# Patient Record
Sex: MALE | Race: WHITE | NOT HISPANIC OR LATINO | URBAN - METROPOLITAN AREA
[De-identification: names, ages, dates, MRNs, and addresses within clinical notes are randomized per-mention and may not be internally consistent; named-entity substitution may affect disease eponyms.]

---

## 2024-04-18 ENCOUNTER — TELEPHONE (OUTPATIENT)
Dept: TRANSPLANT | Facility: CLINIC | Age: 59
End: 2024-04-18
Payer: MEDICARE

## 2024-04-18 NOTE — TELEPHONE ENCOUNTER
----- Message from Aleena Barnes sent at 4/18/2024  2:03 PM CDT -----  Regarding: FW: Hep C appt    Have medical records that where scanned into media from [facility]. Will call referring MD office if we need any additional information on the pt.    Referring Provider:Kina Rothman PA  Phone: [phone]  Fax: [phone]  .  ===========================================================================  ----- Message -----  From: France Manzo  Sent: 4/18/2024   1:37 PM CDT  To: [facility] Liver Referral Pool  Subject: Hep C appt                                       KINA Hart is referring this pt to see you for Hep C     Can you plz ctc pt to schedule aptt?     The ref/records are in media     Thx.France   [facility]

## 2024-04-19 ENCOUNTER — TELEPHONE (OUTPATIENT)
Dept: HEPATOLOGY | Facility: CLINIC | Age: 59
End: 2024-04-19
Payer: MEDICARE

## 2024-04-19 NOTE — TELEPHONE ENCOUNTER
----- Message from Aleena Barnes sent at 4/18/2024  2:03 PM CDT -----  Regarding: FW: Hep C appt    Have medical records that where scanned into media from [facility]. Will call referring MD office if we need any additional information on the pt.    Referring Provider:Kina Rothman PA  Phone: [phone]  Fax: [phone]  .  ===========================================================================  ----- Message -----  From: France Manzo  Sent: 4/18/2024   1:37 PM CDT  To: [facility] Liver Referral Pool  Subject: Hep C appt                                       KINA Hart is referring this pt to see you for Hep C     Can you plz ctc pt to schedule aptt?     The ref/records are in media     Thx.France   [facility]

## 2024-05-07 ENCOUNTER — LAB VISIT (OUTPATIENT)
Dept: LAB | Facility: HOSPITAL | Age: 59
End: 2024-05-07
Payer: MEDICARE

## 2024-05-07 ENCOUNTER — OFFICE VISIT (OUTPATIENT)
Dept: HEPATOLOGY | Facility: CLINIC | Age: 59
End: 2024-05-07
Payer: MEDICARE

## 2024-05-07 VITALS — WEIGHT: 278.88 LBS | HEIGHT: 73 IN | BODY MASS INDEX: 36.96 KG/M2

## 2024-05-07 DIAGNOSIS — B18.2 CHRONIC HEPATITIS C WITHOUT HEPATIC COMA: ICD-10-CM

## 2024-05-07 DIAGNOSIS — K76.6 PORTAL VENOUS HYPERTENSION: ICD-10-CM

## 2024-05-07 DIAGNOSIS — K74.60 HEPATIC CIRRHOSIS, UNSPECIFIED HEPATIC CIRRHOSIS TYPE, UNSPECIFIED WHETHER ASCITES PRESENT: ICD-10-CM

## 2024-05-07 DIAGNOSIS — K76.82 HEPATIC ENCEPHALOPATHY: ICD-10-CM

## 2024-05-07 DIAGNOSIS — B20 HUMAN IMMUNODEFICIENCY VIRUS (HIV) DISEASE: ICD-10-CM

## 2024-05-07 DIAGNOSIS — R18.8 OTHER ASCITES: ICD-10-CM

## 2024-05-07 DIAGNOSIS — K74.60 HEPATIC CIRRHOSIS, UNSPECIFIED HEPATIC CIRRHOSIS TYPE, UNSPECIFIED WHETHER ASCITES PRESENT: Primary | ICD-10-CM

## 2024-05-07 DIAGNOSIS — R77.2 ELEVATED AFP: ICD-10-CM

## 2024-05-07 DIAGNOSIS — D69.6 THROMBOCYTOPENIA: ICD-10-CM

## 2024-05-07 LAB
AFP SERPL-MCNC: 35 NG/ML (ref 0–8.4)
ALBUMIN SERPL BCP-MCNC: 2.5 G/DL (ref 3.5–5.2)
ALP SERPL-CCNC: 123 U/L (ref 55–135)
ALT SERPL W/O P-5'-P-CCNC: 24 U/L (ref 10–44)
ANION GAP SERPL CALC-SCNC: 6 MMOL/L (ref 8–16)
AST SERPL-CCNC: 100 U/L (ref 10–40)
BILIRUB SERPL-MCNC: 5.7 MG/DL (ref 0.1–1)
BUN SERPL-MCNC: 9 MG/DL (ref 6–20)
CALCIUM SERPL-MCNC: 8.7 MG/DL (ref 8.7–10.5)
CHLORIDE SERPL-SCNC: 108 MMOL/L (ref 95–110)
CO2 SERPL-SCNC: 23 MMOL/L (ref 23–29)
CREAT SERPL-MCNC: 1.2 MG/DL (ref 0.5–1.4)
EST. GFR  (NO RACE VARIABLE): >60 ML/MIN/1.73 M^2
GLUCOSE SERPL-MCNC: 76 MG/DL (ref 70–110)
HAV IGG SER QL IA: REACTIVE
HBV CORE AB SERPL QL IA: REACTIVE
INR PPP: 1.5 (ref 0.8–1.2)
POTASSIUM SERPL-SCNC: 3.8 MMOL/L (ref 3.5–5.1)
PROT SERPL-MCNC: 5.6 G/DL (ref 6–8.4)
PROTHROMBIN TIME: 16.5 SEC (ref 9–12.5)
SODIUM SERPL-SCNC: 137 MMOL/L (ref 136–145)

## 2024-05-07 PROCEDURE — 82105 ALPHA-FETOPROTEIN SERUM: CPT | Performed by: PHYSICIAN ASSISTANT

## 2024-05-07 PROCEDURE — 4010F ACE/ARB THERAPY RXD/TAKEN: CPT | Mod: CPTII,S$GLB,, | Performed by: PHYSICIAN ASSISTANT

## 2024-05-07 PROCEDURE — 80053 COMPREHEN METABOLIC PANEL: CPT | Performed by: PHYSICIAN ASSISTANT

## 2024-05-07 PROCEDURE — 1160F RVW MEDS BY RX/DR IN RCRD: CPT | Mod: CPTII,S$GLB,, | Performed by: PHYSICIAN ASSISTANT

## 2024-05-07 PROCEDURE — 85610 PROTHROMBIN TIME: CPT | Performed by: PHYSICIAN ASSISTANT

## 2024-05-07 PROCEDURE — 36415 COLL VENOUS BLD VENIPUNCTURE: CPT | Performed by: PHYSICIAN ASSISTANT

## 2024-05-07 PROCEDURE — 99999 PR PBB SHADOW E&M-EST. PATIENT-LVL IV: CPT | Mod: PBBFAC,,, | Performed by: PHYSICIAN ASSISTANT

## 2024-05-07 PROCEDURE — 3008F BODY MASS INDEX DOCD: CPT | Mod: CPTII,S$GLB,, | Performed by: PHYSICIAN ASSISTANT

## 2024-05-07 PROCEDURE — 86704 HEP B CORE ANTIBODY TOTAL: CPT | Performed by: PHYSICIAN ASSISTANT

## 2024-05-07 PROCEDURE — 99205 OFFICE O/P NEW HI 60 MIN: CPT | Mod: S$GLB,,, | Performed by: PHYSICIAN ASSISTANT

## 2024-05-07 PROCEDURE — 86790 VIRUS ANTIBODY NOS: CPT | Performed by: PHYSICIAN ASSISTANT

## 2024-05-07 PROCEDURE — 1159F MED LIST DOCD IN RCRD: CPT | Mod: CPTII,S$GLB,, | Performed by: PHYSICIAN ASSISTANT

## 2024-05-07 RX ORDER — PANTOPRAZOLE SODIUM 40 MG/1
40 TABLET, DELAYED RELEASE ORAL DAILY
COMMUNITY
Start: 2024-01-15

## 2024-05-07 RX ORDER — PYRIDOXINE HCL (VITAMIN B6) 100 MG
100 TABLET ORAL
COMMUNITY

## 2024-05-07 RX ORDER — FLUOXETINE HYDROCHLORIDE 20 MG/1
20 CAPSULE ORAL
COMMUNITY
Start: 2023-10-20

## 2024-05-07 RX ORDER — ROSUVASTATIN CALCIUM 10 MG/1
10 TABLET, COATED ORAL NIGHTLY
COMMUNITY
Start: 2024-04-15

## 2024-05-07 RX ORDER — CHOLECALCIFEROL (VITAMIN D3) 25 MCG
1000 TABLET ORAL
COMMUNITY

## 2024-05-07 RX ORDER — FENOFIBRATE 160 MG/1
TABLET ORAL
COMMUNITY
Start: 2024-01-15

## 2024-05-07 RX ORDER — SPIRONOLACTONE 50 MG/1
100 TABLET, FILM COATED ORAL DAILY
Qty: 30 TABLET | Refills: 1 | Status: SHIPPED | OUTPATIENT
Start: 2024-05-07

## 2024-05-07 RX ORDER — ERGOCALCIFEROL 1.25 MG/1
50000 CAPSULE ORAL
COMMUNITY
Start: 2024-03-26

## 2024-05-07 RX ORDER — FUROSEMIDE 20 MG/1
40 TABLET ORAL DAILY
Qty: 30 TABLET | Refills: 1 | Status: SHIPPED | OUTPATIENT
Start: 2024-05-07

## 2024-05-07 RX ORDER — ZOLPIDEM TARTRATE 10 MG/1
10 TABLET ORAL
COMMUNITY
Start: 2024-01-02

## 2024-05-07 RX ORDER — LACTULOSE 10 G/15ML
20 SOLUTION ORAL 2 TIMES DAILY
Qty: 1800 ML | Refills: 3 | Status: SHIPPED | OUTPATIENT
Start: 2024-05-07

## 2024-05-07 RX ORDER — LANOLIN ALCOHOL/MO/W.PET/CERES
1000 CREAM (GRAM) TOPICAL
COMMUNITY

## 2024-05-07 RX ORDER — ELVITEGRAVIR, COBICISTAT, EMTRICITABINE, AND TENOFOVIR ALAFENAMIDE 150; 150; 200; 10 MG/1; MG/1; MG/1; MG/1
TABLET ORAL
COMMUNITY
Start: 2024-03-28

## 2024-05-07 RX ORDER — VALSARTAN 40 MG/1
1 TABLET ORAL NIGHTLY
COMMUNITY
Start: 2023-10-30

## 2024-05-07 RX ORDER — TAMSULOSIN HYDROCHLORIDE 0.4 MG/1
0.4 CAPSULE ORAL NIGHTLY
COMMUNITY
Start: 2024-04-25

## 2024-05-07 RX ORDER — METOPROLOL SUCCINATE 25 MG/1
25 TABLET, EXTENDED RELEASE ORAL
COMMUNITY
Start: 2024-01-12

## 2024-05-07 RX ORDER — BENZONATATE 100 MG/1
100 CAPSULE ORAL 3 TIMES DAILY PRN
COMMUNITY
Start: 2023-11-16 | End: 2024-11-15

## 2024-05-07 RX ORDER — LORATADINE 10 MG/1
10 TABLET ORAL
COMMUNITY

## 2024-05-07 NOTE — Clinical Note
Pls call pt: 1. MELD score yesterday was 21. As we discussed, since MELD is > 15 I am entering a referral to liver transplant clinic. They will contact him to schedule appt. May take week or 2 for them to reach out b/c they'll need to do financial check w/ insurance 2. Liver cancer screening blood test was mildly elevated. This can happen when someone has HCV and recent CT looked good which is a good sign. However, I want to be extra thorough and have him do MRI.  3. Schedule BMP in 1 week & MRI sometime soon

## 2024-05-07 NOTE — PROGRESS NOTES
HEPATOLOGY CLINIC VISIT NOTE - HCV clinic  OUTSIDE REFERRING PROVIDER: Kina Rothman PA   CHIEF COMPLAINT: Hepatitis C   (accompanied by: mother and friend)    HISTORY       This is a 58 y.o. White male with PMH of HIV / AIDS (dx 1990s, on Genvoya), who has been followed by hematology since 2021 for chronic, down-trending low plt / WBC & SM in context of HIV/AIDS. Work up including BM bx (5/2022) was unyielding. Trial of prednisone for possible ITP was not effective. More recently pt seen by local GI for HSM w/ elevated TBili. Work up revealed HCV and Cirrhosis for which pt was referred here.    HCV history:  Diagnosed 3/2024 after being referred to local GI for HSM on imaging  Unsure how he acquired HCV since 2013 screening was negative & denies risk since then.  - Prior Treatment: No  - Genotype ?  - HCV RNA >7 Mill IU/mL - 3/2024    Liver staging:  FibroScan 4/2024: kPa 72.9 (F4),  (S3)  Labs and imaging support staging    Cirrhosis history:  Decompensated: see below  (+) portal HTN: SM, low plt    Current sx of hepatic decompensation  Ascites - yes on recent imaging, (+) abd distention, up 40 lbs  ULI - yes, lower legs  Diuretic use - no  TBili elevation - yes, 4s  HE - yes, slowed mentation, mild confusion, poor balance, gait disturbance, daytime sleepiness   HE meds - no  EV bleed / hematemesis / melena - no  Albumin - low, 2s  Platelets - low, 20s  Coagulopathy - ? No INR to review    Estimated MELD 5/2024: 15 (with assigned INR of 1.0)    Cirrhosis health maintenance:  - HCC screening: CT w/ contrast 3/2024: no liver lesions; AFP needed  - Varices screening:  EGD needed  - HAV status: Unknown  - HBV status: (+) immunity, not infected. Unknown exposure    HIV history:  Diagnosed 1990s; Followed by local ID, Dr Gabriel Mcgee  On Genvoya since 2018  3/2024: CD4 166, HIV RNA <20    PMH, PSH, SOCIAL HX, FAMILY HX      Reviewed in Epic  Pertinent findings:  FAMILY HX: neg for liver diease  SOCIAL HX:  Resides in [place].  Alcohol - Prior alcohol use, Very rare now.  Drugs - no      ROS: as per HPI    PHYSICAL EXAM:  Friendly White male, in no acute distress; alert and oriented to person, place and time.   HEENT: Sclerae anicteric.   NECK: Supple  LUNGS: Normal respiratory effort.   ABDOMEN: Protuberant / distended but still depressible. No organomegaly or masses. (+) ascites.   SKIN: Warm and dry. No jaundice, No obvious rashes.   EXTREMITIES: (+) pitting edema to knees bilat  NEURO/PSYCH: Normal gate. Memory intact. Speech is slow but logical and coherent. Behavior normal. No depression or anxiety noted.    PERTINENT DIAGNOSTIC RESULTS      Outside labs: Care Everywhere          3/26/24  A1AT 156  MICHOACANO neg, SMA Neg, AMA neg  Ferritin 256, Fe 146, Fe sat 60%, TIBC 244  HBsAg neg, HBsAb pos      CT ABDOMEN AND PELVIS WITH CONTRAST  - 3/20/24  CLINICAL INFORMATION:  Left lower quadrant abdominal pain.   COMPARISON:  CT abdomen with contrast dated March 2, 2010. MRI abdomen without and with contrast dated March 19, 2013.   TECHNIQUE: Multiple axial images of the abdomen and pelvis were obtained following the intravenous administration of 100 mL of Isovue-300. Coronal and sagittal reformatted images were also reviewed.     FINDINGS:     Partially imaged postsurgical changes of prior median sternotomy.     The liver is mildly enlarged. The spleen is enlarged. The gallbladder, pancreas, adrenal glands, and kidneys demonstrate no acute abnormality. Subcentimeter left lower renal cortical hypodensity, too small to accurately characterize. No hydronephrosis.   The urinary bladder is nearly completely collapsed with apparent mild wall thickening. The prostate gland is within normal size limits. The small bowel is nondilated. Colonic diverticulosis. There is diffuse mesenteric stranding and small volume   abdominopelvic ascites. No intraperitoneal free air. Scattered calcific atherosclerosis of the abdominal aorta  and its major branch vessels.     Mild diffuse body wall edema. Mild multilevel degenerative changes of the spine.       IMPRESSION:   Diffuse mesenteric stranding and small volume abdominopelvic ascites with mild diffuse body wall edema. These findings are of uncertain etiology.   Hepatosplenomegaly.   Colonic diverticulosis.   Apparent mild circumferential urinary bladder wall thickening. Correlate clinically and with urinalysis to exclude cystitis.   Additional findings as above.     U/S ABDOMEN 1/17/23  CLINICAL HISTORY: Thrombocytopenia   COMPARISON: Ultrasound from May 27, 2022   TECHNIQUE: Ultrasound imaging of the liver, gallbladder, CBD, biliary tract, pancreas, kidneys, spleen, abdominal aorta and IVC was performed. Color doppler performed.     FINDINGS:   The pancreas is normal in as far as visualized.      No free fluid is identified within the abdomen.     The liver measures 17.3 cm in length. Echogenicity of the hepatic parenchyma is normal.  No focal liver masses are identified. Portal and hepatic veins are patent and demonstrate appropriate in direction of flow.     No biliary ductal dilatation is identified with the common duct measuring 4 mm in greatest diameter.     The gallbladder is without gallstones, mild gallbladder wall thickening at 5-6 mm, no pericholecystic fluid.     The spleen measures 17.3 x 15.4 x 11 cm.     The kidneys are normal in size and position. The right kidney measures 11.6 cm and the left kidney measures 12.3 cm in length. There is no evidence of hydronephrosis, urolithiasis, or perinephric fluid. Small echogenic area measuring 7-8 mm and the left   kidney is nonspecific     Limited imaging of the aorta and IVC reveal they are unremarkable.     IMPRESSION:   1. Redemonstration of splenomegaly   2.  Mild gallbladder wall thickening without gallstones, this is a nonspecific observation     FIBROSCAN 4/3/24  Diagnosis: Elevated liver enzymes   FibroScan steatosis result (CAP  score): 371 decibels per meter (dB/m)   FibroScan fibrosis result: 72.9 kilopascals (kPa)   IQR/med.: 7%   S3 hepatic steatosis and F4 cirrhosis of the liver.       ASSESSMENT        58 y.o. White male with:  1. Chronic HCV, genotype  ?  - treatment naive  -- Elevated transaminases  -- HAV status:  Unknown  -- HBV status:  Immunity w/ unknown exposure: Pos HBsAb, Unknown HBcAb (neg HBsAg)    2. Cirrhosis, decompensated  -- MELD score (estimated): 15  -- HCC screening: CT 3/2024 w/o liver lesions, needs AFP  -- HE: not on meds  -- Ascites / ULI: not on diuretics    3. Portal hypertension  -- pancytopenia (Plt 20s, WBC 1s-2s)  -- SM  -- EGD needed    4. HIV / AIDS  -- outside ID, on genvoya  -- 3/2024: CD4 166, HIV RNA <20    PLAN      Labs today  Begin lactulose, mechanism of action & dose titration reviewed  Begin Drummond 100 + Lasix 40, Low Na diet 2000mg; BMP in 1 week  EGD for EV screen: pt to ask referring GI provider if they can schedule, if not will order to be done at [facility]  No driving    Orders Placed This Encounter   Procedures    Hepatitis A antibody, IgG    AFP Tumor Marker    Hepatitis B Core Antibody, Total    Protime-INR    Comprehensive Metabolic Panel     Suspect MELD will be > 15 on next labs. If this is the case will refer for liver transplant eval. HCV Rx on hold for now.     ADDENDUM 5/824:  5/7/24 labs:  MELD 3.0: 21 at 5/7/2024  2:47 PM  MELD-Na: 19 at 5/7/2024  2:47 PM  Calculated from:  Serum Creatinine: 1.2 mg/dL at 5/7/2024  2:47 PM  Serum Sodium: 137 mmol/L at 5/7/2024  2:47 PM  Total Bilirubin: 5.7 mg/dL at 5/7/2024  2:47 PM  Serum Albumin: 2.5 g/dL at 5/7/2024  2:47 PM  INR(ratio): 1.5 at 5/7/2024  2:47 PM  Age at listing (hypothetical): 58 years  Sex: Male at 5/7/2024  2:47 PM    AFP 35 (no prior for comparison)  HBcAb positive  HAVAB reactive    Plan: MRI abdomen, Refer to liver transplant    ___________________________________________________________________  EDUCATION:  HCV   The  natural history of Hepatitis C, including potential progression to cirrhosis was reviewed. We discussed the increased progression of liver disease secondary to alcohol use; patient was advised to avoid alcohol completely.     Transmission of Hepatitis C was reviewed, including possible sexual transmission. Sexual contacts should be screened. Patient should avoid sharing personal products such as razors, toothbrushes, etc.     CIRRHOSIS  Discussed evidence that indicates that pt has cirrhosis.   -- Discussed liver fibrosis/scarring and relation to liver function. Reviewed significance of MELD scoring system as it relates to indication for transplant.  -- Signs and symptoms of hepatic decompensation were reviewed, including jaundice, ascites, and slowed mentation due to hepatic encephalopathy. The patient should seek medical attention if any of these things occur.   --  We discussed the potential for bleeding from esophageal varices with symptoms of hematemesis and melena. The patient should report to the Emergency Department for these symptoms.   -- We discussed the increased risk of hepatocellular carcinoma due to cirrhosis. Lifelong screening every six months with ultrasound and AFP is recommended.   -- Discussed portal hypertension, including potential development of esophageal varices. Role of EGD in screening for varices was reviewed.     -- Tylenol (acetaminophen) is okay up to 2,000mg daily  -- Avoid NSAIDs     Dietary recommendations:  -- Avoid alcohol  -- Avoid raw seafood  -- Limit Na to 2,000mg     __________________________________________________________________    Duration of encounter: 61 min  This includes face-to-face time and non face-to-face time preparing to see the patient (eg, review of tests), obtaining and/or reviewing separately obtained history, documenting clinical information in the electronic or other health record, independently interpreting resultsand communicating results to the  patient/family/caregiver, or care coordination.

## 2024-05-08 ENCOUNTER — TELEPHONE (OUTPATIENT)
Dept: HEPATOLOGY | Facility: CLINIC | Age: 59
End: 2024-05-08
Payer: MEDICARE

## 2024-05-08 ENCOUNTER — TELEPHONE (OUTPATIENT)
Dept: TRANSPLANT | Facility: CLINIC | Age: 59
End: 2024-05-08
Payer: MEDICARE

## 2024-05-08 ENCOUNTER — PATIENT MESSAGE (OUTPATIENT)
Dept: HEPATOLOGY | Facility: CLINIC | Age: 59
End: 2024-05-08
Payer: MEDICARE

## 2024-05-08 NOTE — TELEPHONE ENCOUNTER
Referral received from Jennifer Scheuermann discussed with Dr Turner   Initial  referral from Referring Provider:Kina Rothman PA  Phone: [phone]  Fax: [phone]  Patient with HCV CIRRHOSIS . MELD 21  ICD-10:   B18.2          K 74.60   Referred for liver transplant for  EVALUATION.    Referral completed and forwarded to [facility].          Insurance: EPIC

## 2024-05-08 NOTE — TELEPHONE ENCOUNTER
----- Message from Jennifer B. Scheuermann, PA sent at 5/8/2024 11:01 AM CDT -----  MELD 21. Case d/w Dr Turner. Liver transplant referral entered. Pt aware.

## 2024-05-08 NOTE — TELEPHONE ENCOUNTER
----- Message from Jennifer B. Scheuermann, PA sent at 5/8/2024 10:57 AM CDT -----  Pls call pt:  1. MELD score yesterday was 21. As we discussed, since MELD is > 15 I am entering a referral to liver transplant clinic. They will contact him to schedule appt. May take week or 2 for them to reach out b/c they'll need to do financial check w/ insurance  2. Liver cancer screening blood test was mildly elevated. This can happen when someone has HCV and recent CT looked good which is a good sign. However, I want to be extra thorough and have him do MRI.   3. Schedule BMP in 1 week & MRI sometime soon

## 2024-05-08 NOTE — TELEPHONE ENCOUNTER
I spoke with patient and msg from PA Scheuermann relayed.  He asked that orders be faxed to [facility] for scheduling; done and referral for radiology changed to external.

## 2024-05-09 ENCOUNTER — TELEPHONE (OUTPATIENT)
Dept: HEPATOLOGY | Facility: CLINIC | Age: 59
End: 2024-05-09
Payer: MEDICARE

## 2024-05-09 NOTE — TELEPHONE ENCOUNTER
Lissett Webb Shelley  Caller: Unspecified (Yesterday, 11:04 AM)  Patient has [employer] Medicare HMO. This plan is out of network with main campus for medical services. There are no out of network benefits. If auth is approved, single case agreement will be initiated to allow patient to be seen here for transplant services only. Faxed request to insurance.

## 2024-05-09 NOTE — TELEPHONE ENCOUNTER
Ashley Domingo ([phone]) and patient called back.  Patient's plan of care discussed.  It was stressed that they call back if EGD not setup with local MD.

## 2024-05-09 NOTE — TELEPHONE ENCOUNTER
I spoke with patient.  He states that he will call me back later when his friend Ashley gets to his home.  She helps to coordinate his medical care and he states that I can talk with her and discuss all of his medical issues because he has memory issues.

## 2024-05-09 NOTE — TELEPHONE ENCOUNTER
----- Message from Patricia Stephenson MA sent at 5/8/2024  4:48 PM CDT -----  Regarding: FW: Returning a Missed Call  Contact: Jam Card    ----- Message -----  From: Guillermina Ayala  Sent: 5/8/2024   4:44 PM CDT  To: [facility] Hepatology Scheduling  Subject: Returning a Missed Call                          Returning a Missed Call     Caller:Jam Card         Returning call to: Beverley     Caller can be reached @:[phone] (home)       Nature of the call:Patient returning call to beverley. Requesting  a call back

## 2024-05-09 NOTE — TELEPHONE ENCOUNTER
----- Message from Chantal Mehta sent at 5/9/2024 11:22 AM CDT -----  Regarding: Prior Auth  Contact: [phone]  CONSULT/ADVISORY    Name of Caller:  Kathleen with [facility]    Contact Preference: [phone]  Fax [phone]    Nature of Call:  States pt has an order for MRI of the abdomen, but needs prior auth before it can be scheduled.

## 2024-05-13 ENCOUNTER — TELEPHONE (OUTPATIENT)
Dept: HEPATOLOGY | Facility: CLINIC | Age: 59
End: 2024-05-13
Payer: MEDICARE

## 2024-05-13 NOTE — TELEPHONE ENCOUNTER
Ashley called our office.  She states that patient has lost 11 lbs since starting the current diuretic regimen.  He still has some abdominal swelling but it has improved.  He is taking lactulose as ordered but is sleeping more than usual and is processing information slowly.  He has fallen 3 times within the last 24 hours and one time he landed flat on his back (patient does have a history of balance issues); she and mom are unsure if patient hit his head during the falls.  Per his mom his skin and eyes look a little yellow but no skin bruising reported.  Ashley states that patient denied having edema in arms or legs, SOB, chest pain, coughing up blood, seeing blood in stool/urine, or passing black tarry stool.     I spoke with PA Scheuermann and the above relayed.  You ordered that patient report to ER.  I spoke with Ashley and order relayed.  She states that they will bring patient to [facility] ER.

## 2024-05-15 NOTE — TELEPHONE ENCOUNTER
Received notification from Lissett Burns Shelley  Caller: Unspecified (1 week ago, 11:04 AM)  Patient has been denied by insurance due to out of network status. Per the , there are in-network transplant facilities the patient can go to. This decision was rendered by the medical director and they will be mailing those options to the patient.        Pt was called, spoke with care giver informed of denial due to OON. Informed referring office will be called and informed they need to refer the pt to [facility] in [place]. Care giver with many questions re the process at [facility]. I advised her to call them and speak with the liver transplant center for any additional information.     Call to the referring office of r:Kina Rothman PA Phone: [phone] Fax: [phone]. M with staff for return call.     Second call to referring to notify pt insurance OON. Medical records faxed to [facility] as courtesy of pt.

## 2024-05-16 ENCOUNTER — TELEPHONE (OUTPATIENT)
Dept: HEPATOLOGY | Facility: CLINIC | Age: 59
End: 2024-05-16
Payer: MEDICARE

## 2024-05-16 NOTE — TELEPHONE ENCOUNTER
----- Message from Guillermina Ayala sent at 5/16/2024  8:47 AM CDT -----  Regarding: Returning a Missed Call  Contact: [email or web address]  Returning a Missed Call     Caller:[email or web address]        Returning call to: Jeimy     Caller can be reached @:[phone]     Nature of the call:Carolina is returning Libby call in reference to patient. Requesting a call back

## 2024-05-17 ENCOUNTER — TELEPHONE (OUTPATIENT)
Dept: TRANSPLANT | Facility: CLINIC | Age: 59
End: 2024-05-17
Payer: MEDICARE

## 2024-05-17 NOTE — TELEPHONE ENCOUNTER
Spoke to pts care giver informed we officially received the denial letter with in network facilities. [facility] and [facility] or the only two txp facilities in network. Ashley(care giver) stated she already appealed the decision. Both facilities are too far to travel to.  Referring office called as well and staff took at message to give to Kina Rothman PA Phone: [phone] .

## 2024-05-17 NOTE — TELEPHONE ENCOUNTER
Auth received for MRI.  Info faxed to [facility].  They will contact patient for scheduling. Ashley (family friend) notified.

## 2024-05-21 ENCOUNTER — TELEPHONE (OUTPATIENT)
Dept: HEPATOLOGY | Facility: CLINIC | Age: 59
End: 2024-05-21
Payer: MEDICARE

## 2024-05-21 NOTE — TELEPHONE ENCOUNTER
I spoke with Ashley.  She states that patient has been approved to see us here at [facility] for a possible transplant.  This info is being forwarded to the transplant team.

## 2024-05-21 NOTE — TELEPHONE ENCOUNTER
----- Message from Cherise Vasquez MA sent at 5/20/2024  4:57 PM CDT -----  Regarding: FW: Speak to Nurse Lowery  Contact: PT  [phone]    ----- Message -----  From: Shruthi Bowles  Sent: 5/20/2024   2:42 PM CDT  To: Scheuermann Jennifer B Staff  Subject: Speak to Nurse Lowery                            PT wife called requesting to speak to Nurse Lowery, please call at your earliest convenience   She has now has been approved for [facility] & ready to schedule    Patient can be contacted @# [phone] (Neeta)

## 2024-05-22 ENCOUNTER — TELEPHONE (OUTPATIENT)
Dept: TRANSPLANT | Facility: CLINIC | Age: 59
End: 2024-05-22
Payer: MEDICARE

## 2024-05-22 NOTE — TELEPHONE ENCOUNTER
Follow up with [facility] on pt status. Pt appealed denial, the  Decision was overturned. Approval being faxed to [facility] . STill need to call Renato degroot get a contact of . Ito send the SCA request

## 2024-05-22 NOTE — TELEPHONE ENCOUNTER
----- Message from Lissett Webb sent at 5/22/2024  3:19 PM CDT -----  Haven't received a call back from the . Left her a voicemail.  ----- Message -----  From: Jeimy Aleman  Sent: 5/22/2024   3:09 PM CDT  To: Lissett Webb    What's the status?

## 2024-05-27 NOTE — TELEPHONE ENCOUNTER
Lissett Webb Shelley  Caller: Unspecified (6 days ago, 11:25 AM)  Received voicemail from  Xi with [facility] [employer], she states the appeals department has overturned the decision, however the authorization is not finished being completed. She says once it is finalized she will call with the official auth and fax over to me.

## 2024-05-28 ENCOUNTER — TELEPHONE (OUTPATIENT)
Dept: TRANSPLANT | Facility: CLINIC | Age: 59
End: 2024-05-28
Payer: MEDICARE

## 2024-05-28 NOTE — TELEPHONE ENCOUNTER
I called and advised Ashley to bring the pt to the ER. She was bringing him to [facility].   the clr for txp eval. I explained to the pt, though she appealed the original denial due to pt insurance being OON, and being told the denial was overturned we are still pending the liver txp auth. Informed from OKSANA Webb: per REAL and Xi at [facility], the appeals department did overturn the decision however the authorization is not finished being completed. As of today, OKSANA Webb spoke with Xi(REAL) and per xi she has not received the information from the appeals department.  Even after the auth is given, we stil have to process a Single Case Agreement bec pt is OON.      I explained all this to pts caregiver Ashley. I again explained to her the pt can not come for any medical services and the auth will only be for transplant appts and evaluation.

## 2024-05-28 NOTE — TELEPHONE ENCOUNTER
----- Message from Ashley Marquis sent at 5/28/2024  8:06 AM CDT -----  Regarding: Patient advice  Contact: Ashley [phone]              Name of Caller:   Neeta friend of pt      Contact Preference:  [phone]    Nature of Call:  Requesting a call back is concerned states pt is confused thinks his pneumonemia  levels are high also want to get next steps in care plan

## 2024-06-04 ENCOUNTER — TELEPHONE (OUTPATIENT)
Dept: TRANSPLANT | Facility: CLINIC | Age: 59
End: 2024-06-04
Payer: MEDICARE

## 2024-06-04 NOTE — TELEPHONE ENCOUNTER
Pt called and he was made aware that we are still pending insurance auth.  Pt did not receive any calls from [facility]. He is continuing to follow up with Kina MITTAL.

## 2024-06-04 NOTE — TELEPHONE ENCOUNTER
Call to referring  Kina PIZARRO , updated on insurance issues and that pt can not be seen till we obtain the auth/SCA benefits.

## 2024-06-13 ENCOUNTER — TELEPHONE (OUTPATIENT)
Dept: HEPATOLOGY | Facility: CLINIC | Age: 59
End: 2024-06-13
Payer: MEDICARE

## 2024-06-13 DIAGNOSIS — K74.60 HEPATIC CIRRHOSIS, UNSPECIFIED HEPATIC CIRRHOSIS TYPE, UNSPECIFIED WHETHER ASCITES PRESENT: Primary | ICD-10-CM

## 2024-06-13 NOTE — TELEPHONE ENCOUNTER
MRI 6/12/24:  MRI OF THE ABDOMEN WITHOUT AND WITH CONTRAST     HISTORY: Follow-up cirrhosis.     TECHNIQUE: Multiplanar and multisequence MRI of the abdomen before and after 10 mL of Vueway.     FINDINGS: The liver surface has a slightly nodular-like component but there is no well-delineated mass within the liver. There is no focal area of abnormal enhancement. The spleen is markedly enlarged, also without focal defect. It measures 15.4 cm. The   portal vein is very prominent. No filling defect is identified within the portal vein, however. There are multiple linear structures extending from the splenic mackenzie towards the stomach suggesting varices. Stomach wall is mildly prominent but it is also   not well-distended. The adrenal glands and pancreas are normal. Kidneys are normal. No ascites is identified.     IMPRESSION:   1. Cirrhotic-appearing liver with splenomegaly. Prominent portal vein and probable small gastric varices.     Pls notify pt / family that MRI did not show any masses or tumors in the liver.  It is possible liver cancer screening lab was up due to HCV. We'll check it again to make sure it's not rising.    I recommend repeat AFP at end of this month.  Pls schedule      I will enter order for this for him to do locally, but it is my understanding that as of now things w/ his insurance are still not straightened out and his insurance actually does not cover my services.  (There have been efforts to get auth for transplant services here, but I am not a transplant provider so ultimately I can not continue to see him in clinic.)

## 2024-06-13 NOTE — TELEPHONE ENCOUNTER
----- Message from Starla Dewey RN sent at 6/13/2024  1:36 PM CDT -----  See MRI report for review.  Thanks

## 2024-06-14 ENCOUNTER — TELEPHONE (OUTPATIENT)
Dept: HEPATOLOGY | Facility: CLINIC | Age: 59
End: 2024-06-14
Payer: MEDICARE

## 2024-06-14 NOTE — TELEPHONE ENCOUNTER
I received refill request for spironolactone & furosemide    Since he is unable to continue seeing me in clinic I can not continue treating his fluid. I recommend he see local GI provider (or even PCP) for this. He may also need ongoing blood work to monitor kidneys and electrolytes while on fluid pills    thanks

## 2024-06-17 NOTE — TELEPHONE ENCOUNTER
I spoke with patient and msg from PA Scheuermann relayed.  AFP order faxed to [facility] and mailed to patient dated 6/28/24.

## 2024-06-19 ENCOUNTER — TELEPHONE (OUTPATIENT)
Dept: TRANSPLANT | Facility: CLINIC | Age: 59
End: 2024-06-19
Payer: MEDICARE

## 2024-06-19 NOTE — TELEPHONE ENCOUNTER
Pt called re pending SCA with insurance. He was made aware we  are not able to schedule any evaluation until we get the SCA. Pt stated he is scheduled to be seen at [facility] in October. Pt would like to continue with the potential to come to [facility].  He does not want us to close the request.

## 2024-06-20 ENCOUNTER — TELEPHONE (OUTPATIENT)
Dept: TRANSPLANT | Facility: CLINIC | Age: 59
End: 2024-06-20
Payer: MEDICARE

## 2024-06-20 NOTE — TELEPHONE ENCOUNTER
PT called, spoke with Mr. Card . Pt informed of insurance clearance for liver transplant evaluation.They were made aware pt will be assigned to Tiffanie Santiago Liver Transplant Coordinator. She will call to schedule appts needed to complete transplant evaluation.  They were made aware that the support person must be in attendance at the evaluation.   Transplant Department phone number provided to the patient. Time for questions, all questions answered.

## 2024-06-24 ENCOUNTER — TELEPHONE (OUTPATIENT)
Dept: TRANSPLANT | Facility: CLINIC | Age: 59
End: 2024-06-24
Payer: MEDICARE

## 2024-06-24 NOTE — TELEPHONE ENCOUNTER
----- Message from Aleena Barnes sent at 6/24/2024 11:46 AM CDT -----    Request submitted via [employer] for: colon done in 2017 from Jerald Pleitez office.   .  .

## 2024-07-01 ENCOUNTER — TELEPHONE (OUTPATIENT)
Dept: TRANSPLANT | Facility: CLINIC | Age: 59
End: 2024-07-01
Payer: MEDICARE

## 2024-07-01 DIAGNOSIS — B18.2 CHRONIC HEPATITIS C WITHOUT HEPATIC COMA: Primary | ICD-10-CM

## 2024-07-01 DIAGNOSIS — Z76.82 ORGAN TRANSPLANT CANDIDATE: ICD-10-CM

## 2024-07-01 DIAGNOSIS — B20 HUMAN IMMUNODEFICIENCY VIRUS (HIV) DISEASE: ICD-10-CM

## 2024-07-01 DIAGNOSIS — I25.10 CORONARY ARTERY DISEASE, UNSPECIFIED VESSEL OR LESION TYPE, UNSPECIFIED WHETHER ANGINA PRESENT, UNSPECIFIED WHETHER NATIVE OR TRANSPLANTED HEART: Primary | ICD-10-CM

## 2024-07-01 DIAGNOSIS — I25.10 CORONARY ARTERY DISEASE, UNSPECIFIED VESSEL OR LESION TYPE, UNSPECIFIED WHETHER ANGINA PRESENT, UNSPECIFIED WHETHER NATIVE OR TRANSPLANTED HEART: ICD-10-CM

## 2024-07-01 NOTE — TELEPHONE ENCOUNTER
Called patient's caregiver, Neeta Yates @ [phone] to discuss the liver transplant evaluation.  Informed her that evaluation will take approx 2 days to complete.  She voiced understanding of the need for pt to have his caregiver present for the  and surgeon consult, as well as for the patient education and reports that she will accompany him.  All questions/ concerns regarding liver transplant evaluation answered/ addressed.    Orders for liver transplant evaluation entered and submitted to  for scheduling.  As requested, will schedule appts 7/11-7/12.

## 2024-07-02 ENCOUNTER — TELEPHONE (OUTPATIENT)
Dept: TRANSPLANT | Facility: CLINIC | Age: 59
End: 2024-07-02
Payer: MEDICARE

## 2024-07-02 ENCOUNTER — PATIENT MESSAGE (OUTPATIENT)
Dept: TRANSPLANT | Facility: CLINIC | Age: 59
End: 2024-07-02
Payer: MEDICARE

## 2024-07-02 NOTE — TELEPHONE ENCOUNTER
Patient's phone call returned.  All questions related to the Fast [facility] liver transplant evaluation answered and addressed.  Understanding voiced.  Also provided nearby hotels and contact info for lodging.    ======================================================================    ----- Message from Ena Mendoza sent at 7/2/2024  9:25 AM CDT -----  Regarding: FW: Consult/Advisory  Contact: [phone]    ----- Message -----  From: Chantal Mehta  Sent: 7/2/2024   9:15 AM CDT  To: [facility] Pre-Liver Transplant Non-Clinical  Subject: Consult/Advisory                                 CONSULT/ADVISORY    Name of Caller:  NESHA SYED [[id number]]     Contact Preference:   [phone]    Nature of Call:  Pt is requesting a call back from Tiffanie.

## 2024-07-02 NOTE — TELEPHONE ENCOUNTER
----- Message from Aleena Barnes sent at 7/2/2024 11:55 AM CDT -----    Colon report dated 8/31/2017 has been scanned into media.  .

## 2024-07-08 PROBLEM — Z71.85 IMMUNIZATION COUNSELING: Status: ACTIVE | Noted: 2024-07-08

## 2024-07-08 PROBLEM — Z01.818 PRE-TRANSPLANT EVALUATION FOR LIVER TRANSPLANT: Status: ACTIVE | Noted: 2024-07-08

## 2024-07-10 ENCOUNTER — TELEPHONE (OUTPATIENT)
Dept: TRANSPLANT | Facility: CLINIC | Age: 59
End: 2024-07-10
Payer: MEDICARE

## 2024-07-10 NOTE — TELEPHONE ENCOUNTER
Patient called to confirm that they will be attending the scheduled appointments for Liver Transplant Fast Pass Evaluation scheduled to start 7/10/24  at 0730.  Patient confirms that caregivers will be present for the scheduled appointments.  Patient appointments reviewed along with location and special instructions.  Patient questions answered at this time and number provided to call the office if there is any problem.

## 2024-07-10 NOTE — TELEPHONE ENCOUNTER
----- Message from Monisha Santiago RN sent at 7/10/2024  1:54 PM CDT -----  Regarding: FW: Appt  Contact: [phone]  Jack....Aleena spoke w/ him earlier about holding the metoprolol until stress test complete on Friday.      Thanks!  ----- Message -----  From: Ashley Cho  Sent: 7/10/2024   9:41 AM CDT  To: [facility] Pre-Liver Transplant Clinical  Subject: Appt                                                         Name of Caller:Jam      Contact Preference:[phone]    Nature of Call:  Requesting a call back would like to know which medications he needs to stop taking prior to fasting labs scheduled for 07/11/24 @  7:30 and stress test  scheduled for 07/12/24

## 2024-07-11 ENCOUNTER — HOSPITAL ENCOUNTER (OUTPATIENT)
Dept: RADIOLOGY | Facility: HOSPITAL | Age: 59
Discharge: HOME | End: 2024-07-11
Attending: INTERNAL MEDICINE
Payer: MEDICARE

## 2024-07-11 ENCOUNTER — SOCIAL WORK (OUTPATIENT)
Dept: TRANSPLANT | Facility: CLINIC | Age: 59
End: 2024-07-11
Payer: MEDICARE

## 2024-07-11 ENCOUNTER — EVALUATION (OUTPATIENT)
Dept: TRANSPLANT | Facility: CLINIC | Age: 59
End: 2024-07-11
Payer: MEDICARE

## 2024-07-11 VITALS
RESPIRATION RATE: 18 BRPM | DIASTOLIC BLOOD PRESSURE: 48 MMHG | SYSTOLIC BLOOD PRESSURE: 99 MMHG | OXYGEN SATURATION: 97 % | BODY MASS INDEX: 33.92 KG/M2 | TEMPERATURE: 97 F | HEART RATE: 77 BPM | HEIGHT: 72 IN | WEIGHT: 250.44 LBS

## 2024-07-11 DIAGNOSIS — Z76.82 ORGAN TRANSPLANT CANDIDATE: ICD-10-CM

## 2024-07-11 DIAGNOSIS — B18.2 CHRONIC HEPATITIS C WITHOUT HEPATIC COMA: ICD-10-CM

## 2024-07-11 DIAGNOSIS — B18.2 CHRONIC HEPATITIS C WITHOUT HEPATIC COMA: Primary | ICD-10-CM

## 2024-07-11 DIAGNOSIS — B20 HUMAN IMMUNODEFICIENCY VIRUS (HIV) DISEASE: ICD-10-CM

## 2024-07-11 DIAGNOSIS — Z01.818 PRE-TRANSPLANT EVALUATION FOR LIVER TRANSPLANT: ICD-10-CM

## 2024-07-11 DIAGNOSIS — I25.10 CORONARY ARTERY DISEASE, UNSPECIFIED VESSEL OR LESION TYPE, UNSPECIFIED WHETHER ANGINA PRESENT, UNSPECIFIED WHETHER NATIVE OR TRANSPLANTED HEART: ICD-10-CM

## 2024-07-11 DIAGNOSIS — Z71.85 IMMUNIZATION COUNSELING: Primary | ICD-10-CM

## 2024-07-11 PROCEDURE — 93975 VASCULAR STUDY: CPT | Mod: TC,TXP

## 2024-07-11 PROCEDURE — 99999 PR PBB SHADOW E&M-EST. PATIENT-LVL V: CPT | Mod: PBBFAC,TXP,,

## 2024-07-11 NOTE — PROGRESS NOTES
PHARM.D. PRE-TRANSPLANT NOTE:    This patient's medication therapy was evaluated as part of his pre-transplant evaluation.      The following general pharmacologic concerns were noted: Patient currently on Genvoya and fluoxetine.    The following concerns for post-operative pain management were noted: None    The following pharmacologic concerns related to HCV therapy were noted: None      This patient's medication profile was reviewed for considerations for DAA Hepatitis C therapy:    [X]  No current inducers of CYP 3A4 or PGP  [X]  No amiodarone on this patient's EMR profile in the last 24 months  [X]  No past or current atrial fibrillation on this patient's EMR profile       Current Outpatient Medications   Medication Sig Dispense Refill    benzonatate (TESSALON) 100 MG capsule Take 100 mg by mouth 3 times daily as needed.      cyanocobalamin (VITAMIN B-12) 1000 MCG tablet Take 1,000 mcg by mouth.      ergocalciferol (ERGOCALCIFEROL) 50,000 unit Cap Take 50,000 Units by mouth every 7 days.      fenofibrate 160 MG Tab TAKE 1 TABLET(160 MG) BY MOUTH DAILY      FLUoxetine 20 MG capsule Take 20 mg by mouth.      furosemide (LASIX) 20 MG tablet Take 2 tablets (40 mg total) by mouth once daily. (Patient taking differently: Take 20 mg by mouth 2 (two) times a day.) 30 tablet 1    GENVOYA 307-180-357-10 mg Tab TAKE ONE TABLET BY MOUTH DAILY WITH FOOD.      lactulose (CHRONULAC) 20 gram/30 mL Soln Take 30 mLs (20 g total) by mouth 2 (two) times daily. Goal of 3-5 soft stools each day 1800 mL 3    loratadine (CLARITIN) 10 mg tablet Take 10 mg by mouth.      metoprolol succinate (TOPROL-XL) 25 MG 24 hr tablet Take 25 mg by mouth.      pantoprazole (PROTONIX) 40 MG tablet Take 40 mg by mouth once daily.      pyridoxine, vitamin B6, (B-6) 100 MG Tab Take 100 mg by mouth.      rosuvastatin (CRESTOR) 10 MG tablet Take 10 mg by mouth every evening.      spironolactone (ALDACTONE) 50 MG tablet Take 2 tablets (100 mg total) by  mouth once daily. 30 tablet 1    tamsulosin (FLOMAX) 0.4 mg Cap Take 0.4 mg by mouth every evening.      valsartan (DIOVAN) 40 MG tablet Take 1 tablet by mouth every evening.      vitamin D (VITAMIN D3) 1000 units Tab Take 1,000 Units by mouth.       No current facility-administered medications for this visit.           I am available for consultation and can be contacted, as needed by the other members of the Transplant team.

## 2024-07-11 NOTE — PROGRESS NOTES
Transplant Recipient Adult Psychosocial Assessment  SW met with pt and his best friend, Neeta, in clinic    Jam Card  [address] [place] [address]  Telephone Information:   Mobile [phone]   Mobile [phone]   Home  [phone] (home)  Work  There is no work phone number on file.  E-mail  [email or web address]    Sex: male  YOB: 1965  Age: 58 y.o.    Encounter Date: 7/11/2024  [place] Citizen: yes  Primary Language: English   Needed: no    Emergency Contact:  Name: Chelita Card  Relationship: mother  Address: same as pt  Phone Numbers:  C: [phone], H: [phone]    Family/Social Support:   Number of dependents/: None  Marital history: Pt has never been   Other family dynamics: Pt has support from his friend, his mother, and his brother (they are fraternal twins), as well as nieces.    Household Composition:  Name: Chelita Card  Age: 87  Relationship: mother  Does person drive? no    Do you and your caregivers have access to reliable transportation? yes  PRIMARY CAREGIVER: Neeta Domingo will be primary caregiver, phone number [phone].      provided in-depth information to patient and caregiver regarding pre- and post-transplant caregiver role.   strongly encourages patient and caregiver to have concrete plan regarding post-transplant care giving, including back-up caregiver(s) to ensure care giving needs are met as needed.    Patient and Caregiver states understanding all aspects of caregiver role/commitment and is able/willing/committed to being caregiver to the fullest extent necessary.    Patient and Caregiver verbalizes understanding of the education provided today and caregiver responsibilities.         remains available. Patient and Caregiver agree to contact  in a timely manner if concerns arise.      Able to take time off work without financial concerns: yes. Neeta does not  "work.    Additional Significant Others who will Assist with Transplant:  Neeta states she has 2 daughters who are more than willing to help, and pt has 3 nieces who are also willing to help. Pt and Neeta will talk to their families, SW to call pt on Monday 7/15 to get contact information from back-up caregivers.    Living Will: no  Healthcare Power of : no  Advance Directives on file: <<no information> per medical record.  Verbally reviewed LW/HCPA information.   provided patient with copy of LW/HCPA documents and provided education on completion of forms.    Living Donors: No.    Highest Education Level: Associate/Bachelor Degree  Reading Ability: 12th grade  Reports difficulty with:  fine motor skills, and speaking. Pt has no cognitive issues, but had a "fever coma" in  that left him with some deficits. Pt states he had to relearn to eat and speak. Pt states he has slow speech now, but no issues with comprehension. Pt states he also needs to drink through a straw.  Learns Best By:  combination of methods     Status: no  VA Benefits: no     Working for Income: No  If no, reason not working: Disability  Patient is  disabled following his "fever coma". This occurred in , pt previously worked as an .    Spouse/Significant Other Employment: N/A    Disabled: Yes, since  due to "fever coma" deficits.    Monthly Income:   Disability: $4k  Able to afford all costs now and if transplanted, including medications: yes  Patient and Caregiver verbalizes understanding of personal responsibilities related to transplant costs and the importance of having a financial plan to ensure that patients transplant costs are fully covered.      provided fundraising information/education.  Patient and Caregiver verbalizes understanding.   remains available.    Insurance:   Payer/Plan Subscr  Sex Relation Sub. Ins. ID Effective Group Num   1. CIG[employer] MANAGED* " NESHA SYED 1965 Male Self [id number] 1/[birth year]/15 [id number]                                   [address]     Primary Insurance (for UNOS reporting): Public Insurance - Medicare & Choice  Secondary Insurance (for UNOS reporting): None  Patient and Caregiver verbalizes clear understanding that patient may experience difficulty obtaining and/or be denied insurance coverage post-surgery. This includes and is not limited to disability insurance, life insurance, health insurance, burial insurance, long term care insurance, and other insurances.    Patient and Caregiver also reports understanding that future health concerns related to or unrelated to transplantation may not be covered by patient's insurance.  Resources and information provided and reviewed.      Patient and Caregiver provides verbal permission to release any necessary information to outside resources for patient care and discharge planning.  Resources and information provided are reviewed.      Infusion Service: patient utilizing? no  Home Health: patient utilizing?  [facility]  DME:  walker and [facility], pt not using at this time  Pulmonary/Cardiac Rehab: None reported   ADLS:  Pt is independent, and drives.    Adherence:   Pt reports a high level of adherence to his plan of care.  Adherence education and counseling provided.     Per History Section:  Past Medical History:   Diagnosis Date    CAD (coronary artery disease)     Hx of MI and cardiac cath    Chronic hepatitis C     Chronic hepatitis C with cirrhosis     Cirrhosis     GERD (gastroesophageal reflux disease)     HTN (hypertension)     Human immunodeficiency virus (HIV) disease     Hyperlipidemia      Social History     Tobacco Use    Smoking status: Not on file    Smokeless tobacco: Not on file   Substance Use Topics    Alcohol use: Not on file     Social History     Substance and Sexual Activity   Drug Use Not on file     Social History     Substance and Sexual Activity   Sexual  Activity Not on file       Per Today's Psychosocial:  Tobacco:  Pt states his last use was a few months ago. Pt reports that he starting smoking as an older teen, and that a pack would last him for several days .  Alcohol:  Pt states his last drink was around 2020. Pt reports he has only been a social drinker, no history of heavy alcohol consumption reported .  Illicit Drugs/Non-prescribed Medications:  Pt states he last used in 2009. Pt has a hx of smoking cocaine and meth. Pt did attend rehab in 1999, but used again after. Pt reports abstinence since 2009. Pt also has a remote history of THC use .    Patient and Caregiver states clear understanding of the potential impact of substance use as it relates to transplant candidacy and is aware of possible random substance screening.  Substance abstinence/cessation counseling, education and resources provided and reviewed.     Arrests/DWI/Treatment/Rehab:  Pt received DUIs in 1997 and 2007, pt states nothing is on his record.    Psychiatric History:    Mental Health: depression related to health issues.  Psychiatrist/Counselor: None reported   Medications:  Prozac   Suicide/Homicide Issues: None reported currently, or in the past   Safety at home: Pt feels safe at home.    Knowledge: Patient and Caregiver states having clear understanding and realistic expectations regarding the potential risks and potential benefits of organ transplantation and organ donation, agrees to discuss with health care team members and support system members, and to utilize available resources and express questions and/or concerns in order to further facilitate the pt informed decision-making.  Resources and information provided and reviewed.     Patient and Caregiver is aware of [facility]'s affiliation and/or partnership with agencies in home health care, LTAC, SNF, [facility], and other hospitals and clinics.    Understanding: Patient and Caregiver reports having a clear understanding of the many  lifetime commitments involved with being a transplant recipient, including costs, compliance, medications, lab work, procedures, appointments, concrete and financial planning, preparedness, timely and appropriate communication of concerns, abstinence (ETOH, tobacco, illicit non-prescribed drugs), adherence to all health care team recommendations, support system and caregiver involvement, appropriate and timely resource utilization and follow-through, mental health counseling as needed/recommended, and patient and caregiver responsibilities.  Social Service Handbook, resources and detailed educational information provided and reviewed.  Educational information provided.    Patient and Caregiver also reports current and expected compliance with health care regime and states having a clear understanding of the importance of compliance.      Patient and Caregiver reports a clear understanding that risks and benefits may be involved with organ transplantation and with organ donation.      Patient and Caregiver also reports clear understanding that psychosocial risk factors may affect patient, and include but are not limited to feelings of depression, generalized anxiety, anxiety regarding dependence on others, post traumatic stress disorder, feelings of guilt and other emotional and/or mental concerns, and/or exacerbation of existing mental health concerns.  Detailed resources provided and discussed.     Patient and Caregiver agrees to access appropriate resources in a timely manner as needed and/or as recommended, and to communicate concerns appropriately.  Patient and Caregiver also reports a clear understanding of treatment options available.      reviewed education, provided additional information, and answered questions.    Feelings or Concerns: Dying, support provided    Coping: Identify Patient & Caregiver Strategies to [place]:   1. In the past - hanging out with friends, being social and active. Pt  states that due to his other diagnosis he has not done much since covid.    2. Currently & Pre-transplant - tv, ipad, friends come over to visit pt and his mother. Pt states he is more limited now due to health issues. Pt does take his mom to [place].   3. At the time of surgery - Family support   4. During post-Transplant & Recovery Period - Family support    Goals: To be more active again, and go on a cruise with Neeta.  Patient referred to Vocational Rehabilitation.    Interview Behavior: Patient and Caregiver presents as alert and oriented x 4, pleasant, communicative, and asking and answering questions appropriately.          Transplant Social Work - Candidacy  Assessment/Plan:     Psychosocial Suitability:  SUITABILITY BASED ON  SOLIDIFYING A BACK-UP CAREGIVER PLAN ON MONDAY 7/15, SW TO UPDATE THIS THEN. Based on psychosocial risk factors, patient presents as low risk, due to having a solid caregiver plan, no significant history of mental health issues, remote remote history of drug use. Pt has adequate finances, and active insurance .    Recommendations/Additional Comments:   -SW to solidify back-up caregiver plan on Mon 7/15  -Fundraising as needed  -Local Lodging    Tyesha Jo LCSW

## 2024-07-11 NOTE — PROGRESS NOTES
Subjective:       Patient ID: Jam Card is a 58 y.o. male.    Chief Complaint: No chief complaint on file.    HPI  I saw this 58 y.o. man with HCV cirrhosis and HIV who has decompensated liver disease.    Became unwell with liver disease only in March 2024.  - recent diagnosis of HCV- March 2024  - ascites + edema  - jaundice  - HE  - hypoalbuminemia, thrombocytopenia and coagulaopathy    - first seen by Marychuy Zapata on 5/7/24.    AFP 35    Hospitalizations x 2 for HE in last couple of months    MRI abdo: 6/12/2024  Cirrhotic-appearing liver with splenomegaly. Prominent portal vein and probable small gastric varices     Chest CT: 6/5/24  8.5 mm nodular density in the right lower lobe, not amenable to vertex biopsy due to position. Recommend PET/CT     MELD 3.0: 29 at 7/11/2024  7:39 AM  MELD-Na: 28 at 7/11/2024  7:39 AM  Calculated from:  Serum Creatinine: 3.2 mg/dL (Using max of 3 mg/dL) at 7/11/2024  7:39 AM  Serum Sodium: 138 mmol/L (Using max of 137 mmol/L) at 7/11/2024  7:39 AM  Total Bilirubin: 4.9 mg/dL at 7/11/2024  7:39 AM  Serum Albumin: 2.3 g/dL at 7/11/2024  7:39 AM  INR(ratio): 1.5 at 7/11/2024  7:39 AM  Age at listing (hypothetical): 58 years  Sex: Male at 7/11/2024  7:39 AM      PMH:  HIV- since 1997  Chronic Brain syndrome- 2009    Appendictomy- 2005  CABG 2010    ?NSTEMI    SH:  No alcohol  Ex smoker- June 2024-1/3 pack per day for decades    Lives with mom (85)  Caregiver Neeta here with him      FH:  CAD- father        Review of Systems   Constitutional:  Negative for activity change, appetite change, chills, fatigue, fever and unexpected weight change.   HENT:  Negative for hearing loss.    Eyes:  Negative for discharge and visual disturbance.   Respiratory:  Negative for cough, chest tightness, shortness of breath and wheezing.    Cardiovascular:  Negative for chest pain, palpitations and leg swelling.   Gastrointestinal:  Negative for abdominal distention, abdominal pain, constipation,  diarrhea and nausea.   Genitourinary:  Negative for dysuria and frequency.   Musculoskeletal:  Negative for arthralgias and back pain.   Skin:  Negative for pallor and rash.   Neurological:  Negative for dizziness, tremors, speech difficulty and headaches.   Hematological:  Negative for adenopathy.   Psychiatric/Behavioral:  Negative for agitation and confusion.            Lab Results   Component Value Date    ALT 45 (H) 07/11/2024     (H) 07/11/2024    GGT 74 (H) 07/11/2024    ALKPHOS 182 (H) 07/11/2024    BILITOT 4.9 (H) 07/11/2024     Past Medical History:   Diagnosis Date    CAD (coronary artery disease)     Hx of MI and cardiac cath    Chronic hepatitis C     Chronic hepatitis C with cirrhosis     Cirrhosis     GERD (gastroesophageal reflux disease)     HTN (hypertension)     Human immunodeficiency virus (HIV) disease     Hyperlipidemia      Past Surgical History:   Procedure Laterality Date    CARDIAC CATHETERIZATION      TONSILLECTOMY AND ADENOIDECTOMY      TRIGGER FINGER RELEASE       Current Outpatient Medications   Medication Sig    benzonatate (TESSALON) 100 MG capsule Take 100 mg by mouth 3 times daily as needed.    cyanocobalamin (VITAMIN B-12) 1000 MCG tablet Take 1,000 mcg by mouth.    ergocalciferol (ERGOCALCIFEROL) 50,000 unit Cap Take 50,000 Units by mouth every 7 days.    fenofibrate 160 MG Tab TAKE 1 TABLET(160 MG) BY MOUTH DAILY    FLUoxetine 20 MG capsule Take 20 mg by mouth.    furosemide (LASIX) 20 MG tablet Take 2 tablets (40 mg total) by mouth once daily. (Patient taking differently: Take 20 mg by mouth 2 (two) times a day.)    GENVOYA [phone] mg Tab TAKE ONE TABLET BY MOUTH DAILY WITH FOOD.    lactulose (CHRONULAC) 20 gram/30 mL Soln Take 30 mLs (20 g total) by mouth 2 (two) times daily. Goal of 3-5 soft stools each day    loratadine (CLARITIN) 10 mg tablet Take 10 mg by mouth.    metoprolol succinate (TOPROL-XL) 25 MG 24 hr tablet Take 25 mg by mouth.    pantoprazole  (PROTONIX) 40 MG tablet Take 40 mg by mouth once daily.    pyridoxine, vitamin B6, (B-6) 100 MG Tab Take 100 mg by mouth.    rosuvastatin (CRESTOR) 10 MG tablet Take 10 mg by mouth every evening.    spironolactone (ALDACTONE) 50 MG tablet Take 2 tablets (100 mg total) by mouth once daily.    tamsulosin (FLOMAX) 0.4 mg Cap Take 0.4 mg by mouth every evening.    valsartan (DIOVAN) 40 MG tablet Take 1 tablet by mouth every evening.    vitamin D (VITAMIN D3) 1000 units Tab Take 1,000 Units by mouth.     No current facility-administered medications for this visit.       Objective:      Physical Exam  HENT:      Head: Normocephalic.   Eyes:      Pupils: Pupils are equal, round, and reactive to light.   Neck:      Thyroid: No thyromegaly.   Cardiovascular:      Rate and Rhythm: Normal rate and regular rhythm.      Heart sounds: Normal heart sounds.   Pulmonary:      Effort: Pulmonary effort is normal.      Breath sounds: Normal breath sounds. No wheezing.   Abdominal:      General: There is no distension.      Palpations: Abdomen is soft. There is no mass.      Tenderness: There is no abdominal tenderness.   Lymphadenopathy:      Cervical: No cervical adenopathy.   Skin:     General: Skin is warm.      Findings: No erythema or rash.   Neurological:      Mental Status: He is alert and oriented to person, place, and time.   Psychiatric:         Behavior: Behavior normal.         Assessment:       1. Immunization counseling    2. Pre-transplant evaluation for liver transplant    3. Chronic hepatitis C without hepatic coma    4. Human immunodeficiency virus (HIV) disease    5. Coronary artery disease, unspecified vessel or lesion type, unspecified whether angina present, unspecified whether native or transplanted heart    6. Organ transplant candidate        Plan:   This 58 year old man with HIV infection that is well controlled has also recently been found to have decompensated HCV cirrhosis.  He had fluid retention and HE  and was started on diuretics recently. Unfortunately, this has resolved his edema/ascites but has caused a significant rise in his creatinine.    Today, I asked him to stop his diureitcs and offered him admission to hospital to maneage his kidney dysfunction. Unfortunately his insurance does not cover non-transplant care so he decided to get his labs rechecked in the morning and make a decision re admission once we see his creatinine.  In the meantime, he will contiue his liver Tx evaluation.    Today he did not have any evidence of HE- his slow speech is a result of his brain injury in 2009. His functional status and mobility are very good.    UNOS Patient Status  Functional Status: 70% - Cares for self: unable to carry on normal activity or active work  Physical Capacity: No Limitations    Patient on life support: No  Diabetes: No  Any previous malignancy: No  Neoadjuvant Therapy: no  Has patient ever had a dx of HCC: no  Previous Abdominal Surgery: no  Spontaneous Bacterial Peritonitis: no  History of Portal Vein Thrombosis: no  Transjugular Intrahepatic Portosystemic Shunt: no

## 2024-07-11 NOTE — PROGRESS NOTES
TRANSPLANT NUTRITIONAL ASSESSMENT    Referring Provider: Kris Turner MD    Reason for Visit: Pre-liver transplant work-up    Age: 58 y.o.  Sex: male    Patient Active Problem List   Diagnosis    Chronic hepatitis C    Human immunodeficiency virus (HIV) disease    Pre-transplant evaluation for liver transplant    Immunization counseling     Past Medical History:   Diagnosis Date    CAD (coronary artery disease)     Hx of MI and cardiac cath    Chronic hepatitis C     Chronic hepatitis C with cirrhosis     Cirrhosis     GERD (gastroesophageal reflux disease)     HTN (hypertension)     Human immunodeficiency virus (HIV) disease     Hyperlipidemia      Lab Results   Component Value Date    GLU 90 07/11/2024    K 4.9 07/11/2024    ALBUMIN 2.3 (L) 07/11/2024    CALCIUM 9.0 07/11/2024     Other Pertinent Labs: none    Current Outpatient Medications   Medication Sig    benzonatate (TESSALON) 100 MG capsule Take 100 mg by mouth 3 times daily as needed.    cyanocobalamin (VITAMIN B-12) 1000 MCG tablet Take 1,000 mcg by mouth.    ergocalciferol (ERGOCALCIFEROL) 50,000 unit Cap Take 50,000 Units by mouth every 7 days.    fenofibrate 160 MG Tab TAKE 1 TABLET(160 MG) BY MOUTH DAILY    FLUoxetine 20 MG capsule Take 20 mg by mouth.    furosemide (LASIX) 20 MG tablet Take 2 tablets (40 mg total) by mouth once daily. (Patient taking differently: Take 20 mg by mouth 2 (two) times a day.)    GENVOYA [phone] mg Tab TAKE ONE TABLET BY MOUTH DAILY WITH FOOD.    lactulose (CHRONULAC) 20 gram/30 mL Soln Take 30 mLs (20 g total) by mouth 2 (two) times daily. Goal of 3-5 soft stools each day    loratadine (CLARITIN) 10 mg tablet Take 10 mg by mouth.    metoprolol succinate (TOPROL-XL) 25 MG 24 hr tablet Take 25 mg by mouth.    pantoprazole (PROTONIX) 40 MG tablet Take 40 mg by mouth once daily.    pyridoxine, vitamin B6, (B-6) 100 MG Tab Take 100 mg by mouth.    rosuvastatin (CRESTOR) 10 MG tablet Take 10 mg by mouth every  evening.    spironolactone (ALDACTONE) 50 MG tablet Take 2 tablets (100 mg total) by mouth once daily.    tamsulosin (FLOMAX) 0.4 mg Cap Take 0.4 mg by mouth every evening.    valsartan (DIOVAN) 40 MG tablet Take 1 tablet by mouth every evening.    vitamin D (VITAMIN D3) 1000 units Tab Take 1,000 Units by mouth.     No current facility-administered medications for this visit.     Allergies: Hydrocodone-acetaminophen, Lisinopril, and Sulfamethoxazole-trimethoprim    Ht Readings from Last 1 Encounters:   07/11/24 6' (1.829 m)     Wt Readings from Last 1 Encounters:   07/11/24 113.6 kg (250 lb 7.1 oz)      BMI: Body mass index is 33.97 kg/m².    Usual Weight: 250 lb  Weight Change/Time: generally stable, dropped to 238 lb at lowest  Current Diet: regular  Appetite/Current Intake: good   Exercise/Physical Activity: walks around home, has groceries delivered or picks up curbside, some debility  LFI: 4.64  Nutritional/Herbal Supplements: Vit B 6, B12, Vit D  Potential Food/Medication Interactions: reviewed  Chewing/Swallowing Problems: none  Symptoms: none  Assessment of Lab Values: Alb 2.3  Support System: caregiver present, pt lives with his mom (85 yr old), mom cooks    Estimated Kcal Need: 2280 kcal (20 kcal/kg)  Estimated Protein Need: 114 gm (1 gm/kg)    Nutritional History:   Pt in wheelchair but states he does not use a wheelchair or any assistance with mobility at home. Pt states he had some n/v yesterday but that is a rare occurrence. He provided the following diet recall:  B: granola bars  L: frozen pizza / pizza rolls / sloppy-glenda sandwich   D: broccoli/brussel sprouts/ carrots/various fresh vegetables, spaghetti w/ ground beef / dirty rice / chicken / baked fish / baked pork chop   Snack: apples, pears, bananas, cereal , oatmeal, grits ( butter, cinnamon), baked chips, unsalted popcorn, yogurt  Beverage: water, some gatorade or iced tea     Nutritional Diagnoses  Problem: food- and nutrition-related knowledge  deficit  Etiology: r/ t no prior edu on pre liver transplant nutrition recommendations  Symptoms: aeb diet recall     Educational Need? yes  Barriers: none identified  Discussed with: patient and caregiver  Interventions: Patient taught nutrition information regarding Pre-liver transplant work-up  Pre liver transplant nutrition packet provided and discussed. Pt advised on the recommendations to follow a low sodium diet while taking in adequate protein.  This packet includes label low sodium reading tips, seasoning suggestions, protein intake goal amount (gm) for the individual patient, as well as oral supplement suggestions.   Exercise and physical activity are encouraged.    Goals/Recommendations: diet adherence and small frequent meals and snacks  Actions Taken: instruct/provide written information  Strategies Used: problem solving, goal setting, motivational interviewing  Patient and/or family comprehend instructions: yes , adherence expected  Outcome: Verbalizes understanding  Monitoring: Contact information provided, will f/u in clinic and communicate with the care team as needed.     Counseling Time: 15 minutes

## 2024-07-11 NOTE — PROGRESS NOTES
Transplant Surgery Liver Transplant Recipient Evaluation    Referring Physician: Kina Rothman  Corresponding Physician: Kina Rothman    Subjective:     Reason for Visit: evaluate liver transplant candidacy    History of Present Illness: Jam Card is a 58 y.o. year old male who is being evaluated for liver transplantation.    Transplant History: N/A    Native Liver Disease (UNOS terminology): Cirrhosis: Type C (based on medical records from referral).    Manifestations of liver disease: edema, encephalopathy, fatigue, hepatorenal syndrome, muscle wasting, and portal hypertension  MELD 3.0: 29 at 7/11/2024  7:39 AM  MELD-Na: 28 at 7/11/2024  7:39 AM  Calculated from:  Serum Creatinine: 3.2 mg/dL (Using max of 3 mg/dL) at 7/11/2024  7:39 AM  Serum Sodium: 138 mmol/L (Using max of 137 mmol/L) at 7/11/2024  7:39 AM  Total Bilirubin: 4.9 mg/dL at 7/11/2024  7:39 AM  Serum Albumin: 2.3 g/dL at 7/11/2024  7:39 AM  INR(ratio): 1.5 at 7/11/2024  7:39 AM  Age at listing (hypothetical): 58 years  Sex: Male at 7/11/2024  7:39 AM      External provider notes reviewed: Yes    PMH: reviewed  PSH: reviewed    Review of Systems  Objective:     Physical Exam:  Constitutional:   Vitals reviewed: yes   Well-nourished and well-groomed: yes  Eyes:   Sclerae icteric: no   Extraocular movements intact: yes  GI:    Bowel sounds normal: yes   Tenderness: no    If yes, quadrant/location: not applicable   Palpable masses: no    If yes, quadrant/location: not applicable   Hepatosplenomegaly: no   Ascites: no   Hernia: no    If yes, type/location: not applicable   Surgical scars: no    If yes, type/location: appendectomy  Resp:   Effort normal: yes   Breath sounds normal: yes    CV:   Regular rate and rhythm: yes   Heart sounds normal: yes   Femoral pulses normal: yes   Extremities edematous: no  Skin:   Rashes or lesions present: no    If yes, describe:not applicable   Jaundice:: no    Musculoskeletal:   Gait normal:  yes   Strength normal: yes  Psych:   Oriented to person, place, and time: yes   Affect and mood normal: yes    Additional comments: not applicable    Diagnostics:  The following labs have been reviewed: CBC  BMP  INR  The following radiology images have been independently reviewed and interpreted: NA         Transplant Surgery - Candidacy   Assessment/Plan:   I see no surgical contraindication to liver transplantation. Based on available information, Jam Card is a suitable liver transplant candidate. Final determination of transplant candidacy will be made once evaluation is complete and reviewed by the Liver Transplant Selection Committee.    Patient advised that it is recommended that all transplant candidates, and their close contacts and household members receive Covid vaccination.    Additional testing to be completed according to Liver : Written Order Guideline for Adult Liver Transplant Evaluation (LI-02)    Interpretation of tests and discussion of patient management involves all members of the multidisciplinary transplant team.    Jeff Seymour MD       Counseling: We provided Jam Card with a group education session today.  We discussed liver transplantation at length with him, including risks, potential complications, and alternatives in the management of his liver disease.  The discussion included complications related to anesthesia, bleeding, infection, primary nonfunction, and vascular thrombosis.  I discussed the typical postoperative course, length of hospitalization, the need for long-term immunosuppression, and the need for long-term routine follow-up.  I discussed living-donor and -donor transplantation and the relative advantages and disadvantages of each.  I also discussed average waiting times for both living donation and  donation.  I discussed national and center-specific survival rates.  I also mentioned the potential benefit of multicenter listing to  candidates listed with centers within more than one organ procurement organization.  All questions were answered.    Coronavirus disease (COVID-19) caused by severe acute respiratory virus coronavirus 2 (SARS-C0V 2) is associated with increased mortality in solid organ transplant recipients (SOT) compared to non-transplant patients. Vaccine responses to vaccination are depressed against SARS-CoV2 compared to normal individuals but improve with third vaccination doses. Vaccination prior to SOT provides both the best opportunity for transplant candidates to develop protective immunity and to reduce the risk of serious COVID19 infections post transplantation. Organ transplant candidates at [facility] will be required to receive SARS-CoV-2 vaccination prior to being listed with a an active status, whenever possible. Exceptions will be made for disability related reasons or for sincerely held [employer] beliefs.     PHS: I discussed the use of organs from donors with PHS risk criteria, including the testing protocols utilized, as well as data from the literature regarding the likelihood of transmission of hepatitis or HIV.  The patient is willing to consider such grafts.  DCD: I discussed the use of organs recovered by donation after cardiac death (DCD), including slightly decreased graft survival and greater risk of arterial and biliary complications. The potential advantage to the recipient is possibly receiving a transplant sooner by accepting such an organ. The patient is willing to consider such grafts.  HBcAb: I discussed the use of organs from donors with HBcAb in conjunction with long term use of HBV antiviral drugs, such as lamivudine. The small but measurable risk of hepatitis B seroconversion was discussed as well as the potentially life long need to continue antiviral drugs. The patient is willing to consider such grafts.  HCV Non-viremic recipient: I discussed the use of  HCV-positive organs in naive recipients, including the risk of viral transmission to the patients or others, potential insurance barriers for antiviral medication coverage, risk for fibrosing cholestatic hepatitis, death or graft loss. The potential advantage to the recipient is the possibility of receiving a transplant sooner with decreased mortality risk by accepting such an organ. The patient is willing to consider such grafts.  LDLT: I discussed the nature of living donor liver transplant, including donor risks and more frequent recipient complications. The patient is willing to consider such grafts.  Covid: I discussed that this donor has tested positive for the covid virus, but with very low levels of virus and no evidence of covid disease. Although the risk of transmission is unknown, we believe this donor is not infectious and use of the abdominal organs is safe.  To date, these organs have been used with no evidence of transmission. The patient is willing to consider such grafts.

## 2024-07-12 ENCOUNTER — HOSPITAL ENCOUNTER (OUTPATIENT)
Dept: CARDIOLOGY | Facility: HOSPITAL | Age: 59
Discharge: HOME | End: 2024-07-12
Attending: INTERNAL MEDICINE
Payer: MEDICARE

## 2024-07-12 ENCOUNTER — TELEPHONE (OUTPATIENT)
Dept: TRANSPLANT | Facility: CLINIC | Age: 59
End: 2024-07-12
Payer: MEDICARE

## 2024-07-12 ENCOUNTER — HOSPITAL ENCOUNTER (OUTPATIENT)
Dept: RADIOLOGY | Facility: HOSPITAL | Age: 59
Discharge: HOME | End: 2024-07-12
Attending: INTERNAL MEDICINE
Payer: MEDICARE

## 2024-07-12 VITALS
DIASTOLIC BLOOD PRESSURE: 43 MMHG | BODY MASS INDEX: 33.13 KG/M2 | HEIGHT: 73 IN | RESPIRATION RATE: 18 BRPM | HEART RATE: 70 BPM | WEIGHT: 250 LBS | SYSTOLIC BLOOD PRESSURE: 110 MMHG

## 2024-07-12 DIAGNOSIS — B18.2 CHRONIC HEPATITIS C WITHOUT HEPATIC COMA: ICD-10-CM

## 2024-07-12 DIAGNOSIS — B18.2 CHRONIC HEPATITIS C WITHOUT HEPATIC COMA: Primary | ICD-10-CM

## 2024-07-12 DIAGNOSIS — I25.10 CORONARY ARTERY DISEASE, UNSPECIFIED VESSEL OR LESION TYPE, UNSPECIFIED WHETHER ANGINA PRESENT, UNSPECIFIED WHETHER NATIVE OR TRANSPLANTED HEART: ICD-10-CM

## 2024-07-12 DIAGNOSIS — B20 HUMAN IMMUNODEFICIENCY VIRUS (HIV) DISEASE: ICD-10-CM

## 2024-07-12 DIAGNOSIS — Z76.82 ORGAN TRANSPLANT CANDIDATE: ICD-10-CM

## 2024-07-12 LAB
ASCENDING AORTA: 4.11 CM
AV INDEX (PROSTH): 0.58
AV MEAN GRADIENT: 10 MMHG
AV PEAK GRADIENT: 22 MMHG
AV VALVE AREA BY VELOCITY RATIO: 1.94 CM²
AV VALVE AREA: 2.12 CM²
AV VELOCITY RATIO: 0.53
BSA FOR ECHO PROCEDURE: 2.42 M2
CV ECHO LV RWT: 0.29 CM
CV STRESS BASE HR: 70 BPM
DIASTOLIC BLOOD PRESSURE: 43 MMHG
DOP CALC AO PEAK VEL: 2.34 M/S
DOP CALC AO VTI: 50.17 CM
DOP CALC LVOT AREA: 3.6 CM2
DOP CALC LVOT DIAMETER: 2.15 CM
DOP CALC LVOT PEAK VEL: 1.25 M/S
DOP CALC LVOT STROKE VOLUME: 106.25 CM3
DOP CALCLVOT PEAK VEL VTI: 29.28 CM
E WAVE DECELERATION TIME: 254.43 MSEC
E/A RATIO: 3.13
E/E' RATIO: 14.1 M/S
ECHO LV POSTERIOR WALL: 0.86 CM (ref 0.6–1.1)
FRACTIONAL SHORTENING: 35 % (ref 28–44)
INTERVENTRICULAR SEPTUM: 0.66 CM (ref 0.6–1.1)
LA MAJOR: 6.04 CM
LA MINOR: 6.36 CM
LA WIDTH: 4.74 CM
LEFT ATRIUM SIZE: 4.92 CM
LEFT ATRIUM VOLUME INDEX MOD: 47.1 ML/M2
LEFT ATRIUM VOLUME INDEX: 51.8 ML/M2
LEFT ATRIUM VOLUME MOD: 111.64 CM3
LEFT ATRIUM VOLUME: 122.82 CM3
LEFT INTERNAL DIMENSION IN SYSTOLE: 3.84 CM (ref 2.1–4)
LEFT VENTRICLE DIASTOLIC VOLUME INDEX: 73.82 ML/M2
LEFT VENTRICLE DIASTOLIC VOLUME: 174.95 ML
LEFT VENTRICLE MASS INDEX: 72 G/M2
LEFT VENTRICLE SYSTOLIC VOLUME INDEX: 26.9 ML/M2
LEFT VENTRICLE SYSTOLIC VOLUME: 63.66 ML
LEFT VENTRICULAR INTERNAL DIMENSION IN DIASTOLE: 5.93 CM (ref 3.5–6)
LEFT VENTRICULAR MASS: 171.13 G
LV LATERAL E/E' RATIO: 11.75 M/S
LV SEPTAL E/E' RATIO: 17.63 M/S
MV PEAK A VEL: 0.45 M/S
MV PEAK E VEL: 1.41 M/S
MV STENOSIS PRESSURE HALF TIME: 73.79 MS
MV VALVE AREA P 1/2 METHOD: 2.98 CM2
OHS CV CPX 1 MINUTE RECOVERY HEART RATE: 144 BPM
OHS CV CPX 85 PERCENT MAX PREDICTED HEART RATE MALE: 139
OHS CV CPX MAX PREDICTED HEART RATE: 164
OHS CV CPX PATIENT IS FEMALE: 0
OHS CV CPX PATIENT IS MALE: 1
OHS CV CPX PEAK DIASTOLIC BLOOD PRESSURE: 49 MMHG
OHS CV CPX PEAK HEAR RATE: 144 BPM
OHS CV CPX PEAK RATE PRESSURE PRODUCT: NORMAL
OHS CV CPX PEAK SYSTOLIC BLOOD PRESSURE: 102 MMHG
OHS CV CPX PERCENT MAX PREDICTED HEART RATE ACHIEVED: 88
OHS CV CPX RATE PRESSURE PRODUCT PRESENTING: 7700
OHS CV INITIAL DOSE: 10 MCG/KG/MIN
OHS CV PEAK DOSE: 40 MCG/KG/MIN
PISA TR MAX VEL: 3.19 M/S
RA MAJOR: 6.59 CM
RA PRESSURE ESTIMATED: 3 MMHG
RA WIDTH: 4.58 CM
RIGHT VENTRICLE DIASTOLIC BASEL DIMENSION: 4.5 CM
RV TB RVSP: 6 MMHG
SINUS: 3.76 CM
STJ: 3.92 CM
SYSTOLIC BLOOD PRESSURE: 110 MMHG
TDI LATERAL: 0.12 M/S
TDI SEPTAL: 0.08 M/S
TDI: 0.1 M/S
TR MAX PG: 41 MMHG
TRICUSPID ANNULAR PLANE SYSTOLIC EXCURSION: 2.46 CM
TV REST PULMONARY ARTERY PRESSURE: 44 MMHG
Z-SCORE OF LEFT VENTRICULAR DIMENSION IN END DIASTOLE: -5.47
Z-SCORE OF LEFT VENTRICULAR DIMENSION IN END SYSTOLE: -3.72

## 2024-07-12 PROCEDURE — 93325 DOPPLER ECHO COLOR FLOW MAPG: CPT | Mod: TXP

## 2024-07-12 PROCEDURE — 71046 X-RAY EXAM CHEST 2 VIEWS: CPT | Mod: 26,TXP,, | Performed by: RADIOLOGY

## 2024-07-12 PROCEDURE — 93325 DOPPLER ECHO COLOR FLOW MAPG: CPT | Mod: 26,TXP,, | Performed by: INTERNAL MEDICINE

## 2024-07-12 PROCEDURE — 63600175 PHARM REV CODE 636 W HCPCS: Mod: TXP | Performed by: INTERNAL MEDICINE

## 2024-07-12 PROCEDURE — 93320 DOPPLER ECHO COMPLETE: CPT | Mod: TXP

## 2024-07-12 PROCEDURE — 71046 X-RAY EXAM CHEST 2 VIEWS: CPT | Mod: TC,TXP

## 2024-07-12 PROCEDURE — 93320 DOPPLER ECHO COMPLETE: CPT | Mod: 26,TXP,, | Performed by: INTERNAL MEDICINE

## 2024-07-12 PROCEDURE — 93351 STRESS TTE COMPLETE: CPT | Mod: 26,TXP,, | Performed by: INTERNAL MEDICINE

## 2024-07-12 RX ORDER — DOBUTAMINE HYDROCHLORIDE 400 MG/100ML
40 INJECTION, SOLUTION INTRAVENOUS
Status: COMPLETED | OUTPATIENT
Start: 2024-07-12 | End: 2024-07-12

## 2024-07-12 RX ORDER — ATROPINE SULFATE 0.1 MG/ML
0.5 INJECTION INTRAVENOUS
Status: COMPLETED | OUTPATIENT
Start: 2024-07-12 | End: 2024-07-12

## 2024-07-12 RX ADMIN — ATROPINE SULFATE 0.5 MG: 0.1 INJECTION INTRAVENOUS at 10:07

## 2024-07-12 RX ADMIN — DOBUTAMINE 40 MCG/KG/MIN: 12.5 INJECTION, SOLUTION, CONCENTRATE INTRAVENOUS at 10:07

## 2024-07-15 ENCOUNTER — TELEPHONE (OUTPATIENT)
Dept: TRANSPLANT | Facility: HOSPITAL | Age: 59
End: 2024-07-15
Payer: MEDICARE

## 2024-07-15 NOTE — TELEPHONE ENCOUNTER
Repeat labs drawn reviewed by Dr. Turner.  Creatinine has slightly improved.  Patient to repeat labs 7/15 or 7/16.  Patient's caregiver, Neeta notified.  She voiced understanding of above and no need for hospital admission at this time.  She agrees to relay message to patient.  Lab orders faxed to [facility] as requested.

## 2024-07-15 NOTE — TELEPHONE ENCOUNTER
[place] followed up with pt via phone regarding back-up caregiver plan. WASHINGTON met with pt and primary caregiver on Thurs 7/11. Pt states he will call back tomorrow with the information for SW to confirm. SW remains available.

## 2024-07-16 ENCOUNTER — DOCUMENTATION ONLY (OUTPATIENT)
Dept: TRANSPLANT | Facility: CLINIC | Age: 59
End: 2024-07-16

## 2024-07-19 ENCOUNTER — TELEPHONE (OUTPATIENT)
Dept: TRANSPLANT | Facility: HOSPITAL | Age: 59
End: 2024-07-19
Payer: MEDICARE

## 2024-07-19 NOTE — TELEPHONE ENCOUNTER
SW returned pt's call. Pt has solidified a back-up caregiver and SW confirmed. Pt's sister in law will be his back-up, she had been on a mission trip, but returned home this week.    Erin Card, sister in law, 50s, drives, c: [phone], lives near to patient.    Pt is not suitable for transplant, low risk from a psychosocial perspective. SW remains available.      ----- Message from Stephenie Sun sent at 7/19/2024  9:09 AM CDT -----  Regarding: Updated      Name Of Caller:   Jam       Contact Preference:   [phone]       Nature of call:   Requesting to speak with Tyesha. He would like to provide a name of their back up caregiver.

## 2024-07-24 DIAGNOSIS — B20 HUMAN IMMUNODEFICIENCY VIRUS (HIV) DISEASE: ICD-10-CM

## 2024-07-24 DIAGNOSIS — B18.2 CHRONIC HEPATITIS C WITHOUT HEPATIC COMA: Primary | ICD-10-CM

## 2024-07-24 DIAGNOSIS — Z76.82 ORGAN TRANSPLANT CANDIDATE: ICD-10-CM

## 2024-07-29 ENCOUNTER — PATIENT MESSAGE (OUTPATIENT)
Dept: TRANSPLANT | Facility: CLINIC | Age: 59
End: 2024-07-29
Payer: MEDICARE

## 2024-08-08 ENCOUNTER — TELEPHONE (OUTPATIENT)
Dept: INFECTIOUS DISEASES | Facility: CLINIC | Age: 59
End: 2024-08-08
Payer: MEDICARE

## 2024-08-10 ENCOUNTER — HOSPITAL ENCOUNTER (INPATIENT)
Facility: HOSPITAL | Age: 59
LOS: 12 days | Discharge: HOME HEALTH | DRG: 020 | End: 2024-08-22
Attending: HOSPITALIST | Admitting: HOSPITALIST
Payer: MEDICARE

## 2024-08-10 DIAGNOSIS — R07.9 CHEST PAIN: ICD-10-CM

## 2024-08-10 DIAGNOSIS — K74.60 CIRRHOSIS OF LIVER WITH ASCITES, UNSPECIFIED HEPATIC CIRRHOSIS TYPE: ICD-10-CM

## 2024-08-10 DIAGNOSIS — I62.00 SUBDURAL HEMORRHAGE: ICD-10-CM

## 2024-08-10 DIAGNOSIS — R18.8 CIRRHOSIS OF LIVER WITH ASCITES, UNSPECIFIED HEPATIC CIRRHOSIS TYPE: ICD-10-CM

## 2024-08-10 DIAGNOSIS — D62 ACUTE BLOOD LOSS ANEMIA: ICD-10-CM

## 2024-08-10 DIAGNOSIS — B20 HUMAN IMMUNODEFICIENCY VIRUS (HIV) DISEASE: Primary | ICD-10-CM

## 2024-08-10 PROBLEM — D69.6 THROMBOCYTOPENIA: Status: ACTIVE | Noted: 2024-08-10

## 2024-08-10 PROBLEM — S06.5XAA BILATERAL SUBDURAL HEMATOMAS: Status: ACTIVE | Noted: 2024-08-10

## 2024-08-10 PROCEDURE — 11000001 HC ACUTE MED/SURG PRIVATE ROOM: Mod: NTX

## 2024-08-10 RX ORDER — NALOXONE HCL 0.4 MG/ML
0.02 VIAL (ML) INJECTION
Status: DISCONTINUED | OUTPATIENT
Start: 2024-08-11 | End: 2024-08-22 | Stop reason: HOSPADM

## 2024-08-10 RX ORDER — INSULIN ASPART 100 [IU]/ML
0-5 INJECTION, SOLUTION INTRAVENOUS; SUBCUTANEOUS
Status: DISCONTINUED | OUTPATIENT
Start: 2024-08-11 | End: 2024-08-19

## 2024-08-10 RX ORDER — SODIUM CHLORIDE 0.9 % (FLUSH) 0.9 %
10 SYRINGE (ML) INJECTION EVERY 12 HOURS PRN
Status: DISCONTINUED | OUTPATIENT
Start: 2024-08-11 | End: 2024-08-22 | Stop reason: HOSPADM

## 2024-08-10 RX ORDER — GLUCAGON 1 MG
1 KIT INJECTION
Status: DISCONTINUED | OUTPATIENT
Start: 2024-08-11 | End: 2024-08-22 | Stop reason: HOSPADM

## 2024-08-10 RX ORDER — IBUPROFEN 200 MG
24 TABLET ORAL
Status: DISCONTINUED | OUTPATIENT
Start: 2024-08-11 | End: 2024-08-22 | Stop reason: HOSPADM

## 2024-08-10 RX ORDER — IBUPROFEN 200 MG
16 TABLET ORAL
Status: DISCONTINUED | OUTPATIENT
Start: 2024-08-11 | End: 2024-08-22 | Stop reason: HOSPADM

## 2024-08-10 NOTE — Clinical Note
Bilateral: Groin and Wrist.   Scrubbed with ChloroPrep With Tint.    Hair: N/A.  Skin prep dry before draping.  Prepped by: Myorn Burt 8/16/2024 9:20 AM.

## 2024-08-10 NOTE — PLAN OF CARE
Outside Transfer Acceptance Note / Regional Referral Center    Referring facility: [facility]   Referring provider: CHINO PROVIDER  Accepting facility: [facility]  Accepting provider: AFSANEH FRANK  Admitting provider: TBLEMUEL  Reason for transfer: Higher Level of Care   Transfer diagnosis: bilateral subdural hemorrhages  Transfer specialty requested: Hematology and Oncology, SEUN  Transfer specialty notified: Yes  Transfer level: NUMBER 1-5: 2  Bed type requested: NSU step-down  Isolation status: No active isolations   Admission class or status: IP- Inpatient      Narrative     Patient of concern is a 58M HCV cirrhosis and HIV with decompensated liver disease manifested by ascites, HE, thrombocytopenia and coagulopathy currently at [facility] under HM after being referred to ED by SEUN for imaging showing worsening SDH. Patient was recently admitted to hospital in 7/2024 at [facility] for eval of HA, with CTH showing b/l chronic extra-axial fluid collections that have the consistency of CSF. At that time they did consider bur hole drainage of the collections although needed coagulopathy and plt issues corrected. Patient ultimately not interested in bur hole drainage at that time. He f/u in [facility] neurotrauma unit with repeat CT head. Patient was not having HA at that time but continued to have balance disturbance when walking. No trauma or falls reported. Findings on CT showed e/o subacute blood developing since the prior CT of 7/19, hence referral to ED made for admission. Hematology did see the patient as well and feel his thrombocytopenia is most likely due to sequestration in spleen. It is felt that any attempts to correct/improve the patient's plt function is likely to have minimal effect as it is felt that the patient will sequester these plt as well. Also, coagulopathy though due to cirrhosis and correction with FFP would be temporary. Initially SEUN was  "discussing d/c with referral to NSGY at [facility]. However, the NSGY team then reached out to the hospitalist and stated that an inter facility transfer was in best interest. Hepatology agrees with transfer. Attempted connect with NSGY though they felt no need to connect prior to transfer and to bring to [facility] for eval. Patient is HDS, current MELD is 27, Na 135, Cr 1.78 (BL 1.9), TBili 3.5, INR 1.7, CBC 0.9 > 7.7 < 37 (s/p 2U plt and FFP, with initial INR 1.9). Patient is currently on HIV med, lasix, aldactone, and midodrine. He has no FND, GCS 15.    Objective     Vitals:    Recent Labs: All pertinent labs within the past 24 hours have been reviewed.  Recent imaging: See above   Airway:     Vent settings:         IV access:    Infusions: See above  Allergies:   Review of patient's allergies indicates:   Allergen Reactions    Hydrocodone-acetaminophen Other (See Comments)     "cannot sleep when taking" Does not want to take    Lisinopril Other (See Comments)     Other reaction(s): Cough    Sulfamethoxazole-trimethoprim Rash      NPO: No    Anticoagulation:   Anticoagulants       None             Instructions      Esequiel Lea-  Admit to Hospital Medicine  Upon patient arrival to floor, please send SecureChat to [facility] or call extension [phone] (if no answer, do NOT leave a callback number after the beep, rather please send a SecureChat to [facility]), for Hospital Medicine admit team assignment and for additional admit orders for the patient.  Do not page the attending physician associated with the patient on arrival (this physician may not be on duty at the time of arrival).  Rather, always send a SecureChat to [facility] or call [phone] to reach the triage physician for orders and team assignment.   "

## 2024-08-11 PROBLEM — D61.818 PANCYTOPENIA: Status: ACTIVE | Noted: 2024-08-10

## 2024-08-11 PROBLEM — E78.5 HLD (HYPERLIPIDEMIA): Status: ACTIVE | Noted: 2024-08-11

## 2024-08-11 PROBLEM — E72.20 HYPERAMMONEMIA: Status: ACTIVE | Noted: 2024-08-11

## 2024-08-11 PROBLEM — N17.9 AKI (ACUTE KIDNEY INJURY): Status: ACTIVE | Noted: 2024-08-11

## 2024-08-11 PROBLEM — G93.5 BRAIN COMPRESSION: Status: ACTIVE | Noted: 2024-08-11

## 2024-08-11 PROBLEM — N18.9 CKD (CHRONIC KIDNEY DISEASE): Status: ACTIVE | Noted: 2024-08-11

## 2024-08-11 PROBLEM — F32.A DEPRESSION: Status: ACTIVE | Noted: 2024-08-11

## 2024-08-11 LAB
ABO + RH BLD: NORMAL
ALBUMIN SERPL BCP-MCNC: 1.9 G/DL (ref 3.5–5.2)
ALP SERPL-CCNC: 94 U/L (ref 55–135)
ALT SERPL W/O P-5'-P-CCNC: 16 U/L (ref 10–44)
AMMONIA PLAS-SCNC: 100 UMOL/L (ref 10–50)
ANION GAP SERPL CALC-SCNC: 6 MMOL/L (ref 8–16)
ANISOCYTOSIS BLD QL SMEAR: SLIGHT
APTT PPP: 35.8 SEC (ref 21–32)
AST SERPL-CCNC: 66 U/L (ref 10–40)
BASOPHILS # BLD AUTO: ABNORMAL K/UL (ref 0–0.2)
BASOPHILS NFR BLD: 0 % (ref 0–1.9)
BILIRUB DIRECT SERPL-MCNC: 2.6 MG/DL (ref 0.1–0.3)
BILIRUB SERPL-MCNC: 3.7 MG/DL (ref 0.1–1)
BLD GP AB SCN CELLS X3 SERPL QL: NORMAL
BLD PROD TYP BPU: NORMAL
BLD PROD TYP BPU: NORMAL
BLOOD UNIT EXPIRATION DATE: NORMAL
BLOOD UNIT EXPIRATION DATE: NORMAL
BLOOD UNIT TYPE CODE: 7300
BLOOD UNIT TYPE CODE: 8400
BLOOD UNIT TYPE: NORMAL
BLOOD UNIT TYPE: NORMAL
BUN SERPL-MCNC: 19 MG/DL (ref 6–20)
CALCIUM SERPL-MCNC: 8.2 MG/DL (ref 8.7–10.5)
CHLORIDE SERPL-SCNC: 110 MMOL/L (ref 95–110)
CO2 SERPL-SCNC: 22 MMOL/L (ref 23–29)
CODING SYSTEM: NORMAL
CODING SYSTEM: NORMAL
CREAT SERPL-MCNC: 1.7 MG/DL (ref 0.5–1.4)
CROSSMATCH INTERPRETATION: NORMAL
CROSSMATCH INTERPRETATION: NORMAL
DIFFERENTIAL METHOD BLD: ABNORMAL
DISPENSE STATUS: NORMAL
DISPENSE STATUS: NORMAL
EOSINOPHIL # BLD AUTO: ABNORMAL K/UL (ref 0–0.5)
EOSINOPHIL NFR BLD: 0 % (ref 0–8)
ERYTHROCYTE [DISTWIDTH] IN BLOOD BY AUTOMATED COUNT: 17.5 % (ref 11.5–14.5)
ERYTHROCYTE [DISTWIDTH] IN BLOOD BY AUTOMATED COUNT: 17.6 % (ref 11.5–14.5)
EST. GFR  (NO RACE VARIABLE): 46.7 ML/MIN/1.73 M^2
GLUCOSE SERPL-MCNC: 92 MG/DL (ref 70–110)
HCT VFR BLD AUTO: 22.3 % (ref 40–54)
HCT VFR BLD AUTO: 26.3 % (ref 40–54)
HGB BLD-MCNC: 7.6 G/DL (ref 14–18)
HGB BLD-MCNC: 8.5 G/DL (ref 14–18)
HYPOCHROMIA BLD QL SMEAR: ABNORMAL
IMM GRANULOCYTES # BLD AUTO: ABNORMAL K/UL (ref 0–0.04)
IMM GRANULOCYTES NFR BLD AUTO: ABNORMAL % (ref 0–0.5)
INR PPP: 1.6 (ref 0.8–1.2)
INR PPP: 1.7 (ref 0.8–1.2)
LYMPHOCYTES # BLD AUTO: ABNORMAL K/UL (ref 1–4.8)
LYMPHOCYTES NFR BLD: 20 % (ref 18–48)
MCH RBC QN AUTO: 33.5 PG (ref 27–31)
MCH RBC QN AUTO: 34.4 PG (ref 27–31)
MCHC RBC AUTO-ENTMCNC: 32.3 G/DL (ref 32–36)
MCHC RBC AUTO-ENTMCNC: 34.1 G/DL (ref 32–36)
MCV RBC AUTO: 101 FL (ref 82–98)
MCV RBC AUTO: 104 FL (ref 82–98)
MONOCYTES # BLD AUTO: ABNORMAL K/UL (ref 0.3–1)
MONOCYTES NFR BLD: 0 % (ref 4–15)
NEUTROPHILS # BLD AUTO: ABNORMAL K/UL (ref 1.8–7.7)
NEUTROPHILS NFR BLD: 80 % (ref 38–73)
NRBC BLD-RTO: 0 /100 WBC
OVALOCYTES BLD QL SMEAR: ABNORMAL
PLATELET # BLD AUTO: 14 K/UL (ref 150–450)
PLATELET # BLD AUTO: 17 K/UL (ref 150–450)
PMV BLD AUTO: 13.4 FL (ref 9.2–12.9)
PMV BLD AUTO: 13.7 FL (ref 9.2–12.9)
POIKILOCYTOSIS BLD QL SMEAR: SLIGHT
POLYCHROMASIA BLD QL SMEAR: ABNORMAL
POTASSIUM SERPL-SCNC: 3.9 MMOL/L (ref 3.5–5.1)
PROT SERPL-MCNC: 5.7 G/DL (ref 6–8.4)
PROTHROMBIN TIME: 17.2 SEC (ref 9–12.5)
PROTHROMBIN TIME: 17.9 SEC (ref 9–12.5)
RBC # BLD AUTO: 2.21 M/UL (ref 4.6–6.2)
RBC # BLD AUTO: 2.54 M/UL (ref 4.6–6.2)
SODIUM SERPL-SCNC: 138 MMOL/L (ref 136–145)
SPECIMEN OUTDATE: NORMAL
SPHEROCYTES BLD QL SMEAR: ABNORMAL
UNIT NUMBER: NORMAL
UNIT NUMBER: NORMAL
WBC # BLD AUTO: 0.97 K/UL (ref 3.9–12.7)
WBC # BLD AUTO: 1.17 K/UL (ref 3.9–12.7)

## 2024-08-11 PROCEDURE — P9035 PLATELET PHERES LEUKOREDUCED: HCPCS | Mod: NTX

## 2024-08-11 PROCEDURE — 86850 RBC ANTIBODY SCREEN: CPT | Mod: NTX

## 2024-08-11 PROCEDURE — P9017 PLASMA 1 DONOR FRZ W/IN 8 HR: HCPCS | Mod: NTX

## 2024-08-11 PROCEDURE — 11000001 HC ACUTE MED/SURG PRIVATE ROOM: Mod: NTX

## 2024-08-11 PROCEDURE — 63600175 PHARM REV CODE 636 W HCPCS: Mod: JZ,JG,NTX

## 2024-08-11 PROCEDURE — 85610 PROTHROMBIN TIME: CPT | Mod: 91,NTX

## 2024-08-11 PROCEDURE — 85027 COMPLETE CBC AUTOMATED: CPT | Mod: NTX

## 2024-08-11 PROCEDURE — 99223 1ST HOSP IP/OBS HIGH 75: CPT | Mod: GC,NTX,, | Performed by: STUDENT IN AN ORGANIZED HEALTH CARE EDUCATION/TRAINING PROGRAM

## 2024-08-11 PROCEDURE — 36415 COLL VENOUS BLD VENIPUNCTURE: CPT | Mod: NTX,XB

## 2024-08-11 PROCEDURE — P9045 ALBUMIN (HUMAN), 5%, 250 ML: HCPCS | Mod: JZ,JG,NTX

## 2024-08-11 PROCEDURE — 86900 BLOOD TYPING SEROLOGIC ABO: CPT | Mod: NTX

## 2024-08-11 PROCEDURE — 80053 COMPREHEN METABOLIC PANEL: CPT | Mod: NTX

## 2024-08-11 PROCEDURE — 25000003 PHARM REV CODE 250: Mod: NTX

## 2024-08-11 PROCEDURE — 36415 COLL VENOUS BLD VENIPUNCTURE: CPT | Mod: NTX,XB | Performed by: INTERNAL MEDICINE

## 2024-08-11 PROCEDURE — 85730 THROMBOPLASTIN TIME PARTIAL: CPT | Mod: NTX

## 2024-08-11 PROCEDURE — 85007 BL SMEAR W/DIFF WBC COUNT: CPT | Mod: NTX

## 2024-08-11 PROCEDURE — 85027 COMPLETE CBC AUTOMATED: CPT | Mod: 91,NTX

## 2024-08-11 PROCEDURE — 36415 COLL VENOUS BLD VENIPUNCTURE: CPT | Mod: NTX

## 2024-08-11 PROCEDURE — 82140 ASSAY OF AMMONIA: CPT | Mod: NTX

## 2024-08-11 PROCEDURE — 82105 ALPHA-FETOPROTEIN SERUM: CPT | Mod: NTX | Performed by: INTERNAL MEDICINE

## 2024-08-11 PROCEDURE — 63600175 PHARM REV CODE 636 W HCPCS: Mod: NTX

## 2024-08-11 PROCEDURE — 85610 PROTHROMBIN TIME: CPT | Mod: NTX

## 2024-08-11 PROCEDURE — 36430 TRANSFUSION BLD/BLD COMPNT: CPT | Mod: NTX

## 2024-08-11 PROCEDURE — 82248 BILIRUBIN DIRECT: CPT | Mod: NTX

## 2024-08-11 PROCEDURE — 99223 1ST HOSP IP/OBS HIGH 75: CPT | Mod: NTX,,, | Performed by: INTERNAL MEDICINE

## 2024-08-11 PROCEDURE — 86901 BLOOD TYPING SEROLOGIC RH(D): CPT | Mod: NTX

## 2024-08-11 RX ORDER — METOPROLOL SUCCINATE 25 MG/1
25 TABLET, EXTENDED RELEASE ORAL DAILY
Status: DISCONTINUED | OUTPATIENT
Start: 2024-08-11 | End: 2024-08-17

## 2024-08-11 RX ORDER — VALSARTAN 40 MG/1
40 TABLET ORAL NIGHTLY
Status: DISCONTINUED | OUTPATIENT
Start: 2024-08-11 | End: 2024-08-11

## 2024-08-11 RX ORDER — LANOLIN ALCOHOL/MO/W.PET/CERES
100 CREAM (GRAM) TOPICAL DAILY
Status: DISCONTINUED | OUTPATIENT
Start: 2024-08-11 | End: 2024-08-22 | Stop reason: HOSPADM

## 2024-08-11 RX ORDER — LANOLIN ALCOHOL/MO/W.PET/CERES
1000 CREAM (GRAM) TOPICAL DAILY
Status: DISCONTINUED | OUTPATIENT
Start: 2024-08-11 | End: 2024-08-22 | Stop reason: HOSPADM

## 2024-08-11 RX ORDER — FUROSEMIDE 40 MG/1
40 TABLET ORAL DAILY
Status: DISCONTINUED | OUTPATIENT
Start: 2024-08-11 | End: 2024-08-11

## 2024-08-11 RX ORDER — TAMSULOSIN HYDROCHLORIDE 0.4 MG/1
0.4 CAPSULE ORAL NIGHTLY
Status: DISCONTINUED | OUTPATIENT
Start: 2024-08-11 | End: 2024-08-22 | Stop reason: HOSPADM

## 2024-08-11 RX ORDER — PANTOPRAZOLE SODIUM 40 MG/1
40 TABLET, DELAYED RELEASE ORAL DAILY
Status: DISCONTINUED | OUTPATIENT
Start: 2024-08-11 | End: 2024-08-22 | Stop reason: HOSPADM

## 2024-08-11 RX ORDER — ALBUMIN HUMAN 50 G/1000ML
50 SOLUTION INTRAVENOUS ONCE
Status: COMPLETED | OUTPATIENT
Start: 2024-08-11 | End: 2024-08-11

## 2024-08-11 RX ORDER — FLUOXETINE HYDROCHLORIDE 20 MG/1
20 CAPSULE ORAL DAILY
Status: DISCONTINUED | OUTPATIENT
Start: 2024-08-11 | End: 2024-08-22 | Stop reason: HOSPADM

## 2024-08-11 RX ORDER — LEVETIRACETAM 500 MG/5ML
500 INJECTION, SOLUTION, CONCENTRATE INTRAVENOUS EVERY 12 HOURS
Status: DISCONTINUED | OUTPATIENT
Start: 2024-08-11 | End: 2024-08-12

## 2024-08-11 RX ORDER — FENOFIBRATE 160 MG/1
160 TABLET ORAL DAILY
Status: DISCONTINUED | OUTPATIENT
Start: 2024-08-12 | End: 2024-08-16

## 2024-08-11 RX ORDER — HYDROCODONE BITARTRATE AND ACETAMINOPHEN 500; 5 MG/1; MG/1
TABLET ORAL
Status: DISCONTINUED | OUTPATIENT
Start: 2024-08-11 | End: 2024-08-12

## 2024-08-11 RX ORDER — BACLOFEN 10 MG/1
1 TABLET ORAL 3 TIMES DAILY
Status: ON HOLD | COMMUNITY
Start: 2024-07-22 | End: 2024-08-22 | Stop reason: HOSPADM

## 2024-08-11 RX ORDER — CETIRIZINE HYDROCHLORIDE 5 MG/1
10 TABLET ORAL DAILY
Status: DISCONTINUED | OUTPATIENT
Start: 2024-08-11 | End: 2024-08-22 | Stop reason: HOSPADM

## 2024-08-11 RX ORDER — MIDODRINE HYDROCHLORIDE 2.5 MG/1
2.5 TABLET ORAL 2 TIMES DAILY
Status: ON HOLD | COMMUNITY
Start: 2024-08-01 | End: 2024-08-22 | Stop reason: HOSPADM

## 2024-08-11 RX ORDER — SPIRONOLACTONE 25 MG/1
50 TABLET ORAL DAILY
Status: DISCONTINUED | OUTPATIENT
Start: 2024-08-11 | End: 2024-08-11

## 2024-08-11 RX ORDER — BENZONATATE 100 MG/1
100 CAPSULE ORAL 3 TIMES DAILY PRN
Status: DISCONTINUED | OUTPATIENT
Start: 2024-08-11 | End: 2024-08-22 | Stop reason: HOSPADM

## 2024-08-11 RX ORDER — BISACODYL 10 MG/1
1 SUPPOSITORY RECTAL DAILY PRN
COMMUNITY
Start: 2024-08-06

## 2024-08-11 RX ORDER — BISACODYL 10 MG/1
10 SUPPOSITORY RECTAL DAILY PRN
Status: DISCONTINUED | OUTPATIENT
Start: 2024-08-11 | End: 2024-08-22 | Stop reason: HOSPADM

## 2024-08-11 RX ORDER — ERGOCALCIFEROL 1.25 MG/1
50000 CAPSULE ORAL
Status: DISCONTINUED | OUTPATIENT
Start: 2024-08-11 | End: 2024-08-22 | Stop reason: HOSPADM

## 2024-08-11 RX ORDER — BACLOFEN 10 MG/1
10 TABLET ORAL 3 TIMES DAILY
Status: DISCONTINUED | OUTPATIENT
Start: 2024-08-11 | End: 2024-08-13

## 2024-08-11 RX ORDER — ATOVAQUONE 750 MG/5ML
1500 SUSPENSION ORAL DAILY
Status: DISCONTINUED | OUTPATIENT
Start: 2024-08-11 | End: 2024-08-22 | Stop reason: HOSPADM

## 2024-08-11 RX ORDER — LACTULOSE 10 G/15ML
20 SOLUTION ORAL 2 TIMES DAILY
Status: DISCONTINUED | OUTPATIENT
Start: 2024-08-11 | End: 2024-08-12

## 2024-08-11 RX ORDER — MIDODRINE HYDROCHLORIDE 2.5 MG/1
2.5 TABLET ORAL 3 TIMES DAILY PRN
Status: DISCONTINUED | OUTPATIENT
Start: 2024-08-11 | End: 2024-08-22 | Stop reason: HOSPADM

## 2024-08-11 RX ORDER — CHOLECALCIFEROL (VITAMIN D3) 25 MCG
1000 TABLET ORAL DAILY
Status: DISCONTINUED | OUTPATIENT
Start: 2024-08-12 | End: 2024-08-22 | Stop reason: HOSPADM

## 2024-08-11 RX ORDER — ATORVASTATIN CALCIUM 40 MG/1
40 TABLET, FILM COATED ORAL DAILY
Status: DISCONTINUED | OUTPATIENT
Start: 2024-08-11 | End: 2024-08-21

## 2024-08-11 RX ADMIN — LEVETIRACETAM 500 MG: 100 INJECTION INTRAVENOUS at 08:08

## 2024-08-11 RX ADMIN — CYANOCOBALAMIN TAB 1000 MCG 1000 MCG: 1000 TAB at 10:08

## 2024-08-11 RX ADMIN — ELVITEGRAVIR, COBICISTAT, EMTRICITABINE, AND TENOFOVIR DISOPROXIL FUMARATE 1 TABLET: 150; 150; 200; 300 TABLET, FILM COATED ORAL at 10:08

## 2024-08-11 RX ADMIN — ALBUMIN (HUMAN) 50 G: 12.5 SOLUTION INTRAVENOUS at 05:08

## 2024-08-11 RX ADMIN — FLUOXETINE HYDROCHLORIDE 20 MG: 20 CAPSULE ORAL at 09:08

## 2024-08-11 RX ADMIN — LACTULOSE 20 G: 20 SOLUTION ORAL at 10:08

## 2024-08-11 RX ADMIN — ATORVASTATIN CALCIUM 40 MG: 40 TABLET, FILM COATED ORAL at 10:08

## 2024-08-11 RX ADMIN — FUROSEMIDE 40 MG: 40 TABLET ORAL at 10:08

## 2024-08-11 RX ADMIN — TAMSULOSIN HYDROCHLORIDE 0.4 MG: 0.4 CAPSULE ORAL at 08:08

## 2024-08-11 RX ADMIN — BACLOFEN 10 MG: 10 TABLET ORAL at 08:08

## 2024-08-11 RX ADMIN — CETIRIZINE HYDROCHLORIDE 10 MG: 5 TABLET, FILM COATED ORAL at 10:08

## 2024-08-11 RX ADMIN — ATOVAQUONE 1500 MG: 750 SUSPENSION ORAL at 04:08

## 2024-08-11 RX ADMIN — LEVETIRACETAM 500 MG: 100 INJECTION INTRAVENOUS at 10:08

## 2024-08-11 RX ADMIN — LACTULOSE 20 G: 20 SOLUTION ORAL at 08:08

## 2024-08-11 RX ADMIN — PYRIDOXINE HCL TAB 50 MG 100 MG: 50 TAB at 10:08

## 2024-08-11 RX ADMIN — PANTOPRAZOLE SODIUM 40 MG: 40 TABLET, DELAYED RELEASE ORAL at 10:08

## 2024-08-11 RX ADMIN — BACLOFEN 10 MG: 10 TABLET ORAL at 01:08

## 2024-08-11 RX ADMIN — SPIRONOLACTONE 50 MG: 25 TABLET ORAL at 10:08

## 2024-08-11 RX ADMIN — METOPROLOL SUCCINATE 25 MG: 25 TABLET, EXTENDED RELEASE ORAL at 10:08

## 2024-08-11 RX ADMIN — PHYTONADIONE 10 MG: 10 INJECTION, EMULSION INTRAMUSCULAR; INTRAVENOUS; SUBCUTANEOUS at 01:08

## 2024-08-11 RX ADMIN — ERGOCALCIFEROL 50000 UNITS: 1.25 CAPSULE ORAL at 10:08

## 2024-08-11 NOTE — HPI
Mr. Card is a 57 yo M with PMHx of Hep C cirrhosis, HIV, chronic thrombocytopenia, HTN, GERD, HLD, CAD s/p CABG (many years ago) who was initially admitted to [facility] under  after being referred to the ED by NSGY due to worsening SDH on imaging. Patient was previously admitted to [facility] for evaluation of HA (7/19/24) with a CT head showing b/l chronic extra-axial fluid collections which possibly could be CSF. Bur hole drainage was considered but coagulopathy and platelet issue needed to be corrected prior. Patient also did not want drainage at that time. Patient f/u with [facility] neurotrauma unit with repeat CT head (8/7/24) but no longer experienced HA, just gait disturbance. CT head showed blood developing since prior CT head which led to ED visit and evaluation. Heme/Onc was consulted and believe that thrombocytopenia could be to splenic sequestration so improvement of plt function may only be temporary at best. NSGY at [facility] requested inter facility transfer. Hepatology at [facility] also agreed. Patient transferred to [facility] for NSGY, Heme/Onc, and Hepatology.      Patient hemodynamically stable on arrival. MELD 27, Na 135, Cr 1.72 (1.8 baseline), INR 1.7, CBC 0.9 > 7.7 < 37 (s/p 2U plt and FFP, with initial INR 1.9). No focal neuro deficits at this time. GCS 15 on exam. Patient denies HA, weakness, numbness, vision disturbance, dizziness, photophobia, or any other complaints    CTH reordered to assess stability

## 2024-08-11 NOTE — SUBJECTIVE & OBJECTIVE
"Past Medical History:   Diagnosis Date    CAD (coronary artery disease)     Hx of MI and cardiac cath    Chronic hepatitis C     Chronic hepatitis C with cirrhosis     Cirrhosis     GERD (gastroesophageal reflux disease)     HTN (hypertension)     Human immunodeficiency virus (HIV) disease     Hyperlipidemia        Past Surgical History:   Procedure Laterality Date    CARDIAC CATHETERIZATION      CORONARY ARTERY BYPASS GRAFT      TONSILLECTOMY AND ADENOIDECTOMY      TRIGGER FINGER RELEASE         Review of patient's allergies indicates:   Allergen Reactions    Hydrocodone-acetaminophen Other (See Comments)     "cannot sleep when taking" Does not want to take    Lisinopril Other (See Comments)     Other reaction(s): Cough    Sulfamethoxazole-trimethoprim Rash       Current Facility-Administered Medications on File Prior to Encounter   Medication    [DISCONTINUED] 0.9%  NaCl infusion    [DISCONTINUED] albuterol-ipratropium 2.5 mg-0.5 mg/3 mL nebulizer solution    [DISCONTINUED] elviteg-cob-emtri-tenof ALAFEN 582-556-244-10 mg Tab    [DISCONTINUED] GENERIC EXTERNAL MEDICATION    [DISCONTINUED] lactulose 10 gram/15 mL solution    [DISCONTINUED] metoprolol succinate (TOPROL-XL) 24 hr tablet    [DISCONTINUED] midodrine tablet    [DISCONTINUED] naloxone injection    [DISCONTINUED] ondansetron disintegrating tablet    [DISCONTINUED] ondansetron injection    [DISCONTINUED] pantoprazole EC tablet    [DISCONTINUED] rifAXIMin tablet    [DISCONTINUED] sodium chloride 0.9% injection    [DISCONTINUED] spironolactone tablet    [DISCONTINUED] tamsulosin 24 hr capsule     Current Outpatient Medications on File Prior to Encounter   Medication Sig    benzonatate (TESSALON) 100 MG capsule Take 100 mg by mouth 3 times daily as needed.    cyanocobalamin (VITAMIN B-12) 1000 MCG tablet Take 1,000 mcg by mouth.    ergocalciferol (ERGOCALCIFEROL) 50,000 unit Cap Take 50,000 Units by mouth every 7 days.    fenofibrate 160 MG Tab TAKE 1 " TABLET(160 MG) BY MOUTH DAILY    FLUoxetine 20 MG capsule Take 20 mg by mouth.    furosemide (LASIX) 20 MG tablet Take 2 tablets (40 mg total) by mouth once daily. (Patient taking differently: Take 20 mg by mouth 2 (two) times a day.)    GENVOYA [id number] mg Tab TAKE ONE TABLET BY MOUTH DAILY WITH FOOD.    lactulose (CHRONULAC) 20 gram/30 mL Soln Take 30 mLs (20 g total) by mouth 2 (two) times daily. Goal of 3-5 soft stools each day    loratadine (CLARITIN) 10 mg tablet Take 10 mg by mouth.    metoprolol succinate (TOPROL-XL) 25 MG 24 hr tablet Take 25 mg by mouth.    pantoprazole (PROTONIX) 40 MG tablet Take 40 mg by mouth once daily.    pyridoxine, vitamin B6, (B-6) 100 MG Tab Take 100 mg by mouth.    rosuvastatin (CRESTOR) 10 MG tablet Take 10 mg by mouth every evening.    spironolactone (ALDACTONE) 50 MG tablet Take 2 tablets (100 mg total) by mouth once daily.    tamsulosin (FLOMAX) 0.4 mg Cap Take 0.4 mg by mouth every evening.    valsartan (DIOVAN) 40 MG tablet Take 1 tablet by mouth every evening.    vitamin D (VITAMIN D3) 1000 units Tab Take 1,000 Units by mouth.     Family History    None       Tobacco Use    Smoking status: Not on file    Smokeless tobacco: Not on file   Substance and Sexual Activity    Alcohol use: Not on file    Drug use: Not on file    Sexual activity: Not on file     Review of Systems   Constitutional:  Negative for chills and fever.   HENT:  Negative for congestion.    Eyes:  Negative for photophobia and visual disturbance.   Respiratory:  Negative for shortness of breath.    Cardiovascular:  Negative for chest pain, palpitations and leg swelling.   Gastrointestinal:  Negative for abdominal distention, abdominal pain, diarrhea, nausea and vomiting.   Genitourinary:  Negative for difficulty urinating, dysuria and scrotal swelling.   Musculoskeletal:  Positive for gait problem.   Neurological:  Negative for dizziness, seizures, facial asymmetry, speech difficulty, weakness,  numbness and headaches.     Objective:     Vital Signs (Most Recent):  Temp: 98.3 °F (36.8 °C) (08/10/24 2340)  Pulse: 61 (08/10/24 2340)  Resp: 16 (08/10/24 2340)  BP: (!) 126/58 (08/10/24 2340)  SpO2: 97 % (08/10/24 2340) Vital Signs (24h Range):  Temp:  [97.3 °F (36.3 °C)-98.3 °F (36.8 °C)] 98.3 °F (36.8 °C)  Pulse:  [61-63] 61  Resp:  [16-18] 16  SpO2:  [97 %-99 %] 97 %  BP: (115-126)/(57-63) 126/58     Weight: 116.2 kg (256 lb 2.8 oz)  Body mass index is 33.8 kg/m².     Physical Exam  Constitutional:       General: He is not in acute distress.     Appearance: Normal appearance.   HENT:      Head: Normocephalic and atraumatic.      Right Ear: External ear normal.      Left Ear: External ear normal.      Nose: Nose normal.      Mouth/Throat:      Mouth: Mucous membranes are moist.   Eyes:      General: No visual field deficit.     Extraocular Movements: Extraocular movements intact.      Conjunctiva/sclera: Conjunctivae normal.      Pupils: Pupils are equal, round, and reactive to light.   Cardiovascular:      Rate and Rhythm: Normal rate and regular rhythm.   Pulmonary:      Effort: Pulmonary effort is normal. No respiratory distress.      Breath sounds: Normal breath sounds.   Abdominal:      General: Bowel sounds are normal. There is distension.      Palpations: Abdomen is soft.      Comments: No notable ascites   Musculoskeletal:         General: Normal range of motion.      Cervical back: Normal range of motion and neck supple.      Right lower leg: No edema.      Left lower leg: No edema.   Skin:     General: Skin is warm and dry.   Neurological:      General: No focal deficit present.      Mental Status: He is alert and oriented to person, place, and time. Mental status is at baseline.      GCS: GCS eye subscore is 4. GCS verbal subscore is 5. GCS motor subscore is 6.      Cranial Nerves: Cranial nerves 2-12 are intact. No cranial nerve deficit, dysarthria or facial asymmetry.      Motor: No weakness.       Comments: Hx of chronic brain syndrome, slow speech likely baseline     Psychiatric:         Mood and Affect: Mood normal.         Behavior: Behavior normal.              CRANIAL NERVES     CN III, IV, VI   Pupils are equal, round, and reactive to light.       Significant Labs: All pertinent labs within the past 24 hours have been reviewed.    Significant Imaging: I have reviewed all pertinent imaging results/findings within the past 24 hours.

## 2024-08-11 NOTE — ASSESSMENT & PLAN NOTE
58M PMH HIV, thrombocyutopenia, liver cirrhosis, CAD s/p CABG (many years ago) with enlarging bilateral chronic SDH transferred from [facility] after CTH 8/7 without prior intervention    Plan:  Admitted medicine; q4h nc/vs  All labs and significant diagnostics reviewed  INR goal <1.4, plt goal >100k if medically feasible  CTH ordered for surveillance of B/L cSDH  Keppra 500mg BID x7 days  NPO until repeat scan  Hold DVT ppx until repeat scan  Please notify nsgy of any acute neurological decline or of any further questions/concerns  Preponderance of medical care per primary team    Dispo: ongoing

## 2024-08-11 NOTE — NURSING
Message to Hospital Med 1:  Good evening, patient has returned from CT and U/S and is wanting to know if he can get a diet ordered?     Response:  let me know what neurosurgery says    Dr. SHELLIE Muller (on call neurosurgery) added to message

## 2024-08-11 NOTE — PLAN OF CARE
[facility]sequiel Lea - Neurosurgery (Hospital)  Initial Discharge Assessment       Primary Care Provider: Ulysses Almaraz MD    Admission Diagnosis: Subdural hemorrhage [I62.00]    Admission Date: 8/10/2024  Expected Discharge Date:     Transition of Care Barriers: None    Payor: CIG[employer] MANAGED MEDICARE / Plan: CIGNA MEDICARE ADVANTAGE / Product Type: Medicare Advantage /     Extended Emergency Contact Information  Primary Emergency Contact: Chelita Card  Address: [address] [place] [address] [id number] [place] [address]  Home Phone: [phone]  Mobile Phone: [phone]  Relation: Mother  Secondary Emergency Contact: ashley domingo  Mobile Phone: [phone]  Relation: None    Discharge Plan A: Home Health  Discharge Plan B: Home with family      [id number] - [place], [place] - [address] AT [facility] [address]  [address]  [place] [address]  Phone: [phone] Fax: [phone]      Initial Assessment (most recent)       Adult Discharge Assessment - 08/11/24 1406          Discharge Assessment    Assessment Type Discharge Planning Assessment     Confirmed/corrected address, phone number and insurance Yes     Confirmed Demographics Correct on Facesheet     Source of Information family;patient     If unable to respond/provide information was family/caregiver contacted? Yes     Contact Name/Number Ashley oDmingo at bed side     Does patient/caregiver understand observation status Yes     Communicated CODI with patient/caregiver Date not available/Unable to determine     Reason For Admission Bilateral subdural hematomas     People in Home parent(s)     Do you expect to return to your current living situation? Yes     Do you have help at home or someone to help you manage your care at home? Yes     Who are your caregiver(s) and their phone number(s)? Ashley Domingo friend [phone]     Prior to hospitilization cognitive status: Alert/Oriented     Current cognitive status: Unable  to Assess     Walking or Climbing Stairs Difficulty yes     Walking or Climbing Stairs ambulation difficulty, requires equipment     Dressing/Bathing Difficulty no     Equipment Currently Used at Home walker, rolling     Readmission within 30 days? No     Patient currently being followed by outpatient case management? No     Do you currently have service(s) that help you manage your care at home? No     Do you take prescription medications? Yes     Do you have prescription coverage? Yes     Coverage Payor: CIGNA MANAGED MEDICARE - [facility] MEDICARE ADVANTAGE -     Do you have any problems affording any of your prescribed medications? No     Is the patient taking medications as prescribed? yes     Who is going to help you get home at discharge? Ashley Domingo friend [phone]     How do you get to doctors appointments? family or friend will provide     Are you on dialysis? No     Do you take coumadin? No     Discharge Plan A Home Health     Discharge Plan B Home with family     DME Needed Upon Discharge  other (see comments)   TBD    Discharge Plan discussed with: Spouse/sig other     Name(s) and Number(s) Ashley Domingo friend [phone]     Transition of Care Barriers None                      Spoke to patients significant other at bed side//patient sleeping. Pt lives at home with his mother. Post hospital  stay mother and SO will be pt support person and pt. has transportation at d/c with SO. There have been no hospitalizations within the last 30 days per pts SO. Verified pt PCP and preferred pharmacy. Pt not on Coumadin and is not receiving dialysis. All questions answered regarding case management/ discharge planning , pts SO verbalized understanding.    Discharge Plan A and Plan B have been determined by review of patient's clinical status, future medical and therapeutic needs, and coverage/benefits for post-acute care in coordination with multidisciplinary team members.     CORTEZ Acevedo  [facility]    [phone]

## 2024-08-11 NOTE — CONSULTS
Esequiel Lea - Neurosurgery ([facility])  Neurosurgery  Consult Note    Inpatient consult to Neurosurgery  Consult performed by: Luther Muller MD  Consult ordered by: Dina Hobbs MD        Subjective:     Chief Complaint/Reason for Admission: bilateral cSDH    History of Present Illness: Mr. Card is a 57 yo M with PMHx of Hep C cirrhosis, HIV, chronic thrombocytopenia, HTN, GERD, HLD, CAD s/p CABG (many years ago) who was initially admitted to [facility] under  after being referred to the ED by NSGY due to worsening SDH on imaging. Patient was previously admitted to [facility] for evaluation of HA (7/19/24) with a CT head showing b/l chronic extra-axial fluid collections which possibly could be CSF. Bur hole drainage was considered but coagulopathy and platelet issue needed to be corrected prior. Patient also did not want drainage at that time. Patient f/u with [facility] neurotrauma unit with repeat CT head (8/7/24) but no longer experienced HA, just gait disturbance. CT head showed blood developing since prior CT head which led to ED visit and evaluation. Heme/Onc was consulted and believe that thrombocytopenia could be to splenic sequestration so improvement of plt function may only be temporary at best. NSGY at [facility] requested inter facility transfer. Hepatology at [facility] also agreed. Patient transferred to [facility] for NSGY, Heme/Onc, and Hepatology.      Patient hemodynamically stable on arrival. MELD 27, Na 135, Cr 1.72 (1.8 baseline), INR 1.7, CBC 0.9 > 7.7 < 37 (s/p 2U plt and FFP, with initial INR 1.9). No focal neuro deficits at this time. GCS 15 on exam. Patient denies HA, weakness, numbness, vision disturbance, dizziness, photophobia, or any other complaints    CTH reordered to assess stability    Medications Prior to Admission   Medication Sig Dispense Refill Last Dose    benzonatate (TESSALON) 100 MG capsule Take 100 mg by mouth 3 times daily as needed.   Unknown    cyanocobalamin (VITAMIN B-12) 1000 MCG  "tablet Take 1,000 mcg by mouth.   Unknown    ergocalciferol (ERGOCALCIFEROL) 50,000 unit Cap Take 50,000 Units by mouth every 7 days.   Unknown    fenofibrate 160 MG Tab TAKE 1 TABLET(160 MG) BY MOUTH DAILY   Unknown    FLUoxetine 20 MG capsule Take 20 mg by mouth.   Unknown    furosemide (LASIX) 20 MG tablet Take 2 tablets (40 mg total) by mouth once daily. (Patient taking differently: Take 20 mg by mouth 2 (two) times a day.) 30 tablet 1 Unknown    GENVOYA [phone] mg Tab TAKE ONE TABLET BY MOUTH DAILY WITH FOOD.   Unknown    lactulose (CHRONULAC) 20 gram/30 mL Soln Take 30 mLs (20 g total) by mouth 2 (two) times daily. Goal of 3-5 soft stools each day 1800 mL 3 Unknown    loratadine (CLARITIN) 10 mg tablet Take 10 mg by mouth.   Unknown    metoprolol succinate (TOPROL-XL) 25 MG 24 hr tablet Take 25 mg by mouth.   Unknown    pantoprazole (PROTONIX) 40 MG tablet Take 40 mg by mouth once daily.   Unknown    pyridoxine, vitamin B6, (B-6) 100 MG Tab Take 100 mg by mouth.   Unknown    rosuvastatin (CRESTOR) 10 MG tablet Take 10 mg by mouth every evening.   Unknown    spironolactone (ALDACTONE) 50 MG tablet Take 2 tablets (100 mg total) by mouth once daily. 30 tablet 1 Unknown    tamsulosin (FLOMAX) 0.4 mg Cap Take 0.4 mg by mouth every evening.   Unknown    valsartan (DIOVAN) 40 MG tablet Take 1 tablet by mouth every evening.   Unknown    vitamin D (VITAMIN D3) 1000 units Tab Take 1,000 Units by mouth.   Unknown       Review of patient's allergies indicates:   Allergen Reactions    Hydrocodone-acetaminophen Other (See Comments)     "cannot sleep when taking" Does not want to take    Lisinopril Other (See Comments)     Other reaction(s): Cough    Sulfamethoxazole-trimethoprim Rash       Past Medical History:   Diagnosis Date    CAD (coronary artery disease)     Hx of MI and cardiac cath    Chronic hepatitis C     Chronic hepatitis C with cirrhosis     Cirrhosis     GERD (gastroesophageal reflux disease)     HTN " (hypertension)     Human immunodeficiency virus (HIV) disease     Hyperlipidemia      Past Surgical History:   Procedure Laterality Date    CARDIAC CATHETERIZATION      CORONARY ARTERY BYPASS GRAFT      TONSILLECTOMY AND ADENOIDECTOMY      TRIGGER FINGER RELEASE       Family History    None       Tobacco Use    Smoking status: Not on file    Smokeless tobacco: Not on file   Substance and Sexual Activity    Alcohol use: Not on file    Drug use: Not on file    Sexual activity: Not on file     Review of Systems   Constitutional:  Negative for activity change.   HENT:  Negative for congestion.    Eyes:  Negative for discharge.   Respiratory:  Negative for apnea.    Gastrointestinal:  Negative for diarrhea.   Genitourinary:  Negative for difficulty urinating.   Neurological:  Negative for dizziness, weakness and numbness.     Objective:     Weight: 116.2 kg (256 lb 2.8 oz)  Body mass index is 33.8 kg/m².  Vital Signs (Most Recent):  Temp: 97.9 °F (36.6 °C) (08/11/24 0443)  Pulse: 66 (08/11/24 0443)  Resp: 16 (08/11/24 0443)  BP: (!) 120/58 (08/11/24 0443)  SpO2: 96 % (08/11/24 0443) Vital Signs (24h Range):  Temp:  [97.3 °F (36.3 °C)-98.3 °F (36.8 °C)] 97.9 °F (36.6 °C)  Pulse:  [61-66] 66  Resp:  [16-18] 16  SpO2:  [96 %-99 %] 96 %  BP: (115-126)/(57-63) 120/58                                 Physical Exam  Vitals and nursing note reviewed.   HENT:      Head: Normocephalic.   Eyes:      Extraocular Movements: Extraocular movements intact.   Abdominal:      Palpations: Abdomen is soft.   Neurological:      Mental Status: He is alert and oriented to person, place, and time.          GENERAL: resting comfortably  HEENT: NCAT, PERRL, mucous membranes moist  NECK: supple, trachea midline  CV: normal capillary refill  PULM: aerating well, symmetric expansion, no distress  ABD: soft, NT, ND  EXT: no c/c/e     NEURO:     GCS 15 E4V5M6  AAO x 3  CN II-XII grossly intact  Fc x 4 antigravity  SILT     No drift or  dysmetria    Slowed speech, baseline        Significant Labs:  Recent Labs   Lab 08/11/24  0526   GLU 92      K 3.9      CO2 22*   BUN 19   CREATININE 1.7*   CALCIUM 8.2*     Recent Labs   Lab 08/11/24  0526   WBC 0.97*   HGB 7.6*   HCT 22.3*   PLT 14*     Recent Labs   Lab 08/11/24  0001   INR 1.6*   APTT 35.8*     Microbiology Results (last 7 days)       ** No results found for the last 168 hours. **          All pertinent labs from the last 24 hours have been reviewed.    Significant Diagnostics:  I have reviewed all pertinent imaging results/findings within the past 24 hours.  I have reviewed and interpreted all pertinent imaging results/findings within the past 24 hours.  No results found in the last 24 hours.    Assessment/Plan:     * Bilateral subdural hematomas  58M PMH HIV, thrombocyutopenia, liver cirrhosis, CAD s/p CABG (many years ago) with enlarging bilateral chronic SDH transferred from [facility] after CTH 8/7 without prior intervention    Plan:  Admitted medicine; q4h nc/vs  All labs and significant diagnostics reviewed  INR goal <1.4, plt goal >100k if medically feasible  CTH ordered for surveillance of B/L cSDH  Keppra 500mg BID x7 days  NPO until repeat scan  SBP < 160  Hold DVT ppx until repeat scan  Please notify nsgy of any acute neurological decline or of any further questions/concerns  Preponderance of medical care per primary team    Dispo: ongoing          Thank you for your consult. I will follow-up with patient. Please contact us if you have any additional questions.    Luther Muller MD  Neurosurgery  Esequiel Lea - Neurosurgery ([facility])

## 2024-08-11 NOTE — CARE UPDATE
I have reviewed the chart of Jam Card who is hospitalized for the following:    Active Hospital Problems    Diagnosis    *Bilateral subdural hematomas    HLD (hyperlipidemia)    Depression    CKD (chronic kidney disease)    Hyperammonemia    Brain compression     monitor with q4h neuro checks while on floor or more frequently while in neuro critical care, as any change in the patients clinical exam may signify expansion of the insult and/or the area of the edema. Such changes may require acute interventions to prevent loss of function and/or death.  Pending nsgy evaluation for mass effect on imaging               Liver cirrhosis    Pancytopenia    Chronic hepatitis C    Human immunodeficiency virus (HIV) disease        Heidi Fitzpatrick NP  Unit Based [facility]

## 2024-08-11 NOTE — ASSESSMENT & PLAN NOTE
Patient with known Cirrhosis   MELD-Na score calculated; MELD 3.0: 28 at 7/12/2024  8:57 AM  MELD-Na: 27 at 7/12/2024  8:57 AM  Calculated from:  Serum Creatinine: 2.8 mg/dL at 7/12/2024  8:57 AM  Serum Sodium: 138 mmol/L (Using max of 137 mmol/L) at 7/12/2024  8:57 AM  Total Bilirubin: 4.9 mg/dL at 7/12/2024  8:57 AM  Serum Albumin: 2.2 g/dL at 7/12/2024  8:57 AM  INR(ratio): 1.5 at 7/11/2024  7:39 AM  Age at listing (hypothetical): 58 years  Sex: Male at 7/12/2024  8:57 AM      Continue chronic meds. Etiology likely Hepatitis. Will avoid any hepatotoxic meds, and monitor CBC/CMP/INR for synthetic function.     Can consider liver ultrasound, low suspicion of ascites on exam  F/u ammonia, dbili  Strict I/Os  Continue lasix 40mg daily and spironolactone 50mg daily  Continue lactulose and rifaximin with goal 4-5 BM daily.   Hepatology consulted, appreciate recs   normal sinus rhythm

## 2024-08-11 NOTE — ASSESSMENT & PLAN NOTE
Recent Hep C RNA showed elevated viral load 7/11/24. Not currently on treatment.     Can consider repeat viral load if clinically indicated

## 2024-08-11 NOTE — HPI
57 y/o M h/o CAD s/p CABG, HIV dx 1997, currently on genvoya since 2018, VL ND, CD4 128 (14.6%), HCV cirrhosis (treatment naive) and splenomegaly, HbcAb+, HPV (excision of perianal condyloma 2018), chronic leukopenia/thrombocytopenia, referred to the ED by NSGY due to worsening SDH on imaging.  Afebrile, no history of severe infections or opportunistic infections    Hep A, B immune  Cryptoccal, histo/blasto antigens negative 6/2024

## 2024-08-11 NOTE — ASSESSMENT & PLAN NOTE
Patient initially presented to OSH with HA and CT head showing b/l subdural hematomas. Most recent CT head at follow up visit showed increase in b/l subdural hematomas size.    - NSGY consulted, appreciate recs  - q4h neurochecks  - SBP goal 140  - fall precautions

## 2024-08-11 NOTE — ASSESSMENT & PLAN NOTE
The likely etiology of thrombocytopenia is liver disease and platelet consumption from splenic sequestration . The patients 3 most recent labs are listed below.  Last platelet count at [facility] 37. S/p 2 units of platelets and 1 unit of FFP.  Plan  - Will transfuse if platelet count is <100k (if undergoing neurosurgery).  - Heme/Onc consulted for further recommendations

## 2024-08-11 NOTE — SUBJECTIVE & OBJECTIVE
"Medications Prior to Admission   Medication Sig Dispense Refill Last Dose    benzonatate (TESSALON) 100 MG capsule Take 100 mg by mouth 3 times daily as needed.   Unknown    cyanocobalamin (VITAMIN B-12) 1000 MCG tablet Take 1,000 mcg by mouth.   Unknown    ergocalciferol (ERGOCALCIFEROL) 50,000 unit Cap Take 50,000 Units by mouth every 7 days.   Unknown    fenofibrate 160 MG Tab TAKE 1 TABLET(160 MG) BY MOUTH DAILY   Unknown    FLUoxetine 20 MG capsule Take 20 mg by mouth.   Unknown    furosemide (LASIX) 20 MG tablet Take 2 tablets (40 mg total) by mouth once daily. (Patient taking differently: Take 20 mg by mouth 2 (two) times a day.) 30 tablet 1 Unknown    GENVOYA [phone] mg Tab TAKE ONE TABLET BY MOUTH DAILY WITH FOOD.   Unknown    lactulose (CHRONULAC) 20 gram/30 mL Soln Take 30 mLs (20 g total) by mouth 2 (two) times daily. Goal of 3-5 soft stools each day 1800 mL 3 Unknown    loratadine (CLARITIN) 10 mg tablet Take 10 mg by mouth.   Unknown    metoprolol succinate (TOPROL-XL) 25 MG 24 hr tablet Take 25 mg by mouth.   Unknown    pantoprazole (PROTONIX) 40 MG tablet Take 40 mg by mouth once daily.   Unknown    pyridoxine, vitamin B6, (B-6) 100 MG Tab Take 100 mg by mouth.   Unknown    rosuvastatin (CRESTOR) 10 MG tablet Take 10 mg by mouth every evening.   Unknown    spironolactone (ALDACTONE) 50 MG tablet Take 2 tablets (100 mg total) by mouth once daily. 30 tablet 1 Unknown    tamsulosin (FLOMAX) 0.4 mg Cap Take 0.4 mg by mouth every evening.   Unknown    valsartan (DIOVAN) 40 MG tablet Take 1 tablet by mouth every evening.   Unknown    vitamin D (VITAMIN D3) 1000 units Tab Take 1,000 Units by mouth.   Unknown       Review of patient's allergies indicates:   Allergen Reactions    Hydrocodone-acetaminophen Other (See Comments)     "cannot sleep when taking" Does not want to take    Lisinopril Other (See Comments)     Other reaction(s): Cough    Sulfamethoxazole-trimethoprim Rash       Past Medical " History:   Diagnosis Date    CAD (coronary artery disease)     Hx of MI and cardiac cath    Chronic hepatitis C     Chronic hepatitis C with cirrhosis     Cirrhosis     GERD (gastroesophageal reflux disease)     HTN (hypertension)     Human immunodeficiency virus (HIV) disease     Hyperlipidemia      Past Surgical History:   Procedure Laterality Date    CARDIAC CATHETERIZATION      CORONARY ARTERY BYPASS GRAFT      TONSILLECTOMY AND ADENOIDECTOMY      TRIGGER FINGER RELEASE       Family History    None       Tobacco Use    Smoking status: Not on file    Smokeless tobacco: Not on file   Substance and Sexual Activity    Alcohol use: Not on file    Drug use: Not on file    Sexual activity: Not on file     Review of Systems   Constitutional:  Negative for activity change.   HENT:  Negative for congestion.    Eyes:  Negative for discharge.   Respiratory:  Negative for apnea.    Gastrointestinal:  Negative for diarrhea.   Genitourinary:  Negative for difficulty urinating.   Neurological:  Negative for dizziness, weakness and numbness.     Objective:     Weight: 116.2 kg (256 lb 2.8 oz)  Body mass index is 33.8 kg/m².  Vital Signs (Most Recent):  Temp: 97.9 °F (36.6 °C) (08/11/24 0443)  Pulse: 66 (08/11/24 0443)  Resp: 16 (08/11/24 0443)  BP: (!) 120/58 (08/11/24 0443)  SpO2: 96 % (08/11/24 0443) Vital Signs (24h Range):  Temp:  [97.3 °F (36.3 °C)-98.3 °F (36.8 °C)] 97.9 °F (36.6 °C)  Pulse:  [61-66] 66  Resp:  [16-18] 16  SpO2:  [96 %-99 %] 96 %  BP: (115-126)/(57-63) 120/58                                 Physical Exam  Vitals and nursing note reviewed.   HENT:      Head: Normocephalic.   Eyes:      Extraocular Movements: Extraocular movements intact.   Abdominal:      Palpations: Abdomen is soft.   Neurological:      Mental Status: He is alert and oriented to person, place, and time.          GENERAL: resting comfortably  HEENT: NCAT, PERRL, mucous membranes moist  NECK: supple, trachea midline  CV: normal capillary  refill  PULM: aerating well, symmetric expansion, no distress  ABD: soft, NT, ND  EXT: no c/c/e     NEURO:     GCS 15 E4V5M6  AAO x 3  CN II-XII grossly intact  Fc x 4 antigravity  SILT     No drift or dysmetria    Slowed speech, baseline        Significant Labs:  Recent Labs   Lab 08/11/24  0526   GLU 92      K 3.9      CO2 22*   BUN 19   CREATININE 1.7*   CALCIUM 8.2*     Recent Labs   Lab 08/11/24  0526   WBC 0.97*   HGB 7.6*   HCT 22.3*   PLT 14*     Recent Labs   Lab 08/11/24  0001   INR 1.6*   APTT 35.8*     Microbiology Results (last 7 days)       ** No results found for the last 168 hours. **          All pertinent labs from the last 24 hours have been reviewed.    Significant Diagnostics:  I have reviewed all pertinent imaging results/findings within the past 24 hours.  I have reviewed and interpreted all pertinent imaging results/findings within the past 24 hours.  No results found in the last 24 hours.

## 2024-08-11 NOTE — HPI
Mr. Card is a 59 yo M with PMHx of Hep C cirrhosis, HIV, chronic thrombocytopenia, HTN, GERD, HLD, CAD s/p CABG (many years ago) who was initially admitted to [facility] under  after being referred to the ED by NSGY due to worsening SDH on imaging. Patient was previously admitted to [facility] for evaluation of HA (7/19/24) with a CT head showing b/l chronic extra-axial fluid collections which possibly could be CSF. Bur hole drainage was considered but coagulopathy and platelet issue needed to be corrected prior. Patient also did not want drainage at that time. Patient f/u with [facility] neurotrauma unit with repeat CT head (8/7/24) but no longer experienced HA, just gait disturbance. CT head showed blood developing since prior CT head which led to ED visit and evaluation. Heme/Onc was consulted and believe that thrombocytopenia could be to splenic sequestration so improvement of plt function may only be temporary at best. NSGY at [facility] requested inter facility transfer. Hepatology at [facility] also agreed. Patient transferred to [facility] for NSGY, Heme/Onc, and Hepatology.     Patient hemodynamically stable on arrival. MELD 27, Na 135, Cr 1.72 (1.8 baseline), INR 1.7, CBC 0.9 > 7.7 < 37 (s/p 2U plt and FFP, with initial INR 1.9). No focal neuro deficits at this time. GCS 15 on exam. Patient denies HA, weakness, numbness, vision disturbance, dizziness, photophobia, or any other complaints.

## 2024-08-11 NOTE — NURSING
Critical lab results called to Dr Alexey Avendaño MD with [facility] medicine. No new orders at this time. Will review complete results and give further orders.

## 2024-08-11 NOTE — PLAN OF CARE
Problem: Adult Inpatient Plan of Care  Goal: Plan of Care Review  8/11/2024 0455 by Renetta Agarwal RN  Outcome: Progressing  8/11/2024 0455 by Renetta Agarwal RN  Outcome: Progressing  8/11/2024 0450 by Renetta Agarwal RN  Outcome: Progressing  Flowsheets (Taken 8/11/2024 0450)  Plan of Care Reviewed With: patient  Goal: Patient-Specific Goal (Individualized)  8/11/2024 0455 by Renetta Agarwal RN  Outcome: Progressing  8/11/2024 0455 by Renetta Agarwal RN  Outcome: Progressing  8/11/2024 0450 by Renetta Agarwal RN  Outcome: Progressing  Flowsheets (Taken 8/11/2024 0450)  Individualized Care Needs: maintain baseline neurological status and saftey.  Anxieties, Fears or Concerns: Denies  Patient/Family-Specific Goals (Include Timeframe): Get evaluated  Goal: Absence of Hospital-Acquired Illness or Injury  8/11/2024 0455 by Renetta Agarwal RN  Outcome: Progressing  8/11/2024 0455 by Renetta Agarwal RN  Outcome: Progressing  8/11/2024 0450 by Renetta Agarwal RN  Outcome: Progressing  Intervention: Identify and Manage Fall Risk  Flowsheets (Taken 8/11/2024 0450)  Safety Promotion/Fall Prevention:   assistive device/personal item within reach   bed alarm set   commode/urinal/bedpan at bedside   Fall Risk reviewed with patient/family   Fall Risk signage in place   diversional activities provided   high risk medications identified   instructed to call staff for mobility   lighting adjusted   medications reviewed   muscle strengthening facilitated   nonskid shoes/socks when out of bed   patient expresses understanding of fall risk and prevention   side rails raised x 3   toileting scheduled  Intervention: Prevent Skin Injury  Flowsheets (Taken 8/11/2024 0450)  Body Position:   position changed independently   upper extremity elevated   lower extremity elevated  Skin Protection:   incontinence pads utilized   pulse oximeter probe site changed   silicone foam dressing in place   protective footwear  used  Device Skin Pressure Protection:   absorbent pad utilized/changed   adhesive use limited   positioning supports utilized   pressure points protected   tubing/devices free from skin contact   skin-to-skin areas padded   skin-to-device areas padded  Intervention: Prevent and Manage VTE (Venous Thromboembolism) Risk  Flowsheets (Taken 8/11/2024 0450)  VTE Prevention/Management:   remove, assess skin, and reapply sequential compression device   bleeding precations maintained   bleeding risk assessed   bleeding risk factor(s) identified, provider notified   dorsiflexion/plantar flexion performed   ROM (active) performed   ROM (passive) performed  Intervention: Prevent Infection  Flowsheets (Taken 8/11/2024 0450)  Infection Prevention:   cohorting utilized   environmental surveillance performed   equipment surfaces disinfected   hand hygiene promoted   rest/sleep promoted  Goal: Optimal Comfort and Wellbeing  8/11/2024 0455 by Renetta Agarwal RN  Outcome: Progressing  8/11/2024 0455 by Renetta Agarwal RN  Outcome: Progressing  8/11/2024 0450 by Renetta Agarwal RN  Outcome: Progressing  Intervention: Monitor Pain and Promote Comfort  Flowsheets (Taken 8/11/2024 0450)  Pain Management Interventions:   care clustered   medication offered but refused   pain management plan reviewed with patient/caregiver   quiet environment facilitated   position adjusted   relaxation techniques promoted   pillow support provided  Intervention: Provide Person-Centered Care  Flowsheets (Taken 8/11/2024 0450)  Trust Relationship/Rapport:   care explained   choices provided   emotional support provided   empathic listening provided   questions answered   questions encouraged   reassurance provided   thoughts/feelings acknowledged  Goal: Readiness for Transition of Care  8/11/2024 0455 by Renetta Agarwal RN  Outcome: Progressing  8/11/2024 0455 by Renetta Agarwal RN  Outcome: Progressing  8/11/2024 0450 by Renetta Agarwal  RN  Outcome: Progressing     Problem: Fall Injury Risk  Goal: Absence of Fall and Fall-Related Injury  8/11/2024 0455 by Renetta Agarwal RN  Outcome: Progressing  8/11/2024 0455 by Renetta Agarwal RN  Outcome: Progressing  8/11/2024 0450 by Renetta Agarwal RN  Outcome: Progressing  Intervention: Identify and Manage Contributors  Flowsheets (Taken 8/11/2024 0450)  Self-Care Promotion:   independence encouraged   BADL personal objects within reach   BADL personal routines maintained   adaptive equipment use encouraged  Medication Review/Management:   medications reviewed   dosing adjusted   high-risk medications identified   provider consulted  Intervention: Promote Injury-Free Environment  Flowsheets (Taken 8/11/2024 0450)  Safety Promotion/Fall Prevention:   assistive device/personal item within reach   bed alarm set   commode/urinal/bedpan at bedside   Fall Risk reviewed with patient/family   Fall Risk signage in place   diversional activities provided   high risk medications identified   instructed to call staff for mobility   lighting adjusted   medications reviewed   muscle strengthening facilitated   nonskid shoes/socks when out of bed   patient expresses understanding of fall risk and prevention   side rails raised x 3   toileting scheduled     Problem: Pain Acute  Goal: Optimal Pain Control and Function  8/11/2024 0455 by Renetta Agarwal RN  Outcome: Progressing  8/11/2024 0455 by Renetta Agarwal RN  Outcome: Progressing  8/11/2024 0450 by Renetta Agarwal RN  Outcome: Progressing  Intervention: Develop Pain Management Plan  Flowsheets (Taken 8/11/2024 0450)  Pain Management Interventions:   care clustered   medication offered but refused   pain management plan reviewed with patient/caregiver   quiet environment facilitated   position adjusted   relaxation techniques promoted   pillow support provided  Intervention: Prevent or Manage Pain  Flowsheets (Taken 8/11/2024 0450)  Sleep/Rest  Enhancement:   awakenings minimized   consistent schedule promoted   relaxation techniques promoted   regular sleep/rest pattern promoted   noise level reduced  Sensory Stimulation Regulation:   lighting decreased   care clustered   quiet environment promoted  Medication Review/Management:   medications reviewed   dosing adjusted   high-risk medications identified   provider consulted  Intervention: Optimize Psychosocial Wellbeing  Flowsheets (Taken 8/11/2024 0450)  Supportive Measures:   active listening utilized   counseling provided   decision-making supported   goal-setting facilitated   positive reinforcement provided   problem-solving facilitated   relaxation techniques promoted   self-care encouraged   self-reflection promoted   verbalization of feelings encouraged   self-responsibility promoted  Diversional Activities: television     Problem: Comorbidity Management  Goal: Blood Pressure in Desired Range  8/11/2024 0455 by Renetta Agarwal, RN  Outcome: Progressing  8/11/2024 0455 by Renetta Agarwal RN  Outcome: Progressing  8/11/2024 0450 by Renetta Agarwal RN  Outcome: Progressing  Intervention: Maintain Blood Pressure Management  Flowsheets (Taken 8/11/2024 0450)  Medication Review/Management:   medications reviewed   dosing adjusted   high-risk medications identified   provider consulted     Problem: Infection  Goal: Absence of Infection Signs and Symptoms  8/11/2024 0455 by Renetta Agarwal, RN  Outcome: Progressing  8/11/2024 0455 by Renetta Agarwal, RN  Outcome: Progressing  8/11/2024 0450 by Renetta Agarwal, RN  Outcome: ProgressingIntervention: Prevent or Manage Infection  Flowsheets (Taken 8/11/2024 0450)  Fever Reduction/Comfort Measures:   lightweight clothing   lightweight bedding  Infection Management: aseptic technique maintained   POC reviewed and updated with the patientat the bedside. Questions regarding POC were encouraged and addressed. VSS, see flowsheets. Tele maintained per  order. Patient is AOx 4 at this time. Fall and safety precautions maintained, no signs of injury noted during shift. Pain management utilizing PRN pain medications effective, see MAR for administration details. Upon exiting room, patient's bed locked in low position, side rails up x 3, bed alarm set, with call light within reach. Instructed patient to call staff for mobility, verbalized understanding.  No acute signs of distress noted at this time.

## 2024-08-11 NOTE — SUBJECTIVE & OBJECTIVE
"Past Medical History:   Diagnosis Date    CAD (coronary artery disease)     Hx of MI and cardiac cath    Chronic hepatitis C     Chronic hepatitis C with cirrhosis     Cirrhosis     GERD (gastroesophageal reflux disease)     HTN (hypertension)     Human immunodeficiency virus (HIV) disease     Hyperlipidemia        Past Surgical History:   Procedure Laterality Date    CARDIAC CATHETERIZATION      CORONARY ARTERY BYPASS GRAFT      TONSILLECTOMY AND ADENOIDECTOMY      TRIGGER FINGER RELEASE         Review of patient's allergies indicates:   Allergen Reactions    Hydrocodone-acetaminophen Other (See Comments)     "cannot sleep when taking" Does not want to take    Lisinopril Other (See Comments)     Other reaction(s): Cough    Sulfamethoxazole-trimethoprim Rash       Medications:  Medications Prior to Admission   Medication Sig    benzonatate (TESSALON) 100 MG capsule Take 100 mg by mouth 3 times daily as needed.    cyanocobalamin (VITAMIN B-12) 1000 MCG tablet Take 1,000 mcg by mouth.    ergocalciferol (ERGOCALCIFEROL) 50,000 unit Cap Take 50,000 Units by mouth every 7 days.    fenofibrate 160 MG Tab TAKE 1 TABLET(160 MG) BY MOUTH DAILY    FLUoxetine 20 MG capsule Take 20 mg by mouth.    furosemide (LASIX) 20 MG tablet Take 2 tablets (40 mg total) by mouth once daily. (Patient taking differently: Take 20 mg by mouth 2 (two) times a day.)    GENVOYA [phone] mg Tab TAKE ONE TABLET BY MOUTH DAILY WITH FOOD.    lactulose (CHRONULAC) 20 gram/30 mL Soln Take 30 mLs (20 g total) by mouth 2 (two) times daily. Goal of 3-5 soft stools each day    loratadine (CLARITIN) 10 mg tablet Take 10 mg by mouth.    metoprolol succinate (TOPROL-XL) 25 MG 24 hr tablet Take 25 mg by mouth.    pantoprazole (PROTONIX) 40 MG tablet Take 40 mg by mouth once daily.    pyridoxine, vitamin B6, (B-6) 100 MG Tab Take 100 mg by mouth.    rosuvastatin (CRESTOR) 10 MG tablet Take 10 mg by mouth every evening.    spironolactone (ALDACTONE) 50 MG " tablet Take 2 tablets (100 mg total) by mouth once daily.    tamsulosin (FLOMAX) 0.4 mg Cap Take 0.4 mg by mouth every evening.    valsartan (DIOVAN) 40 MG tablet Take 1 tablet by mouth every evening.    vitamin D (VITAMIN D3) 1000 units Tab Take 1,000 Units by mouth.     Antibiotics (From admission, onward)      None          Antifungals (From admission, onward)      None          Antivirals (From admission, onward)          Stop Route Frequency     chgqhixz-pjjdccyt-hzotjq-tenof (STRIBILD) [id number] mg         -- Oral Daily             Immunization History   Administered Date(s) Administered    COVID-19, MRNA, LN-S, PF (MODERNA FULL 0.5 ML DOSE) 01/27/2021, 02/24/2021, 08/19/2021       Family History    None       Social History     Socioeconomic History    Marital status: Single     Social Determinants of Health     Financial Resource Strain: Medium Risk (8/11/2024)    Overall Financial Resource Strain (CARDIA)     Difficulty of Paying Living Expenses: Somewhat hard   Food Insecurity: No Food Insecurity (8/11/2024)    Hunger Vital Sign     Worried About Running Out of Food in the Last Year: Never true     Ran Out of Food in the Last Year: Never true   Transportation Needs: No Transportation Needs (8/11/2024)    TRANSPORTATION NEEDS     Transportation : No   Physical Activity: Patient Declined (8/10/2024)    [facility] from [facility]    Exercise Vital Sign     Days of Exercise per Week: Patient declined     Minutes of Exercise per Session: Patient declined   Recent Concern: Physical Activity - Inactive (8/9/2024)    Received from [facility] and [facility]    Exercise Vital Sign     Days of Exercise per Week: 0 days     Minutes of Exercise per Session: 0 min   Stress: No Stress Concern Present (8/11/2024)    Citizen of [place] [employer] Occupational [employer] - Occupational Stress Questionnaire     Feeling of Stress : Only a little   Housing Stability: Low Risk   (8/11/2024)    Housing Stability Vital Sign     Unable to Pay for Housing in the Last Year: No     Homeless in the Last Year: No     Review of Systems   Constitutional:  Negative for activity change, appetite change, chills, fatigue and fever.   HENT:  Negative for congestion, dental problem, mouth sores and sinus pressure.    Eyes:  Negative for pain, redness and visual disturbance.   Respiratory:  Negative for cough, shortness of breath and wheezing.    Cardiovascular:  Negative for chest pain and leg swelling.   Gastrointestinal:  Negative for abdominal distention, abdominal pain, diarrhea, nausea and vomiting.   Endocrine: Negative for polyuria.   Genitourinary:  Negative for decreased urine volume, dysuria and frequency.   Musculoskeletal:  Negative for joint swelling.   Skin:  Negative for color change.   Allergic/Immunologic: Negative for food allergies.   Neurological:  Negative for dizziness, weakness and headaches.   Hematological:  Negative for adenopathy.   Psychiatric/Behavioral:  Negative for agitation and confusion. The patient is not nervous/anxious.      Objective:     Vital Signs (Most Recent):  Temp: 98.4 °F (36.9 °C) (08/11/24 0959)  Pulse: 61 (08/11/24 0959)  Resp: 18 (08/11/24 0959)  BP: (!) 118/57 (08/11/24 0959)  SpO2: 95 % (08/11/24 0959) Vital Signs (24h Range):  Temp:  [97.9 °F (36.6 °C)-98.4 °F (36.9 °C)] 98.4 °F (36.9 °C)  Pulse:  [61-66] 61  Resp:  [16-18] 18  SpO2:  [95 %-99 %] 95 %  BP: (118-126)/(57-58) 118/57     Weight: 116.2 kg (256 lb 2.8 oz)  Body mass index is 33.8 kg/m².    Estimated Creatinine Clearance: 64.8 mL/min (A) (based on SCr of 1.7 mg/dL (H)).     Physical Exam  Constitutional:       Appearance: He is well-developed.   HENT:      Head: Normocephalic and atraumatic.   Eyes:      Conjunctiva/sclera: Conjunctivae normal.   Cardiovascular:      Rate and Rhythm: Normal rate and regular rhythm.      Heart sounds: Normal heart sounds. No murmur heard.  Pulmonary:       Effort: Pulmonary effort is normal. No respiratory distress.      Breath sounds: Normal breath sounds. No wheezing.   Abdominal:      General: Bowel sounds are normal. There is no distension.      Palpations: Abdomen is soft.      Tenderness: There is no abdominal tenderness.   Musculoskeletal:         General: No tenderness. Normal range of motion.      Cervical back: Neck supple.   Lymphadenopathy:      Cervical: No cervical adenopathy.   Skin:     General: Skin is warm and dry.      Findings: No rash.   Neurological:      Mental Status: He is alert and oriented to person, place, and time.      Coordination: Coordination normal.   Psychiatric:         Behavior: Behavior normal.          Significant Labs: CBC:   Recent Labs   Lab 08/11/24  0526   WBC 0.97*   HGB 7.6*   HCT 22.3*   PLT 14*     CMP:   Recent Labs   Lab 08/11/24  0526      K 3.9      CO2 22*   GLU 92   BUN 19   CREATININE 1.7*   CALCIUM 8.2*   PROT 5.7*   ALBUMIN 1.9*   BILITOT 3.7*   ALKPHOS 94   AST 66*   ALT 16   ANIONGAP 6*       Significant Imaging: I have reviewed all pertinent imaging results/findings within the past 24 hours.

## 2024-08-11 NOTE — CONSULTS
[facility]  Hepatology  Consult Note    Patient Name: Jam Card  MRN: [id number]  Admission Date: 8/10/2024  Hospital Length of Stay: 1 days  Code Status: Full Code   Attending Provider: Leo Peres MD   Consulting Provider: Bk Howard MD  Primary Care Physician: Kina Rothman PA  Principal Problem:Bilateral subdural hematomas    Inpatient consult to Hepatology  Consult performed by: Bk Howard MD  Consult ordered by: Dina Hobbs MD        Subjective:     HPI: Jam Card is a 58 y.o. 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 Repeat CT showed subacute blood developing since prior CT on 7/19 so sent to ED. [place] hole drainage was discussed but held off due to coagulopathy. NSGY was discussing discharge with outpatient referral to [facility] NSGY, but discussed with [facility] hepatology and felt best to transfer directly to [facility] for hepatology and NSGY eval.     MELD 27  TB 3.5  INR 1.7  Plts 14  Was empirically treated for ITP in the past with corticosteroids but did not respond.  Hematology evaluated as inpatient at [facility] and planned IVIG    Was evaluated by hematology, thrombocytopenia felt secondary to cirrhosis and splenic sequestration    Sees Dr. Turner in clinic  Diagnosed with HCV cirrhosis in March 2024  Outpatient transplant evaluation was recently started    WBC currently 0.97  Plts 14  Hgb 7.6  INR 1.6  Cr 1.7 (BL 1.2)            Past Medical History:   Diagnosis Date    CAD (coronary artery disease)     Hx of MI and cardiac cath    Chronic hepatitis C     Chronic hepatitis C with cirrhosis     Cirrhosis     GERD (gastroesophageal reflux disease)     HTN (hypertension)     Human immunodeficiency virus (HIV) disease     Hyperlipidemia        Past  Surgical History:   Procedure Laterality Date    CARDIAC CATHETERIZATION      CORONARY ARTERY BYPASS GRAFT      TONSILLECTOMY AND ADENOIDECTOMY      TRIGGER FINGER RELEASE         No family history on file.    Social History     Socioeconomic History    Marital status: Single     Social Determinants of Health     Financial Resource Strain: Medium Risk (8/11/2024)    Overall Financial Resource Strain (CARDIA)     Difficulty of Paying Living Expenses: Somewhat hard   Food Insecurity: No Food Insecurity (8/11/2024)    Hunger Vital Sign     Worried About Running Out of Food in the Last Year: Never true     Ran Out of Food in the Last Year: Never true   Transportation Needs: No Transportation Needs (8/11/2024)    TRANSPORTATION NEEDS     Transportation : No   Physical Activity: Patient Declined (8/10/2024)    Received from [facility] and [facility]    Exercise Vital Sign     Days of Exercise per Week: Patient declined     Minutes of Exercise per Session: Patient declined   Recent Concern: Physical Activity - Inactive (8/9/2024)    Received from [facility] and [facility]    Exercise Vital Sign     Days of Exercise per Week: 0 days     Minutes of Exercise per Session: 0 min   Stress: No Stress Concern Present (8/11/2024)    [place] [employer] - Occupational Stress Questionnaire     Feeling of Stress : Only a little   Housing Stability: Low Risk  (8/11/2024)    Housing Stability Vital Sign     Unable to Pay for Housing in the Last Year: No     Homeless in the Last Year: No       Objective:     Vitals:    08/11/24 0443   BP: (!) 120/58   Pulse: 66   Resp: 16   Temp: 97.9 °F (36.6 °C)       Physical Exam:  Constitutional:  not in acute distress and well developed  HENT: Head: Normal, normocephalic.  Eyes:  scleral icterus  Respiratory: normal chest expansion & respiratory effort and no accessory muscle use  GI: soft, non-tender, without masses or  organomegaly  Skin: no jaundice  Neurological: alert, oriented x3. Dysarthric      Significant Labs:  Recent Labs   Lab 08/11/24  0526   HGB 7.6*       Lab Results   Component Value Date    WBC 0.97 (LL) 08/11/2024    HGB 7.6 (L) 08/11/2024    HCT 22.3 (L) 08/11/2024     (H) 08/11/2024    PLT 14 (LL) 08/11/2024       Lab Results   Component Value Date     08/11/2024    K 3.9 08/11/2024     08/11/2024    CO2 22 (L) 08/11/2024    BUN 19 08/11/2024    CREATININE 1.7 (H) 08/11/2024    CALCIUM 8.2 (L) 08/11/2024    ANIONGAP 6 (L) 08/11/2024       Lab Results   Component Value Date    ALT 16 08/11/2024    AST 66 (H) 08/11/2024    GGT 74 (H) 07/11/2024    ALKPHOS 94 08/11/2024    BILITOT 3.7 (H) 08/11/2024       Lab Results   Component Value Date    INR 1.6 (H) 08/11/2024    INR 1.5 (H) 07/11/2024    INR 1.5 (H) 05/07/2024       MELD 3.0: 24 at 8/11/2024  5:26 AM  MELD-Na: 22 at 8/11/2024  5:26 AM  Calculated from:  Serum Creatinine: 1.7 mg/dL at 8/11/2024  5:26 AM  Serum Sodium: 138 mmol/L (Using max of 137 mmol/L) at 8/11/2024  5:26 AM  Total Bilirubin: 3.7 mg/dL at 8/11/2024  5:26 AM  Serum Albumin: 1.9 g/dL at 8/11/2024  5:26 AM  INR(ratio): 1.6 at 8/11/2024 12:01 AM  Age at listing (hypothetical): 58 years  Sex: Male at 8/11/2024  5:26 AM        Significant Imaging:  Reviewed pertinent radiology findings.       Assessment/Plan:     Jam Card is a 58 y.o. 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 Transferred from [facility] where NSGY was consulted and recommended against intervention with significant coagulopathy. NSGY and hematology consulted here. Awaiting repeat CT.    Recently started liver transplant evaluation as outpatient but will put any further evaluation on hold with current SDH.    Problem List:  Bilateral chronic subdural  hematomas  Pancytopenia, neutropenia    Recommendations:  - Repeat CTH per NSGY  - Hematology consulted for coagulopathy  - Holding off on further transplant evaluation due to enlarging SDH  - Would hold diuretics in setting of JASON  - IV albumin 50g BID  - Continue lactulose titrated to 2-3 BMs per day  - Low salt, high protein diet    Thank you for involving us in the care of Jam Card. Please call with any additional questions, concerns or changes in the patient's clinical status. We will continue to follow.     Bk Howard MD  Gastroenterology & Hepatology Fellow PGY-6  [facility]

## 2024-08-11 NOTE — NURSING
Message to Hospital med team and Neurosurgery:  Just an FYI; lab called with criticals WBC = 1.17 and Plt = 17. Albumin completed and next will be Platelets and FFP.

## 2024-08-11 NOTE — CONSULTS
[facility] - Neurosurgery ([facility])  Infectious Disease  Consult Note    Patient Name: Jam Card  MRN: [id number]  Admission Date: 8/10/2024  Hospital Length of Stay: 1 days  Attending Physician: Leo Peres MD  Primary Care Provider: Kina Rothman PA     Isolation Status: No active isolations    Patient information was obtained from past medical records.      Inpatient consult to Infectious Diseases  Consult performed by: Carmelo Wu MD  Consult ordered by: Dandre Avendaño MD  Reason for consult: HIV      Assessment/Plan:     ID  Human immunodeficiency virus (HIV) disease  57 y/o M h/o CAD s/p CABG, HIV dx 1997, currently on genvoya since 2018, VL ND, CD4 128 (14.6%), HCV cirrhosis (treatment naive) and splenomegaly, HbcAb+, HPV (excision of perianal condyloma 2018), chronic leukopenia/thrombocytopenia, referred to the ED by NSGY due to worsening SDH on imaging  - no active, obvious infectious issues, can check AFB blood culture but suspect will be low yield as patient has had virologic suppression for awhile with decent CD4% even though absolute CD4 likely low in setting of liver disease and splenomegaly  - agree with further heme/onc evaluation  - continue genvoya  - recommend adding atovaquone prophyaxis for CD4< 200 (which was prescribed by primary per his notes)  - discussed with team, patient and his friend at bedside  - will sign off for now call back if further questions or OLT eval moves forward.        Thank you for your consult. I will sign off. Please contact us if you have any additional questions.    Carmelo Wu MD  Infectious Disease  [facility] ([facility])    Subjective:     Principal Problem: Bilateral subdural hematomas    HPI: 57 y/o M h/o CAD s/p CABG, HIV dx 1997, currently on genvoya since 2018, VL ND, CD4 128 (14.6%), HCV cirrhosis (treatment naive) and splenomegaly, HbcAb+, HPV (excision of perianal condyloma 2018), chronic leukopenia/thrombocytopenia,  "referred to the ED by NSGY due to worsening SDH on imaging.  Afebrile, no history of severe infections or opportunistic infections    Hep A, B immune  Cryptoccal, histo/blasto antigens negative 6/2024    Past Medical History:   Diagnosis Date    CAD (coronary artery disease)     Hx of MI and cardiac cath    Chronic hepatitis C     Chronic hepatitis C with cirrhosis     Cirrhosis     GERD (gastroesophageal reflux disease)     HTN (hypertension)     Human immunodeficiency virus (HIV) disease     Hyperlipidemia        Past Surgical History:   Procedure Laterality Date    CARDIAC CATHETERIZATION      CORONARY ARTERY BYPASS GRAFT      TONSILLECTOMY AND ADENOIDECTOMY      TRIGGER FINGER RELEASE         Review of patient's allergies indicates:   Allergen Reactions    Hydrocodone-acetaminophen Other (See Comments)     "cannot sleep when taking" Does not want to take    Lisinopril Other (See Comments)     Other reaction(s): Cough    Sulfamethoxazole-trimethoprim Rash       Medications:  Medications Prior to Admission   Medication Sig    benzonatate (TESSALON) 100 MG capsule Take 100 mg by mouth 3 times daily as needed.    cyanocobalamin (VITAMIN B-12) 1000 MCG tablet Take 1,000 mcg by mouth.    ergocalciferol (ERGOCALCIFEROL) 50,000 unit Cap Take 50,000 Units by mouth every 7 days.    fenofibrate 160 MG Tab TAKE 1 TABLET(160 MG) BY MOUTH DAILY    FLUoxetine 20 MG capsule Take 20 mg by mouth.    furosemide (LASIX) 20 MG tablet Take 2 tablets (40 mg total) by mouth once daily. (Patient taking differently: Take 20 mg by mouth 2 (two) times a day.)    GENVOYA [id number] mg Tab TAKE ONE TABLET BY MOUTH DAILY WITH FOOD.    lactulose (CHRONULAC) 20 gram/30 mL Soln Take 30 mLs (20 g total) by mouth 2 (two) times daily. Goal of 3-5 soft stools each day    loratadine (CLARITIN) 10 mg tablet Take 10 mg by mouth.    metoprolol succinate (TOPROL-XL) 25 MG 24 hr tablet Take 25 mg by mouth.    pantoprazole " (PROTONIX) 40 MG tablet Take 40 mg by mouth once daily.    pyridoxine, vitamin B6, (B-6) 100 MG Tab Take 100 mg by mouth.    rosuvastatin (CRESTOR) 10 MG tablet Take 10 mg by mouth every evening.    spironolactone (ALDACTONE) 50 MG tablet Take 2 tablets (100 mg total) by mouth once daily.    tamsulosin (FLOMAX) 0.4 mg Cap Take 0.4 mg by mouth every evening.    valsartan (DIOVAN) 40 MG tablet Take 1 tablet by mouth every evening.    vitamin D (VITAMIN D3) 1000 units Tab Take 1,000 Units by mouth.     Antibiotics (From admission, onward)      None          Antifungals (From admission, onward)      None          Antivirals (From admission, onward)          Stop Route Frequency     uwfbkhlx-uizebeke-oqdimj-tenof (STRIBILD) [id number] mg         -- Oral Daily             Immunization History   Administered Date(s) Administered    COVID-19, MRNA, LN-S, PF (MODERNA FULL 0.5 ML DOSE) 01/27/2021, 02/24/2021, 08/19/2021       Family History    None       Social History     Socioeconomic History    Marital status: Single     Social Determinants of Health     Financial Resource Strain: Medium Risk (8/11/2024)    Overall Financial Resource Strain (CARDIA)     Difficulty of Paying Living Expenses: Somewhat hard   Food Insecurity: No Food Insecurity (8/11/2024)    Hunger Vital Sign     Worried About Running Out of Food in the Last Year: Never true     Ran Out of Food in the Last Year: Never true   Transportation Needs: No Transportation Needs (8/11/2024)    TRANSPORTATION NEEDS     Transportation : No   Physical Activity: Patient Declined (8/10/2024)    Received from [facility]    Exercise Vital Sign     Days of Exercise per Week: Patient declined     Minutes of Exercise per Session: Patient declined   Recent Concern: Physical Activity - Inactive (8/9/2024)    Received from [facility]    Exercise Vital Sign     Days of Exercise per Week:  0 days     Minutes of Exercise per Session: 0 min   Stress: No Stress Concern Present (8/11/2024)    [employer] - Occupational Stress Questionnaire     Feeling of Stress : Only a little   Housing Stability: Low Risk  (8/11/2024)    Housing Stability Vital Sign     Unable to Pay for Housing in the Last Year: No     Homeless in the Last Year: No     Review of Systems   Constitutional:  Negative for activity change, appetite change, chills, fatigue and fever.   HENT:  Negative for congestion, dental problem, mouth sores and sinus pressure.    Eyes:  Negative for pain, redness and visual disturbance.   Respiratory:  Negative for cough, shortness of breath and wheezing.    Cardiovascular:  Negative for chest pain and leg swelling.   Gastrointestinal:  Negative for abdominal distention, abdominal pain, diarrhea, nausea and vomiting.   Endocrine: Negative for polyuria.   Genitourinary:  Negative for decreased urine volume, dysuria and frequency.   Musculoskeletal:  Negative for joint swelling.   Skin:  Negative for color change.   Allergic/Immunologic: Negative for food allergies.   Neurological:  Negative for dizziness, weakness and headaches.   Hematological:  Negative for adenopathy.   Psychiatric/Behavioral:  Negative for agitation and confusion. The patient is not nervous/anxious.      Objective:     Vital Signs (Most Recent):  Temp: 98.4 °F (36.9 °C) (08/11/24 0959)  Pulse: 61 (08/11/24 0959)  Resp: 18 (08/11/24 0959)  BP: (!) 118/57 (08/11/24 0959)  SpO2: 95 % (08/11/24 0959) Vital Signs (24h Range):  Temp:  [97.9 °F (36.6 °C)-98.4 °F (36.9 °C)] 98.4 °F (36.9 °C)  Pulse:  [61-66] 61  Resp:  [16-18] 18  SpO2:  [95 %-99 %] 95 %  BP: (118-126)/(57-58) 118/57     Weight: 116.2 kg (256 lb 2.8 oz)  Body mass index is 33.8 kg/m².    Estimated Creatinine Clearance: 64.8 mL/min (A) (based on SCr of 1.7 mg/dL (H)).     Physical Exam  Constitutional:       Appearance: He is well-developed.    HENT:      Head: Normocephalic and atraumatic.   Eyes:      Conjunctiva/sclera: Conjunctivae normal.   Cardiovascular:      Rate and Rhythm: Normal rate and regular rhythm.      Heart sounds: Normal heart sounds. No murmur heard.  Pulmonary:      Effort: Pulmonary effort is normal. No respiratory distress.      Breath sounds: Normal breath sounds. No wheezing.   Abdominal:      General: Bowel sounds are normal. There is no distension.      Palpations: Abdomen is soft.      Tenderness: There is no abdominal tenderness.   Musculoskeletal:         General: No tenderness. Normal range of motion.      Cervical back: Neck supple.   Lymphadenopathy:      Cervical: No cervical adenopathy.   Skin:     General: Skin is warm and dry.      Findings: No rash.   Neurological:      Mental Status: He is alert and oriented to person, place, and time.      Coordination: Coordination normal.   Psychiatric:         Behavior: Behavior normal.          Significant Labs: CBC:   Recent Labs   Lab 08/11/24  0526   WBC 0.97*   HGB 7.6*   HCT 22.3*   PLT 14*     CMP:   Recent Labs   Lab 08/11/24  0526      K 3.9      CO2 22*   GLU 92   BUN 19   CREATININE 1.7*   CALCIUM 8.2*   PROT 5.7*   ALBUMIN 1.9*   BILITOT 3.7*   ALKPHOS 94   AST 66*   ALT 16   ANIONGAP 6*       Significant Imaging: I have reviewed all pertinent imaging results/findings within the past 24 hours.

## 2024-08-11 NOTE — H&P
[facility]a - Neurosurgery ([facility])  [facility] Medicine  History & Physical    Patient Name: Jam Card  MRN: [id number]  Patient Class: IP- Inpatient  Admission Date: 8/10/2024  Attending Physician: Leo Peres MD   Primary Care Provider: Kina Rothman PA         Patient information was obtained from patient, past medical records, and ER records.     Subjective:     Principal Problem:Bilateral subdural hematomas    Chief Complaint: No chief complaint on file.       HPI: Mr. Card is a 57 yo M with PMHx of Hep C cirrhosis, HIV, chronic thrombocytopenia, HTN, GERD, HLD, CAD s/p CABG (many years ago) who was initially admitted to [facility] under  after being referred to the ED by NSGY due to worsening SDH on imaging. Patient was previously admitted to [facility] for evaluation of HA (7/19/24) with a CT head showing b/l chronic extra-axial fluid collections which possibly could be CSF. Bur hole drainage was considered but coagulopathy and platelet issue needed to be corrected prior. Patient also did not want drainage at that time. Patient f/u with [facility] neurotrauma unit with repeat CT head (8/7/24) but no longer experienced HA, just gait disturbance. CT head showed blood developing since prior CT head which led to ED visit and evaluation. Heme/Onc was consulted and believe that thrombocytopenia could be to splenic sequestration so improvement of plt function may only be temporary at best. NSGY at [facility] requested inter facility transfer. Hepatology at [facility] also agreed. Patient transferred to [facility] for NSGY, Heme/Onc, and Hepatology.     Patient hemodynamically stable on arrival. MELD 27, Na 135, Cr 1.72 (1.8 baseline), INR 1.7, CBC 0.9 > 7.7 < 37 (s/p 2U plt and FFP, with initial INR 1.9). No focal neuro deficits at this time. GCS 15 on exam. Patient denies HA, weakness, numbness, vision disturbance, dizziness, photophobia, or any other complaints.     Past Medical History:   Diagnosis Date    CAD  "(coronary artery disease)     Hx of MI and cardiac cath    Chronic hepatitis C     Chronic hepatitis C with cirrhosis     Cirrhosis     GERD (gastroesophageal reflux disease)     HTN (hypertension)     Human immunodeficiency virus (HIV) disease     Hyperlipidemia        Past Surgical History:   Procedure Laterality Date    CARDIAC CATHETERIZATION      CORONARY ARTERY BYPASS GRAFT      TONSILLECTOMY AND ADENOIDECTOMY      TRIGGER FINGER RELEASE         Review of patient's allergies indicates:   Allergen Reactions    Hydrocodone-acetaminophen Other (See Comments)     "cannot sleep when taking" Does not want to take    Lisinopril Other (See Comments)     Other reaction(s): Cough    Sulfamethoxazole-trimethoprim Rash       Current Facility-Administered Medications on File Prior to Encounter   Medication    [DISCONTINUED] 0.9%  NaCl infusion    [DISCONTINUED] albuterol-ipratropium 2.5 mg-0.5 mg/3 mL nebulizer solution    [DISCONTINUED] elviteg-cob-emtri-tenof ALAFEN [id number] mg Tab    [DISCONTINUED] GENERIC EXTERNAL MEDICATION    [DISCONTINUED] lactulose 10 gram/15 mL solution    [DISCONTINUED] metoprolol succinate (TOPROL-XL) 24 hr tablet    [DISCONTINUED] midodrine tablet    [DISCONTINUED] naloxone injection    [DISCONTINUED] ondansetron disintegrating tablet    [DISCONTINUED] ondansetron injection    [DISCONTINUED] pantoprazole EC tablet    [DISCONTINUED] rifAXIMin tablet    [DISCONTINUED] sodium chloride 0.9% injection    [DISCONTINUED] spironolactone tablet    [DISCONTINUED] tamsulosin 24 hr capsule     Current Outpatient Medications on File Prior to Encounter   Medication Sig    benzonatate (TESSALON) 100 MG capsule Take 100 mg by mouth 3 times daily as needed.    cyanocobalamin (VITAMIN B-12) 1000 MCG tablet Take 1,000 mcg by mouth.    ergocalciferol (ERGOCALCIFEROL) 50,000 unit Cap Take 50,000 Units by mouth every 7 days.    fenofibrate 160 MG Tab TAKE 1 TABLET(160 MG) BY MOUTH DAILY    FLUoxetine 20 MG " capsule Take 20 mg by mouth.    furosemide (LASIX) 20 MG tablet Take 2 tablets (40 mg total) by mouth once daily. (Patient taking differently: Take 20 mg by mouth 2 (two) times a day.)    GENVOYA [id number] mg Tab TAKE ONE TABLET BY MOUTH DAILY WITH FOOD.    lactulose (CHRONULAC) 20 gram/30 mL Soln Take 30 mLs (20 g total) by mouth 2 (two) times daily. Goal of 3-5 soft stools each day    loratadine (CLARITIN) 10 mg tablet Take 10 mg by mouth.    metoprolol succinate (TOPROL-XL) 25 MG 24 hr tablet Take 25 mg by mouth.    pantoprazole (PROTONIX) 40 MG tablet Take 40 mg by mouth once daily.    pyridoxine, vitamin B6, (B-6) 100 MG Tab Take 100 mg by mouth.    rosuvastatin (CRESTOR) 10 MG tablet Take 10 mg by mouth every evening.    spironolactone (ALDACTONE) 50 MG tablet Take 2 tablets (100 mg total) by mouth once daily.    tamsulosin (FLOMAX) 0.4 mg Cap Take 0.4 mg by mouth every evening.    valsartan (DIOVAN) 40 MG tablet Take 1 tablet by mouth every evening.    vitamin D (VITAMIN D3) 1000 units Tab Take 1,000 Units by mouth.     Family History    None       Tobacco Use    Smoking status: Not on file    Smokeless tobacco: Not on file   Substance and Sexual Activity    Alcohol use: Not on file    Drug use: Not on file    Sexual activity: Not on file     Review of Systems   Constitutional:  Negative for chills and fever.   HENT:  Negative for congestion.    Eyes:  Negative for photophobia and visual disturbance.   Respiratory:  Negative for shortness of breath.    Cardiovascular:  Negative for chest pain, palpitations and leg swelling.   Gastrointestinal:  Negative for abdominal distention, abdominal pain, diarrhea, nausea and vomiting.   Genitourinary:  Negative for difficulty urinating, dysuria and scrotal swelling.   Musculoskeletal:  Positive for gait problem.   Neurological:  Negative for dizziness, seizures, facial asymmetry, speech difficulty, weakness, numbness and headaches.     Objective:     Vital Signs  (Most Recent):  Temp: 98.3 °F (36.8 °C) (08/10/24 2340)  Pulse: 61 (08/10/24 2340)  Resp: 16 (08/10/24 2340)  BP: (!) 126/58 (08/10/24 2340)  SpO2: 97 % (08/10/24 2340) Vital Signs (24h Range):  Temp:  [97.3 °F (36.3 °C)-98.3 °F (36.8 °C)] 98.3 °F (36.8 °C)  Pulse:  [61-63] 61  Resp:  [16-18] 16  SpO2:  [97 %-99 %] 97 %  BP: (115-126)/(57-63) 126/58     Weight: 116.2 kg (256 lb 2.8 oz)  Body mass index is 33.8 kg/m².     Physical Exam  Constitutional:       General: He is not in acute distress.     Appearance: Normal appearance.   HENT:      Head: Normocephalic and atraumatic.      Right Ear: External ear normal.      Left Ear: External ear normal.      Nose: Nose normal.      Mouth/Throat:      Mouth: Mucous membranes are moist.   Eyes:      General: No visual field deficit.     Extraocular Movements: Extraocular movements intact.      Conjunctiva/sclera: Conjunctivae normal.      Pupils: Pupils are equal, round, and reactive to light.   Cardiovascular:      Rate and Rhythm: Normal rate and regular rhythm.   Pulmonary:      Effort: Pulmonary effort is normal. No respiratory distress.      Breath sounds: Normal breath sounds.   Abdominal:      General: Bowel sounds are normal. There is distension.      Palpations: Abdomen is soft.      Comments: No notable ascites   Musculoskeletal:         General: Normal range of motion.      Cervical back: Normal range of motion and neck supple.      Right lower leg: No edema.      Left lower leg: No edema.   Skin:     General: Skin is warm and dry.   Neurological:      General: No focal deficit present.      Mental Status: He is alert and oriented to person, place, and time. Mental status is at baseline.      GCS: GCS eye subscore is 4. GCS verbal subscore is 5. GCS motor subscore is 6.      Cranial Nerves: Cranial nerves 2-12 are intact. No cranial nerve deficit, dysarthria or facial asymmetry.      Motor: No weakness.      Comments: Hx of chronic brain syndrome, slow speech  likely baseline     Psychiatric:         Mood and Affect: Mood normal.         Behavior: Behavior normal.              CRANIAL NERVES     CN III, IV, VI   Pupils are equal, round, and reactive to light.       Significant Labs: All pertinent labs within the past 24 hours have been reviewed.    Significant Imaging: I have reviewed all pertinent imaging results/findings within the past 24 hours.  Assessment/Plan:     * Bilateral subdural hematomas  Patient initially presented to OSH with HA and CT head showing b/l subdural hematomas. Most recent CT head at follow up visit showed increase in b/l subdural hematomas size.    - NSGY consulted, appreciate recs  - q4h neurochecks  - SBP goal 140  - fall precautions          Depression  Continue fluoxetine 20mg      HLD (hyperlipidemia)  Continue statin      Thrombocytopenia  The likely etiology of thrombocytopenia is liver disease and platelet consumption from splenic sequestration . The patients 3 most recent labs are listed below.  Last platelet count at [facility] 37. S/p 2 units of platelets and 1 unit of FFP.  Plan  - Will transfuse if platelet count is <100k (if undergoing neurosurgery).  - Heme/Onc consulted for further recommendations      Liver cirrhosis  Patient with known Cirrhosis   MELD-Na score calculated; MELD 3.0: 28 at 7/12/2024  8:57 AM  MELD-Na: 27 at 7/12/2024  8:57 AM  Calculated from:  Serum Creatinine: 2.8 mg/dL at 7/12/2024  8:57 AM  Serum Sodium: 138 mmol/L (Using max of 137 mmol/L) at 7/12/2024  8:57 AM  Total Bilirubin: 4.9 mg/dL at 7/12/2024  8:57 AM  Serum Albumin: 2.2 g/dL at 7/12/2024  8:57 AM  INR(ratio): 1.5 at 7/11/2024  7:39 AM  Age at listing (hypothetical): 58 years  Sex: Male at 7/12/2024  8:57 AM      Continue chronic meds. Etiology likely Hepatitis. Will avoid any hepatotoxic meds, and monitor CBC/CMP/INR for synthetic function.     Can consider liver ultrasound, low suspicion of ascites on exam  F/u ammonia, dbili  Strict I/Os  Continue  lasix 40mg daily and spironolactone 50mg daily  Continue lactulose and rifaximin with goal 4-5 BM daily.   Hepatology consulted, appreciate recs    Human immunodeficiency virus (HIV) disease  Previously was taking GENVOYA.     Started STRIBILD, [facility] [place] equivalent. Will confirm with pharmacy       Chronic hepatitis C  Recent Hep C RNA showed elevated viral load 7/11/24. Not currently on treatment.     Can consider repeat viral load if clinically indicated          VTE Risk Mitigation (From admission, onward)           Ordered     IP VTE HIGH RISK PATIENT  Once         08/10/24 2347     Place sequential compression device  Until discontinued         08/10/24 2347                                    Dina Hobbs MD  Department of Hospital Medicine  [facility] - Neurosurgery ([facility])

## 2024-08-11 NOTE — CONSULTS
"Hematology Oncology Consult Note    Inpatient consult to Hematology/Oncology  Consult performed by: Antonio Pierre DO  Consult ordered by: Dina Hobbs MD        SUBJECTIVE:     History of Present Illness:  Jam Card is a 58 y.o. 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 Repeat CT showed subacute blood developing since prior CT on 7/19 so sent to ED. [place]sey hole drainage was discussed but held off due to coagulopathy. NSGY was discussing discharge with outpatient referral to [facility] [place] [place] NSGY, but discussed with [facility] [place] [place] hepatology and felt best to transfer directly to [facility] [place] [place] for hepatology and NSGY eval. Hematology consulted to comment on coagulopathy and pancytopenia.     WBC 0.97, Hgb 7.6, plt 14, , T bili 3.7, D bili 2.6, AST 66, ALT 16, INR 1.6, PT 17.2, PTT 35.8. Received 2u plts and 1u FFP at OSH, and another 2u plt and 2u FFP here.    Follows with outside hematologist who notes his baseline platelet count runs close to 27k. Previously had BMBx which demonstrated hypercellularity without cytogenetic abnormalities and normal MDS FISH. He did not have evidence of underlying dyspoiesis although iron stores were noted to be low.       Review of patient's allergies indicates:   Allergen Reactions    Hydrocodone-acetaminophen Other (See Comments)     "cannot sleep when taking" Does not want to take    Lisinopril Other (See Comments)     Other reaction(s): Cough    Sulfamethoxazole-trimethoprim Rash     Past Medical History:   Diagnosis Date    CAD (coronary artery disease)     Hx of MI and cardiac cath    Chronic hepatitis C     Chronic hepatitis C with cirrhosis     Cirrhosis     GERD (gastroesophageal reflux disease)     HTN (hypertension)     Human immunodeficiency virus (HIV) disease     Hyperlipidemia  "     Past Surgical History:   Procedure Laterality Date    CARDIAC CATHETERIZATION      CORONARY ARTERY BYPASS GRAFT      TONSILLECTOMY AND ADENOIDECTOMY      TRIGGER FINGER RELEASE       No family history on file.     Review of Systems   Constitutional:  Negative for chills and fever.   HENT:  Negative for hearing loss, nosebleeds and tinnitus.    Eyes:  Negative for blurred vision and double vision.   Respiratory:  Negative for hemoptysis and shortness of breath.    Cardiovascular:  Negative for chest pain and palpitations.   Gastrointestinal:  Negative for blood in stool, melena, nausea and vomiting.   Genitourinary:  Negative for dysuria and hematuria.   Musculoskeletal:  Negative for back pain and myalgias.   Skin:  Negative for itching and rash.   Neurological:  Negative for dizziness and headaches.   Psychiatric/Behavioral:  Negative for depression. The patient does not have insomnia.      OBJECTIVE:     Vital Signs:  Temp:  [98.2 °F (36.8 °C)-98.5 °F (36.9 °C)]   Pulse:  [55-69]   Resp:  [16-18]   BP: (108-123)/(55-59)   SpO2:  [93 %-96 %]     Physical Exam  Constitutional:       Appearance: Normal appearance.   HENT:      Head: Normocephalic and atraumatic.      Mouth/Throat:      Mouth: Mucous membranes are moist.      Pharynx: Oropharynx is clear.   Eyes:      Extraocular Movements: Extraocular movements intact.      Pupils: Pupils are equal, round, and reactive to light.   Cardiovascular:      Rate and Rhythm: Normal rate and regular rhythm.      Heart sounds: No murmur heard.  Pulmonary:      Effort: Pulmonary effort is normal.      Breath sounds: Normal breath sounds.   Abdominal:      Palpations: Abdomen is soft. There is no mass.   Musculoskeletal:         General: No swelling or tenderness. Normal range of motion.      Cervical back: Normal range of motion and neck supple.   Skin:     General: Skin is warm and dry.   Neurological:      General: No focal deficit present.      Mental Status: He is  alert and oriented to person, place, and time.   Psychiatric:         Mood and Affect: Mood normal.         Behavior: Behavior normal.       Laboratory:  CBC:   Recent Labs   Lab 08/11/24  1707   WBC 1.17*   RBC 2.54*   HGB 8.5*   HCT 26.3*   PLT 17*   *   MCH 33.5*   MCHC 32.3     CMP:   Recent Labs   Lab 08/11/24  0526   GLU 92   CALCIUM 8.2*   ALBUMIN 1.9*   PROT 5.7*      K 3.9   CO2 22*      BUN 19   CREATININE 1.7*   ALKPHOS 94   ALT 16   AST 66*   BILITOT 3.7*     Coagulation:   Recent Labs   Lab 08/11/24  0001 08/11/24  1707   LABPROT 17.2* 17.9*   INR 1.6* 1.7*   APTT 35.8*  --          Diagnostic Results:  US: Reviewed  CT: Reviewed      ASSESSMENT/PLAN:     #Pancytopenia, chronic in setting of  #HCV cirrhosis and   #HIV    Recommendations:     Pancytopenia well-explained by advanced liver disease. Likely has slight reduction in cell lines due to splenic sequestration. No indication for repeat bone marrow biopsy.     - Recommend monitoring CBCs and giving supportive transfusions, however it is unlikely his counts will improve substantially if at all.     - Transfuse PLT for:   <100k for NSGY, ophthalmic, or CNS bleeding   <50k for active bleeding or immediately prior to invasive procedure   <20k in patients with hemorrhagic diatheses (ie, mucosal bleeding)   <10k prophylactically in all patients      - Order CBC, pathologist interpretation, fibrinogen, d-dimer, PT/INR, PTT  - Order factor VIII to differentiate liver disease from DIC   - Trend CBC, fibrinogen, PTT, PT   Management of DIC:  - If the plasma fibrinogen level is <100 mg/dL give cryoprecipitate  - If fibrinogen >100 but PT or aPTT remains significantly elevated transfuse FFP   - Goal is to reduce bleeding, not to normalize coagulation tests          Discussed with Dr. Tello.      We will sign off. Let us know if any questions.      Antonio Pierre DO  Hematology/Oncology Fellow, PGY-IV   [facility]

## 2024-08-11 NOTE — NURSING
Patient Transferred to NPU Room 904       Upon arrival to the floor, patient greeted and oriented to room. Complete head to toe assessment completed per protocol. VSS, see flowsheet for details. Neuro assessment completed. Primary team notified of patient's transfer to floor. All current and transfer orders reviewed/reconciled per protocol. All emergency equipment set up in patient's room. Fall/seizure precautions initiated per providers orders. 4 Eyes skin assessment performed, see below for details. Reviewed assessment and rounding frequency with patient and family. Questions were encouraged and addressed. Repositioned patient for comfort with bed locked in lowest position, side rails up x 3, bed alarm set, and call light within reach. Instructed patient to call staff for mobility/assistance, verbalized understanding. No acute signs of distress noted at this time.   Nurses Note -- 4 Eyes      Skin assessed during: Admit      [x] No Altered Skin Integrity Present    [x]Prevention Measures Documented    Waffle Mattress and SCD's placed on arrival.    [] Yes- Altered Skin Integrity Present or Discovered   [x] LDA Added if Not in Epic (Describe Wound)   [] New Altered Skin Integrity was Present on Admit and Documented in LDA   [] Wound Image Taken    Wound Care Consulted? No     Attending Nurse:  DRE Jackson     Second RN/Staff Member: DRE Newton            NIHSS assessment completed on floor arrival. Patient's NIHSS score is 2 at this time. SCDs applied to patient per provider's orders. Meier bedside swallow screen completed per stroke protocol. Patient has passed meier bedside swallow screen, team notified of results. However instructed to remain NPO except sips of water per team pending possible testing and further evaluation per consulting providers.

## 2024-08-11 NOTE — NURSING
"Message to [place] [id number] and Neurosurgery:  Patient with c/o back spasm and requesting "Baclofen" as he takes at home.   "

## 2024-08-11 NOTE — H&P
[facility] - Neurosurgery ([facility])  [facility] Medicine  History & Physical    Patient Name: Jam Card  MRN: [id number]  Patient Class: IP- Inpatient  Admission Date: 8/10/2024  Attending Physician: Leo Peres MD   Primary Care Provider: Kina Rothman PA         Patient information was obtained from patient, past medical records, and ER records.     Subjective:     Principal Problem:Bilateral subdural hematomas    Chief Complaint: No chief complaint on file.       HPI: Mr. Card is a 59 yo M with PMHx of Hep C cirrhosis, HIV, chronic thrombocytopenia, HTN, GERD, HLD, CAD s/p CABG (many years ago) who was initially admitted to [facility] under  after being referred to the ED by NSGY due to worsening SDH on imaging. Patient was previously admitted to [facility] for evaluation of HA (7/19/24) with a CT head showing b/l chronic extra-axial fluid collections which possibly could be CSF. Bur hole drainage was considered but coagulopathy and platelet issue needed to be corrected prior. Patient also did not want drainage at that time. Patient f/u with [facility] neurotrauma unit with repeat CT head (8/7/24) but no longer experienced HA, just gait disturbance. CT head showed blood developing since prior CT head which led to ED visit and evaluation. Heme/Onc was consulted and believe that thrombocytopenia could be to splenic sequestration so improvement of plt function may only be temporary at best. NSGY at [facility] requested inter facility transfer. Hepatology at [facility] also agreed. Patient transferred to [facility] for NSGY, Heme/Onc, and Hepatology.     Patient hemodynamically stable on arrival. MELD 27, Na 135, Cr 1.72 (1.8 baseline), INR 1.7, CBC 0.9 > 7.7 < 37 (s/p 2U plt and FFP, with initial INR 1.9). No focal neuro deficits at this time. GCS 15 on exam. Patient denies HA, weakness, numbness, vision disturbance, dizziness, photophobia, or any other complaints.     Past Medical History:   Diagnosis Date    CAD  "(coronary artery disease)     Hx of MI and cardiac cath    Chronic hepatitis C     Chronic hepatitis C with cirrhosis     Cirrhosis     GERD (gastroesophageal reflux disease)     HTN (hypertension)     Human immunodeficiency virus (HIV) disease     Hyperlipidemia        Past Surgical History:   Procedure Laterality Date    CARDIAC CATHETERIZATION      CORONARY ARTERY BYPASS GRAFT      TONSILLECTOMY AND ADENOIDECTOMY      TRIGGER FINGER RELEASE         Review of patient's allergies indicates:   Allergen Reactions    Hydrocodone-acetaminophen Other (See Comments)     "cannot sleep when taking" Does not want to take    Lisinopril Other (See Comments)     Other reaction(s): Cough    Sulfamethoxazole-trimethoprim Rash       Current Facility-Administered Medications on File Prior to Encounter   Medication    [DISCONTINUED] 0.9%  NaCl infusion    [DISCONTINUED] albuterol-ipratropium 2.5 mg-0.5 mg/3 mL nebulizer solution    [DISCONTINUED] elviteg-cob-emtri-tenof ALAFEN [id number] mg Tab    [DISCONTINUED] GENERIC EXTERNAL MEDICATION    [DISCONTINUED] lactulose 10 gram/15 mL solution    [DISCONTINUED] metoprolol succinate (TOPROL-XL) 24 hr tablet    [DISCONTINUED] midodrine tablet    [DISCONTINUED] naloxone injection    [DISCONTINUED] ondansetron disintegrating tablet    [DISCONTINUED] ondansetron injection    [DISCONTINUED] pantoprazole EC tablet    [DISCONTINUED] rifAXIMin tablet    [DISCONTINUED] sodium chloride 0.9% injection    [DISCONTINUED] spironolactone tablet    [DISCONTINUED] tamsulosin 24 hr capsule     Current Outpatient Medications on File Prior to Encounter   Medication Sig    benzonatate (TESSALON) 100 MG capsule Take 100 mg by mouth 3 times daily as needed.    cyanocobalamin (VITAMIN B-12) 1000 MCG tablet Take 1,000 mcg by mouth.    ergocalciferol (ERGOCALCIFEROL) 50,000 unit Cap Take 50,000 Units by mouth every 7 days.    fenofibrate 160 MG Tab TAKE 1 TABLET(160 MG) BY MOUTH DAILY    FLUoxetine 20 MG " capsule Take 20 mg by mouth.    furosemide (LASIX) 20 MG tablet Take 2 tablets (40 mg total) by mouth once daily. (Patient taking differently: Take 20 mg by mouth 2 (two) times a day.)    GENVOYA [id number] mg Tab TAKE ONE TABLET BY MOUTH DAILY WITH FOOD.    lactulose (CHRONULAC) 20 gram/30 mL Soln Take 30 mLs (20 g total) by mouth 2 (two) times daily. Goal of 3-5 soft stools each day    loratadine (CLARITIN) 10 mg tablet Take 10 mg by mouth.    metoprolol succinate (TOPROL-XL) 25 MG 24 hr tablet Take 25 mg by mouth.    pantoprazole (PROTONIX) 40 MG tablet Take 40 mg by mouth once daily.    pyridoxine, vitamin B6, (B-6) 100 MG Tab Take 100 mg by mouth.    rosuvastatin (CRESTOR) 10 MG tablet Take 10 mg by mouth every evening.    spironolactone (ALDACTONE) 50 MG tablet Take 2 tablets (100 mg total) by mouth once daily.    tamsulosin (FLOMAX) 0.4 mg Cap Take 0.4 mg by mouth every evening.    valsartan (DIOVAN) 40 MG tablet Take 1 tablet by mouth every evening.    vitamin D (VITAMIN D3) 1000 units Tab Take 1,000 Units by mouth.     Family History    None       Tobacco Use    Smoking status: Not on file    Smokeless tobacco: Not on file   Substance and Sexual Activity    Alcohol use: Not on file    Drug use: Not on file    Sexual activity: Not on file     Review of Systems   Constitutional:  Negative for chills and fever.   HENT:  Negative for congestion.    Eyes:  Negative for photophobia and visual disturbance.   Respiratory:  Negative for shortness of breath.    Cardiovascular:  Negative for chest pain, palpitations and leg swelling.   Gastrointestinal:  Negative for abdominal distention, abdominal pain, diarrhea, nausea and vomiting.   Genitourinary:  Negative for difficulty urinating, dysuria and scrotal swelling.   Musculoskeletal:  Positive for gait problem.   Neurological:  Negative for dizziness, seizures, facial asymmetry, speech difficulty, weakness, numbness and headaches.     Objective:     Vital Signs  (Most Recent):  Temp: 98.3 °F (36.8 °C) (08/10/24 2340)  Pulse: 61 (08/10/24 2340)  Resp: 16 (08/10/24 2340)  BP: (!) 126/58 (08/10/24 2340)  SpO2: 97 % (08/10/24 2340) Vital Signs (24h Range):  Temp:  [97.3 °F (36.3 °C)-98.3 °F (36.8 °C)] 98.3 °F (36.8 °C)  Pulse:  [61-63] 61  Resp:  [16-18] 16  SpO2:  [97 %-99 %] 97 %  BP: (115-126)/(57-63) 126/58     Weight: 116.2 kg (256 lb 2.8 oz)  Body mass index is 33.8 kg/m².     Physical Exam  Constitutional:       General: He is not in acute distress.     Appearance: Normal appearance.   HENT:      Head: Normocephalic and atraumatic.      Right Ear: External ear normal.      Left Ear: External ear normal.      Nose: Nose normal.      Mouth/Throat:      Mouth: Mucous membranes are moist.   Eyes:      General: No visual field deficit.     Extraocular Movements: Extraocular movements intact.      Conjunctiva/sclera: Conjunctivae normal.      Pupils: Pupils are equal, round, and reactive to light.   Cardiovascular:      Rate and Rhythm: Normal rate and regular rhythm.   Pulmonary:      Effort: Pulmonary effort is normal. No respiratory distress.      Breath sounds: Normal breath sounds.   Abdominal:      General: Bowel sounds are normal. There is distension.      Palpations: Abdomen is soft.      Comments: No notable ascites   Musculoskeletal:         General: Normal range of motion.      Cervical back: Normal range of motion and neck supple.      Right lower leg: No edema.      Left lower leg: No edema.   Skin:     General: Skin is warm and dry.   Neurological:      General: No focal deficit present.      Mental Status: He is alert and oriented to person, place, and time. Mental status is at baseline.      GCS: GCS eye subscore is 4. GCS verbal subscore is 5. GCS motor subscore is 6.      Cranial Nerves: Cranial nerves 2-12 are intact. No cranial nerve deficit, dysarthria or facial asymmetry.      Motor: No weakness.      Comments: Hx of chronic brain syndrome, slow speech  likely baseline     Psychiatric:         Mood and Affect: Mood normal.         Behavior: Behavior normal.              CRANIAL NERVES     CN III, IV, VI   Pupils are equal, round, and reactive to light.       Significant Labs: All pertinent labs within the past 24 hours have been reviewed.    Significant Imaging: I have reviewed all pertinent imaging results/findings within the past 24 hours.  Assessment/Plan:     * Bilateral subdural hematomas  Patient initially presented to OSH with HA and CT head showing b/l subdural hematomas. Most recent CT head at follow up visit showed increase in b/l subdural hematomas size.    - NSGY consulted, appreciate recs  - q4h neurochecks  - SBP goal 140  - fall precautions          Depression  Continue fluoxetine 20mg      HLD (hyperlipidemia)  Continue statin      Thrombocytopenia  The likely etiology of thrombocytopenia is liver disease and platelet consumption from splenic sequestration . The patients 3 most recent labs are listed below.  Last platelet count at [facility] 37. S/p 2 units of platelets and 1 unit of FFP.  Plan  - Will transfuse if platelet count is <100k (if undergoing neurosurgery).  - Heme/Onc consulted for further recommendations      Liver cirrhosis  Patient with known Cirrhosis   MELD-Na score calculated; MELD 3.0: 28 at 7/12/2024  8:57 AM  MELD-Na: 27 at 7/12/2024  8:57 AM  Calculated from:  Serum Creatinine: 2.8 mg/dL at 7/12/2024  8:57 AM  Serum Sodium: 138 mmol/L (Using max of 137 mmol/L) at 7/12/2024  8:57 AM  Total Bilirubin: 4.9 mg/dL at 7/12/2024  8:57 AM  Serum Albumin: 2.2 g/dL at 7/12/2024  8:57 AM  INR(ratio): 1.5 at 7/11/2024  7:39 AM  Age at listing (hypothetical): 58 years  Sex: Male at 7/12/2024  8:57 AM      Continue chronic meds. Etiology likely Hepatitis. Will avoid any hepatotoxic meds, and monitor CBC/CMP/INR for synthetic function.     F/u ammonia, dbili  Strict I/Os  Continue lasix 40mg daily and spironolactone 50mg daily  Continue lactulose  and rifaximin with goal 4-5 BM daily.   Hepatology consulted, appreciate recs    Human immunodeficiency virus (HIV) disease  Previously was taking GENVOYA.     Started STRIBILD, [facility] equivalent. Will confirm with pharmacy       Chronic hepatitis C  Recent Hep C RNA showed elevated viral load 7/11/24. Not currently on treatment.     Can consider repeat viral load if clinically indicated          VTE Risk Mitigation (From admission, onward)           Ordered     IP VTE HIGH RISK PATIENT  Once         08/10/24 2347     Place sequential compression device  Until discontinued         08/10/24 2347                                    Dina Hobbs MD  Department of Hospital Medicine  [facility] - Neurosurgery ([facility])

## 2024-08-11 NOTE — ASSESSMENT & PLAN NOTE
59 y/o M h/o CAD s/p CABG, HIV dx 1997, currently on genvoya since 2018, VL ND, CD4 128 (14.6%), HCV cirrhosis (treatment naive) and splenomegaly, HbcAb+, HPV (excision of perianal condyloma 2018), chronic leukopenia/thrombocytopenia, referred to the ED by NSGY due to worsening SDH on imaging  - no active, obvious infectious issues, can check AFB blood culture but suspect will be low yield as patient has had virologic suppression for awhile with decent CD4% even though absolute CD4 likely low in setting of liver disease and splenomegaly  - agree with further heme/onc evaluation  - continue genvoya  - recommend adding atovaquone prophyaxis for CD4< 200 (which was prescribed by primary per his notes)  - discussed with team, patient and his friend at bedside  - will sign off for now call back if further questions or OLT eval moves forward.

## 2024-08-11 NOTE — ASSESSMENT & PLAN NOTE
Previously was taking GENVOYA.     Started STRIBILD, hospital equivalent. Will confirm with pharmacy

## 2024-08-12 ENCOUNTER — TELEPHONE (OUTPATIENT)
Dept: TRANSPLANT | Facility: CLINIC | Age: 59
End: 2024-08-12
Payer: MEDICARE

## 2024-08-12 PROBLEM — R18.8 ASCITES: Status: ACTIVE | Noted: 2024-08-12

## 2024-08-12 PROBLEM — K76.6 PORTAL HYPERTENSION: Status: ACTIVE | Noted: 2024-08-12

## 2024-08-12 LAB
AFP SERPL-MCNC: 24 NG/ML (ref 0–8.4)
AFP SERPL-MCNC: 25 NG/ML (ref 0–8.4)
ALBUMIN SERPL BCP-MCNC: 2.5 G/DL (ref 3.5–5.2)
ALP SERPL-CCNC: 70 U/L (ref 55–135)
ALT SERPL W/O P-5'-P-CCNC: 18 U/L (ref 10–44)
ANION GAP SERPL CALC-SCNC: 9 MMOL/L (ref 8–16)
ANISOCYTOSIS BLD QL SMEAR: SLIGHT
APTT PPP: 35.3 SEC (ref 21–32)
AST SERPL-CCNC: 64 U/L (ref 10–40)
BASOPHILS # BLD AUTO: 0.01 K/UL (ref 0–0.2)
BASOPHILS # BLD AUTO: 0.01 K/UL (ref 0–0.2)
BASOPHILS NFR BLD: 1.2 % (ref 0–1.9)
BASOPHILS NFR BLD: 1.4 % (ref 0–1.9)
BILIRUB SERPL-MCNC: 4 MG/DL (ref 0.1–1)
BLD PROD TYP BPU: NORMAL
BLOOD UNIT EXPIRATION DATE: NORMAL
BLOOD UNIT TYPE CODE: 5100
BLOOD UNIT TYPE CODE: 5100
BLOOD UNIT TYPE CODE: 6200
BLOOD UNIT TYPE CODE: 6200
BLOOD UNIT TYPE CODE: 8400
BLOOD UNIT TYPE: NORMAL
BUN SERPL-MCNC: 19 MG/DL (ref 6–20)
CALCIUM SERPL-MCNC: 8.9 MG/DL (ref 8.7–10.5)
CHLORIDE SERPL-SCNC: 107 MMOL/L (ref 95–110)
CO2 SERPL-SCNC: 22 MMOL/L (ref 23–29)
CODING SYSTEM: NORMAL
CREAT SERPL-MCNC: 1.7 MG/DL (ref 0.5–1.4)
CROSSMATCH INTERPRETATION: NORMAL
D DIMER PPP IA.FEU-MCNC: 2.04 MG/L FEU
DIFFERENTIAL METHOD BLD: ABNORMAL
DIFFERENTIAL METHOD BLD: ABNORMAL
DISPENSE STATUS: NORMAL
EOSINOPHIL # BLD AUTO: 0 K/UL (ref 0–0.5)
EOSINOPHIL # BLD AUTO: 0 K/UL (ref 0–0.5)
EOSINOPHIL NFR BLD: 2.7 % (ref 0–8)
EOSINOPHIL NFR BLD: 3.6 % (ref 0–8)
ERYTHROCYTE [DISTWIDTH] IN BLOOD BY AUTOMATED COUNT: 17.2 % (ref 11.5–14.5)
ERYTHROCYTE [DISTWIDTH] IN BLOOD BY AUTOMATED COUNT: 17.4 % (ref 11.5–14.5)
EST. GFR  (NO RACE VARIABLE): 46.7 ML/MIN/1.73 M^2
FACT VIII ACT/NOR PPP: 159 % (ref 60–170)
FIBRINOGEN PPP-MCNC: 87 MG/DL (ref 182–400)
GLUCOSE SERPL-MCNC: 87 MG/DL (ref 70–110)
HCT VFR BLD AUTO: 20.4 % (ref 40–54)
HCT VFR BLD AUTO: 21.1 % (ref 40–54)
HGB BLD-MCNC: 7.1 G/DL (ref 14–18)
HGB BLD-MCNC: 7.1 G/DL (ref 14–18)
HYPOCHROMIA BLD QL SMEAR: ABNORMAL
IMM GRANULOCYTES # BLD AUTO: 0 K/UL (ref 0–0.04)
IMM GRANULOCYTES # BLD AUTO: 0.01 K/UL (ref 0–0.04)
IMM GRANULOCYTES NFR BLD AUTO: 0 % (ref 0–0.5)
IMM GRANULOCYTES NFR BLD AUTO: 1.4 % (ref 0–0.5)
LYMPHOCYTES # BLD AUTO: 0.4 K/UL (ref 1–4.8)
LYMPHOCYTES # BLD AUTO: 0.4 K/UL (ref 1–4.8)
LYMPHOCYTES NFR BLD: 50.7 % (ref 18–48)
LYMPHOCYTES NFR BLD: 52.4 % (ref 18–48)
MCH RBC QN AUTO: 33.5 PG (ref 27–31)
MCH RBC QN AUTO: 35 PG (ref 27–31)
MCHC RBC AUTO-ENTMCNC: 33.6 G/DL (ref 32–36)
MCHC RBC AUTO-ENTMCNC: 34.8 G/DL (ref 32–36)
MCV RBC AUTO: 100 FL (ref 82–98)
MCV RBC AUTO: 101 FL (ref 82–98)
MONOCYTES # BLD AUTO: 0.1 K/UL (ref 0.3–1)
MONOCYTES # BLD AUTO: 0.1 K/UL (ref 0.3–1)
MONOCYTES NFR BLD: 6 % (ref 4–15)
MONOCYTES NFR BLD: 6.8 % (ref 4–15)
NEUTROPHILS # BLD AUTO: 0.3 K/UL (ref 1.8–7.7)
NEUTROPHILS # BLD AUTO: 0.3 K/UL (ref 1.8–7.7)
NEUTROPHILS NFR BLD: 36.8 % (ref 38–73)
NEUTROPHILS NFR BLD: 37 % (ref 38–73)
NRBC BLD-RTO: 0 /100 WBC
NRBC BLD-RTO: 0 /100 WBC
NUM UNITS TRANS FFP: NORMAL
OVALOCYTES BLD QL SMEAR: ABNORMAL
PATH REV BLD -IMP: NORMAL
PLATELET # BLD AUTO: 20 K/UL (ref 150–450)
PLATELET # BLD AUTO: 22 K/UL (ref 150–450)
PLATELET BLD QL SMEAR: ABNORMAL
PMV BLD AUTO: 12 FL (ref 9.2–12.9)
PMV BLD AUTO: 12.1 FL (ref 9.2–12.9)
POIKILOCYTOSIS BLD QL SMEAR: SLIGHT
POLYCHROMASIA BLD QL SMEAR: ABNORMAL
POTASSIUM SERPL-SCNC: 3.7 MMOL/L (ref 3.5–5.1)
PROT SERPL-MCNC: 6 G/DL (ref 6–8.4)
RBC # BLD AUTO: 2.03 M/UL (ref 4.6–6.2)
RBC # BLD AUTO: 2.12 M/UL (ref 4.6–6.2)
RETICS/RBC NFR AUTO: 3.1 % (ref 0.4–2)
SODIUM SERPL-SCNC: 138 MMOL/L (ref 136–145)
UNIT NUMBER: NORMAL
UNIT NUMBER: NORMAL
WBC # BLD AUTO: 0.73 K/UL (ref 3.9–12.7)
WBC # BLD AUTO: 0.84 K/UL (ref 3.9–12.7)

## 2024-08-12 PROCEDURE — 25000003 PHARM REV CODE 250: Mod: NTX

## 2024-08-12 PROCEDURE — 85379 FIBRIN DEGRADATION QUANT: CPT | Mod: NTX

## 2024-08-12 PROCEDURE — P9017 PLASMA 1 DONOR FRZ W/IN 8 HR: HCPCS | Mod: NTX

## 2024-08-12 PROCEDURE — 11000001 HC ACUTE MED/SURG PRIVATE ROOM: Mod: NTX

## 2024-08-12 PROCEDURE — 25000003 PHARM REV CODE 250: Mod: NTX | Performed by: INTERNAL MEDICINE

## 2024-08-12 PROCEDURE — 36430 TRANSFUSION BLD/BLD COMPNT: CPT | Mod: NTX

## 2024-08-12 PROCEDURE — 85045 AUTOMATED RETICULOCYTE COUNT: CPT | Mod: NTX

## 2024-08-12 PROCEDURE — P9012 CRYOPRECIPITATE EACH UNIT: HCPCS | Mod: NTX

## 2024-08-12 PROCEDURE — 85025 COMPLETE CBC W/AUTO DIFF WBC: CPT | Mod: NTX

## 2024-08-12 PROCEDURE — 85240 CLOT FACTOR VIII AHG 1 STAGE: CPT | Mod: NTX

## 2024-08-12 PROCEDURE — 63600175 PHARM REV CODE 636 W HCPCS: Mod: JZ,JG,NTX

## 2024-08-12 PROCEDURE — P9035 PLATELET PHERES LEUKOREDUCED: HCPCS | Mod: NTX

## 2024-08-12 PROCEDURE — 85730 THROMBOPLASTIN TIME PARTIAL: CPT | Mod: NTX

## 2024-08-12 PROCEDURE — 63600175 PHARM REV CODE 636 W HCPCS: Mod: NTX

## 2024-08-12 PROCEDURE — 80053 COMPREHEN METABOLIC PANEL: CPT | Mod: NTX

## 2024-08-12 PROCEDURE — 85384 FIBRINOGEN ACTIVITY: CPT | Mod: NTX

## 2024-08-12 PROCEDURE — 25000242 PHARM REV CODE 250 ALT 637 W/ HCPCS: Mod: NTX

## 2024-08-12 PROCEDURE — P9047 ALBUMIN (HUMAN), 25%, 50ML: HCPCS | Mod: JZ,JG,NTX

## 2024-08-12 PROCEDURE — 36415 COLL VENOUS BLD VENIPUNCTURE: CPT | Mod: NTX

## 2024-08-12 PROCEDURE — 85060 BLOOD SMEAR INTERPRETATION: CPT | Mod: NTX,,, | Performed by: PATHOLOGY

## 2024-08-12 PROCEDURE — 86965 POOLING BLOOD PLATELETS: CPT | Mod: NTX

## 2024-08-12 PROCEDURE — 82105 ALPHA-FETOPROTEIN SERUM: CPT | Mod: NTX | Performed by: STUDENT IN AN ORGANIZED HEALTH CARE EDUCATION/TRAINING PROGRAM

## 2024-08-12 PROCEDURE — 99232 SBSQ HOSP IP/OBS MODERATE 35: CPT | Mod: NTX,,, | Performed by: STUDENT IN AN ORGANIZED HEALTH CARE EDUCATION/TRAINING PROGRAM

## 2024-08-12 PROCEDURE — 85025 COMPLETE CBC W/AUTO DIFF WBC: CPT | Mod: 91,NTX

## 2024-08-12 PROCEDURE — 36415 COLL VENOUS BLD VENIPUNCTURE: CPT | Mod: NTX | Performed by: STUDENT IN AN ORGANIZED HEALTH CARE EDUCATION/TRAINING PROGRAM

## 2024-08-12 RX ORDER — ACETAMINOPHEN 325 MG/1
650 TABLET ORAL ONCE
Status: COMPLETED | OUTPATIENT
Start: 2024-08-12 | End: 2024-08-12

## 2024-08-12 RX ORDER — ALBUMIN HUMAN 250 G/1000ML
50 SOLUTION INTRAVENOUS 2 TIMES DAILY
Status: DISCONTINUED | OUTPATIENT
Start: 2024-08-12 | End: 2024-08-12

## 2024-08-12 RX ORDER — LEVETIRACETAM 500 MG/1
500 TABLET ORAL 2 TIMES DAILY
Status: COMPLETED | OUTPATIENT
Start: 2024-08-12 | End: 2024-08-17

## 2024-08-12 RX ORDER — ONDANSETRON 4 MG/1
1 TABLET, ORALLY DISINTEGRATING ORAL EVERY 8 HOURS PRN
COMMUNITY
Start: 2024-07-19 | End: 2025-07-19

## 2024-08-12 RX ORDER — HYDROCODONE BITARTRATE AND ACETAMINOPHEN 500; 5 MG/1; MG/1
TABLET ORAL
Status: DISCONTINUED | OUTPATIENT
Start: 2024-08-12 | End: 2024-08-14

## 2024-08-12 RX ORDER — MECLIZINE HCL 12.5 MG 12.5 MG/1
6.25 TABLET ORAL 2 TIMES DAILY PRN
COMMUNITY
Start: 2024-07-16

## 2024-08-12 RX ORDER — ALBUMIN HUMAN 250 G/1000ML
50 SOLUTION INTRAVENOUS 2 TIMES DAILY
Status: COMPLETED | OUTPATIENT
Start: 2024-08-12 | End: 2024-08-12

## 2024-08-12 RX ORDER — LACTULOSE 10 G/15ML
30 SOLUTION ORAL 3 TIMES DAILY
Status: DISCONTINUED | OUTPATIENT
Start: 2024-08-12 | End: 2024-08-13

## 2024-08-12 RX ORDER — SPIRONOLACTONE 25 MG/1
50 TABLET ORAL DAILY
COMMUNITY
Start: 2024-08-01 | End: 2025-08-01

## 2024-08-12 RX ADMIN — BACLOFEN 10 MG: 10 TABLET ORAL at 08:08

## 2024-08-12 RX ADMIN — ATOVAQUONE 1500 MG: 750 SUSPENSION ORAL at 08:08

## 2024-08-12 RX ADMIN — ALBUMIN (HUMAN) 50 G: 12.5 SOLUTION INTRAVENOUS at 11:08

## 2024-08-12 RX ADMIN — LACTULOSE 30 G: 20 SOLUTION ORAL at 09:08

## 2024-08-12 RX ADMIN — LEVETIRACETAM 500 MG: 500 TABLET, FILM COATED ORAL at 09:08

## 2024-08-12 RX ADMIN — ELVITEGRAVIR, COBICISTAT, EMTRICITABINE, AND TENOFOVIR DISOPROXIL FUMARATE 1 TABLET: 150; 150; 200; 300 TABLET, FILM COATED ORAL at 09:08

## 2024-08-12 RX ADMIN — BACLOFEN 10 MG: 10 TABLET ORAL at 04:08

## 2024-08-12 RX ADMIN — LACTULOSE 20 G: 20 SOLUTION ORAL at 08:08

## 2024-08-12 RX ADMIN — CHOLECALCIFEROL TAB 25 MCG (1000 UNIT) 1000 UNITS: 25 TAB at 08:08

## 2024-08-12 RX ADMIN — CETIRIZINE HYDROCHLORIDE 10 MG: 5 TABLET, FILM COATED ORAL at 08:08

## 2024-08-12 RX ADMIN — LEVETIRACETAM 500 MG: 100 INJECTION INTRAVENOUS at 08:08

## 2024-08-12 RX ADMIN — LACTULOSE 30 G: 20 SOLUTION ORAL at 04:08

## 2024-08-12 RX ADMIN — FENOFIBRATE 160 MG: 160 TABLET ORAL at 08:08

## 2024-08-12 RX ADMIN — ACETAMINOPHEN 650 MG: 325 TABLET ORAL at 05:08

## 2024-08-12 RX ADMIN — BACLOFEN 10 MG: 10 TABLET ORAL at 09:08

## 2024-08-12 RX ADMIN — CYANOCOBALAMIN TAB 1000 MCG 1000 MCG: 1000 TAB at 08:08

## 2024-08-12 RX ADMIN — FLUOXETINE HYDROCHLORIDE 20 MG: 20 CAPSULE ORAL at 08:08

## 2024-08-12 RX ADMIN — PYRIDOXINE HCL TAB 50 MG 100 MG: 50 TAB at 08:08

## 2024-08-12 RX ADMIN — PANTOPRAZOLE SODIUM 40 MG: 40 TABLET, DELAYED RELEASE ORAL at 08:08

## 2024-08-12 RX ADMIN — ATORVASTATIN CALCIUM 40 MG: 40 TABLET, FILM COATED ORAL at 08:08

## 2024-08-12 RX ADMIN — TAMSULOSIN HYDROCHLORIDE 0.4 MG: 0.4 CAPSULE ORAL at 09:08

## 2024-08-12 RX ADMIN — METOPROLOL SUCCINATE 25 MG: 25 TABLET, EXTENDED RELEASE ORAL at 08:08

## 2024-08-12 RX ADMIN — ALBUMIN (HUMAN) 50 G: 12.5 SOLUTION INTRAVENOUS at 10:08

## 2024-08-12 NOTE — PROGRESS NOTES
Paged [facility]IC[facility] and attempted to speak with MD Yohana, twice about patient's upper extremity tremors. Patient stated that he had the tremors yesterday, but they seem to be getting worse. Never received a call back or page.

## 2024-08-12 NOTE — ASSESSMENT & PLAN NOTE
58M PMH HIV, thrombocytopenia, liver cirrhosis, CAD s/p CABG (many years ago) with enlarging bilateral chronic SDH transferred from [facility] after CTH 8/7 without prior intervention    Plan:  Admitted medicine; q4h nc/vs  - Recommend MMA embolization   - PLT goal >50k for MMA embo  INR goal <1.4, plt goal >100k if medically feasible  Keppra 500mg BID x7 days  Hold DVT ppx until repeat scan  Please notify nsgy of any acute neurological decline or of any further questions/concerns  Preponderance of medical care per primary team    Dispo: ongoing    Case and plan discussed with attending neurosurgeon Dr. Molina

## 2024-08-12 NOTE — HOSPITAL COURSE
8/12: NAEO. Repeat CTH yesterday grossly stable. PLT this AM 20. Patient reports mild headache. Denies blurry vision, nausea, vomiting, focal weakness or sensory dysfunction. Hepatology and Hematology following.   8/13: Change in mental status reported by caregiver overnight. Reported slurred speech, jerking movements, confusion. CTH obtained and stable. Patient given 1g Keppra. Patient AAOx4 today, expresses concern about new jerking movements and inability to feed self.   8/14: NAEO. Mental status improved per family at bedside. Remains alert and oriented this AM. Pending bilateral MMA embolization today with PLT on hold for procedure. Neurosurgery will continue following along.   8/15: NAEO. MMA embo not done yesterday due to emergent cases. Planning for MMA embo today. Patient denies any new complaints.   8/16: NAEO. Patient seen in PACU following bilaterally MMA embolization. Patient doing well, denies headaches or any other complaints. Exam stable. Pending CT Head.   8/17: CTH stable. No headaches. Plts 19 this am in setting of chronic thrombocytopenia. S/p bilateral mma embo.  08/18/2024 No headaches, dizziness, visual changes. IPR pending per hospital medicine team. Plts 17, but per primary team will not transfuse unless < 10 given likelihood of splenic consumption.

## 2024-08-12 NOTE — PROGRESS NOTES
Nurses Note -- 4 Eyes      8/11/2024   10:29 PM      Skin assessed during: Q Shift Change      [x] No Altered Skin Integrity Present    [x]Prevention Measures Documented      [] Yes- Altered Skin Integrity Present or Discovered   [] LDA Added if Not in Epic (Describe Wound)   [] New Altered Skin Integrity was Present on Admit and Documented in LDA   [] Wound Image Taken    Wound Care Consulted? No    Attending Nurse:  Asiya Webber RN/Staff Member:  Lamar

## 2024-08-12 NOTE — SUBJECTIVE & OBJECTIVE
Interval History: 8/12: NAEO. Repeat CTH yesterday grossly stable. PLT this AM 20. Patient reports mild headache. Denies blurry vision, nausea, vomiting, focal weakness or sensory dysfunction. Hepatology and Hematology following.     Medications:  Continuous Infusions:  Scheduled Meds:   albumin human 25%  50 g Intravenous BID    atorvastatin  40 mg Oral Daily    atovaquone  1,500 mg Oral Daily    baclofen  10 mg Oral TID    cetirizine  10 mg Oral Daily    cyanocobalamin  1,000 mcg Oral Daily    pcshsdzv-hggegbvg-ydzlkd-tenof (STRIBILD) 102-025-070-300 mg  1 tablet Oral Daily    ergocalciferol  50,000 Units Oral Q7 Days    fenofibrate  160 mg Oral Daily    FLUoxetine  20 mg Oral Daily    lactulose  30 g Oral TID    levETIRAcetam  500 mg Oral BID    metoprolol succinate  25 mg Oral Daily    pantoprazole  40 mg Oral Daily    pyridoxine (vitamin B6)  100 mg Oral Daily    tamsulosin  0.4 mg Oral QHS    vitamin D  1,000 Units Oral Daily     PRN Meds:  Current Facility-Administered Medications:     0.9%  NaCl infusion (for blood administration), , Intravenous, Q24H PRN    benzonatate, 100 mg, Oral, TID PRN    bisacodyL, 10 mg, Rectal, Daily PRN    dextrose 10%, 12.5 g, Intravenous, PRN    dextrose 10%, 25 g, Intravenous, PRN    glucagon (human recombinant), 1 mg, Intramuscular, PRN    glucose, 16 g, Oral, PRN    glucose, 24 g, Oral, PRN    insulin aspart U-100, 0-5 Units, Subcutaneous, QID (AC + HS) PRN    midodrine, 2.5 mg, Oral, TID PRN    naloxone, 0.02 mg, Intravenous, PRN    sodium chloride 0.9%, 10 mL, Intravenous, Q12H PRN     Objective:     Weight: 116.2 kg (256 lb 2.8 oz)  Body mass index is 33.8 kg/m².  Vital Signs (Most Recent):  Temp: 98.4 °F (36.9 °C) (08/12/24 1136)  Pulse: (!) 55 (08/12/24 1136)  Resp: 17 (08/12/24 1136)  BP: (!) 111/55 (08/12/24 1136)  SpO2: (!) 92 % (08/12/24 1136) Vital Signs (24h Range):  Temp:  [98.1 °F (36.7 °C)-99.8 °F (37.7 °C)] 98.4 °F (36.9 °C)  Pulse:  [53-73] 55  Resp:  [16-18]  17  SpO2:  [92 %-98 %] 92 %  BP: (108-130)/(53-61) 111/55     Date 08/12/24 0700 - 08/13/24 0659   Shift 4728-1128 7827-0958 1252-4332 24 Hour Total   INTAKE   Blood 298   298   Shift Total(mL/kg) 298(2.6)   298(2.6)   OUTPUT   Shift Total(mL/kg)       Weight (kg) 116.2 116.2 116.2 116.2     Neurosurgery Physical Exam  General: well developed, well nourished, no distress.   Head: normocephalic, atraumatic  Mental Status: Awake, Alert, Oriented  Speech: Slow but clear with content appropriate to conversation  Cranial nerves: face symmetric, CN II-XII grossly intact.   Eyes: pupils equal, round, reactive to light, EOMI.  Sensory: intact to light touch throughout    Motor Strength: Moves all extremities spontaneously with good tone.  Full strength upper and lower extremities. No abnormal movements seen.     Pronator Drift: no drift noted  Finger-to-nose: Intact bilaterally    Significant Labs:  Recent Labs   Lab 08/11/24  0526 08/12/24  0305   GLU 92 87    138   K 3.9 3.7    107   CO2 22* 22*   BUN 19 19   CREATININE 1.7* 1.7*   CALCIUM 8.2* 8.9     Recent Labs   Lab 08/11/24  1707 08/12/24  0305 08/12/24  0904   WBC 1.17* 0.73* 0.84*   HGB 8.5* 7.1* 7.1*   HCT 26.3* 20.4* 21.1*   PLT 17* 22* 20*     Recent Labs   Lab 08/11/24  0001 08/11/24  1707 08/12/24  0904   INR 1.6* 1.7*  --    APTT 35.8*  --  35.3*     Microbiology Results (last 7 days)       ** No results found for the last 168 hours. **          All pertinent labs from the last 24 hours have been reviewed.    Significant Diagnostics:  I have reviewed and interpreted all pertinent imaging results/findings within the past 24 hours.

## 2024-08-12 NOTE — SUBJECTIVE & OBJECTIVE
Interval History: NAEON. Patient requiring PLTs, fibrinogen, abd FFP given elevated INR.     Review of Systems  Objective:     Vital Signs (Most Recent):  Temp: 98.4 °F (36.9 °C) (08/12/24 1136)  Pulse: (!) 55 (08/12/24 1136)  Resp: 17 (08/12/24 1136)  BP: (!) 111/55 (08/12/24 1136)  SpO2: (!) 92 % (08/12/24 1136) Vital Signs (24h Range):  Temp:  [98.1 °F (36.7 °C)-99.8 °F (37.7 °C)] 98.4 °F (36.9 °C)  Pulse:  [53-73] 55  Resp:  [16-18] 17  SpO2:  [92 %-98 %] 92 %  BP: (108-130)/(53-61) 111/55     Weight: 116.2 kg (256 lb 2.8 oz)  Body mass index is 33.8 kg/m².    Intake/Output Summary (Last 24 hours) at 8/12/2024 1421  Last data filed at 8/12/2024 1136  Gross per 24 hour   Intake 1030.6 ml   Output --   Net 1030.6 ml         Physical Exam  Constitutional:       General: He is not in acute distress.     Appearance: Normal appearance.   HENT:      Head: Normocephalic and atraumatic.      Right Ear: External ear normal.      Left Ear: External ear normal.      Nose: Nose normal.      Mouth/Throat:      Mouth: Mucous membranes are moist.   Eyes:      General: No visual field deficit.     Extraocular Movements: Extraocular movements intact.      Conjunctiva/sclera: Conjunctivae normal.      Pupils: Pupils are equal, round, and reactive to light.   Cardiovascular:      Rate and Rhythm: Normal rate and regular rhythm.   Pulmonary:      Effort: Pulmonary effort is normal. No respiratory distress.      Breath sounds: Normal breath sounds.   Abdominal:      General: Bowel sounds are normal. There is distension.      Palpations: Abdomen is soft.      Comments: No notable ascites   Musculoskeletal:         General: Normal range of motion.      Cervical back: Normal range of motion and neck supple.      Right lower leg: No edema.      Left lower leg: No edema.   Skin:     General: Skin is warm and dry.   Neurological:      General: No focal deficit present.      Mental Status: He is alert and oriented to person, place, and  time. Mental status is at baseline.      GCS: GCS eye subscore is 4. GCS verbal subscore is 5. GCS motor subscore is 6.      Cranial Nerves: Cranial nerves 2-12 are intact. No cranial nerve deficit, dysarthria or facial asymmetry.      Motor: No weakness.      Comments: Hx of chronic brain syndrome, slow speech likely baseline     Psychiatric:         Mood and Affect: Mood normal.         Behavior: Behavior normal.             Significant Labs: All pertinent labs within the past 24 hours have been reviewed.    Significant Imaging: I have reviewed all pertinent imaging results/findings within the past 24 hours.

## 2024-08-12 NOTE — PROGRESS NOTES
Esequiel Lea - Neurosurgery ([facility])  Neurosurgery  Progress Note    Subjective:     History of Present Illness: Mr. Card is a 57 yo M with PMHx of Hep C cirrhosis, HIV, chronic thrombocytopenia, HTN, GERD, HLD, CAD s/p CABG (many years ago) who was initially admitted to [facility] under  after being referred to the ED by NSGY due to worsening SDH on imaging. Patient was previously admitted to [facility] for evaluation of HA (7/19/24) with a CT head showing b/l chronic extra-axial fluid collections which possibly could be CSF. Bur hole drainage was considered but coagulopathy and platelet issue needed to be corrected prior. Patient also did not want drainage at that time. Patient f/u with [facility] neurotrauma unit with repeat CT head (8/7/24) but no longer experienced HA, just gait disturbance. CT head showed blood developing since prior CT head which led to ED visit and evaluation. Heme/Onc was consulted and believe that thrombocytopenia could be to splenic sequestration so improvement of plt function may only be temporary at best. NSGY at [facility] requested inter facility transfer. Hepatology at [facility] also agreed. Patient transferred to [facility] for NSGY, Heme/Onc, and Hepatology.      Patient hemodynamically stable on arrival. MELD 27, Na 135, Cr 1.72 (1.8 baseline), INR 1.7, CBC 0.9 > 7.7 < 37 (s/p 2U plt and FFP, with initial INR 1.9). No focal neuro deficits at this time. GCS 15 on exam. Patient denies HA, weakness, numbness, vision disturbance, dizziness, photophobia, or any other complaints    CTH reordered to assess stability    Post-Op Info:  * No surgery found *       Interval History: 8/12: NAEO. Repeat CTH yesterday grossly stable. PLT this AM 20. Patient reports mild headache. Denies blurry vision, nausea, vomiting, focal weakness or sensory dysfunction. Hepatology and Hematology following.     Medications:  Continuous Infusions:  Scheduled Meds:   albumin human 25%  50 g Intravenous BID    atorvastatin  40  mg Oral Daily    atovaquone  1,500 mg Oral Daily    baclofen  10 mg Oral TID    cetirizine  10 mg Oral Daily    cyanocobalamin  1,000 mcg Oral Daily    rlrsvwgh-jwqbslzh-dqsvek-tenof (STRIBILD) [id number] mg  1 tablet Oral Daily    ergocalciferol  50,000 Units Oral Q7 Days    fenofibrate  160 mg Oral Daily    FLUoxetine  20 mg Oral Daily    lactulose  30 g Oral TID    levETIRAcetam  500 mg Oral BID    metoprolol succinate  25 mg Oral Daily    pantoprazole  40 mg Oral Daily    pyridoxine (vitamin B6)  100 mg Oral Daily    tamsulosin  0.4 mg Oral QHS    vitamin D  1,000 Units Oral Daily     PRN Meds:  Current Facility-Administered Medications:     0.9%  NaCl infusion (for blood administration), , Intravenous, Q24H PRN    benzonatate, 100 mg, Oral, TID PRN    bisacodyL, 10 mg, Rectal, Daily PRN    dextrose 10%, 12.5 g, Intravenous, PRN    dextrose 10%, 25 g, Intravenous, PRN    glucagon (human recombinant), 1 mg, Intramuscular, PRN    glucose, 16 g, Oral, PRN    glucose, 24 g, Oral, PRN    insulin aspart U-100, 0-5 Units, Subcutaneous, QID (AC + HS) PRN    midodrine, 2.5 mg, Oral, TID PRN    naloxone, 0.02 mg, Intravenous, PRN    sodium chloride 0.9%, 10 mL, Intravenous, Q12H PRN     Objective:     Weight: 116.2 kg (256 lb 2.8 oz)  Body mass index is 33.8 kg/m².  Vital Signs (Most Recent):  Temp: 98.4 °F (36.9 °C) (08/12/24 1136)  Pulse: (!) 55 (08/12/24 1136)  Resp: 17 (08/12/24 1136)  BP: (!) 111/55 (08/12/24 1136)  SpO2: (!) 92 % (08/12/24 1136) Vital Signs (24h Range):  Temp:  [98.1 °F (36.7 °C)-99.8 °F (37.7 °C)] 98.4 °F (36.9 °C)  Pulse:  [53-73] 55  Resp:  [16-18] 17  SpO2:  [92 %-98 %] 92 %  BP: (108-130)/(53-61) 111/55     Date 08/12/24 0700 - 08/13/24 0659   Shift 5498-1406 1567-2072 7842-0229 24 Hour Total   INTAKE   Blood 298   298   Shift Total(mL/kg) 298(2.6)   298(2.6)   OUTPUT   Shift Total(mL/kg)       Weight (kg) 116.2 116.2 116.2 116.2     Neurosurgery Physical Exam  General: well developed,  well nourished, no distress.   Head: normocephalic, atraumatic  Mental Status: Awake, Alert, Oriented  Speech: Slow but clear with content appropriate to conversation  Cranial nerves: face symmetric, CN II-XII grossly intact.   Eyes: pupils equal, round, reactive to light, EOMI.  Sensory: intact to light touch throughout    Motor Strength: Moves all extremities spontaneously with good tone.  Full strength upper and lower extremities. No abnormal movements seen.     Pronator Drift: no drift noted  Finger-to-nose: Intact bilaterally    Significant Labs:  Recent Labs   Lab 08/11/24  0526 08/12/24  0305   GLU 92 87    138   K 3.9 3.7    107   CO2 22* 22*   BUN 19 19   CREATININE 1.7* 1.7*   CALCIUM 8.2* 8.9     Recent Labs   Lab 08/11/24  1707 08/12/24  0305 08/12/24  0904   WBC 1.17* 0.73* 0.84*   HGB 8.5* 7.1* 7.1*   HCT 26.3* 20.4* 21.1*   PLT 17* 22* 20*     Recent Labs   Lab 08/11/24  0001 08/11/24  1707 08/12/24  0904   INR 1.6* 1.7*  --    APTT 35.8*  --  35.3*     Microbiology Results (last 7 days)       ** No results found for the last 168 hours. **          All pertinent labs from the last 24 hours have been reviewed.    Significant Diagnostics:  I have reviewed and interpreted all pertinent imaging results/findings within the past 24 hours.  Assessment/Plan:     * Bilateral subdural hematomas  58M PMH HIV, thrombocytopenia, liver cirrhosis, CAD s/p CABG (many years ago) with enlarging bilateral chronic SDH transferred from [facility] after CT 8/7 without prior intervention    Plan:  Admitted medicine; q4h nc/vs  - Recommend MMA embolization   - PLT goal >50k for MMA embo  INR goal <1.4, plt goal >100k if medically feasible  Keppra 500mg BID x7 days  Hold DVT ppx until repeat scan  Please notify nsgy of any acute neurological decline or of any further questions/concerns  Preponderance of medical care per primary team    Dispo: ongoing    Case and plan discussed with attending neurosurgeon   Tracy Perez PA-C  Neurosurgery  Esequiel Lea - Neurosurgery ([facility])

## 2024-08-12 NOTE — PROGRESS NOTES
Esequiel Lea - Neurosurgery ([facility])  [facility] Medicine  Progress Note    Patient Name: Jam Card  MRN: [id number]  Patient Class: IP- Inpatient   Admission Date: 8/10/2024  Length of Stay: 2 days  Attending Physician: Leo Peres MD  Primary Care Provider: Ulysses Almaraz MD        Subjective:     Principal Problem:Bilateral subdural hematomas        HPI:  Mr. Card is a 57 yo M with PMHx of Hep C cirrhosis, HIV, chronic thrombocytopenia, HTN, GERD, HLD, CAD s/p CABG (many years ago) who was initially admitted to [facility] under  after being referred to the ED by NSGY due to worsening SDH on imaging. Patient was previously admitted to [facility] for evaluation of HA (7/19/24) with a CT head showing b/l chronic extra-axial fluid collections which possibly could be CSF. Bur hole drainage was considered but coagulopathy and platelet issue needed to be corrected prior. Patient also did not want drainage at that time. Patient f/u with [facility] neurotrauma unit with repeat CT head (8/7/24) but no longer experienced HA, just gait disturbance. CT head showed blood developing since prior CT head which led to ED visit and evaluation. Heme/Onc was consulted and believe that thrombocytopenia could be to splenic sequestration so improvement of plt function may only be temporary at best. NSGY at [facility] requested inter facility transfer. Hepatology at [facility] also agreed. Patient transferred to [facility] for NSGY, Heme/Onc, and Hepatology.     Patient hemodynamically stable on arrival. MELD 27, Na 135, Cr 1.72 (1.8 baseline), INR 1.7, CBC 0.9 > 7.7 < 37 (s/p 2U plt and FFP, with initial INR 1.9). No focal neuro deficits at this time. GCS 15 on exam. Patient denies HA, weakness, numbness, vision disturbance, dizziness, photophobia, or any other complaints.     Overview/Hospital Course:  No notes on file    Interval History: NAEON. Patient requiring PLTs, fibrinogen, abd FFP given elevated INR.     Review of  Systems  Objective:     Vital Signs (Most Recent):  Temp: 98.4 °F (36.9 °C) (08/12/24 1136)  Pulse: (!) 55 (08/12/24 1136)  Resp: 17 (08/12/24 1136)  BP: (!) 111/55 (08/12/24 1136)  SpO2: (!) 92 % (08/12/24 1136) Vital Signs (24h Range):  Temp:  [98.1 °F (36.7 °C)-99.8 °F (37.7 °C)] 98.4 °F (36.9 °C)  Pulse:  [53-73] 55  Resp:  [16-18] 17  SpO2:  [92 %-98 %] 92 %  BP: (108-130)/(53-61) 111/55     Weight: 116.2 kg (256 lb 2.8 oz)  Body mass index is 33.8 kg/m².    Intake/Output Summary (Last 24 hours) at 8/12/2024 1421  Last data filed at 8/12/2024 1136  Gross per 24 hour   Intake 1030.6 ml   Output --   Net 1030.6 ml         Physical Exam  Constitutional:       General: He is not in acute distress.     Appearance: Normal appearance.   HENT:      Head: Normocephalic and atraumatic.      Right Ear: External ear normal.      Left Ear: External ear normal.      Nose: Nose normal.      Mouth/Throat:      Mouth: Mucous membranes are moist.   Eyes:      General: No visual field deficit.     Extraocular Movements: Extraocular movements intact.      Conjunctiva/sclera: Conjunctivae normal.      Pupils: Pupils are equal, round, and reactive to light.   Cardiovascular:      Rate and Rhythm: Normal rate and regular rhythm.   Pulmonary:      Effort: Pulmonary effort is normal. No respiratory distress.      Breath sounds: Normal breath sounds.   Abdominal:      General: Bowel sounds are normal. There is distension.      Palpations: Abdomen is soft.      Comments: No notable ascites   Musculoskeletal:         General: Normal range of motion.      Cervical back: Normal range of motion and neck supple.      Right lower leg: No edema.      Left lower leg: No edema.   Skin:     General: Skin is warm and dry.   Neurological:      General: No focal deficit present.      Mental Status: He is alert and oriented to person, place, and time. Mental status is at baseline.      GCS: GCS eye subscore is 4. GCS verbal subscore is 5. GCS motor  subscore is 6.      Cranial Nerves: Cranial nerves 2-12 are intact. No cranial nerve deficit, dysarthria or facial asymmetry.      Motor: No weakness.      Comments: Hx of chronic brain syndrome, slow speech likely baseline     Psychiatric:         Mood and Affect: Mood normal.         Behavior: Behavior normal.             Significant Labs: All pertinent labs within the past 24 hours have been reviewed.    Significant Imaging: I have reviewed all pertinent imaging results/findings within the past 24 hours.    Assessment/Plan:      * Bilateral subdural hematomas  Patient initially presented to OSH with HA and CT head showing b/l subdural hematomas. Most recent CT head at follow up visit showed increase in b/l subdural hematomas size.    - NSGY consulted, appreciate recs  - q4h neurochecks  - SBP goal 140  - fall precautions  - FFP/PLTs/Cryo   - CT scan concern for possible progression of hematoma, cont to transfuse             Depression  Continue fluoxetine 20mg      HLD (hyperlipidemia)  Continue statin      Pancytopenia  The likely etiology of thrombocytopenia is liver disease and platelet consumption from splenic sequestration . The patients 3 most recent labs are listed below.  Last platelet count at [facility] 37. S/p 2 units of platelets and 1 unit of FFP.  Plan  - Will transfuse if platelet count is <100k (if undergoing neurosurgery).  - Heme onc consult, hemolysis labs ordered         Liver cirrhosis  Patient with known Cirrhosis   MELD-Na score calculated; MELD 3.0: 28 at 7/12/2024  8:57 AM  MELD-Na: 27 at 7/12/2024  8:57 AM  Calculated from:  Serum Creatinine: 2.8 mg/dL at 7/12/2024  8:57 AM  Serum Sodium: 138 mmol/L (Using max of 137 mmol/L) at 7/12/2024  8:57 AM  Total Bilirubin: 4.9 mg/dL at 7/12/2024  8:57 AM  Serum Albumin: 2.2 g/dL at 7/12/2024  8:57 AM  INR(ratio): 1.5 at 7/11/2024  7:39 AM  Age at listing (hypothetical): 58 years  Sex: Male at 7/12/2024  8:57 AM      Continue chronic meds. Etiology  likely Hepatitis. Will avoid any hepatotoxic meds, and monitor CBC/CMP/INR for synthetic function.     Can consider liver ultrasound, low suspicion of ascites on exam  F/u ammonia, dbili  Strict I/Os  Continue lasix 40mg daily and spironolactone 50mg daily  Continue lactulose and rifaximin with goal 4-5 BM daily.   Hepatology consulted, appreciate recs    Human immunodeficiency virus (HIV) disease  Previously was taking GENVOYA.     Started STRIBILD, hospital equivalent. Will confirm with pharmacy       Chronic hepatitis C  Recent Hep C RNA showed elevated viral load 7/11/24. Not currently on treatment.     Can consider repeat viral load if clinically indicated          VTE Risk Mitigation (From admission, onward)           Ordered     IP VTE HIGH RISK PATIENT  Once         08/10/24 2347     Place sequential compression device  Until discontinued         08/10/24 2347                    Discharge Planning   CODI: 8/15/2024     Code Status: Full Code   Is the patient medically ready for discharge?:     Reason for patient still in hospital (select all that apply): Patient trending condition, Laboratory test, Treatment, and Consult recommendations  Discharge Plan A: Home Health                  [facility]   Department of Hospital Medicine   Esequiel jenelle - Neurosurgery ([facility])

## 2024-08-12 NOTE — CONSULTS
"Esequiel Lea - Neurosurgery ([facility])  Adult Nutrition  Consult Note    SUMMARY     Recommendations    Continue Low sodium, 2 gm -  Fluid 1200 mL diet as tolerated.     RD to monitor and follow up.     Goals: Meet % EEN/EPN by RD follow up.  Nutrition Goal Status: new  Communication of RD Recs: other (comment) (POC)    Assessment and Plan    Nutrition Problem  Increased energy needs    Related to (etiology):   Physiological needs    Signs and Symptoms (as evidenced by):    Bilateral subdural hematomas       Interventions/Recommendations (treatment strategy):  Collaboration of nutrition care w/ other providers    Nutrition Diagnosis Status:   New        Reason for Assessment    Reason For Assessment: consult (Hx of hepatology disorders)  Diagnosis: other (see comments) (Bilateral subdural hematomas)  Relevant Medical History: Chronic hep C, cirrhosis, HIV, CAD, HTN, GERD, HLD  Interdisciplinary Rounds: did not attend  General Information Comments: RD consulted due to new admit with history of Hepatology disorders. Pt and wife report pt's typical intake at 3 meals/day, with no N/V/C/D. Pt says he was told he had swallowing difficulty 10 years ago, but it has not affected his nutritional intake. Pt's diet was just advanced to low sodium 2gm, Fluid - 1200 mL. Wt loss noted, pt contributes to fluid loss related to liver failure. Malnutrition not suspected.   Nutrition Discharge Planning: Pending medical course    Nutrition Risk Screen    Nutrition Risk Screen: no indicators present    Nutrition/Diet History    Food Allergies: NKFA  Factors Affecting Nutritional Intake: None identified at this time    Anthropometrics    Temp: 98.4 °F (36.9 °C)  Height Method: Measured  Height: 6' 1" (185.4 cm)  Height (inches): 73 in  Weight Method: Bed Scale  Weight: 116.2 kg (256 lb 2.8 oz)  Weight (lb): 256.18 lb  Ideal Body Weight (IBW), Male: 184 lb  % Ideal Body Weight, Male (lb): 139.23 %  BMI (Calculated): 33.8  BMI Grade: 30 " - 34.9- obesity - grade I       Lab/Procedures/Meds    Pertinent Labs Reviewed: reviewed  Pertinent Labs Comments: Creatinine: 1.7, eGFR: 46.7, AST: 64, vit D: 15  Pertinent Medications Reviewed: reviewed  Pertinent Medications Comments: Cyanocobalamin, ergocalciferol, lactulose, pyridoxine, vit D        Estimated/Assessed Needs    Weight Used For Calorie Calculations: 116.2 kg (256 lb 2.8 oz)  Energy Calorie Requirements (kcal): 2046 (MSJ x 1.0 PAL)     Protein Requirements: 151.38 (1.3 g/kg ABW)  Weight Used For Protein Calculations: 116.2 kg (256 lb 2.8 oz)  Fluid Requirements (mL): Per MD     RDA Method (mL): 2046         Nutrition Prescription Ordered    Current Diet Order: Diet Low Sodium, 2gm Fluid - 1200mL      Evaluation of Received Nutrient/Fluid Intake    I/O: -1,527.4 net I/O  Comments: LBM: 8/10  % Intake of Estimated Energy Needs: 0 - 25 %  % Meal Intake: 0 - 25 %    Nutrition Risk    Nutrition Risk Screen: no indicators present      Monitor and Evaluation    Food and Nutrient Intake: energy intake, food and beverage intake  Food and Nutrient Adminstration: diet order  Knowledge/Beliefs/Attitudes: food and nutrition knowledge/skill  Physical Activity and Function: nutrition-related ADLs and IADLs  Anthropometric Measurements: body mass index, weight change, weight  Biochemical Data, Medical Tests and Procedures: lipid profile, inflammatory profile, glucose/endocrine profile, gastrointestinal profile, electrolyte and renal panel  Nutrition-Focused Physical Findings: overall appearance       Nutrition Follow-Up    RD Follow-up?: Yes    Tamiko Oakley, Registration Eligible, Provisional LDN

## 2024-08-12 NOTE — CARE UPDATE
I have reviewed the chart of Jam Card who is hospitalized for the following:    Active Hospital Problems    Diagnosis    *Bilateral subdural hematomas    Portal hypertension     Sequela of portal hypertension including splenomegaly and moderate volume ascites       Ascites     Sequela of portal hypertension including splenomegaly and moderate volume ascites       HLD (hyperlipidemia)    Depression    CKD (chronic kidney disease)    Hyperammonemia    Brain compression     monitor with q4h neuro checks while on floor or more frequently while in neuro critical care, as any change in the patients clinical exam may signify expansion of the insult and/or the area of the edema. Such changes may require acute interventions to prevent loss of function and/or death.  Pending nsgy evaluation for mass effect on imaging               Liver cirrhosis    Pancytopenia    Chronic hepatitis C    Human immunodeficiency virus (HIV) disease        Heidi Fitzpatrick NP  Unit Based DEBBIE

## 2024-08-12 NOTE — PLAN OF CARE
[facility] (Hospital)  Discharge Reassessment    Primary Care Provider: Ulysses Almaraz MD    Expected Discharge Date: 8/15/2024    Reassessment (most recent)       Discharge Reassessment - 08/12/24 1039          Discharge Reassessment    Assessment Type Discharge Planning Reassessment     Did the patient's condition or plan change since previous assessment? Yes     Discharge Plan discussed with: Patient     Communicated CODI with patient/caregiver Date not available/Unable to determine     Discharge Plan A Home Health (P)      Discharge Plan B Home with family (P)      DME Needed Upon Discharge  -- (P)    TBD    Transition of Care Barriers None (P)      Why the patient remains in the hospital Requires continued medical care (P)                    Pt not ready for discharge due to: receiving blood and plasma   CM will remain available for patient/families on NPU.  Currently pt has d/c plans in progress at this time.    Discharge Plan A and Plan B have been determined by review of patient's clinical status, future medical and therapeutic needs, and coverage/benefits for post-acute care in coordination with multidisciplinary team members.

## 2024-08-12 NOTE — ASSESSMENT & PLAN NOTE
Patient initially presented to OSH with HA and CT head showing b/l subdural hematomas. Most recent CT head at follow up visit showed increase in b/l subdural hematomas size.    - NSGY consulted, appreciate recs  - q4h neurochecks  - SBP goal 140  - fall precautions  - FFP/PLTs/Cryo   - CT scan concern for possible progression of hematoma, cont to transfuse

## 2024-08-12 NOTE — PLAN OF CARE
0887 CM asked patient HM team to place PT/OT orders if patient is ready to work with therapy.   CM awaiting a response.

## 2024-08-12 NOTE — PLAN OF CARE
Problem: Adult Inpatient Plan of Care  Goal: Plan of Care Review  Outcome: Progressing     Problem: Adult Inpatient Plan of Care  Goal: Absence of Hospital-Acquired Illness or Injury  Outcome: Progressing     Problem: Adult Inpatient Plan of Care  Goal: Optimal Comfort and Wellbeing  Outcome: Progressing   Review POC; agrees with plan; repeat / follow up CT for @ 7 PM or after; NPO after MN.

## 2024-08-12 NOTE — ASSESSMENT & PLAN NOTE
The likely etiology of thrombocytopenia is liver disease and platelet consumption from splenic sequestration . The patients 3 most recent labs are listed below.  Last platelet count at [facility] 37. S/p 2 units of platelets and 1 unit of FFP.  Plan  - Will transfuse if platelet count is <100k (if undergoing neurosurgery).  - Heme onc consult, hemolysis labs ordered

## 2024-08-12 NOTE — PLAN OF CARE
Recommendations    Continue Low sodium, 2 gm -  Fluid 1200 mL diet as tolerated.     RD to monitor and follow up.     Goals: Meet % EEN/EPN by RD follow up.  Nutrition Goal Status: new  Communication of RD Recs: other (comment) (POC)

## 2024-08-12 NOTE — TELEPHONE ENCOUNTER
Liver Transplant Committee Discussion     Patient Name: Jam Card   : 1965  MRN: [id number]    Requested by: Laquita Jones MD    Day to be discussed: Liver discussion days: Tuesday    Transplant Coordinator: Liver Coordinators: Renetta Plascencia    Patient Status: inpatient    Transplant Status: transplant status: Pre-liver    Reason for Discussion:  Pt in active outpatient eval, on hold as inpatient d/t current SDH, h/o HCV cirrhosis, HIV, transferred in from [facility] 8/10    Plan: defer evaluation at this time pending further investigation/treatment for multiple SDH's, cyoptenias and HCV and HIV statuses    Route to:  Dr. Jones

## 2024-08-12 NOTE — PHARMACY MED REC
"          Admission Medication History     The home medication history was taken by Tiffany Baker.    You may go to "Admission" then "Reconcile Home Medications" tabs to review and/or act upon these items.     The home medication list has been updated by the Pharmacy department.   Please read ALL comments highlighted in yellow.   Please address this information as you see fit.    Feel free to contact us if you have any questions or require assistance.          Medications listed below were obtained from: Patient/family and [employer]- [employer] Medications   Medication Sig    baclofen (LIORESAL) 10 MG tablet Take 1 tablet by mouth 3 (three) times daily.    bisacodyL (DULCOLAX) 10 mg Supp Place 1 suppository rectally daily as needed (constipation).    cyanocobalamin (VITAMIN B-12) 1000 MCG tablet Take 1,000 mcg by mouth once daily.    fenofibrate 160 MG Tab TAKE 1 TABLET(160 MG) BY MOUTH DAILY    FLUoxetine 20 MG capsule Take 20 mg by mouth once daily.    furosemide (LASIX) 20 MG tablet Take 2 tablets (40 mg total) by mouth once daily.    GENVOYA [phone] mg Tab TAKE ONE TABLET BY MOUTH DAILY WITH FOOD.    lactulose (CHRONULAC) 20 gram/30 mL Soln Take 30 mLs (20 g total) by mouth 2 (two) times daily. Goal of 3-5 soft stools each day    meclizine (ANTIVERT) 12.5 mg tablet Take 6.25 mg by mouth 2 (two) times daily as needed for Dizziness.    metoprolol succinate (TOPROL-XL) 25 MG 24 hr tablet Take 25 mg by mouth once daily.    midodrine (PROAMATINE) 2.5 MG Tab Take 2.5 mg by mouth 2 (two) times daily.    ondansetron (ZOFRAN-ODT) 4 MG TbDL Take 1 tablet by mouth every 8 (eight) hours as needed (nausea).    pantoprazole (PROTONIX) 40 MG tablet Take 40 mg by mouth once daily.    pyridoxine, vitamin B6, (B-6) 100 MG Tab Take 100 mg by mouth once daily.    rifAXIMin (XIFAXAN) 550 mg Tab Take 550 mg by mouth 2 (two) times daily.    rosuvastatin (CRESTOR) 10 MG tablet Take 10 mg by mouth every evening.    " spironolactone (ALDACTONE) 25 MG tablet Take 50 mg by mouth once daily.    tamsulosin (FLOMAX) 0.4 mg Cap Take 0.4 mg by mouth every evening.    valsartan (DIOVAN) 40 MG tablet Take 40 mg by mouth every evening.    vitamin D (VITAMIN D3) 1000 units Tab Take 1,000 Units by mouth once daily.    ergocalciferol (ERGOCALCIFEROL) 50,000 unit Cap Take 50,000 Units by mouth every 7 days.    loratadine (CLARITIN) 10 mg tablet Take 10 mg by mouth once daily.         Tiffany Baker  EXT [id number]       .

## 2024-08-13 PROBLEM — D62 ACUTE BLOOD LOSS ANEMIA: Status: ACTIVE | Noted: 2024-08-13

## 2024-08-13 PROBLEM — N17.9 AKI (ACUTE KIDNEY INJURY): Status: ACTIVE | Noted: 2024-08-13

## 2024-08-13 LAB
ALBUMIN SERPL BCP-MCNC: 3.2 G/DL (ref 3.5–5.2)
ALP SERPL-CCNC: 57 U/L (ref 55–135)
ALT SERPL W/O P-5'-P-CCNC: 16 U/L (ref 10–44)
AMMONIA PLAS-SCNC: 92 UMOL/L (ref 10–50)
ANION GAP SERPL CALC-SCNC: 8 MMOL/L (ref 8–16)
ANISOCYTOSIS BLD QL SMEAR: SLIGHT
APTT PPP: 34.3 SEC (ref 21–32)
AST SERPL-CCNC: 58 U/L (ref 10–40)
BASOPHILS # BLD AUTO: 0.01 K/UL (ref 0–0.2)
BASOPHILS # BLD AUTO: 0.01 K/UL (ref 0–0.2)
BASOPHILS # BLD AUTO: 0.02 K/UL (ref 0–0.2)
BASOPHILS NFR BLD: 0.9 % (ref 0–1.9)
BASOPHILS NFR BLD: 1 % (ref 0–1.9)
BASOPHILS NFR BLD: 1.3 % (ref 0–1.9)
BILIRUB SERPL-MCNC: 4.7 MG/DL (ref 0.1–1)
BLD PROD TYP BPU: NORMAL
BLOOD UNIT EXPIRATION DATE: NORMAL
BLOOD UNIT TYPE CODE: 5100
BLOOD UNIT TYPE: NORMAL
BUN SERPL-MCNC: 18 MG/DL (ref 6–20)
BURR CELLS BLD QL SMEAR: ABNORMAL
CALCIUM SERPL-MCNC: 9.3 MG/DL (ref 8.7–10.5)
CHLORIDE SERPL-SCNC: 109 MMOL/L (ref 95–110)
CO2 SERPL-SCNC: 20 MMOL/L (ref 23–29)
CODING SYSTEM: NORMAL
CREAT SERPL-MCNC: 1.6 MG/DL (ref 0.5–1.4)
CROSSMATCH INTERPRETATION: NORMAL
DACRYOCYTES BLD QL SMEAR: ABNORMAL
DACRYOCYTES BLD QL SMEAR: ABNORMAL
DIFFERENTIAL METHOD BLD: ABNORMAL
DISPENSE STATUS: NORMAL
DOHLE BOD BLD QL SMEAR: PRESENT
EOSINOPHIL # BLD AUTO: 0 K/UL (ref 0–0.5)
EOSINOPHIL # BLD AUTO: 0 K/UL (ref 0–0.5)
EOSINOPHIL # BLD AUTO: 0.1 K/UL (ref 0–0.5)
EOSINOPHIL NFR BLD: 2.1 % (ref 0–8)
EOSINOPHIL NFR BLD: 2.6 % (ref 0–8)
EOSINOPHIL NFR BLD: 3.2 % (ref 0–8)
ERYTHROCYTE [DISTWIDTH] IN BLOOD BY AUTOMATED COUNT: 16.8 % (ref 11.5–14.5)
ERYTHROCYTE [DISTWIDTH] IN BLOOD BY AUTOMATED COUNT: 17 % (ref 11.5–14.5)
ERYTHROCYTE [DISTWIDTH] IN BLOOD BY AUTOMATED COUNT: 17.2 % (ref 11.5–14.5)
EST. GFR  (NO RACE VARIABLE): 50.3 ML/MIN/1.73 M^2
FIBRINOGEN PPP-MCNC: 109 MG/DL (ref 182–400)
FIBRINOGEN PPP-MCNC: 114 MG/DL (ref 182–400)
FIBRINOGEN PPP-MCNC: 143 MG/DL (ref 182–400)
GIANT PLATELETS BLD QL SMEAR: PRESENT
GLUCOSE SERPL-MCNC: 81 MG/DL (ref 70–110)
HCT VFR BLD AUTO: 20.9 % (ref 40–54)
HCT VFR BLD AUTO: 21.3 % (ref 40–54)
HCT VFR BLD AUTO: 24.4 % (ref 40–54)
HGB BLD-MCNC: 6.9 G/DL (ref 14–18)
HGB BLD-MCNC: 7.2 G/DL (ref 14–18)
HGB BLD-MCNC: 8.1 G/DL (ref 14–18)
HYPOCHROMIA BLD QL SMEAR: ABNORMAL
HYPOCHROMIA BLD QL SMEAR: ABNORMAL
IMM GRANULOCYTES # BLD AUTO: 0 K/UL (ref 0–0.04)
IMM GRANULOCYTES # BLD AUTO: 0.01 K/UL (ref 0–0.04)
IMM GRANULOCYTES # BLD AUTO: 0.01 K/UL (ref 0–0.04)
IMM GRANULOCYTES NFR BLD AUTO: 0 % (ref 0–0.5)
IMM GRANULOCYTES NFR BLD AUTO: 0.6 % (ref 0–0.5)
IMM GRANULOCYTES NFR BLD AUTO: 0.9 % (ref 0–0.5)
INR PPP: 1.5 (ref 0.8–1.2)
INR PPP: 1.5 (ref 0.8–1.2)
INR PPP: 1.6 (ref 0.8–1.2)
LYMPHOCYTES # BLD AUTO: 0.5 K/UL (ref 1–4.8)
LYMPHOCYTES # BLD AUTO: 0.5 K/UL (ref 1–4.8)
LYMPHOCYTES # BLD AUTO: 0.6 K/UL (ref 1–4.8)
LYMPHOCYTES NFR BLD: 38.7 % (ref 18–48)
LYMPHOCYTES NFR BLD: 46.1 % (ref 18–48)
LYMPHOCYTES NFR BLD: 49 % (ref 18–48)
MAGNESIUM SERPL-MCNC: 1.5 MG/DL (ref 1.6–2.6)
MCH RBC QN AUTO: 34.1 PG (ref 27–31)
MCH RBC QN AUTO: 34.3 PG (ref 27–31)
MCH RBC QN AUTO: 34.3 PG (ref 27–31)
MCHC RBC AUTO-ENTMCNC: 33 G/DL (ref 32–36)
MCHC RBC AUTO-ENTMCNC: 33.2 G/DL (ref 32–36)
MCHC RBC AUTO-ENTMCNC: 33.8 G/DL (ref 32–36)
MCV RBC AUTO: 101 FL (ref 82–98)
MCV RBC AUTO: 103 FL (ref 82–98)
MCV RBC AUTO: 104 FL (ref 82–98)
MONOCYTES # BLD AUTO: 0.1 K/UL (ref 0.3–1)
MONOCYTES NFR BLD: 6.3 % (ref 4–15)
MONOCYTES NFR BLD: 6.5 % (ref 4–15)
MONOCYTES NFR BLD: 7 % (ref 4–15)
NEUTROPHILS # BLD AUTO: 0.4 K/UL (ref 1.8–7.7)
NEUTROPHILS # BLD AUTO: 0.5 K/UL (ref 1.8–7.7)
NEUTROPHILS # BLD AUTO: 0.8 K/UL (ref 1.8–7.7)
NEUTROPHILS NFR BLD: 41.6 % (ref 38–73)
NEUTROPHILS NFR BLD: 42.5 % (ref 38–73)
NEUTROPHILS NFR BLD: 49.7 % (ref 38–73)
NRBC BLD-RTO: 0 /100 WBC
NUM UNITS TRANS FFP: NORMAL
OVALOCYTES BLD QL SMEAR: ABNORMAL
PATH REV BLD -IMP: NORMAL
PHOSPHATE SERPL-MCNC: 2.6 MG/DL (ref 2.7–4.5)
PLATELET # BLD AUTO: 17 K/UL (ref 150–450)
PLATELET # BLD AUTO: 18 K/UL (ref 150–450)
PLATELET # BLD AUTO: 23 K/UL (ref 150–450)
PLATELET BLD QL SMEAR: ABNORMAL
PLATELET BLD QL SMEAR: ABNORMAL
PMV BLD AUTO: 12.6 FL (ref 9.2–12.9)
PMV BLD AUTO: 13.2 FL (ref 9.2–12.9)
PMV BLD AUTO: 13.2 FL (ref 9.2–12.9)
POCT GLUCOSE: 100 MG/DL (ref 70–110)
POCT GLUCOSE: 98 MG/DL (ref 70–110)
POIKILOCYTOSIS BLD QL SMEAR: SLIGHT
POLYCHROMASIA BLD QL SMEAR: ABNORMAL
POLYCHROMASIA BLD QL SMEAR: ABNORMAL
POTASSIUM SERPL-SCNC: 3.8 MMOL/L (ref 3.5–5.1)
PROT SERPL-MCNC: 6.3 G/DL (ref 6–8.4)
PROTHROMBIN TIME: 15.7 SEC (ref 9–12.5)
PROTHROMBIN TIME: 16.4 SEC (ref 9–12.5)
PROTHROMBIN TIME: 16.8 SEC (ref 9–12.5)
RBC # BLD AUTO: 2.01 M/UL (ref 4.6–6.2)
RBC # BLD AUTO: 2.11 M/UL (ref 4.6–6.2)
RBC # BLD AUTO: 2.36 M/UL (ref 4.6–6.2)
SCHISTOCYTES BLD QL SMEAR: ABNORMAL
SCHISTOCYTES BLD QL SMEAR: PRESENT
SMUDGE CELLS BLD QL SMEAR: PRESENT
SODIUM SERPL-SCNC: 137 MMOL/L (ref 136–145)
SPHEROCYTES BLD QL SMEAR: ABNORMAL
SPHEROCYTES BLD QL SMEAR: ABNORMAL
TOXIC GRANULES BLD QL SMEAR: PRESENT
TRANS ERYTHROCYTES VOL PATIENT: NORMAL ML
UNIT NUMBER: NORMAL
UNIT NUMBER: NORMAL
WBC # BLD AUTO: 0.96 K/UL (ref 3.9–12.7)
WBC # BLD AUTO: 1.15 K/UL (ref 3.9–12.7)
WBC # BLD AUTO: 1.55 K/UL (ref 3.9–12.7)

## 2024-08-13 PROCEDURE — 30233N1 TRANSFUSION OF NONAUTOLOGOUS RED BLOOD CELLS INTO PERIPHERAL VEIN, PERCUTANEOUS APPROACH: ICD-10-PCS | Performed by: INTERNAL MEDICINE

## 2024-08-13 PROCEDURE — 80053 COMPREHEN METABOLIC PANEL: CPT | Mod: NTX

## 2024-08-13 PROCEDURE — P9021 RED BLOOD CELLS UNIT: HCPCS | Mod: NTX

## 2024-08-13 PROCEDURE — P9035 PLATELET PHERES LEUKOREDUCED: HCPCS | Mod: NTX

## 2024-08-13 PROCEDURE — 86965 POOLING BLOOD PLATELETS: CPT | Mod: NTX

## 2024-08-13 PROCEDURE — P9017 PLASMA 1 DONOR FRZ W/IN 8 HR: HCPCS | Mod: NTX

## 2024-08-13 PROCEDURE — 36415 COLL VENOUS BLD VENIPUNCTURE: CPT | Mod: NTX,XB | Performed by: INTERNAL MEDICINE

## 2024-08-13 PROCEDURE — 84100 ASSAY OF PHOSPHORUS: CPT | Mod: NTX

## 2024-08-13 PROCEDURE — 85730 THROMBOPLASTIN TIME PARTIAL: CPT | Mod: NTX | Performed by: INTERNAL MEDICINE

## 2024-08-13 PROCEDURE — 85610 PROTHROMBIN TIME: CPT | Mod: NTX | Performed by: INTERNAL MEDICINE

## 2024-08-13 PROCEDURE — P9047 ALBUMIN (HUMAN), 25%, 50ML: HCPCS | Mod: JZ,JG,NTX

## 2024-08-13 PROCEDURE — 83735 ASSAY OF MAGNESIUM: CPT | Mod: NTX

## 2024-08-13 PROCEDURE — 82140 ASSAY OF AMMONIA: CPT | Mod: NTX

## 2024-08-13 PROCEDURE — 11000001 HC ACUTE MED/SURG PRIVATE ROOM: Mod: NTX

## 2024-08-13 PROCEDURE — 85025 COMPLETE CBC W/AUTO DIFF WBC: CPT | Mod: 91,NTX | Performed by: INTERNAL MEDICINE

## 2024-08-13 PROCEDURE — 30233M1 TRANSFUSION OF NONAUTOLOGOUS PLASMA CRYOPRECIPITATE INTO PERIPHERAL VEIN, PERCUTANEOUS APPROACH: ICD-10-PCS | Performed by: INTERNAL MEDICINE

## 2024-08-13 PROCEDURE — P9012 CRYOPRECIPITATE EACH UNIT: HCPCS | Mod: NTX

## 2024-08-13 PROCEDURE — 30233K1 TRANSFUSION OF NONAUTOLOGOUS FROZEN PLASMA INTO PERIPHERAL VEIN, PERCUTANEOUS APPROACH: ICD-10-PCS | Performed by: INTERNAL MEDICINE

## 2024-08-13 PROCEDURE — 25000003 PHARM REV CODE 250: Mod: NTX

## 2024-08-13 PROCEDURE — 99232 SBSQ HOSP IP/OBS MODERATE 35: CPT | Mod: NTX,,, | Performed by: PHYSICIAN ASSISTANT

## 2024-08-13 PROCEDURE — 86920 COMPATIBILITY TEST SPIN: CPT | Mod: NTX

## 2024-08-13 PROCEDURE — 63600175 PHARM REV CODE 636 W HCPCS: Mod: JZ,JG,NTX

## 2024-08-13 PROCEDURE — 25000003 PHARM REV CODE 250: Mod: NTX | Performed by: INTERNAL MEDICINE

## 2024-08-13 PROCEDURE — 36430 TRANSFUSION BLD/BLD COMPNT: CPT | Mod: NTX

## 2024-08-13 PROCEDURE — 85384 FIBRINOGEN ACTIVITY: CPT | Mod: 91,NTX | Performed by: INTERNAL MEDICINE

## 2024-08-13 PROCEDURE — 36415 COLL VENOUS BLD VENIPUNCTURE: CPT | Mod: NTX,XB

## 2024-08-13 PROCEDURE — 63600175 PHARM REV CODE 636 W HCPCS: Mod: NTX

## 2024-08-13 PROCEDURE — 30233L1 TRANSFUSION OF NONAUTOLOGOUS FRESH PLASMA INTO PERIPHERAL VEIN, PERCUTANEOUS APPROACH: ICD-10-PCS | Performed by: INTERNAL MEDICINE

## 2024-08-13 PROCEDURE — 25000242 PHARM REV CODE 250 ALT 637 W/ HCPCS: Mod: NTX

## 2024-08-13 PROCEDURE — 30233R1 TRANSFUSION OF NONAUTOLOGOUS PLATELETS INTO PERIPHERAL VEIN, PERCUTANEOUS APPROACH: ICD-10-PCS | Performed by: INTERNAL MEDICINE

## 2024-08-13 PROCEDURE — 36415 COLL VENOUS BLD VENIPUNCTURE: CPT | Mod: NTX

## 2024-08-13 PROCEDURE — 85384 FIBRINOGEN ACTIVITY: CPT | Mod: NTX | Performed by: INTERNAL MEDICINE

## 2024-08-13 PROCEDURE — 85610 PROTHROMBIN TIME: CPT | Mod: 91,NTX | Performed by: INTERNAL MEDICINE

## 2024-08-13 RX ORDER — HYDROCODONE BITARTRATE AND ACETAMINOPHEN 500; 5 MG/1; MG/1
TABLET ORAL
Status: DISCONTINUED | OUTPATIENT
Start: 2024-08-13 | End: 2024-08-14

## 2024-08-13 RX ORDER — LEVETIRACETAM 500 MG/5ML
1000 INJECTION, SOLUTION, CONCENTRATE INTRAVENOUS ONCE
Status: COMPLETED | OUTPATIENT
Start: 2024-08-13 | End: 2024-08-13

## 2024-08-13 RX ORDER — LACTULOSE 10 G/15ML
30 SOLUTION ORAL EVERY 6 HOURS
Status: DISCONTINUED | OUTPATIENT
Start: 2024-08-13 | End: 2024-08-17

## 2024-08-13 RX ORDER — SODIUM,POTASSIUM PHOSPHATES 280-250MG
2 POWDER IN PACKET (EA) ORAL EVERY 4 HOURS
Status: COMPLETED | OUTPATIENT
Start: 2024-08-13 | End: 2024-08-13

## 2024-08-13 RX ORDER — LANOLIN ALCOHOL/MO/W.PET/CERES
800 CREAM (GRAM) TOPICAL EVERY 4 HOURS
Status: CANCELLED | OUTPATIENT
Start: 2024-08-13 | End: 2024-08-13

## 2024-08-13 RX ORDER — SODIUM,POTASSIUM PHOSPHATES 280-250MG
2 POWDER IN PACKET (EA) ORAL EVERY 4 HOURS
Status: CANCELLED | OUTPATIENT
Start: 2024-08-13 | End: 2024-08-13

## 2024-08-13 RX ORDER — ALBUMIN HUMAN 250 G/1000ML
50 SOLUTION INTRAVENOUS EVERY 12 HOURS
Status: COMPLETED | OUTPATIENT
Start: 2024-08-13 | End: 2024-08-15

## 2024-08-13 RX ORDER — LANOLIN ALCOHOL/MO/W.PET/CERES
800 CREAM (GRAM) TOPICAL EVERY 4 HOURS
Status: COMPLETED | OUTPATIENT
Start: 2024-08-13 | End: 2024-08-13

## 2024-08-13 RX ORDER — LACTULOSE 10 G/15ML
200 SOLUTION ORAL; RECTAL 3 TIMES DAILY
Status: DISCONTINUED | OUTPATIENT
Start: 2024-08-13 | End: 2024-08-13

## 2024-08-13 RX ADMIN — ELVITEGRAVIR, COBICISTAT, EMTRICITABINE, AND TENOFOVIR DISOPROXIL FUMARATE 1 TABLET: 150; 150; 200; 300 TABLET, FILM COATED ORAL at 09:08

## 2024-08-13 RX ADMIN — METOPROLOL SUCCINATE 25 MG: 25 TABLET, EXTENDED RELEASE ORAL at 09:08

## 2024-08-13 RX ADMIN — LEVETIRACETAM 500 MG: 500 TABLET, FILM COATED ORAL at 09:08

## 2024-08-13 RX ADMIN — Medication 800 MG: at 03:08

## 2024-08-13 RX ADMIN — ALBUMIN (HUMAN) 50 G: 12.5 SOLUTION INTRAVENOUS at 10:08

## 2024-08-13 RX ADMIN — POTASSIUM & SODIUM PHOSPHATES POWDER PACK 280-160-250 MG 2 PACKET: 280-160-250 PACK at 03:08

## 2024-08-13 RX ADMIN — LEVETIRACETAM 500 MG: 500 TABLET, FILM COATED ORAL at 10:08

## 2024-08-13 RX ADMIN — TAMSULOSIN HYDROCHLORIDE 0.4 MG: 0.4 CAPSULE ORAL at 10:08

## 2024-08-13 RX ADMIN — CETIRIZINE HYDROCHLORIDE 10 MG: 5 TABLET, FILM COATED ORAL at 09:08

## 2024-08-13 RX ADMIN — FENOFIBRATE 160 MG: 160 TABLET ORAL at 09:08

## 2024-08-13 RX ADMIN — LACTULOSE 30 G: 20 SOLUTION ORAL at 10:08

## 2024-08-13 RX ADMIN — ATOVAQUONE 1500 MG: 750 SUSPENSION ORAL at 09:08

## 2024-08-13 RX ADMIN — CHOLECALCIFEROL TAB 25 MCG (1000 UNIT) 1000 UNITS: 25 TAB at 09:08

## 2024-08-13 RX ADMIN — CYANOCOBALAMIN TAB 1000 MCG 1000 MCG: 1000 TAB at 09:08

## 2024-08-13 RX ADMIN — FLUOXETINE HYDROCHLORIDE 20 MG: 20 CAPSULE ORAL at 09:08

## 2024-08-13 RX ADMIN — PYRIDOXINE HCL TAB 50 MG 100 MG: 50 TAB at 09:08

## 2024-08-13 RX ADMIN — POTASSIUM & SODIUM PHOSPHATES POWDER PACK 280-160-250 MG 2 PACKET: 280-160-250 PACK at 10:08

## 2024-08-13 RX ADMIN — Medication 800 MG: at 10:08

## 2024-08-13 RX ADMIN — LACTULOSE 30 G: 20 SOLUTION ORAL at 05:08

## 2024-08-13 RX ADMIN — LEVETIRACETAM 1000 MG: 100 INJECTION INTRAVENOUS at 05:08

## 2024-08-13 RX ADMIN — LACTULOSE 30 G: 20 SOLUTION ORAL at 09:08

## 2024-08-13 RX ADMIN — PANTOPRAZOLE SODIUM 40 MG: 40 TABLET, DELAYED RELEASE ORAL at 09:08

## 2024-08-13 RX ADMIN — RIFAXIMIN 550 MG: 550 TABLET ORAL at 10:08

## 2024-08-13 RX ADMIN — ATORVASTATIN CALCIUM 40 MG: 40 TABLET, FILM COATED ORAL at 09:08

## 2024-08-13 RX ADMIN — BACLOFEN 10 MG: 10 TABLET ORAL at 09:08

## 2024-08-13 NOTE — ASSESSMENT & PLAN NOTE
Patient with known Cirrhosis   MELD-Na score calculated; MELD 3.0: 22 at 8/13/2024  9:29 AM  MELD-Na: 21 at 8/13/2024  9:29 AM  Calculated from:  Serum Creatinine: 1.6 mg/dL at 8/13/2024  3:20 AM  Serum Sodium: 137 mmol/L at 8/13/2024  3:20 AM  Total Bilirubin: 4.7 mg/dL at 8/13/2024  3:20 AM  Serum Albumin: 3.2 g/dL at 8/13/2024  3:20 AM  INR(ratio): 1.5 at 8/13/2024  9:29 AM  Age at listing (hypothetical): 58 years  Sex: Male at 8/13/2024  9:29 AM      Change in mentation overnight. Stable CT head. Most likely hepatic encephalopathy despite downtrending ammonia level and pt having 2-3 BM a day. Added lactulose enemas.   Continue chronic meds. Etiology likely Hepatitis. Will avoid any hepatotoxic meds, and monitor CBC/CMP/INR for synthetic function.   Can consider liver ultrasound, low suspicion of ascites on exam  F/u ammonia, dbili  Strict I/Os  Consulted PT/OT   Ordered low salt diet, NPO at midnight   Discontinued lasix 40mg and spironolactone 50mg in setting of JASON  Continue lactulose and rifaximin with goal 2-3 BM daily.   Ordered 50g albumin q12H for 2 days (ends 8/15)   Hepatology consulted, appreciate recs  - Will discuss at committee meeting today if eval for transplant officially deferred due to presence of SDH

## 2024-08-13 NOTE — ASSESSMENT & PLAN NOTE
Patient initially presented to OSH with HA and CT head showing b/l subdural hematomas. CT head at follow up visit showed increase in b/l subdural hematomas size.     - Change in mentation over night, CT head this morning (8/13) shows no changes. Most likely not due to SDH.   - NSGY consulted, appreciate recs  - Recommend MMA embolization. PLT goal >50k for MMA embo. INR goal <1.4, plt goal >100k if medically feasible.   - consulted neurology per NSGY rec, appreciate recs   - Pt refractory to plt infusion, discussed case with [facility] neuro, will do MMA embolization siva. NPO at midnight. Plt transfusion right before surgery  - SBP goal 140  - fall precautions

## 2024-08-13 NOTE — PLAN OF CARE
Discussed w/ hepatology today  HIV appears well controlled on stribild, however CD4 is 128 (absolute CD4 in March 2024 was 166).   CD4 < 200 is a relative contraindication to transplant  Evaluation is being deferred at this time due to mutliple other medical issues  Please place formal consult if pre-transplant ID evaluation is needed  Pt should continue to follow up his primary HIV provider for management    Tracie Joyner DO  Transplant Infectious Disease

## 2024-08-13 NOTE — PLAN OF CARE
1030 Per nursing staff patient is currently medically unstable and is continuing to receive transfusion.

## 2024-08-13 NOTE — SUBJECTIVE & OBJECTIVE
Interval History: Change in mental status reported by caregiver overnight. Reported slurred speech, jerking movements, confusion. CTH obtained and stable. Patient given 1g Keppra. Patient AAOx4 today, expresses concern about new jerking movements and inability to feed self.     Medications:  Continuous Infusions:  Scheduled Meds:   atorvastatin  40 mg Oral Daily    atovaquone  1,500 mg Oral Daily    cetirizine  10 mg Oral Daily    cyanocobalamin  1,000 mcg Oral Daily    ktqbcreq-airreouw-kjpegb-tenof (STRIBILD) [phone] mg  1 tablet Oral Daily    ergocalciferol  50,000 Units Oral Q7 Days    fenofibrate  160 mg Oral Daily    FLUoxetine  20 mg Oral Daily    lactulose  200 g Rectal TID    lactulose  30 g Oral TID    levETIRAcetam  500 mg Oral BID    metoprolol succinate  25 mg Oral Daily    pantoprazole  40 mg Oral Daily    pyridoxine (vitamin B6)  100 mg Oral Daily    tamsulosin  0.4 mg Oral QHS    vitamin D  1,000 Units Oral Daily     PRN Meds:  Current Facility-Administered Medications:     0.9%  NaCl infusion (for blood administration), , Intravenous, Q24H PRN    0.9%  NaCl infusion (for blood administration), , Intravenous, Q24H PRN    0.9%  NaCl infusion (for blood administration), , Intravenous, Q24H PRN    0.9%  NaCl infusion (for blood administration), , Intravenous, Q24H PRN    0.9%  NaCl infusion (for blood administration), , Intravenous, Q24H PRN    benzonatate, 100 mg, Oral, TID PRN    bisacodyL, 10 mg, Rectal, Daily PRN    dextrose 10%, 12.5 g, Intravenous, PRN    dextrose 10%, 25 g, Intravenous, PRN    glucagon (human recombinant), 1 mg, Intramuscular, PRN    glucose, 16 g, Oral, PRN    glucose, 24 g, Oral, PRN    insulin aspart U-100, 0-5 Units, Subcutaneous, QID (AC + HS) PRN    midodrine, 2.5 mg, Oral, TID PRN    naloxone, 0.02 mg, Intravenous, PRN    sodium chloride 0.9%, 10 mL, Intravenous, Q12H PRN     Objective:     Weight: 116.2 kg (256 lb 2.8 oz)  Body mass index is 33.8 kg/m².  Vital  Signs (Most Recent):  Temp: 97.9 °F (36.6 °C) (08/13/24 1231)  Pulse: (!) 58 (08/13/24 1231)  Resp: 16 (08/13/24 1231)  BP: 128/60 (08/13/24 1231)  SpO2: 95 % (08/13/24 1231) Vital Signs (24h Range):  Temp:  [97.5 °F (36.4 °C)-99.1 °F (37.3 °C)] 97.9 °F (36.6 °C)  Pulse:  [53-77] 58  Resp:  [16-18] 16  SpO2:  [91 %-98 %] 95 %  BP: (104-130)/(55-69) 128/60     Date 08/13/24 0700 - 08/14/24 0659   Shift 1913-0847 7395-0655 0975-4187 24 Hour Total   INTAKE   Blood 1855.4   1855.4   Shift Total(mL/kg) 1855.4(16)   1855.4(16)   OUTPUT   Shift Total(mL/kg)       Weight (kg) 116.2 116.2 116.2 116.2     Neurosurgery Physical Exam  General: well developed, well nourished, no distress.   Head: normocephalic, atraumatic  Mental Status: Awake, Alert, Oriented x 4.  Speech: Slow but clear with content appropriate to conversation  Cranial nerves: face symmetric, CN II-XII grossly intact.   Eyes: pupils equal, round, reactive to light, EOMI.  Sensory: intact to light touch throughout  Motor Strength: Moves all extremities spontaneously with good tone.  Full strength upper and lower extremities. Myoclonic jerks with movement of extremities antigravity.  Pronator Drift: no drift noted  Finger-to-nose: Intact bilaterally    Significant Labs:  Recent Labs   Lab 08/12/24  0305 08/13/24  0320   GLU 87 81    137   K 3.7 3.8    109   CO2 22* 20*   BUN 19 18   CREATININE 1.7* 1.6*   CALCIUM 8.9 9.3   MG  --  1.5*     Recent Labs   Lab 08/12/24  0904 08/13/24  0036 08/13/24  0930   WBC 0.84* 0.96* 1.15*   HGB 7.1* 6.9* 7.2*   HCT 21.1* 20.9* 21.3*   PLT 20* 17* 18*     Recent Labs   Lab 08/11/24  1707 08/12/24  0904 08/13/24  0036 08/13/24  0929   INR 1.7*  --  1.6* 1.5*   APTT  --  35.3* 34.3*  --      Microbiology Results (last 7 days)       ** No results found for the last 168 hours. **          All pertinent labs from the last 24 hours have been reviewed.    Significant Diagnostics:  I have reviewed and interpreted all  pertinent imaging results/findings within the past 24 hours.

## 2024-08-13 NOTE — ASSESSMENT & PLAN NOTE
The likely etiology of thrombocytopenia is liver disease and platelet consumption from splenic sequestration . The patients 3 most recent labs are listed below.  [facility] S/p 2 units of platelets and 1 unit of FFP.  Plan  - given 1:1:1 rbc/PLT/ffP+CRYO overnight. Mild improvement in labs.   - Per heme/onc recs, recommend judicious use of PLT transfusions given he is appears to be refractory likely due to splenic consumption. He is unlikely to get to >50k. Recommend FFP or cryo only if he has evidence of clinical significant bleeding. Do not transfuse for the sake of correcting his coags in the absence of bleeding as he is at high risk for TACO (he may have esophageal varices). Recommend GOC conversations as ultimately his overall clinical picture is secondary to cirrhosis.

## 2024-08-13 NOTE — TREATMENT PLAN
Hematology Plan of Care    58 y.o. male with history of HCV cirrhosis, HIV, CAD s/p CABG transferred here for NSGY eval of SDH. NSGY clinic due to imaging showing worsening SDH. Repeat CT showed subacute blood developing since prior CT on 7/19 so sent to ED. [place] hole drainage was discussed but held off due to coagulopathy. NSGY was discussing discharge with [place] [facility] but discussed with [facility] [place] and felt best to [place] directly [place] [facility] [place] and [place]GY eval. Hematology consulted to comment on coagulopathy and pancytopenia.     WBC 0.97, Hgb 7.6, plt 14 on admission. Rest of labs show PT/INR 1.6, PTT 34.3, Fibrinogen as low as 87, Factor VIII normal (so not DIC). His MELD is 23. Imaging shows cirrhotic liver with splenomegaly. He has been given at least 5+ units of PLTs and FFP without much change in his counts. CT head done 8/13 shows stable chronic SDH not enlarging per radiology read.    Per notes he follows with with outside hematologist who notes his baseline platelet count runs from 20-30k (per his labs dating back to 2/9/23). Previously had BMBx which demonstrated hypercellularity without cytogenetic abnormalities and normal MDS FISH. He did not have evidence of underlying dyspoiesis although iron stores were noted to be low.     Primary team has been transfusing PLTs to reach our conventional platelet goals without much improvement.    Recommendations:  -recommend judicious use of PLT transfusions given he is appears to be refractory likely due to splenic consumption. He is unlikely to get to >50k   -recommend FFP or cryo only if he has evidence of clinical significant bleeding. Do not transfuse for the sake of correcting his coags in the absence of bleeding as he is at high risk for TACO (he may have esophageal varices)  -recommend GOC conversations as ultimately his overall clinical picture is secondary to cirrhosis. Appreciate hepatology eval as well.    Terrence  MD Mauricio  Hematology/Oncology PGY-V

## 2024-08-13 NOTE — PROGRESS NOTES
Esequiel Lea - Neurosurgery ([facility])  [facility] Medicine  Progress Note    Patient Name: Jam Card  MRN: [id number]  Patient Class: IP- Inpatient   Admission Date: 8/10/2024  Length of Stay: 3 days  Attending Physician: Leo Peres MD  Primary Care Provider: Ulysses Almaraz MD        Subjective:     Principal Problem:Bilateral subdural hematomas        HPI:  Mr. Card is a 57 yo M with PMHx of Hep C cirrhosis, HIV, chronic thrombocytopenia, HTN, GERD, HLD, CAD s/p CABG (many years ago) who was initially admitted to [facility] under  after being referred to the ED by NSGY due to worsening SDH on imaging. Patient was previously admitted to [facility] for evaluation of HA (7/19/24) with a CT head showing b/l chronic extra-axial fluid collections which possibly could be CSF. Bur hole drainage was considered but coagulopathy and platelet issue needed to be corrected prior. Patient also did not want drainage at that time. Patient f/u with [facility] neurotrauma unit with repeat CT head (8/7/24) but no longer experienced HA, just gait disturbance. CT head showed blood developing since prior CT head which led to ED visit and evaluation. Heme/Onc was consulted and believe that thrombocytopenia could be to splenic sequestration so improvement of plt function may only be temporary at best. NSGY at [facility] requested inter facility transfer. Hepatology at [facility] also agreed. Patient transferred to [facility] for NSGY, Heme/Onc, and Hepatology.     Patient hemodynamically stable on arrival. MELD 27, Na 135, Cr 1.72 (1.8 baseline), INR 1.7, CBC 0.9 > 7.7 < 37 (s/p 2U plt and FFP, with initial INR 1.9). No focal neuro deficits at this time. GCS 15 on exam. Patient denies HA, weakness, numbness, vision disturbance, dizziness, photophobia, or any other complaints.     Overview/Hospital Course:  No notes on file    Interval History: Overnight, change in mentation according to friend; word finding difficulty, head jerk back, and  slowed cognition. Called NSGY. Rec 1g Keppra. Stable CT head.   Also given 1:1:1 rbc/PLT/ffP+CRYO overnight.   This morning pt very lethargic an unable to participate in exam with mild improvement in cognition in the afternoon.     Review of Systems  Objective:     Vital Signs (Most Recent):  Temp: 97.6 °F (36.4 °C) (08/13/24 1332)  Pulse: (!) 54 (08/13/24 1332)  Resp: 16 (08/13/24 1332)  BP: (!) 140/63 (08/13/24 1332)  SpO2: (!) 93 % (08/13/24 1332) Vital Signs (24h Range):  Temp:  [97.5 °F (36.4 °C)-99.1 °F (37.3 °C)] 97.6 °F (36.4 °C)  Pulse:  [53-77] 54  Resp:  [16-18] 16  SpO2:  [91 %-98 %] 93 %  BP: (104-140)/(55-69) 140/63     Weight: 116.2 kg (256 lb 2.8 oz)  Body mass index is 33.8 kg/m².    Intake/Output Summary (Last 24 hours) at 8/13/2024 1345  Last data filed at 8/13/2024 1231  Gross per 24 hour   Intake 2095.35 ml   Output 2000 ml   Net 95.35 ml         Physical Exam  Constitutional:       General: He is not in acute distress.     Appearance: He is ill-appearing.   HENT:      Head: Normocephalic and atraumatic.      Jaw: There is normal jaw occlusion.   Eyes:      Extraocular Movements:      Right eye: Abnormal extraocular motion present.      Left eye: Abnormal extraocular motion present.      Conjunctiva/sclera: Conjunctivae normal.      Pupils: Pupils are equal, round, and reactive to light.      Comments: Unable to follow commands to check EOM   Cardiovascular:      Rate and Rhythm: Normal rate and regular rhythm.      Pulses: Normal pulses.      Heart sounds: Normal heart sounds.   Pulmonary:      Effort: Pulmonary effort is normal.      Breath sounds: Normal breath sounds.   Abdominal:      General: Bowel sounds are normal. There is distension (mild).   Musculoskeletal:         General: Normal range of motion.   Skin:     General: Skin is warm and dry.      Capillary Refill: Capillary refill takes less than 2 seconds.   Neurological:      General: No focal deficit present.      Mental Status: He  is lethargic.      GCS: GCS eye subscore is 3. GCS verbal subscore is 4. GCS motor subscore is 5.      Cranial Nerves: Cranial nerves 2-12 are intact.      Sensory: Sensation is intact.      Motor: Abnormal muscle tone present.   Psychiatric:         Mood and Affect: Mood normal.         Behavior: Behavior is uncooperative.             Significant Labs: All pertinent labs within the past 24 hours have been reviewed.    Significant Imaging: I have reviewed all pertinent imaging results/findings within the past 24 hours.    Assessment/Plan:      * Bilateral subdural hematomas  Patient initially presented to OSH with HA and CT head showing b/l subdural hematomas. CT head at follow up visit showed increase in b/l subdural hematomas size.     - Change in mentation over night, CT head this morning (8/13) shows no changes. Most likely not due to SDH.   - NSGY consulted, appreciate recs  - Recommend MMA embolization. PLT goal >50k for MMA embo. INR goal <1.4, plt goal >100k if medically feasible.   - consulted neurology per NSGY rec, appreciate recs   - Pt refractory to plt infusion, discussed case with [facility] neuro, will do MMA embolization siva. NPO at midnight. Plt transfusion right before surgery  - SBP goal 140  - fall precautions              Depression  Continue fluoxetine 20mg      HLD (hyperlipidemia)  Continue statin      Pancytopenia  The likely etiology of thrombocytopenia is liver disease and platelet consumption from splenic sequestration . The patients 3 most recent labs are listed below.  Last platelet count at [facility] 37. S/p 2 units of platelets and 1 unit of FFP.  Plan  - given 1:1:1 rbc/PLT/ffP+CRYO overnight. Mild improvement in labs.   - Per heme/onc recs, recommend judicious use of PLT transfusions given he is appears to be refractory likely due to splenic consumption. He is unlikely to get to >50k. Recommend FFP or cryo only if he has evidence of clinical significant bleeding. Do not transfuse for the sake  of correcting his coags in the absence of bleeding as he is at high risk for TACO (he may have esophageal varices). Recommend GOC conversations as ultimately his overall clinical picture is secondary to cirrhosis.           Liver cirrhosis  Patient with known Cirrhosis   MELD-Na score calculated; MELD 3.0: 22 at 8/13/2024  9:29 AM  MELD-Na: 21 at 8/13/2024  9:29 AM  Calculated from:  Serum Creatinine: 1.6 mg/dL at 8/13/2024  3:20 AM  Serum Sodium: 137 mmol/L at 8/13/2024  3:20 AM  Total Bilirubin: 4.7 mg/dL at 8/13/2024  3:20 AM  Serum Albumin: 3.2 g/dL at 8/13/2024  3:20 AM  INR(ratio): 1.5 at 8/13/2024  9:29 AM  Age at listing (hypothetical): 58 years  Sex: Male at 8/13/2024  9:29 AM      Change in mentation overnight. Stable CT head. Most likely hepatic encephalopathy despite downtrending ammonia level and pt having 2-3 BM a day. Added lactulose enemas.   Continue chronic meds. Etiology likely Hepatitis. Will avoid any hepatotoxic meds, and monitor CBC/CMP/INR for synthetic function.   Can consider liver ultrasound, low suspicion of ascites on exam  F/u ammonia, dbili  Strict I/Os  Consulted PT/OT   Ordered low salt diet, NPO at midnight   Discontinued lasix 40mg and spironolactone 50mg in setting of JASON  Continue lactulose and rifaximin with goal 2-3 BM daily.   Ordered 50g albumin q12H for 2 days (ends 8/15)   Hepatology consulted, appreciate recs  - Will discuss at committee meeting today if eval for transplant officially deferred due to presence of SDH      Human immunodeficiency virus (HIV) disease  Previously was taking GENVOYA.     Started STRIBILD, hospital equivalent. Will confirm with pharmacy       Chronic hepatitis C  Recent Hep C RNA showed elevated viral load 7/11/24. Not currently on treatment.     Can consider repeat viral load if clinically indicated          VTE Risk Mitigation (From admission, onward)           Ordered     IP VTE HIGH RISK PATIENT  Once         08/10/24 2347     Place  sequential compression device  Until discontinued         08/10/24 2347                    Discharge Planning   CODI: 8/15/2024     Code Status: Full Code   Is the patient medically ready for discharge?:     Reason for patient still in hospital (select all that apply): Patient unstable  Discharge Plan A: Home Health                  Ryann Almazan MD  Department of Hospital Medicine   [facility] - Neurosurgery ([facility])

## 2024-08-13 NOTE — CARE UPDATE
I have reviewed the chart of Jam Card who is hospitalized for the following:    Active Hospital Problems    Diagnosis    *Bilateral subdural hematomas    Acute blood loss anemia     Bilateral subdural hematoma  Pancytopenic  Hem onc following  Pending MMA embolization  Transfusing         JASON (acute kidney injury)     JASON  - had significant elevated creatinine at outside facility on presentation  - Max creatinine ~ 3.5, now improved  - baseline appears ca. 1.2  - avoid contrast  - Albumin 50g BID, ok for ARB      Portal hypertension     Sequela of portal hypertension including splenomegaly and moderate volume ascites       Ascites     Sequela of portal hypertension including splenomegaly and moderate volume ascites       HLD (hyperlipidemia)    Depression    CKD (chronic kidney disease)    Hyperammonemia    Brain compression     monitor with q4h neuro checks while on floor or more frequently while in neuro critical care, as any change in the patients clinical exam may signify expansion of the insult and/or the area of the edema. Such changes may require acute interventions to prevent loss of function and/or death.  Pending nsgy evaluation for mass effect on imaging               Liver cirrhosis    Pancytopenia    Chronic hepatitis C    Human immunodeficiency virus (HIV) disease        Heidi Fitzpatrick NP  Unit Based [facility]

## 2024-08-13 NOTE — CARE UPDATE
"RAPID RESPONSE NURSE CHART REVIEW        Chart Reviewed: 08/13/2024, 8:25 AM    MRN: [id number]  Bed: 904/904 A    Dx: Bilateral subdural hematomas    Jam Card has a past medical history of CAD (coronary artery disease), Chronic hepatitis C, Chronic hepatitis C with cirrhosis, Cirrhosis, GERD (gastroesophageal reflux disease), HTN (hypertension), Human immunodeficiency virus (HIV) disease, and Hyperlipidemia.    Last VS: BP (!) 126/59 (BP Location: Left arm, Patient Position: Lying)   Pulse 68   Temp 98.5 °F (36.9 °C) (Axillary)   Resp 16   Ht 6' 1" (1.854 m)   Wt 116.2 kg (256 lb 2.8 oz)   SpO2 (!) 92%   BMI 33.80 kg/m²     24H Vital Sign Range:  Temp:  [97.5 °F (36.4 °C)-99.1 °F (37.3 °C)]   Pulse:  [53-77]   Resp:  [16-18]   BP: (104-130)/(53-69)   SpO2:  [91 %-98 %]     Level of Consciousness (AVPU): alert    Recent Labs     08/12/24  0305 08/12/24  0904 08/13/24  0036   WBC 0.73* 0.84* 0.96*   HGB 7.1* 7.1* 6.9*   HCT 20.4* 21.1* 20.9*   PLT 22* 20* 17*       Recent Labs     08/11/24  0526 08/12/24  0305 08/13/24  0320    138 137   K 3.9 3.7 3.8    107 109   CO2 22* 22* 20*   BUN 19 19 18   CREATININE 1.7* 1.7* 1.6*   GLU 92 87 81   PHOS  --   --  2.6*   MG  --   --  1.5*        No results for input(s): "PH", "PCO2", "PO2", "HCO3", "POCSATURATED", "BE" in the last 72 hours.     OXYGEN:             MEWS score: 1    Charge RNZora contacted for abnormal labs requiring multiple blood products reports VSS, assignment adjusted to accommodate. Will continue to follow. No additional concerns verbalized at this time. Instructed to call [phone] for further concerns or assistance.    Robb Stone RN        "

## 2024-08-13 NOTE — PROGRESS NOTES
Esequiel Lea - Neurosurgery ([facility])  Neurosurgery  Progress Note    Subjective:     History of Present Illness: Mr. Card is a 57 yo M with PMHx of Hep C cirrhosis, HIV, chronic thrombocytopenia, HTN, GERD, HLD, CAD s/p CABG (many years ago) who was initially admitted to [facility] under  after being referred to the ED by NSGY due to worsening SDH on imaging. Patient was previously admitted to [facility] for evaluation of HA (7/19/24) with a CT head showing b/l chronic extra-axial fluid collections which possibly could be CSF. Bur hole drainage was considered but coagulopathy and platelet issue needed to be corrected prior. Patient also did not want drainage at that time. Patient f/u with [facility] neurotrauma unit with repeat CT head (8/7/24) but no longer experienced HA, just gait disturbance. CT head showed blood developing since prior CT head which led to ED visit and evaluation. Heme/Onc was consulted and believe that thrombocytopenia could be to splenic sequestration so improvement of plt function may only be temporary at best. NSGY at [facility] requested inter facility transfer. Hepatology at [facility] also agreed. Patient transferred to [facility] for NSGY, Heme/Onc, and Hepatology.      Patient hemodynamically stable on arrival. MELD 27, Na 135, Cr 1.72 (1.8 baseline), INR 1.7, CBC 0.9 > 7.7 < 37 (s/p 2U plt and FFP, with initial INR 1.9). No focal neuro deficits at this time. GCS 15 on exam. Patient denies HA, weakness, numbness, vision disturbance, dizziness, photophobia, or any other complaints    CTH reordered to assess stability    Post-Op Info:  * No surgery found *       Interval History: Change in mental status reported by caregiver overnight. Reported slurred speech, jerking movements, confusion. CTH obtained and stable. Patient given 1g Keppra. Patient AAOx4 today, expresses concern about new jerking movements and inability to feed self.     Medications:  Continuous Infusions:  Scheduled Meds:   atorvastatin   40 mg Oral Daily    atovaquone  1,500 mg Oral Daily    cetirizine  10 mg Oral Daily    cyanocobalamin  1,000 mcg Oral Daily    fmbelvil-ftuudfum-fokwus-tenof (STRIBILD) [phone] mg  1 tablet Oral Daily    ergocalciferol  50,000 Units Oral Q7 Days    fenofibrate  160 mg Oral Daily    FLUoxetine  20 mg Oral Daily    lactulose  200 g Rectal TID    lactulose  30 g Oral TID    levETIRAcetam  500 mg Oral BID    metoprolol succinate  25 mg Oral Daily    pantoprazole  40 mg Oral Daily    pyridoxine (vitamin B6)  100 mg Oral Daily    tamsulosin  0.4 mg Oral QHS    vitamin D  1,000 Units Oral Daily     PRN Meds:  Current Facility-Administered Medications:     0.9%  NaCl infusion (for blood administration), , Intravenous, Q24H PRN    0.9%  NaCl infusion (for blood administration), , Intravenous, Q24H PRN    0.9%  NaCl infusion (for blood administration), , Intravenous, Q24H PRN    0.9%  NaCl infusion (for blood administration), , Intravenous, Q24H PRN    0.9%  NaCl infusion (for blood administration), , Intravenous, Q24H PRN    benzonatate, 100 mg, Oral, TID PRN    bisacodyL, 10 mg, Rectal, Daily PRN    dextrose 10%, 12.5 g, Intravenous, PRN    dextrose 10%, 25 g, Intravenous, PRN    glucagon (human recombinant), 1 mg, Intramuscular, PRN    glucose, 16 g, Oral, PRN    glucose, 24 g, Oral, PRN    insulin aspart U-100, 0-5 Units, Subcutaneous, QID (AC + HS) PRN    midodrine, 2.5 mg, Oral, TID PRN    naloxone, 0.02 mg, Intravenous, PRN    sodium chloride 0.9%, 10 mL, Intravenous, Q12H PRN     Objective:     Weight: 116.2 kg (256 lb 2.8 oz)  Body mass index is 33.8 kg/m².  Vital Signs (Most Recent):  Temp: 97.9 °F (36.6 °C) (08/13/24 1231)  Pulse: (!) 58 (08/13/24 1231)  Resp: 16 (08/13/24 1231)  BP: 128/60 (08/13/24 1231)  SpO2: 95 % (08/13/24 1231) Vital Signs (24h Range):  Temp:  [97.5 °F (36.4 °C)-99.1 °F (37.3 °C)] 97.9 °F (36.6 °C)  Pulse:  [53-77] 58  Resp:  [16-18] 16  SpO2:  [91 %-98 %] 95 %  BP: (104-130)/(55-69)  128/60     Date 08/13/24 0700 - 08/14/24 0659   Shift 6708-2743 6654-1223 0446-1086 24 Hour Total   INTAKE   Blood 1855.4   1855.4   Shift Total(mL/kg) 1855.4(16)   1855.4(16)   OUTPUT   Shift Total(mL/kg)       Weight (kg) 116.2 116.2 116.2 116.2     Neurosurgery Physical Exam  General: well developed, well nourished, no distress.   Head: normocephalic, atraumatic  Mental Status: Awake, Alert, Oriented x 4.  Speech: Slow but clear with content appropriate to conversation  Cranial nerves: face symmetric, CN II-XII grossly intact.   Eyes: pupils equal, round, reactive to light, EOMI.  Sensory: intact to light touch throughout  Motor Strength: Moves all extremities spontaneously with good tone.  Full strength upper and lower extremities. Myoclonic jerks with movement of extremities antigravity.  Pronator Drift: no drift noted  Finger-to-nose: Intact bilaterally    Significant Labs:  Recent Labs   Lab 08/12/24  0305 08/13/24  0320   GLU 87 81    137   K 3.7 3.8    109   CO2 22* 20*   BUN 19 18   CREATININE 1.7* 1.6*   CALCIUM 8.9 9.3   MG  --  1.5*     Recent Labs   Lab 08/12/24  0904 08/13/24  0036 08/13/24  0930   WBC 0.84* 0.96* 1.15*   HGB 7.1* 6.9* 7.2*   HCT 21.1* 20.9* 21.3*   PLT 20* 17* 18*     Recent Labs   Lab 08/11/24  1707 08/12/24  0904 08/13/24  0036 08/13/24  0929   INR 1.7*  --  1.6* 1.5*   APTT  --  35.3* 34.3*  --      Microbiology Results (last 7 days)       ** No results found for the last 168 hours. **          All pertinent labs from the last 24 hours have been reviewed.    Significant Diagnostics:  I have reviewed and interpreted all pertinent imaging results/findings within the past 24 hours.    Assessment/Plan:     * Bilateral subdural hematomas  58M PMH HIV, thrombocytopenia, liver cirrhosis, CAD s/p CABG (many years ago) with enlarging bilateral chronic SDH transferred from [facility] after CTH 8/7 without prior intervention    Plan:  Admitted medicine; q4h nc/vs  Repeat CTH 8/13  stable  - Recommend MMA embolization   - PLT goal >50k for MMA embo  INR goal <1.4, plt goal >100k if medically feasible  On Keppra 500 BID, received additional 1g overnight for neuro change. Recommend consult to neurology +/- EEG and AED management.  Hold DVT ppx until repeat scan  Please notify nsgy of any acute neurological decline or of any further questions/concerns  Preponderance of medical care per primary team    Dispo: ongoing        [facility] GERMÁN Reyes  Neurosurgery  Esequiel Lea - Neurosurgery ([facility])

## 2024-08-13 NOTE — CARE UPDATE
"F/u midnight CBC: WBC 0.96; Hb 6.9; Plt 17. Secondarily given 1:1:1 PRBC/Plt/FFP+Cryo.       -Fibrinogen 109 (up from 87) -> given cryo.   -PT-INR 16.8/1.6 (down from 17.9/1.7) -> given FFP.     Also "acute neuro changes" per friend in the room w/ pt. Change in mentation in setting of existing SDH. On exam, AOx3, no focal NDs. However, sluggish EOMs. Friend in room notes clear change from baseline d/t word finding difficulties, intermittent "jerkiness" (head jerks back), and general slowed cognition. Was at baseline yesterday per friend, progression of symptoms over the past 7 hours, though jerkiness pre-existing and has lasted a few days now.     Patient initially presented to OSH with HA and CT head showing b/l subdural hematomas. Most recent CT head at follow up visit showed increase in b/l subdural hematomas size.    Called NSGY, ordered stat CT head. NSGY recs 1g keppra; ordered IV 1g keppra STAT. Also ordered repeat ammonia.   "

## 2024-08-13 NOTE — ASSESSMENT & PLAN NOTE
58M PMH HIV, thrombocytopenia, liver cirrhosis, CAD s/p CABG (many years ago) with enlarging bilateral chronic SDH transferred from [facility] after CTH 8/7 without prior intervention    Plan:  Admitted medicine; q4h nc/vs  Repeat CTH 8/13 stable  - Recommend MMA embolization   - PLT goal >50k for MMA embo  INR goal <1.4, plt goal >100k if medically feasible  On Keppra 500 BID, received additional 1g overnight for neuro change. Recommend consult to neurology +/- EEG and AED management.  Hold DVT ppx until repeat scan  Please notify nsgy of any acute neurological decline or of any further questions/concerns  Preponderance of medical care per primary team    Dispo: ongoing

## 2024-08-13 NOTE — TREATMENT PLAN
Hepatology Treatment Plan    Jam Card is a 58 y.o. male admitted to hospital 8/10/2024 (Hospital Day: 4) due to Bilateral subdural hematomas.     Interval History  Change in mentation overnight, stat CTH showed stable b/l SDH. Suspect component of HE as well, getting syed lactulose now.     Objective  Temp:  [97.5 °F (36.4 °C)-99.1 °F (37.3 °C)] 98 °F (36.7 °C) (08/13 1127)  Pulse:  [53-77] 57 (08/13 1127)  BP: (104-130)/(55-69) 130/61 (08/13 1127)  Resp:  [16-18] 16 (08/13 1127)  SpO2:  [91 %-98 %] 98 % (08/13 1127)    Laboratory    Lab Results   Component Value Date    WBC 1.15 (LL) 08/13/2024    HGB 7.2 (L) 08/13/2024    HCT 21.3 (L) 08/13/2024     (H) 08/13/2024    PLT 18 (LL) 08/13/2024       Lab Results   Component Value Date     08/13/2024    K 3.8 08/13/2024     08/13/2024    CO2 20 (L) 08/13/2024    BUN 18 08/13/2024    CREATININE 1.6 (H) 08/13/2024    CALCIUM 9.3 08/13/2024       Lab Results   Component Value Date    ALBUMIN 3.2 (L) 08/13/2024    ALT 16 08/13/2024    AST 58 (H) 08/13/2024    GGT 74 (H) 07/11/2024    ALKPHOS 57 08/13/2024    BILITOT 4.7 (H) 08/13/2024       Lab Results   Component Value Date    INR 1.5 (H) 08/13/2024    INR 1.6 (H) 08/13/2024    INR 1.7 (H) 08/11/2024       MELD 3.0: 22 at 8/13/2024  9:29 AM  MELD-Na: 21 at 8/13/2024  9:29 AM  Calculated from:  Serum Creatinine: 1.6 mg/dL at 8/13/2024  3:20 AM  Serum Sodium: 137 mmol/L at 8/13/2024  3:20 AM  Total Bilirubin: 4.7 mg/dL at 8/13/2024  3:20 AM  Serum Albumin: 3.2 g/dL at 8/13/2024  3:20 AM  INR(ratio): 1.5 at 8/13/2024  9:29 AM  Age at listing (hypothetical): 58 years  Sex: Male at 8/13/2024  9:29 AM      Assessment  Jam Card is a 58 y.o. 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 Transferred from [facility] where NSGY was consulted and recommended  against intervention with significant coagulopathy. NSGY and hematology consulted here. Awaiting repeat CT.     Recently started liver transplant evaluation as outpatient but will put any further evaluation on hold with current SDH.    Bilateral chronic subdural hematomas  Pancytopenia, neutropenia    Plan  - NSGY and Hematology following   - Will discuss at committee meeting today if eval officially deferred due to presence of SDH  - Would hold diuretics in setting of JASON  - IV albumin 50g BID  - Continue lactulose titrated to 2-3 BMs per day  - Low salt, high protein diet    Thank you for involving us in the care of Jamjenelle Frazierck. Please call with any additional concerns or questions.    Mera Neal MD  Gastroenterology & Hepatology Fellow PGY-IV  [facility]

## 2024-08-13 NOTE — PLAN OF CARE
Problem: Adult Inpatient Plan of Care  Goal: Plan of Care Review  Outcome: Progressing     Problem: Adult Inpatient Plan of Care  Goal: Absence of Hospital-Acquired Illness or Injury  Outcome: Progressing     Problem: Adult Inpatient Plan of Care  Goal: Optimal Comfort and Wellbeing  Outcome: Progressing     Problem: Fall Injury Risk  Goal: Absence of Fall and Fall-Related Injury  Outcome: Progressing     Problem: Comorbidity Management  Goal: Blood Pressure in Desired Range  Outcome: Progressing     Problem: Skin Injury Risk Increased  Goal: Skin Health and Integrity  Outcome: Progressing

## 2024-08-13 NOTE — SUBJECTIVE & OBJECTIVE
Interval History: Overnight, change in mentation according to friend; word finding difficulty, head jerk back, and slowed cognition. Called NSGY. Rec 1g Keppra. Stable CT head.   Also given 1:1:1 rbc/PLT/ffP+CRYO overnight.   This morning pt very lethargic an unable to participate in exam with mild improvement in cognition in the afternoon.     Review of Systems  Objective:     Vital Signs (Most Recent):  Temp: 97.6 °F (36.4 °C) (08/13/24 1332)  Pulse: (!) 54 (08/13/24 1332)  Resp: 16 (08/13/24 1332)  BP: (!) 140/63 (08/13/24 1332)  SpO2: (!) 93 % (08/13/24 1332) Vital Signs (24h Range):  Temp:  [97.5 °F (36.4 °C)-99.1 °F (37.3 °C)] 97.6 °F (36.4 °C)  Pulse:  [53-77] 54  Resp:  [16-18] 16  SpO2:  [91 %-98 %] 93 %  BP: (104-140)/(55-69) 140/63     Weight: 116.2 kg (256 lb 2.8 oz)  Body mass index is 33.8 kg/m².    Intake/Output Summary (Last 24 hours) at 8/13/2024 1345  Last data filed at 8/13/2024 1231  Gross per 24 hour   Intake 2095.35 ml   Output 2000 ml   Net 95.35 ml         Physical Exam  Constitutional:       General: He is not in acute distress.     Appearance: He is ill-appearing.   HENT:      Head: Normocephalic and atraumatic.      Jaw: There is normal jaw occlusion.   Eyes:      Extraocular Movements:      Right eye: Abnormal extraocular motion present.      Left eye: Abnormal extraocular motion present.      Conjunctiva/sclera: Conjunctivae normal.      Pupils: Pupils are equal, round, and reactive to light.      Comments: Unable to follow commands to check EOM   Cardiovascular:      Rate and Rhythm: Normal rate and regular rhythm.      Pulses: Normal pulses.      Heart sounds: Normal heart sounds.   Pulmonary:      Effort: Pulmonary effort is normal.      Breath sounds: Normal breath sounds.   Abdominal:      General: Bowel sounds are normal. There is distension (mild).   Musculoskeletal:         General: Normal range of motion.   Skin:     General: Skin is warm and dry.      Capillary Refill:  Capillary refill takes less than 2 seconds.   Neurological:      General: No focal deficit present.      Mental Status: He is lethargic.      GCS: GCS eye subscore is 3. GCS verbal subscore is 4. GCS motor subscore is 5.      Cranial Nerves: Cranial nerves 2-12 are intact.      Sensory: Sensation is intact.      Motor: Abnormal muscle tone present.   Psychiatric:         Mood and Affect: Mood normal.         Behavior: Behavior is uncooperative.             Significant Labs: All pertinent labs within the past 24 hours have been reviewed.    Significant Imaging: I have reviewed all pertinent imaging results/findings within the past 24 hours.

## 2024-08-13 NOTE — CONSULTS
Consult acknowledged. Pt is scheduled for MMA embolization under general anesthesia for tomorrow. Please keep NPO after midnight and transfuse platelets shortly before the procedure. Will consent tomorrow in AM.             Fredy Cobb MD, MHA  Fellow, NeuroEndovascular Surgery, [facility]  Neurologist, [facility] [place], [place]

## 2024-08-13 NOTE — NURSING
Nurses Note -- 4 Eyes      8/13/2024   4:53 PM      Skin assessed during: Q Shift Change      [x] No Altered Skin Integrity Present    [x]Prevention Measures Documented      [] Yes- Altered Skin Integrity Present or Discovered   [] LDA Added if Not in Epic (Describe Wound)   [] New Altered Skin Integrity was Present on Admit and Documented in LDA   [] Wound Image Taken    Wound Care Consulted? No    Attending Nurse:  Nisreen Webber RN/Staff Member:  Maryana

## 2024-08-14 PROBLEM — R18.8 CIRRHOSIS OF LIVER WITH ASCITES: Status: ACTIVE | Noted: 2024-08-10

## 2024-08-14 PROBLEM — K76.82 HEPATIC ENCEPHALOPATHY: Status: ACTIVE | Noted: 2024-08-11

## 2024-08-14 LAB
ALBUMIN SERPL BCP-MCNC: 3.3 G/DL (ref 3.5–5.2)
ALP SERPL-CCNC: 62 U/L (ref 55–135)
ALT SERPL W/O P-5'-P-CCNC: 15 U/L (ref 10–44)
ANION GAP SERPL CALC-SCNC: 7 MMOL/L (ref 8–16)
ANISOCYTOSIS BLD QL SMEAR: SLIGHT
AST SERPL-CCNC: 58 U/L (ref 10–40)
BASOPHILS # BLD AUTO: 0.01 K/UL (ref 0–0.2)
BASOPHILS # BLD AUTO: 0.01 K/UL (ref 0–0.2)
BASOPHILS NFR BLD: 0.7 % (ref 0–1.9)
BASOPHILS NFR BLD: 0.8 % (ref 0–1.9)
BILIRUB SERPL-MCNC: 5 MG/DL (ref 0.1–1)
BUN SERPL-MCNC: 15 MG/DL (ref 6–20)
CALCIUM SERPL-MCNC: 9.4 MG/DL (ref 8.7–10.5)
CHLORIDE SERPL-SCNC: 110 MMOL/L (ref 95–110)
CO2 SERPL-SCNC: 20 MMOL/L (ref 23–29)
CREAT SERPL-MCNC: 1.5 MG/DL (ref 0.5–1.4)
DIFFERENTIAL METHOD BLD: ABNORMAL
DIFFERENTIAL METHOD BLD: ABNORMAL
EOSINOPHIL # BLD AUTO: 0 K/UL (ref 0–0.5)
EOSINOPHIL # BLD AUTO: 0.1 K/UL (ref 0–0.5)
EOSINOPHIL NFR BLD: 3 % (ref 0–8)
EOSINOPHIL NFR BLD: 3.6 % (ref 0–8)
ERYTHROCYTE [DISTWIDTH] IN BLOOD BY AUTOMATED COUNT: 17.2 % (ref 11.5–14.5)
ERYTHROCYTE [DISTWIDTH] IN BLOOD BY AUTOMATED COUNT: 17.3 % (ref 11.5–14.5)
EST. GFR  (NO RACE VARIABLE): 54.3 ML/MIN/1.73 M^2
FIBRINOGEN PPP-MCNC: 127 MG/DL (ref 182–400)
FIBRINOGEN PPP-MCNC: 129 MG/DL (ref 182–400)
FOLATE SERPL-MCNC: 6.2 NG/ML (ref 4–24)
GLUCOSE SERPL-MCNC: 88 MG/DL (ref 70–110)
HCT VFR BLD AUTO: 23.4 % (ref 40–54)
HCT VFR BLD AUTO: 24.4 % (ref 40–54)
HGB BLD-MCNC: 7.7 G/DL (ref 14–18)
HGB BLD-MCNC: 8.1 G/DL (ref 14–18)
HYPOCHROMIA BLD QL SMEAR: ABNORMAL
IMM GRANULOCYTES # BLD AUTO: 0.01 K/UL (ref 0–0.04)
IMM GRANULOCYTES # BLD AUTO: 0.01 K/UL (ref 0–0.04)
IMM GRANULOCYTES NFR BLD AUTO: 0.7 % (ref 0–0.5)
IMM GRANULOCYTES NFR BLD AUTO: 0.8 % (ref 0–0.5)
INR PPP: 1.5 (ref 0.8–1.2)
INR PPP: 1.6 (ref 0.8–1.2)
LYMPHOCYTES # BLD AUTO: 0.6 K/UL (ref 1–4.8)
LYMPHOCYTES # BLD AUTO: 0.6 K/UL (ref 1–4.8)
LYMPHOCYTES NFR BLD: 42.1 % (ref 18–48)
LYMPHOCYTES NFR BLD: 43.9 % (ref 18–48)
MAGNESIUM SERPL-MCNC: 1.7 MG/DL (ref 1.6–2.6)
MCH RBC QN AUTO: 33.3 PG (ref 27–31)
MCH RBC QN AUTO: 33.8 PG (ref 27–31)
MCHC RBC AUTO-ENTMCNC: 32.9 G/DL (ref 32–36)
MCHC RBC AUTO-ENTMCNC: 33.2 G/DL (ref 32–36)
MCV RBC AUTO: 101 FL (ref 82–98)
MCV RBC AUTO: 102 FL (ref 82–98)
MONOCYTES # BLD AUTO: 0.1 K/UL (ref 0.3–1)
MONOCYTES # BLD AUTO: 0.1 K/UL (ref 0.3–1)
MONOCYTES NFR BLD: 5 % (ref 4–15)
MONOCYTES NFR BLD: 6.8 % (ref 4–15)
NEUTROPHILS # BLD AUTO: 0.6 K/UL (ref 1.8–7.7)
NEUTROPHILS # BLD AUTO: 0.6 K/UL (ref 1.8–7.7)
NEUTROPHILS NFR BLD: 46.1 % (ref 38–73)
NEUTROPHILS NFR BLD: 46.5 % (ref 38–73)
NRBC BLD-RTO: 0 /100 WBC
NRBC BLD-RTO: 0 /100 WBC
OVALOCYTES BLD QL SMEAR: ABNORMAL
PHOSPHATE SERPL-MCNC: 2.7 MG/DL (ref 2.7–4.5)
PLATELET # BLD AUTO: 20 K/UL (ref 150–450)
PLATELET # BLD AUTO: 21 K/UL (ref 150–450)
PLATELET BLD QL SMEAR: ABNORMAL
PMV BLD AUTO: 11.1 FL (ref 9.2–12.9)
PMV BLD AUTO: 11.9 FL (ref 9.2–12.9)
POIKILOCYTOSIS BLD QL SMEAR: SLIGHT
POLYCHROMASIA BLD QL SMEAR: ABNORMAL
POTASSIUM SERPL-SCNC: 3.8 MMOL/L (ref 3.5–5.1)
PROT SERPL-MCNC: 6.6 G/DL (ref 6–8.4)
PROTHROMBIN TIME: 16.1 SEC (ref 9–12.5)
PROTHROMBIN TIME: 16.7 SEC (ref 9–12.5)
RBC # BLD AUTO: 2.31 M/UL (ref 4.6–6.2)
RBC # BLD AUTO: 2.4 M/UL (ref 4.6–6.2)
SODIUM SERPL-SCNC: 137 MMOL/L (ref 136–145)
SPHEROCYTES BLD QL SMEAR: ABNORMAL
VIT B12 SERPL-MCNC: >2000 PG/ML (ref 210–950)
WBC # BLD AUTO: 1.33 K/UL (ref 3.9–12.7)
WBC # BLD AUTO: 1.39 K/UL (ref 3.9–12.7)

## 2024-08-14 PROCEDURE — 25000003 PHARM REV CODE 250: Mod: NTX

## 2024-08-14 PROCEDURE — 82525 ASSAY OF COPPER: CPT | Mod: NTX | Performed by: STUDENT IN AN ORGANIZED HEALTH CARE EDUCATION/TRAINING PROGRAM

## 2024-08-14 PROCEDURE — 80053 COMPREHEN METABOLIC PANEL: CPT | Mod: NTX

## 2024-08-14 PROCEDURE — 83735 ASSAY OF MAGNESIUM: CPT | Mod: NTX

## 2024-08-14 PROCEDURE — 99233 SBSQ HOSP IP/OBS HIGH 50: CPT | Mod: NTX,,, | Performed by: NEUROLOGICAL SURGERY

## 2024-08-14 PROCEDURE — 11000001 HC ACUTE MED/SURG PRIVATE ROOM: Mod: NTX

## 2024-08-14 PROCEDURE — 84100 ASSAY OF PHOSPHORUS: CPT | Mod: NTX

## 2024-08-14 PROCEDURE — 36415 COLL VENOUS BLD VENIPUNCTURE: CPT | Mod: NTX

## 2024-08-14 PROCEDURE — 36415 COLL VENOUS BLD VENIPUNCTURE: CPT | Mod: NTX | Performed by: INTERNAL MEDICINE

## 2024-08-14 PROCEDURE — 85384 FIBRINOGEN ACTIVITY: CPT | Mod: NTX | Performed by: INTERNAL MEDICINE

## 2024-08-14 PROCEDURE — 85610 PROTHROMBIN TIME: CPT | Mod: 91,NTX | Performed by: INTERNAL MEDICINE

## 2024-08-14 PROCEDURE — 82746 ASSAY OF FOLIC ACID SERUM: CPT | Mod: NTX | Performed by: STUDENT IN AN ORGANIZED HEALTH CARE EDUCATION/TRAINING PROGRAM

## 2024-08-14 PROCEDURE — 25000242 PHARM REV CODE 250 ALT 637 W/ HCPCS: Mod: NTX

## 2024-08-14 PROCEDURE — P9047 ALBUMIN (HUMAN), 25%, 50ML: HCPCS | Mod: JZ,JG,NTX

## 2024-08-14 PROCEDURE — 63600175 PHARM REV CODE 636 W HCPCS: Mod: JZ,JG,NTX

## 2024-08-14 PROCEDURE — 82607 VITAMIN B-12: CPT | Mod: NTX | Performed by: STUDENT IN AN ORGANIZED HEALTH CARE EDUCATION/TRAINING PROGRAM

## 2024-08-14 PROCEDURE — 25000003 PHARM REV CODE 250: Mod: NTX | Performed by: INTERNAL MEDICINE

## 2024-08-14 PROCEDURE — 85025 COMPLETE CBC W/AUTO DIFF WBC: CPT | Mod: 91,NTX | Performed by: INTERNAL MEDICINE

## 2024-08-14 RX ORDER — FENOFIBRATE 48 MG/1
48 TABLET, FILM COATED ORAL DAILY
Status: CANCELLED | OUTPATIENT
Start: 2024-08-15

## 2024-08-14 RX ADMIN — LACTULOSE 30 G: 20 SOLUTION ORAL at 02:08

## 2024-08-14 RX ADMIN — ELVITEGRAVIR, COBICISTAT, EMTRICITABINE, AND TENOFOVIR DISOPROXIL FUMARATE 1 TABLET: 150; 150; 200; 300 TABLET, FILM COATED ORAL at 08:08

## 2024-08-14 RX ADMIN — ALBUMIN (HUMAN) 50 G: 12.5 SOLUTION INTRAVENOUS at 10:08

## 2024-08-14 RX ADMIN — LEVETIRACETAM 500 MG: 500 TABLET, FILM COATED ORAL at 08:08

## 2024-08-14 RX ADMIN — FLUOXETINE HYDROCHLORIDE 20 MG: 20 CAPSULE ORAL at 08:08

## 2024-08-14 RX ADMIN — CETIRIZINE HYDROCHLORIDE 10 MG: 5 TABLET, FILM COATED ORAL at 08:08

## 2024-08-14 RX ADMIN — CHOLECALCIFEROL TAB 25 MCG (1000 UNIT) 1000 UNITS: 25 TAB at 08:08

## 2024-08-14 RX ADMIN — TAMSULOSIN HYDROCHLORIDE 0.4 MG: 0.4 CAPSULE ORAL at 08:08

## 2024-08-14 RX ADMIN — ATORVASTATIN CALCIUM 40 MG: 40 TABLET, FILM COATED ORAL at 08:08

## 2024-08-14 RX ADMIN — ATOVAQUONE 1500 MG: 750 SUSPENSION ORAL at 08:08

## 2024-08-14 RX ADMIN — RIFAXIMIN 550 MG: 550 TABLET ORAL at 08:08

## 2024-08-14 RX ADMIN — ALBUMIN (HUMAN) 50 G: 12.5 SOLUTION INTRAVENOUS at 09:08

## 2024-08-14 RX ADMIN — CYANOCOBALAMIN TAB 1000 MCG 1000 MCG: 1000 TAB at 08:08

## 2024-08-14 RX ADMIN — PYRIDOXINE HCL TAB 50 MG 100 MG: 50 TAB at 08:08

## 2024-08-14 RX ADMIN — FENOFIBRATE 160 MG: 160 TABLET ORAL at 08:08

## 2024-08-14 RX ADMIN — LACTULOSE 30 G: 20 SOLUTION ORAL at 06:08

## 2024-08-14 RX ADMIN — METOPROLOL SUCCINATE 25 MG: 25 TABLET, EXTENDED RELEASE ORAL at 08:08

## 2024-08-14 RX ADMIN — LACTULOSE 30 G: 20 SOLUTION ORAL at 11:08

## 2024-08-14 RX ADMIN — PANTOPRAZOLE SODIUM 40 MG: 40 TABLET, DELAYED RELEASE ORAL at 08:08

## 2024-08-14 NOTE — ASSESSMENT & PLAN NOTE
Patient with known Cirrhosis   MELD-Na score calculated; MELD 3.0: 22 at 8/13/2024  6:07 PM  MELD-Na: 21 at 8/13/2024  6:07 PM  Calculated from:  Serum Creatinine: 1.6 mg/dL at 8/13/2024  3:20 AM  Serum Sodium: 137 mmol/L at 8/13/2024  3:20 AM  Total Bilirubin: 4.7 mg/dL at 8/13/2024  3:20 AM  Serum Albumin: 3.2 g/dL at 8/13/2024  3:20 AM  INR(ratio): 1.5 at 8/13/2024  6:07 PM  Age at listing (hypothetical): 58 years  Sex: Male at 8/13/2024  6:07 PM      Improvement in mentation today.   Continue chronic meds. Etiology likely Hepatitis. Will avoid any hepatotoxic meds, and monitor CBC/CMP/INR for synthetic function.   Can consider liver ultrasound, low suspicion of ascites on exam  F/u ammonia, dbili  Strict I/Os  Consulted PT/OT   Ordered low salt diet  Discontinued lasix 40mg and spironolactone 50mg in setting of JASON  Continue lactulose and rifaximin with goal 2-3 BM daily.   Ordered 50g albumin q12H for 2 days (ends 8/15)   Hepatology consulted, appreciate recs  - Will discuss at committee meeting today if eval for transplant officially deferred due to presence of SDH  ID spoke with hepatology. CD4 128, Cd4 <200 is relative contraindication ot transplant. Formal consult if pre-transplant eval is needed.

## 2024-08-14 NOTE — SUBJECTIVE & OBJECTIVE
Interval History: NAEON. Pt mentation slowly improving. Able to participate in neuro exam. MMA embolization scheduled for today. Will transfuse plt apheresis prior to procedure. Continuing to have 2-3 BM a day. No complaints today.    Review of Systems  Objective:     Vital Signs (Most Recent):  Temp: 99 °F (37.2 °C) (08/14/24 0102)  Pulse: 62 (08/14/24 0315)  Resp: 16 (08/14/24 0102)  BP: 134/68 (08/14/24 0102)  SpO2: (!) 94 % (08/14/24 0102) Vital Signs (24h Range):  Temp:  [96.9 °F (36.1 °C)-99 °F (37.2 °C)] 99 °F (37.2 °C)  Pulse:  [53-71] 62  Resp:  [14-18] 16  SpO2:  [93 %-98 %] 94 %  BP: (114-140)/(55-68) 134/68     Weight: 116.2 kg (256 lb 2.8 oz)  Body mass index is 33.8 kg/m².    Intake/Output Summary (Last 24 hours) at 8/14/2024 0728  Last data filed at 8/13/2024 1811  Gross per 24 hour   Intake 861.7 ml   Output 1250 ml   Net -388.3 ml         Physical Exam  Constitutional:       Appearance: He is ill-appearing.   HENT:      Head: Normocephalic and atraumatic.      Mouth/Throat:      Mouth: Mucous membranes are dry.   Eyes:      Extraocular Movements: Extraocular movements intact.      Conjunctiva/sclera: Conjunctivae normal.      Pupils: Pupils are equal, round, and reactive to light.   Cardiovascular:      Rate and Rhythm: Normal rate and regular rhythm.      Pulses: Normal pulses.      Heart sounds: Normal heart sounds.   Pulmonary:      Effort: Pulmonary effort is normal.      Breath sounds: Normal breath sounds.   Abdominal:      General: Abdomen is flat. Bowel sounds are normal.      Palpations: Abdomen is soft.   Musculoskeletal:         General: Normal range of motion.   Skin:     General: Skin is warm and dry.      Capillary Refill: Capillary refill takes less than 2 seconds.      Coloration: Skin is not jaundiced.   Neurological:      Mental Status: He is alert.      Motor: Abnormal muscle tone (atony present in BL hands) present.   Psychiatric:         Mood and Affect: Mood normal.              Significant Labs: All pertinent labs within the past 24 hours have been reviewed.    Significant Imaging: I have reviewed all pertinent imaging results/findings within the past 24 hours.

## 2024-08-14 NOTE — PROGRESS NOTES
[facility] - Neurosurgery ([facility])  Neurosurgery  Progress Note    Subjective:     History of Present Illness: Mr. Card is a 57 yo M with PMHx of Hep C cirrhosis, HIV, chronic thrombocytopenia, HTN, GERD, HLD, CAD s/p CABG (many years ago) who was initially admitted to [facility] under  after being referred to the ED by NSGY due to worsening SDH on imaging. Patient was previously admitted to [facility] for evaluation of HA (7/19/24) with a CT head showing b/l chronic extra-axial fluid collections which possibly could be CSF. Bur hole drainage was considered but coagulopathy and platelet issue needed to be corrected prior. Patient also did not want drainage at that time. Patient f/u with [facility] neurotrauma unit with repeat CT head (8/7/24) but no longer experienced HA, just gait disturbance. CT head showed blood developing since prior CT head which led to ED visit and evaluation. Heme/Onc was consulted and believe that thrombocytopenia could be to splenic sequestration so improvement of plt function may only be temporary at best. NSGY at [facility] requested inter facility transfer. Hepatology at [facility] also agreed. Patient transferred to [facility] for NSGY, Heme/Onc, and Hepatology.      Patient hemodynamically stable on arrival. MELD 27, Na 135, Cr 1.72 (1.8 baseline), INR 1.7, CBC 0.9 > 7.7 < 37 (s/p 2U plt and FFP, with initial INR 1.9). No focal neuro deficits at this time. GCS 15 on exam. Patient denies HA, weakness, numbness, vision disturbance, dizziness, photophobia, or any other complaints    CTH reordered to assess stability    Post-Op Info:  * No surgery found *       Interval History: 8/14: NAEO. Mental status improved per family at bedside. Remains alert and oriented this AM. Pending bilateral MMA embolization today with PLT on hold for procedure. Neurosurgery will continue following along.     Medications:  Continuous Infusions:  Scheduled Meds:   albumin human 25%  50 g Intravenous Q12H    atorvastatin  40  mg Oral Daily    atovaquone  1,500 mg Oral Daily    cetirizine  10 mg Oral Daily    cyanocobalamin  1,000 mcg Oral Daily    lgefyday-znneshum-tvywte-tenof (STRIBILD) [phone] mg  1 tablet Oral Daily    ergocalciferol  50,000 Units Oral Q7 Days    fenofibrate  160 mg Oral Daily    FLUoxetine  20 mg Oral Daily    lactulose  30 g Oral Q6H    levETIRAcetam  500 mg Oral BID    metoprolol succinate  25 mg Oral Daily    pantoprazole  40 mg Oral Daily    pyridoxine (vitamin B6)  100 mg Oral Daily    rifAXImin  550 mg Oral BID    tamsulosin  0.4 mg Oral QHS    vitamin D  1,000 Units Oral Daily     PRN Meds:  Current Facility-Administered Medications:     benzonatate, 100 mg, Oral, TID PRN    bisacodyL, 10 mg, Rectal, Daily PRN    dextrose 10%, 12.5 g, Intravenous, PRN    dextrose 10%, 25 g, Intravenous, PRN    glucagon (human recombinant), 1 mg, Intramuscular, PRN    glucose, 16 g, Oral, PRN    glucose, 24 g, Oral, PRN    insulin aspart U-100, 0-5 Units, Subcutaneous, QID (AC + HS) PRN    midodrine, 2.5 mg, Oral, TID PRN    naloxone, 0.02 mg, Intravenous, PRN    sodium chloride 0.9%, 10 mL, Intravenous, Q12H PRN     Objective:     Weight: 116.2 kg (256 lb 2.8 oz)  Body mass index is 33.8 kg/m².  Vital Signs (Most Recent):  Temp: 97.6 °F (36.4 °C) (08/14/24 1018)  Pulse: 63 (08/14/24 1018)  Resp: 17 (08/14/24 1018)  BP: 119/61 (08/14/24 1018)  SpO2: (!) 94 % (08/14/24 1018) Vital Signs (24h Range):  Temp:  [96.9 °F (36.1 °C)-99 °F (37.2 °C)] 97.6 °F (36.4 °C)  Pulse:  [54-65] 63  Resp:  [14-18] 17  SpO2:  [92 %-96 %] 94 %  BP: (119-140)/(58-68) 119/61     Male External Urinary Catheter 08/13/24 0800 Large (Active)   Collection Container Standard drainage bag 08/14/24 0400      Neurosurgery Physical Exam  General: well developed, well nourished, no distress.   Head: normocephalic, atraumatic  Mental Status: Awake, Alert, Oriented x 4.  Speech: Slow but clear with content appropriate to conversation  Cranial nerves: face  symmetric, CN II-XII grossly intact.   Eyes: pupils equal, round, reactive to light, EOMI.  Sensory: intact to light touch throughout  Motor Strength: Moves all extremities spontaneously with good tone.  Full strength upper and lower extremities. Myoclonic jerks with movement of extremities antigravity.  Pronator Drift: no drift noted  Finger-to-nose: Intact bilaterally    Significant Labs:  Recent Labs   Lab 08/13/24  0320 08/14/24  0848   GLU 81 88    137   K 3.8 3.8    110   CO2 20* 20*   BUN 18 15   CREATININE 1.6* 1.5*   CALCIUM 9.3 9.4   MG 1.5* 1.7     Recent Labs   Lab 08/13/24  0930 08/13/24  1807 08/14/24  0848   WBC 1.15* 1.55* 1.39*   HGB 7.2* 8.1* 7.7*   HCT 21.3* 24.4* 23.4*   PLT 18* 23* 20*     Recent Labs   Lab 08/13/24  0036 08/13/24  0929 08/13/24  1807 08/14/24  0848   INR 1.6* 1.5* 1.5* 1.5*   APTT 34.3*  --   --   --      Microbiology Results (last 7 days)       ** No results found for the last 168 hours. **          All pertinent labs from the last 24 hours have been reviewed.    Significant Diagnostics:  I have reviewed and interpreted all pertinent imaging results/findings within the past 24 hours.  Assessment/Plan:     * Bilateral subdural hematomas  58M PMH HIV, thrombocytopenia, liver cirrhosis, CAD s/p CABG (many years ago) with enlarging bilateral chronic SDH transferred from [facility] after CTH 8/7 without prior intervention    Plan:  Admitted medicine; q4h nc/vs  Repeat CTH 8/13 stable  - Recommend MMA embolization   - PLT goal >50k for MMA embo   - Repeat CTH after the procedure  INR goal <1.4, plt goal >100k if medically feasible  On Keppra 500 BID, received additional 1g overnight for neuro change. Recommend consult to neurology +/- EEG and AED management.  Hold DVT ppx until repeat scan  Please notify nsgy of any acute neurological decline or of any further questions/concerns  Preponderance of medical care per primary team    Dispo: ongoing        Tatyana Perez  GERMÁN  Neurosurgery  [facility] - Neurosurgery ([facility])

## 2024-08-14 NOTE — PROGRESS NOTES
[facility] - Neurosurgery ([facility])  [facility] Medicine  Progress Note    Patient Name: Jam Card  MRN: [id number]  Patient Class: IP- Inpatient   Admission Date: 8/10/2024  Length of Stay: 4 days  Attending Physician: Leo Peres MD  Primary Care Provider: Ulysses Almaraz MD        Subjective:     Principal Problem:Bilateral subdural hematomas        HPI:  Mr. Card is a 57 yo M with PMHx of Hep C cirrhosis, HIV, chronic thrombocytopenia, HTN, GERD, HLD, CAD s/p CABG (many years ago) who was initially admitted to [facility] under  after being referred to the ED by NSGY due to worsening SDH on imaging. Patient was previously admitted to [facility] for evaluation of HA (7/19/24) with a CT head showing b/l chronic extra-axial fluid collections which possibly could be CSF. Bur hole drainage was considered but coagulopathy and platelet issue needed to be corrected prior. Patient also did not want drainage at that time. Patient f/u with [facility] neurotrauma unit with repeat CT head (8/7/24) but no longer experienced HA, just gait disturbance. CT head showed blood developing since prior CT head which led to ED visit and evaluation. Heme/Onc was consulted and believe that thrombocytopenia could be to splenic sequestration so improvement of plt function may only be temporary at best. NSGY at [facility] requested inter facility transfer. Hepatology at [facility] also agreed. Patient transferred to [facility] for NSGY, Heme/Onc, and Hepatology.     Patient hemodynamically stable on arrival. MELD 27, Na 135, Cr 1.72 (1.8 baseline), INR 1.7, CBC 0.9 > 7.7 < 37 (s/p 2U plt and FFP, with initial INR 1.9). No focal neuro deficits at this time. GCS 15 on exam. Patient denies HA, weakness, numbness, vision disturbance, dizziness, photophobia, or any other complaints.     Overview/Hospital Course:  No notes on file    Interval History: NAEON. Pt mentation slowly improving. Able to participate in neuro exam. MMA embolization scheduled  for today. Will transfuse plt apheresis prior to procedure. Continuing to have 2-3 BM a day. No complaints today.    Review of Systems  Objective:     Vital Signs (Most Recent):  Temp: 99 °F (37.2 °C) (08/14/24 0102)  Pulse: 62 (08/14/24 0315)  Resp: 16 (08/14/24 0102)  BP: 134/68 (08/14/24 0102)  SpO2: (!) 94 % (08/14/24 0102) Vital Signs (24h Range):  Temp:  [96.9 °F (36.1 °C)-99 °F (37.2 °C)] 99 °F (37.2 °C)  Pulse:  [53-71] 62  Resp:  [14-18] 16  SpO2:  [93 %-98 %] 94 %  BP: (114-140)/(55-68) 134/68     Weight: 116.2 kg (256 lb 2.8 oz)  Body mass index is 33.8 kg/m².    Intake/Output Summary (Last 24 hours) at 8/14/2024 0728  Last data filed at 8/13/2024 1811  Gross per 24 hour   Intake 861.7 ml   Output 1250 ml   Net -388.3 ml         Physical Exam  Constitutional:       Appearance: He is ill-appearing.   HENT:      Head: Normocephalic and atraumatic.      Mouth/Throat:      Mouth: Mucous membranes are dry.   Eyes:      Extraocular Movements: Extraocular movements intact.      Conjunctiva/sclera: Conjunctivae normal.      Pupils: Pupils are equal, round, and reactive to light.   Cardiovascular:      Rate and Rhythm: Normal rate and regular rhythm.      Pulses: Normal pulses.      Heart sounds: Normal heart sounds.   Pulmonary:      Effort: Pulmonary effort is normal.      Breath sounds: Normal breath sounds.   Abdominal:      General: Abdomen is flat. Bowel sounds are normal.      Palpations: Abdomen is soft.   Musculoskeletal:         General: Normal range of motion.   Skin:     General: Skin is warm and dry.      Capillary Refill: Capillary refill takes less than 2 seconds.      Coloration: Skin is not jaundiced.   Neurological:      Mental Status: He is alert.      Motor: Abnormal muscle tone (atony present in BL hands) present.   Psychiatric:         Mood and Affect: Mood normal.             Significant Labs: All pertinent labs within the past 24 hours have been reviewed.    Significant Imaging: I have  reviewed all pertinent imaging results/findings within the past 24 hours.    Assessment/Plan:      * Bilateral subdural hematomas  Patient initially presented to OSH with HA and CT head showing b/l subdural hematomas. CT head at follow up visit showed increase in b/l subdural hematomas size.     - Improvement in mentation today  - NSGY consulted, appreciate recs  - NSGY scheduled to complete MMA embolization this AM. Will transfuse 1 unit plt apheresis before procedure. Goal plt 50,000.   - general neurology consulted, said to fix HE before they can intervene.   - Keppra 500mg BID for seizure prophylaxis.   - SBP goal 140  - fall precautions              JASON (acute kidney injury)  JASON is likely due to acute tubular necrosis caused by ammonia . Baseline creatinine is unknown. Most recent creatinine and eGFR are listed below.  Recent Labs     08/12/24  0305 08/13/24  0320   CREATININE 1.7* 1.6*   EGFRNORACEVR 46.7* 50.3*      Plan  - JASON is improving  - Avoid nephrotoxins and renally dose meds for GFR listed above  - Monitor urine output, serial BMP, and adjust therapy as needed  - Cr and electrolytes improving. Most likely new baseline.   - continue 50g albumin q12H for 2 days (ends 8/15)     Depression  Continue fluoxetine 20mg      HLD (hyperlipidemia)  Continue statin      Pancytopenia  The likely etiology of thrombocytopenia is liver disease and platelet consumption from splenic sequestration . The patients 3 most recent labs are listed below.  [facility] S/p 2 units of platelets and 1 unit of FFP.  Plan  - given 1:1:1 rbc/PLT/ffP+CRYO overnight. Mild improvement in labs.   - Per heme/onc recs, recommend judicious use of PLT transfusions given he is appears to be refractory likely due to splenic consumption. He is unlikely to get to >50k. Recommend FFP or cryo only if he has evidence of clinical significant bleeding. Do not transfuse for the sake of correcting his coags in the absence of  bleeding as he is at high risk for TACO (he may have esophageal varices). Recommend GOC conversations as ultimately his overall clinical picture is secondary to cirrhosis.           Liver cirrhosis  Patient with known Cirrhosis   MELD-Na score calculated; MELD 3.0: 22 at 8/13/2024  6:07 PM  MELD-Na: 21 at 8/13/2024  6:07 PM  Calculated from:  Serum Creatinine: 1.6 mg/dL at 8/13/2024  3:20 AM  Serum Sodium: 137 mmol/L at 8/13/2024  3:20 AM  Total Bilirubin: 4.7 mg/dL at 8/13/2024  3:20 AM  Serum Albumin: 3.2 g/dL at 8/13/2024  3:20 AM  INR(ratio): 1.5 at 8/13/2024  6:07 PM  Age at listing (hypothetical): 58 years  Sex: Male at 8/13/2024  6:07 PM      Improvement in mentation today.   Continue chronic meds. Etiology likely Hepatitis. Will avoid any hepatotoxic meds, and monitor CBC/CMP/INR for synthetic function.   Can consider liver ultrasound, low suspicion of ascites on exam  F/u ammonia, dbili  Strict I/Os  Consulted PT/OT   Ordered low salt diet  Discontinued lasix 40mg and spironolactone 50mg in setting of JASON  Continue lactulose and rifaximin with goal 2-3 BM daily.   Ordered 50g albumin q12H for 2 days (ends 8/15)   Hepatology consulted, appreciate recs  - Will discuss at committee meeting today if eval for transplant officially deferred due to presence of SDH  ID spoke with hepatology. CD4 128, Cd4 <200 is relative contraindication ot transplant. Formal consult if pre-transplant eval is needed.       Human immunodeficiency virus (HIV) disease  Previously was taking GENVOYA.     Started STRIBILD, hospital equivalent. Will confirm with pharmacy       Chronic hepatitis C  Recent Hep C RNA showed elevated viral load 7/11/24. Not currently on treatment.     Can consider repeat viral load if clinically indicated          VTE Risk Mitigation (From admission, onward)           Ordered     IP VTE HIGH RISK PATIENT  Once         08/10/24 0834     Place sequential compression device  Until discontinued         08/10/24  2347                    Discharge Planning   CODI: 8/15/2024     Code Status: Full Code   Is the patient medically ready for discharge?:     Reason for patient still in hospital (select all that apply): Treatment  Discharge Plan A: Home Health                  Ryann Almazan MD  Department of Hospital Medicine   [facility] - Neurosurgery ([facility])

## 2024-08-14 NOTE — SUBJECTIVE & OBJECTIVE
Interval History: 8/14: NAEO. Mental status improved per family at bedside. Remains alert and oriented this AM. Pending bilateral MMA embolization today with PLT on hold for procedure. Neurosurgery will continue following along.     Medications:  Continuous Infusions:  Scheduled Meds:   albumin human 25%  50 g Intravenous Q12H    atorvastatin  40 mg Oral Daily    atovaquone  1,500 mg Oral Daily    cetirizine  10 mg Oral Daily    cyanocobalamin  1,000 mcg Oral Daily    gszmssfv-nmtayvjs-axrrce-tenof (STRIBILD) [phone] mg  1 tablet Oral Daily    ergocalciferol  50,000 Units Oral Q7 Days    fenofibrate  160 mg Oral Daily    FLUoxetine  20 mg Oral Daily    lactulose  30 g Oral Q6H    levETIRAcetam  500 mg Oral BID    metoprolol succinate  25 mg Oral Daily    pantoprazole  40 mg Oral Daily    pyridoxine (vitamin B6)  100 mg Oral Daily    rifAXImin  550 mg Oral BID    tamsulosin  0.4 mg Oral QHS    vitamin D  1,000 Units Oral Daily     PRN Meds:  Current Facility-Administered Medications:     benzonatate, 100 mg, Oral, TID PRN    bisacodyL, 10 mg, Rectal, Daily PRN    dextrose 10%, 12.5 g, Intravenous, PRN    dextrose 10%, 25 g, Intravenous, PRN    glucagon (human recombinant), 1 mg, Intramuscular, PRN    glucose, 16 g, Oral, PRN    glucose, 24 g, Oral, PRN    insulin aspart U-100, 0-5 Units, Subcutaneous, QID (AC + HS) PRN    midodrine, 2.5 mg, Oral, TID PRN    naloxone, 0.02 mg, Intravenous, PRN    sodium chloride 0.9%, 10 mL, Intravenous, Q12H PRN     Objective:     Weight: 116.2 kg (256 lb 2.8 oz)  Body mass index is 33.8 kg/m².  Vital Signs (Most Recent):  Temp: 97.6 °F (36.4 °C) (08/14/24 1018)  Pulse: 63 (08/14/24 1018)  Resp: 17 (08/14/24 1018)  BP: 119/61 (08/14/24 1018)  SpO2: (!) 94 % (08/14/24 1018) Vital Signs (24h Range):  Temp:  [96.9 °F (36.1 °C)-99 °F (37.2 °C)] 97.6 °F (36.4 °C)  Pulse:  [54-65] 63  Resp:  [14-18] 17  SpO2:  [92 %-96 %] 94 %  BP: (119-140)/(58-68) 119/61     Male External Urinary  Catheter 08/13/24 0800 Large (Active)   Collection Container Standard drainage bag 08/14/24 0400      Neurosurgery Physical Exam  General: well developed, well nourished, no distress.   Head: normocephalic, atraumatic  Mental Status: Awake, Alert, Oriented x 4.  Speech: Slow but clear with content appropriate to conversation  Cranial nerves: face symmetric, CN II-XII grossly intact.   Eyes: pupils equal, round, reactive to light, EOMI.  Sensory: intact to light touch throughout  Motor Strength: Moves all extremities spontaneously with good tone.  Full strength upper and lower extremities. Myoclonic jerks with movement of extremities antigravity.  Pronator Drift: no drift noted  Finger-to-nose: Intact bilaterally    Significant Labs:  Recent Labs   Lab 08/13/24  0320 08/14/24  0848   GLU 81 88    137   K 3.8 3.8    110   CO2 20* 20*   BUN 18 15   CREATININE 1.6* 1.5*   CALCIUM 9.3 9.4   MG 1.5* 1.7     Recent Labs   Lab 08/13/24  0930 08/13/24  1807 08/14/24  0848   WBC 1.15* 1.55* 1.39*   HGB 7.2* 8.1* 7.7*   HCT 21.3* 24.4* 23.4*   PLT 18* 23* 20*     Recent Labs   Lab 08/13/24  0036 08/13/24  0929 08/13/24  1807 08/14/24  0848   INR 1.6* 1.5* 1.5* 1.5*   APTT 34.3*  --   --   --      Microbiology Results (last 7 days)       ** No results found for the last 168 hours. **          All pertinent labs from the last 24 hours have been reviewed.    Significant Diagnostics:  I have reviewed and interpreted all pertinent imaging results/findings within the past 24 hours.

## 2024-08-14 NOTE — TREATMENT PLAN
Hepatology Treatment Plan    Jam Card is a 58 y.o. male admitted to hospital 8/10/2024 (Hospital Day: 5) due to Bilateral subdural hematomas.     Interval History  No events overnight, mentation improved     Objective  Temp:  [96.9 °F (36.1 °C)-99 °F (37.2 °C)] 97.6 °F (36.4 °C) (08/14 1018)  Pulse:  [54-65] 63 (08/14 1018)  BP: (119-140)/(58-68) 119/61 (08/14 1018)  Resp:  [14-18] 17 (08/14 1018)  SpO2:  [92 %-96 %] 94 % (08/14 1018)    Laboratory    Lab Results   Component Value Date    WBC 1.39 (LL) 08/14/2024    HGB 7.7 (L) 08/14/2024    HCT 23.4 (L) 08/14/2024     (H) 08/14/2024    PLT 20 (LL) 08/14/2024       Lab Results   Component Value Date     08/14/2024    K 3.8 08/14/2024     08/14/2024    CO2 20 (L) 08/14/2024    BUN 15 08/14/2024    CREATININE 1.5 (H) 08/14/2024    CALCIUM 9.4 08/14/2024       Lab Results   Component Value Date    ALBUMIN 3.3 (L) 08/14/2024    ALT 15 08/14/2024    AST 58 (H) 08/14/2024    GGT 74 (H) 07/11/2024    ALKPHOS 62 08/14/2024    BILITOT 5.0 (H) 08/14/2024       Lab Results   Component Value Date    INR 1.5 (H) 08/14/2024    INR 1.5 (H) 08/13/2024    INR 1.5 (H) 08/13/2024       MELD 3.0: 22 at 8/14/2024  8:48 AM  MELD-Na: 21 at 8/14/2024  8:48 AM  Calculated from:  Serum Creatinine: 1.5 mg/dL at 8/14/2024  8:48 AM  Serum Sodium: 137 mmol/L at 8/14/2024  8:48 AM  Total Bilirubin: 5.0 mg/dL at 8/14/2024  8:48 AM  Serum Albumin: 3.3 g/dL at 8/14/2024  8:48 AM  INR(ratio): 1.5 at 8/14/2024  8:48 AM  Age at listing (hypothetical): 58 years  Sex: Male at 8/14/2024  8:48 AM      Assessment  Jam Card is a 58 y.o. 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 Transferred from [facility] where NSGY was consulted and recommended against intervention with significant coagulopathy. NSGY and hematology consulted here.  Awaiting repeat CT.     Recently started liver transplant evaluation as outpatient but will put any further evaluation on hold with current SDH.    Bilateral chronic subdural hematomas  Pancytopenia, neutropenia    Plan  - NSGY and Hematology following   - Planned for MMA embolization today, platelet transfusion before   - Discussed at committee meeting yesterday, francis; currently deferred; recommend palliative care consult, will re-evaluate candidacy for transplant in 1 month   - Would hold diuretics in setting of JASON  - IV albumin 50g BID  - Continue lactulose titrated to 2-3 BMs per day  - Low salt, high protein diet    We will sign off at this time.     Thank you for involving us in the care of Jam Card. Please call with any additional concerns or questions.    Mera Neal MD  Gastroenterology & Hepatology Fellow PGY-IV  [facility]

## 2024-08-14 NOTE — PLAN OF CARE
[facility] - Neurosurgery ([facility])  Neurosurgery  Plan of Care    Inpatient consult to Neurology  Consult performed by: Emiliano Richard MD  Consult ordered by: Ryann Almazan MD        59 yo M with a PMHx of Hep C cirrhosis, HIV, chronic thrombocytopenia, HTN, GERD, HLD, and CAD s/p CABG, who was initially admitted to [facility] under  following a referral to the ED by NSGY due to worsening SDH on imaging. The patient was previously evaluated at [facility] on 7/19/24 for HA, where a CT head revealed b/l chronic extra-axial fluid collections, possibly CSF. At that time, the patient considered bur hole drainage, but coagulopathy and platelet issues required correction first, and the patient expressed reluctance to proceed with drainage. A repeat CT head showed new blood since the prior imaging, prompting an ED visit and further evaluation. Heme/Onc was consulted, suspecting thrombocytopenia due to splenic sequestration, with only temporary improvement in plt function expected. NSGY at [facility] requested an inter-facility transfer, supported by Hepatology at [facility]. The patient was subsequently transferred to [facility] for further NSGY, Heme/Onc, and Hepatology management.  According to the chart, the patients mentation is gradually improving, with the patient now able to participate in a neuro exam. After discussions with the primary team and neurology staff, the patient is reportedly showing improvement. Mental status changes are consistent with expectations in a cirrhotic patient with JASON, hyperammonemia, and acidosis in the setting of subdural hematomas. Neurology currently has no additional recommendations, though if NSGY has concerns about ongoing seizures, an EEG is not unreasonable. The patient is already on appropriate AED prophylaxis.  Neurology will sign off, please call with questions or concerns or if patient's condition changes.    08/14/2024  Emiliano Richard MD, [facility]  Neurology Resident,  PGY-4  Department of Neurology  [facility]

## 2024-08-14 NOTE — PT/OT/SLP PROGRESS
Physical Therapy      Patient Name:  Jam Card   MRN:  [id number]    PT orders acknowledged & reviewed. Patient not seen today secondary to Off the floor for angiogram. Will follow-up for evaluation at next appropriate date.

## 2024-08-14 NOTE — PLAN OF CARE
Problem: Adult Inpatient Plan of Care  Goal: Patient-Specific Goal (Individualized)  Outcome: Progressing  Goal: Optimal Comfort and Wellbeing  Outcome: Progressing  Goal: Readiness for Transition of Care  Outcome: Progressing     Problem: Pain Acute  Goal: Optimal Pain Control and Function  Outcome: Progressing     Problem: Comorbidity Management  Goal: Blood Pressure in Desired Range  Outcome: Progressing     Problem: Acute Kidney Injury/Impairment  Goal: Fluid and Electrolyte Balance  Outcome: Progressing

## 2024-08-14 NOTE — PT/OT/SLP PROGRESS
Occupational Therapy      Patient Name:  Jam Card   MRN:  [id number]    Patient not seen today secondary to patient off floor for angiogram; OT will follow up as appropriate .     8/14/2024

## 2024-08-14 NOTE — ASSESSMENT & PLAN NOTE
JASON is likely due to acute tubular necrosis caused by ammonia . Baseline creatinine is unknown. Most recent creatinine and eGFR are listed below.  Recent Labs     08/12/24  0305 08/13/24  0320   CREATININE 1.7* 1.6*   EGFRNORACEVR 46.7* 50.3*      Plan  - JASON is improving  - Avoid nephrotoxins and renally dose meds for GFR listed above  - Monitor urine output, serial BMP, and adjust therapy as needed  - Cr and electrolytes improving. Most likely new baseline.   - continue 50g albumin q12H for 2 days (ends 8/15)

## 2024-08-14 NOTE — ASSESSMENT & PLAN NOTE
Patient initially presented to OSH with HA and CT head showing b/l subdural hematomas. CT head at follow up visit showed increase in b/l subdural hematomas size.     - Improvement in mentation today  - NSGY consulted, appreciate recs  - NSGY scheduled to complete MMA embolization this AM. Will transfuse 1 unit plt apheresis before procedure. Goal plt 50,000.   - general neurology consulted, said to fix HE before they can intervene.   - Keppra 500mg BID for seizure prophylaxis.   - SBP goal 140  - fall precautions

## 2024-08-14 NOTE — ASSESSMENT & PLAN NOTE
58M PMH HIV, thrombocytopenia, liver cirrhosis, CAD s/p CABG (many years ago) with enlarging bilateral chronic SDH transferred from [facility] after CTH 8/7 without prior intervention    Plan:  Admitted medicine; q4h nc/vs  Repeat CTH 8/13 stable  - Recommend MMA embolization   - PLT goal >50k for MMA embo   - Repeat CTH after the procedure  INR goal <1.4, plt goal >100k if medically feasible  On Keppra 500 BID, received additional 1g overnight for neuro change. Recommend consult to neurology +/- EEG and AED management.  Hold DVT ppx until repeat scan  Please notify nsgy of any acute neurological decline or of any further questions/concerns  Preponderance of medical care per primary team    Dispo: ongoing

## 2024-08-14 NOTE — NURSING
Nurses Note -- 4 Eyes      8/14/2024   1:31 AM      Skin assessed during: Q Shift Change      [x] No Altered Skin Integrity Present    [x]Prevention Measures Documented      [] Yes- Altered Skin Integrity Present or Discovered   [] LDA Added if Not in Epic (Describe Wound)   [] New Altered Skin Integrity was Present on Admit and Documented in LDA   [] Wound Image Taken    Wound Care Consulted? No    Attending Nurse:  Ena Webber RN/Staff Member:  [email or web address]

## 2024-08-15 PROBLEM — E83.39 HYPOPHOSPHATEMIA: Status: ACTIVE | Noted: 2024-08-15

## 2024-08-15 PROBLEM — E83.42 HYPOMAGNESEMIA: Status: ACTIVE | Noted: 2024-08-15

## 2024-08-15 LAB
ALBUMIN SERPL BCP-MCNC: 3.9 G/DL (ref 3.5–5.2)
ALP SERPL-CCNC: 60 U/L (ref 55–135)
ALT SERPL W/O P-5'-P-CCNC: 13 U/L (ref 10–44)
ANION GAP SERPL CALC-SCNC: 8 MMOL/L (ref 8–16)
ANISOCYTOSIS BLD QL SMEAR: SLIGHT
ANISOCYTOSIS BLD QL SMEAR: SLIGHT
AST SERPL-CCNC: 58 U/L (ref 10–40)
BASOPHILS # BLD AUTO: 0.01 K/UL (ref 0–0.2)
BASOPHILS # BLD AUTO: 0.02 K/UL (ref 0–0.2)
BASOPHILS NFR BLD: 0.8 % (ref 0–1.9)
BASOPHILS NFR BLD: 1.6 % (ref 0–1.9)
BILIRUB SERPL-MCNC: 4.9 MG/DL (ref 0.1–1)
BUN SERPL-MCNC: 15 MG/DL (ref 6–20)
CALCIUM SERPL-MCNC: 9.5 MG/DL (ref 8.7–10.5)
CHLORIDE SERPL-SCNC: 110 MMOL/L (ref 95–110)
CO2 SERPL-SCNC: 20 MMOL/L (ref 23–29)
CREAT SERPL-MCNC: 1.5 MG/DL (ref 0.5–1.4)
DIFFERENTIAL METHOD BLD: ABNORMAL
DIFFERENTIAL METHOD BLD: ABNORMAL
EOSINOPHIL # BLD AUTO: 0 K/UL (ref 0–0.5)
EOSINOPHIL # BLD AUTO: 0.1 K/UL (ref 0–0.5)
EOSINOPHIL NFR BLD: 3.4 % (ref 0–8)
EOSINOPHIL NFR BLD: 4 % (ref 0–8)
ERYTHROCYTE [DISTWIDTH] IN BLOOD BY AUTOMATED COUNT: 17.2 % (ref 11.5–14.5)
ERYTHROCYTE [DISTWIDTH] IN BLOOD BY AUTOMATED COUNT: 17.5 % (ref 11.5–14.5)
EST. GFR  (NO RACE VARIABLE): 54.3 ML/MIN/1.73 M^2
FIBRINOGEN PPP-MCNC: 107 MG/DL (ref 182–400)
FIBRINOGEN PPP-MCNC: 96 MG/DL (ref 182–400)
GLUCOSE SERPL-MCNC: 90 MG/DL (ref 70–110)
HCT VFR BLD AUTO: 23 % (ref 40–54)
HCT VFR BLD AUTO: 24.3 % (ref 40–54)
HGB BLD-MCNC: 7.8 G/DL (ref 14–18)
HGB BLD-MCNC: 7.9 G/DL (ref 14–18)
HYPOCHROMIA BLD QL SMEAR: ABNORMAL
IMM GRANULOCYTES # BLD AUTO: 0 K/UL (ref 0–0.04)
IMM GRANULOCYTES # BLD AUTO: 0.01 K/UL (ref 0–0.04)
IMM GRANULOCYTES NFR BLD AUTO: 0 % (ref 0–0.5)
IMM GRANULOCYTES NFR BLD AUTO: 0.8 % (ref 0–0.5)
INR PPP: 1.6 (ref 0.8–1.2)
INR PPP: 1.7 (ref 0.8–1.2)
LYMPHOCYTES # BLD AUTO: 0.5 K/UL (ref 1–4.8)
LYMPHOCYTES # BLD AUTO: 0.6 K/UL (ref 1–4.8)
LYMPHOCYTES NFR BLD: 40.3 % (ref 18–48)
LYMPHOCYTES NFR BLD: 46 % (ref 18–48)
MAGNESIUM SERPL-MCNC: 1.9 MG/DL (ref 1.6–2.6)
MCH RBC QN AUTO: 33.6 PG (ref 27–31)
MCH RBC QN AUTO: 34.7 PG (ref 27–31)
MCHC RBC AUTO-ENTMCNC: 32.5 G/DL (ref 32–36)
MCHC RBC AUTO-ENTMCNC: 33.9 G/DL (ref 32–36)
MCV RBC AUTO: 102 FL (ref 82–98)
MCV RBC AUTO: 103 FL (ref 82–98)
MONOCYTES # BLD AUTO: 0.1 K/UL (ref 0.3–1)
MONOCYTES # BLD AUTO: 0.1 K/UL (ref 0.3–1)
MONOCYTES NFR BLD: 7.6 % (ref 4–15)
MONOCYTES NFR BLD: 9.5 % (ref 4–15)
NEUTROPHILS # BLD AUTO: 0.5 K/UL (ref 1.8–7.7)
NEUTROPHILS # BLD AUTO: 0.6 K/UL (ref 1.8–7.7)
NEUTROPHILS NFR BLD: 38.9 % (ref 38–73)
NEUTROPHILS NFR BLD: 47.1 % (ref 38–73)
NRBC BLD-RTO: 0 /100 WBC
NRBC BLD-RTO: 0 /100 WBC
OVALOCYTES BLD QL SMEAR: ABNORMAL
PHOSPHATE SERPL-MCNC: 2.5 MG/DL (ref 2.7–4.5)
PLATELET # BLD AUTO: 18 K/UL (ref 150–450)
PLATELET # BLD AUTO: 20 K/UL (ref 150–450)
PLATELET BLD QL SMEAR: ABNORMAL
PLATELET BLD QL SMEAR: ABNORMAL
PMV BLD AUTO: 11.8 FL (ref 9.2–12.9)
PMV BLD AUTO: 13.4 FL (ref 9.2–12.9)
POCT GLUCOSE: 82 MG/DL (ref 70–110)
POCT GLUCOSE: 94 MG/DL (ref 70–110)
POIKILOCYTOSIS BLD QL SMEAR: SLIGHT
POTASSIUM SERPL-SCNC: 3.7 MMOL/L (ref 3.5–5.1)
PROT SERPL-MCNC: 6.9 G/DL (ref 6–8.4)
PROTHROMBIN TIME: 17.2 SEC (ref 9–12.5)
PROTHROMBIN TIME: 17.8 SEC (ref 9–12.5)
RBC # BLD AUTO: 2.25 M/UL (ref 4.6–6.2)
RBC # BLD AUTO: 2.35 M/UL (ref 4.6–6.2)
SODIUM SERPL-SCNC: 138 MMOL/L (ref 136–145)
SPHEROCYTES BLD QL SMEAR: ABNORMAL
WBC # BLD AUTO: 1.19 K/UL (ref 3.9–12.7)
WBC # BLD AUTO: 1.26 K/UL (ref 3.9–12.7)

## 2024-08-15 PROCEDURE — 97530 THERAPEUTIC ACTIVITIES: CPT | Mod: NTX

## 2024-08-15 PROCEDURE — 97535 SELF CARE MNGMENT TRAINING: CPT | Mod: NTX

## 2024-08-15 PROCEDURE — P9047 ALBUMIN (HUMAN), 25%, 50ML: HCPCS | Mod: JZ,JG,NTX

## 2024-08-15 PROCEDURE — 85025 COMPLETE CBC W/AUTO DIFF WBC: CPT | Mod: NTX | Performed by: INTERNAL MEDICINE

## 2024-08-15 PROCEDURE — 25000003 PHARM REV CODE 250: Mod: NTX

## 2024-08-15 PROCEDURE — 25000003 PHARM REV CODE 250: Mod: NTX | Performed by: INTERNAL MEDICINE

## 2024-08-15 PROCEDURE — 36415 COLL VENOUS BLD VENIPUNCTURE: CPT | Mod: NTX,XB | Performed by: INTERNAL MEDICINE

## 2024-08-15 PROCEDURE — 36415 COLL VENOUS BLD VENIPUNCTURE: CPT | Mod: NTX

## 2024-08-15 PROCEDURE — 97116 GAIT TRAINING THERAPY: CPT | Mod: NTX

## 2024-08-15 PROCEDURE — 97162 PT EVAL MOD COMPLEX 30 MIN: CPT | Mod: NTX

## 2024-08-15 PROCEDURE — 83735 ASSAY OF MAGNESIUM: CPT | Mod: NTX

## 2024-08-15 PROCEDURE — 97166 OT EVAL MOD COMPLEX 45 MIN: CPT | Mod: NTX

## 2024-08-15 PROCEDURE — 85384 FIBRINOGEN ACTIVITY: CPT | Mod: NTX | Performed by: INTERNAL MEDICINE

## 2024-08-15 PROCEDURE — 11000001 HC ACUTE MED/SURG PRIVATE ROOM: Mod: NTX

## 2024-08-15 PROCEDURE — 80053 COMPREHEN METABOLIC PANEL: CPT | Mod: NTX

## 2024-08-15 PROCEDURE — 85610 PROTHROMBIN TIME: CPT | Mod: NTX | Performed by: INTERNAL MEDICINE

## 2024-08-15 PROCEDURE — 99232 SBSQ HOSP IP/OBS MODERATE 35: CPT | Mod: NTX,,, | Performed by: STUDENT IN AN ORGANIZED HEALTH CARE EDUCATION/TRAINING PROGRAM

## 2024-08-15 PROCEDURE — 84100 ASSAY OF PHOSPHORUS: CPT | Mod: NTX

## 2024-08-15 PROCEDURE — 63600175 PHARM REV CODE 636 W HCPCS: Mod: JZ,JG,NTX

## 2024-08-15 RX ORDER — ALBUMIN HUMAN 250 G/1000ML
50 SOLUTION INTRAVENOUS EVERY 12 HOURS
Status: DISCONTINUED | OUTPATIENT
Start: 2024-08-15 | End: 2024-08-17

## 2024-08-15 RX ORDER — SODIUM,POTASSIUM PHOSPHATES 280-250MG
2 POWDER IN PACKET (EA) ORAL EVERY 4 HOURS
Status: DISPENSED | OUTPATIENT
Start: 2024-08-15 | End: 2024-08-15

## 2024-08-15 RX ORDER — ALBUMIN HUMAN 250 G/1000ML
50 SOLUTION INTRAVENOUS EVERY 12 HOURS
Status: DISCONTINUED | OUTPATIENT
Start: 2024-08-15 | End: 2024-08-15

## 2024-08-15 RX ADMIN — FLUOXETINE HYDROCHLORIDE 20 MG: 20 CAPSULE ORAL at 08:08

## 2024-08-15 RX ADMIN — RIFAXIMIN 550 MG: 550 TABLET ORAL at 09:08

## 2024-08-15 RX ADMIN — LACTULOSE 30 G: 20 SOLUTION ORAL at 11:08

## 2024-08-15 RX ADMIN — METOPROLOL SUCCINATE 25 MG: 25 TABLET, EXTENDED RELEASE ORAL at 08:08

## 2024-08-15 RX ADMIN — ATORVASTATIN CALCIUM 40 MG: 40 TABLET, FILM COATED ORAL at 08:08

## 2024-08-15 RX ADMIN — CETIRIZINE HYDROCHLORIDE 10 MG: 5 TABLET, FILM COATED ORAL at 08:08

## 2024-08-15 RX ADMIN — CYANOCOBALAMIN TAB 1000 MCG 1000 MCG: 1000 TAB at 08:08

## 2024-08-15 RX ADMIN — CHOLECALCIFEROL TAB 25 MCG (1000 UNIT) 1000 UNITS: 25 TAB at 08:08

## 2024-08-15 RX ADMIN — PANTOPRAZOLE SODIUM 40 MG: 40 TABLET, DELAYED RELEASE ORAL at 08:08

## 2024-08-15 RX ADMIN — LEVETIRACETAM 500 MG: 500 TABLET, FILM COATED ORAL at 09:08

## 2024-08-15 RX ADMIN — RIFAXIMIN 550 MG: 550 TABLET ORAL at 08:08

## 2024-08-15 RX ADMIN — PYRIDOXINE HCL TAB 50 MG 100 MG: 50 TAB at 08:08

## 2024-08-15 RX ADMIN — ALBUMIN (HUMAN) 50 G: 12.5 SOLUTION INTRAVENOUS at 09:08

## 2024-08-15 RX ADMIN — ATOVAQUONE 1500 MG: 750 SUSPENSION ORAL at 08:08

## 2024-08-15 RX ADMIN — POTASSIUM & SODIUM PHOSPHATES POWDER PACK 280-160-250 MG 2 PACKET: 280-160-250 PACK at 03:08

## 2024-08-15 RX ADMIN — TAMSULOSIN HYDROCHLORIDE 0.4 MG: 0.4 CAPSULE ORAL at 09:08

## 2024-08-15 RX ADMIN — LEVETIRACETAM 500 MG: 500 TABLET, FILM COATED ORAL at 08:08

## 2024-08-15 RX ADMIN — ELVITEGRAVIR, COBICISTAT, EMTRICITABINE, AND TENOFOVIR DISOPROXIL FUMARATE 1 TABLET: 150; 150; 200; 300 TABLET, FILM COATED ORAL at 08:08

## 2024-08-15 RX ADMIN — LACTULOSE 30 G: 20 SOLUTION ORAL at 05:08

## 2024-08-15 NOTE — ASSESSMENT & PLAN NOTE
Patient with known Cirrhosis   MELD-Na score calculated; MELD 3.0: 22 at 8/15/2024  8:24 AM  MELD-Na: 22 at 8/15/2024  8:24 AM  Calculated from:  Serum Creatinine: 1.5 mg/dL at 8/15/2024  2:52 AM  Serum Sodium: 138 mmol/L (Using max of 137 mmol/L) at 8/15/2024  2:52 AM  Total Bilirubin: 4.9 mg/dL at 8/15/2024  2:52 AM  Serum Albumin: 3.9 g/dL (Using max of 3.5 g/dL) at 8/15/2024  2:52 AM  INR(ratio): 1.6 at 8/15/2024  8:24 AM  Age at listing (hypothetical): 58 years  Sex: Male at 8/15/2024  8:24 AM      Improvement in mentation today.   Continue chronic meds. Etiology likely Hepatitis. Will avoid any hepatotoxic meds, and monitor CBC/CMP/INR for synthetic function.   Can consider liver ultrasound, low suspicion of ascites on exam  F/u ammonia, dbili  Strict I/Os  Consulted PT/OT   Ordered low salt diet  Discontinued lasix 40mg and spironolactone 50mg in setting of JASON  Continue lactulose and rifaximin with goal 2-3 BM daily.   Hepatology consulted, appreciate recs  - transplant eval on hold in setting of low CD4 and SDH. Rec palliative care consult.

## 2024-08-15 NOTE — PROGRESS NOTES
[facility]a - Neurosurgery ([facility])  [facility] Medicine  Progress Note    Patient Name: Jam Card  MRN: [id number]  Patient Class: IP- Inpatient   Admission Date: 8/10/2024  Length of Stay: 5 days  Attending Physician: Rudy Sotomayor MD  Primary Care Provider: Ulysses Almaraz MD        Subjective:     Principal Problem:Bilateral subdural hematomas        HPI:  Mr. Card is a 59 yo M with PMHx of Hep C cirrhosis, HIV, chronic thrombocytopenia, HTN, GERD, HLD, CAD s/p CABG (many years ago) who was initially admitted to [facility] under  after being referred to the ED by NSGY due to worsening SDH on imaging. Patient was previously admitted to [facility] for evaluation of HA (7/19/24) with a CT head showing b/l chronic extra-axial fluid collections which possibly could be CSF. Bur hole drainage was considered but coagulopathy and platelet issue needed to be corrected prior. Patient also did not want drainage at that time. Patient f/u with [facility] neurotrauma unit with repeat CT head (8/7/24) but no longer experienced HA, just gait disturbance. CT head showed blood developing since prior CT head which led to ED visit and evaluation. Heme/Onc was consulted and believe that thrombocytopenia could be to splenic sequestration so improvement of plt function may only be temporary at best. NSGY at [facility] requested inter facility transfer. Hepatology at [facility] also agreed. Patient transferred to [facility] for NSGY, Heme/Onc, and Hepatology.     Patient hemodynamically stable on arrival. MELD 27, Na 135, Cr 1.72 (1.8 baseline), INR 1.7, CBC 0.9 > 7.7 < 37 (s/p 2U plt and FFP, with initial INR 1.9). No focal neuro deficits at this time. GCS 15 on exam. Patient denies HA, weakness, numbness, vision disturbance, dizziness, photophobia, or any other complaints.     Overview/Hospital Course:  No notes on file    Interval History: NAEON. Mentation and asterixis improving every day. No new ascites.     Review of Systems  Objective:      Vital Signs (Most Recent):  Temp: 97.9 °F (36.6 °C) (08/15/24 0725)  Pulse: (!) 59 (08/15/24 1132)  Resp: 18 (08/15/24 1132)  BP: 136/64 (08/15/24 1132)  SpO2: 96 % (08/15/24 1132) Vital Signs (24h Range):  Temp:  [97.3 °F (36.3 °C)-99 °F (37.2 °C)] 97.9 °F (36.6 °C)  Pulse:  [58-68] 59  Resp:  [16-18] 18  SpO2:  [94 %-96 %] 96 %  BP: (110-139)/(56-66) 136/64     Weight: 116.2 kg (256 lb 2.8 oz)  Body mass index is 33.8 kg/m².    Intake/Output Summary (Last 24 hours) at 8/15/2024 1439  Last data filed at 8/15/2024 1314  Gross per 24 hour   Intake 240 ml   Output 2054 ml   Net -1814 ml         Physical Exam  Constitutional:       Appearance: He is ill-appearing.   HENT:      Head: Normocephalic and atraumatic.      Mouth/Throat:      Mouth: Mucous membranes are dry.   Eyes:      Extraocular Movements: Extraocular movements intact.      Conjunctiva/sclera: Conjunctivae normal.      Pupils: Pupils are equal, round, and reactive to light.   Cardiovascular:      Rate and Rhythm: Normal rate and regular rhythm.      Pulses: Normal pulses.      Heart sounds: Normal heart sounds.   Pulmonary:      Effort: Pulmonary effort is normal.      Breath sounds: Normal breath sounds.   Abdominal:      General: Abdomen is flat. Bowel sounds are normal.      Palpations: Abdomen is soft.   Musculoskeletal:         General: Normal range of motion.   Skin:     General: Skin is warm and dry.      Capillary Refill: Capillary refill takes less than 2 seconds.      Coloration: Skin is not jaundiced.   Neurological:      Mental Status: He is alert.      Motor: Abnormal muscle tone (atony present in BL hands) present.   Psychiatric:         Mood and Affect: Mood normal.             Significant Labs: All pertinent labs within the past 24 hours have been reviewed.    Significant Imaging: I have reviewed all pertinent imaging results/findings within the past 24 hours.    Assessment/Plan:      * Bilateral subdural hematomas  Patient initially  presented to OSH with HA and CT head showing b/l subdural hematomas. CT head at follow up visit showed increase in b/l subdural hematomas size.     - Improvement in mentation today  - NSGY consulted, appreciate recs  - interventional neurology scheduled to complete MMA embolization. Will most likely be pushed to Friday due to scheduling difficulties. Will transfuse 1 unit plt apheresis before procedure. Goal plt 50,000.   - general neurology consulted, said to fix HE before they can intervene. Will complete EEG if NSGY deems it necessary.    - Keppra 500mg BID for seizure prophylaxis.   - SBP goal 140  - fall precautions              JASON (acute kidney injury)  JASON is likely due to acute tubular necrosis caused by ammonia . Baseline creatinine is unknown. Most recent creatinine and eGFR are listed below.  Recent Labs     08/13/24  0320 08/14/24  0848 08/15/24  0252   CREATININE 1.6* 1.5* 1.5*   EGFRNORACEVR 50.3* 54.3* 54.3*        Plan  - JASON is improving  - Avoid nephrotoxins and renally dose meds for GFR listed above  - Monitor urine output, serial BMP, and adjust therapy as needed  - Cr and electrolytes improving. Most likely new baseline.   - continue 50g albumin q12H f    Depression  Continue fluoxetine 20mg      HLD (hyperlipidemia)  Continue statin      Pancytopenia  The likely etiology of thrombocytopenia is liver disease and platelet consumption from splenic sequestration . The patients 3 most recent labs are listed below.  Last platelet count at [facility] 37. S/p 2 units of platelets and 1 unit of FFP.  Plan  - given 1:1:1 rbc/PLT/ffP+CRYO overnight. Mild improvement in labs.   - Per heme/onc recs, recommend judicious use of PLT transfusions given he is appears to be refractory likely due to splenic consumption. He is unlikely to get to >50k. Recommend FFP or cryo only if he has evidence of clinical significant bleeding. Do not transfuse for the sake of correcting his coags in the absence of bleeding as he is at  high risk for TACO (he may have esophageal varices). Recommend GOC conversations as ultimately his overall clinical picture is secondary to cirrhosis.           Cirrhosis of liver with ascites  Patient with known Cirrhosis   MELD-Na score calculated; MELD 3.0: 22 at 8/15/2024  8:24 AM  MELD-Na: 22 at 8/15/2024  8:24 AM  Calculated from:  Serum Creatinine: 1.5 mg/dL at 8/15/2024  2:52 AM  Serum Sodium: 138 mmol/L (Using max of 137 mmol/L) at 8/15/2024  2:52 AM  Total Bilirubin: 4.9 mg/dL at 8/15/2024  2:52 AM  Serum Albumin: 3.9 g/dL (Using max of 3.5 g/dL) at 8/15/2024  2:52 AM  INR(ratio): 1.6 at 8/15/2024  8:24 AM  Age at listing (hypothetical): 58 years  Sex: Male at 8/15/2024  8:24 AM      Improvement in mentation today.   Continue chronic meds. Etiology likely Hepatitis. Will avoid any hepatotoxic meds, and monitor CBC/CMP/INR for synthetic function.   Can consider liver ultrasound, low suspicion of ascites on exam  F/u ammonia, dbili  Strict I/Os  Consulted PT/OT   Ordered low salt diet  Discontinued lasix 40mg and spironolactone 50mg in setting of JASON  Continue lactulose and rifaximin with goal 2-3 BM daily.   Hepatology consulted, appreciate recs  - transplant eval on hold in setting of low CD4 and SDH. Rec palliative care consult.         Human immunodeficiency virus (HIV) disease  Previously was taking GENVOYA.     Started STRIBILD, hospital equivalent. Will confirm with pharmacy       Chronic hepatitis C  Recent Hep C RNA showed elevated viral load 7/11/24. Not currently on treatment.     Can consider repeat viral load if clinically indicated          VTE Risk Mitigation (From admission, onward)           Ordered     IP VTE HIGH RISK PATIENT  Once         08/10/24 2347     Place sequential compression device  Until discontinued         08/10/24 2347                    Discharge Planning   CODI: 8/19/2024     Code Status: Full Code   Is the patient medically ready for discharge?:     Reason for patient  still in hospital (select all that apply): Treatment  Discharge Plan A: Home Health                  Ryann Almazan MD  Department of Hospital Medicine   [facility] - Neurosurgery ([facility])

## 2024-08-15 NOTE — SUBJECTIVE & OBJECTIVE
Interval History: NAEON. Mentation and asterixis improving every day. No new ascites.     Review of Systems  Objective:     Vital Signs (Most Recent):  Temp: 97.9 °F (36.6 °C) (08/15/24 0725)  Pulse: (!) 59 (08/15/24 1132)  Resp: 18 (08/15/24 1132)  BP: 136/64 (08/15/24 1132)  SpO2: 96 % (08/15/24 1132) Vital Signs (24h Range):  Temp:  [97.3 °F (36.3 °C)-99 °F (37.2 °C)] 97.9 °F (36.6 °C)  Pulse:  [58-68] 59  Resp:  [16-18] 18  SpO2:  [94 %-96 %] 96 %  BP: (110-139)/(56-66) 136/64     Weight: 116.2 kg (256 lb 2.8 oz)  Body mass index is 33.8 kg/m².    Intake/Output Summary (Last 24 hours) at 8/15/2024 1439  Last data filed at 8/15/2024 1314  Gross per 24 hour   Intake 240 ml   Output 2054 ml   Net -1814 ml         Physical Exam  Constitutional:       Appearance: He is ill-appearing.   HENT:      Head: Normocephalic and atraumatic.      Mouth/Throat:      Mouth: Mucous membranes are dry.   Eyes:      Extraocular Movements: Extraocular movements intact.      Conjunctiva/sclera: Conjunctivae normal.      Pupils: Pupils are equal, round, and reactive to light.   Cardiovascular:      Rate and Rhythm: Normal rate and regular rhythm.      Pulses: Normal pulses.      Heart sounds: Normal heart sounds.   Pulmonary:      Effort: Pulmonary effort is normal.      Breath sounds: Normal breath sounds.   Abdominal:      General: Abdomen is flat. Bowel sounds are normal.      Palpations: Abdomen is soft.   Musculoskeletal:         General: Normal range of motion.   Skin:     General: Skin is warm and dry.      Capillary Refill: Capillary refill takes less than 2 seconds.      Coloration: Skin is not jaundiced.   Neurological:      Mental Status: He is alert.      Motor: Abnormal muscle tone (atony present in BL hands) present.   Psychiatric:         Mood and Affect: Mood normal.             Significant Labs: All pertinent labs within the past 24 hours have been reviewed.    Significant Imaging: I have reviewed all pertinent imaging  results/findings within the past 24 hours.

## 2024-08-15 NOTE — ASSESSMENT & PLAN NOTE
JASON is likely due to acute tubular necrosis caused by ammonia . Baseline creatinine is unknown. Most recent creatinine and eGFR are listed below.  Recent Labs     08/13/24  0320 08/14/24  0848 08/15/24  0252   CREATININE 1.6* 1.5* 1.5*   EGFRNORACEVR 50.3* 54.3* 54.3*        Plan  - JASON is improving  - Avoid nephrotoxins and renally dose meds for GFR listed above  - Monitor urine output, serial BMP, and adjust therapy as needed  - Cr and electrolytes improving. Most likely new baseline.   - continue 50g albumin q12H f

## 2024-08-15 NOTE — PT/OT/SLP EVAL
Physical Therapy Co-Evaluation    Patient Name:  Jam Card   MRN:  [id number]    Recommendations:     Discharge Recommendations: High Intensity Therapy   Discharge Equipment Recommendations: bedside commode   Barriers to discharge: Decreased caregiver support and increased skilled assistance required    Assessment:   Co-evaluation performed due to multiple deficits anticipated requiring two skilled therapists to appropriately and safely assess patient's strength, endurance, functional mobility, and ADL performance while facilitating functional tasks in addition to accommodating for patient's activity tolerance and medical acuity.     Jam Card is a 58 y.o. male admitted with a medical diagnosis of Bilateral subdural hematomas. Patient presented with increased motivation to participate in evaluation, with good response to completed activities and provided education. He demonstrated good quality bed mobility this date, evidenced by no physical assistance required. MinAx2-Ximena, with use of RW, needed for dynamic WB activities. Overall deviations encompassed step characteristics, step sequencing, AD management + associated safety awareness with use of AD. Noted improvement in physical assistance needed directly associated with cues on safe sequencing of tasks while utilizing AD. Of note, patient required increased time for task completion. Based upon information gained, PT recommends high intensity skilled physical therapy services post-acutely. Provided recommendation based upon needed intensity to not only directly address patient's previously listed functional impairments, discrepancy between functional baseline & current mobility status, and increased falls risk, but to also positively impact patient's quality of life & intervene on high risk for caregiver burnout. Performance deficits impacting function include weakness, impaired endurance, impaired self care skills, impaired functional mobility, gait  "instability, impaired balance, decreased upper extremity function, decreased lower extremity function, decreased safety awareness, impaired coordination.    Rehab Prognosis: Good; patient would benefit from acute skilled PT services to address these deficits and reach maximum level of function.    Recent Surgery: * No surgery found *      Plan:     During this hospitalization, patient to be seen 4 x/week to address the identified rehab impairments via gait training, therapeutic activities, therapeutic exercises, neuromuscular re-education and progress toward the following goals:    Plan of Care Expires:  09/15/24    Subjective     Chief Complaint: None stated  Patient/Family Comments/goals: Needing to go to the bathroom  Pain/Comfort:  Pain Rating 1: 0/10  Pain Addressed 1: Reposition, Distraction  Pain Rating Post-Intervention 1: 0/10    Patients cultural, spiritual, Latter day conflicts given the current situation: no    Social History:  Residence: Patient lives with their mother in a single story house with  no NAHUM . Patient's bathroom has a T/S combo, with a seat & gb.  Equipment Owned: walker, rolling, raised toilet, bath bench, grab bar  Prior level of function:  Prior to admission, patient utilized RW or HHA for ambulation secondary to frequent LOB, without subsequent falls  Assistance Upon Discharge:  Caretaker    Objective:     Communicated with Nsg prior to session.  Patient found supine with telemetry, SCD  upon PT entry to room.    General Precautions: Standard, fall   Orthopedic Precautions:N/A   Braces: N/A   Body mass index is 33.8 kg/m².  Oxygen Device: Room Air  Vitals: /61 (BP Location: Left arm, Patient Position: Sitting)   Pulse (!) 58   Temp 97.7 °F (36.5 °C) (Oral)   Resp 18   Ht 6' 1" (1.854 m)   Wt 116.2 kg (256 lb 2.8 oz)   SpO2 98%   BMI 33.80 kg/m²     Exams:  Cognition:   Alert and Cooperative   Patient is oriented to Person, Place, Time, Situation  Command following: Follows " one-step verbal commands  Skin Integrity: Visible skin intact  Postural Assessment: rounded shoulders, forward head, increased kyphosis, and posterior pelvic tilt  Physical Exam:    Left LE Right LE   Sensation intact to light touch intact to light touch   Coordination impaired normal     LLE ROM: WFL  RLE ROM: WFL    LLE Strength: Overall 5/5, except hip flexion 4/5  RLE Strength: Overall 5/5, except hip flexion 4/5    Functional Mobility:  Bed Mobility:     EOB Scooting:   Anterior: Stand-By Assistance  Lateral (L): Stand-By Assistance  Supine>Sit: x1, Stand-By Assistance  Sit>Supine: x1, Stand-By Assistance    Transfers:     Sit<>Stand:   x1, Minimal Assistancex2 from Edge of Bed with HHAx2  x1, Minimal Assistancex2 from Edge of Bed with Rolling Walker  x2, Minimal Assistance from Bedside Commode with Rolling Walker  Bed>BSC: x1, Minimal Assistancex2 with Rolling Walker using step transfer technique  BSC>Bed: x1, Minimal Assistance with Rolling Walker using step transfer technique  *VC/TC for upright posturing, glute engagement, quad engagement, AD management, UE positioning, safety awareness, step sequencing  *Facilitation of trunk extension, hip extension, B TKE, AD management    Gait:  x2ft, Minimal Assistancex2 with Rolling Walker  x2ft, Minimal Assistance with Rolling Walker  Gait Assessment: decreased step length, decreased step height, decreased beverly, decreased gait speed, unsteady gait, FFP, tendency to ambulate outside AD SYEDA, impaired step sequencing  Total Distance: 4ft  *VC/TC for upright posturing, AD management, step sequencing  *Facilitation of trunk extension, AD management    Balance:   Static Sitting: Stand-By Assistance  Dynamic Sitting: Stand-By Assistance    Static Standing: Contact Guard Assistance  Dynamic Standing: Minimal Assistance  *VC/TC for upright posturing, glute engagement, quad engagement, AD management, safety awareness  *Facilitation of trunk extension, hip extension, AD  management    Outcome Measures:  Romero Score: 11    AM-PAC 6 CLICK MOBILITY  Total Score:18     Treatment & Education:  Patient Education Provided on:  The role of physical therapy and how the patient can benefit from skilled services  The negative effects of prolonged bed rest/sedentary behavior, along with the importance of OOB activity & patient participation with PT  The importance of contacting RN, via call light, for mobility throughout the day  Pt white board updated with current therapists name and level of mobility assistance needed.     Patient Verbalized understanding of all topics touched on this date. All questions answered within the PT scope of practice    Patient left HOB elevated with all lines intact, call button in reach, and RN notified.    GOALS:   Multidisciplinary Problems       Physical Therapy Goals          Problem: Physical Therapy    Goal Priority Disciplines Outcome Goal Variances Interventions   Physical Therapy Goal     PT, PT/OT Progressing     Description: Goals to be met by: 24    Patient will increase functional independence with mobility by performin. Supine to sit with [place]  2. Sit to supine with [place]  3. Rolling to Left and Right with [place].  4. Sit to stand transfer with Modified [place] using LRAD as needed  5. Bed to chair transfer with Stand-by Assistance using LRAD as needed  6. Gait  x 50 feet with Stand-by Assistance using LRAD as needed   7. Sitting at edge of bed for 10 minutes with [place]  8. Stand for 8 minutes with Supervision using LRAD as needed  9. Lower extremity exercise program x30 reps per handout, with assistance as needed                         History:     Past Medical History:   Diagnosis Date    CAD (coronary artery disease)     Hx of MI and cardiac cath    Chronic hepatitis C with cirrhosis     GERD (gastroesophageal reflux disease)     HTN (hypertension)     Human immunodeficiency virus (HIV) disease      Hyperlipidemia        Past Surgical History:   Procedure Laterality Date    CARDIAC CATHETERIZATION      CORONARY ARTERY BYPASS GRAFT      TONSILLECTOMY AND ADENOIDECTOMY      TRIGGER FINGER RELEASE         Time Tracking:     PT Received On: 08/15/24  PT Start Time: 1124     PT Stop Time: 1206  PT Total Time (min): 42 min     Billable Minutes: Evaluation 12, Gait Training 10, and Therapeutic Activity 20      08/15/2024

## 2024-08-15 NOTE — ASSESSMENT & PLAN NOTE
Patient initially presented to OSH with HA and CT head showing b/l subdural hematomas. CT head at follow up visit showed increase in b/l subdural hematomas size.     - Improvement in mentation today  - NSGY consulted, appreciate recs  - interventional neurology scheduled to complete MMA embolization. Will most likely be pushed to Friday due to scheduling difficulties. Will transfuse 1 unit plt apheresis before procedure. Goal plt 50,000.   - general neurology consulted, said to fix HE before they can intervene. Will complete EEG if NSGY deems it necessary.    - Keppra 500mg BID for seizure prophylaxis.   - SBP goal 140  - fall precautions

## 2024-08-15 NOTE — PLAN OF CARE
[facility] (Hospital)  Discharge Reassessment    Primary Care Provider: Ulysses Almaraz MD    Expected Discharge Date: 8/19/2024    Reassessment (most recent)       Discharge Reassessment - 08/15/24 1137          Discharge Reassessment    Assessment Type Discharge Planning Reassessment (P)      Did the patient's condition or plan change since previous assessment? Yes (P)      Discharge Plan discussed with: Patient (P)      Communicated CODI with patient/caregiver Date not available/Unable to determine (P)      Discharge Plan A Home Health (P)      Discharge Plan B Home with family (P)      DME Needed Upon Discharge  -- (P)    TBD    Transition of Care Barriers None (P)      Why the patient remains in the hospital Requires continued medical care (P)                    Pt not ready for discharge due to: procedure today and pending PT/OT consult   CM will remain available for patient/families on NPU.  Currently pt has d/c plans in progress at this time.    Discharge Plan A and Plan B have been determined by review of patient's clinical status, future medical and therapeutic needs, and coverage/benefits for post-acute care in coordination with multidisciplinary team members.

## 2024-08-15 NOTE — ASSESSMENT & PLAN NOTE
58M PMH HIV, thrombocytopenia, liver cirrhosis, CAD s/p CABG (many years ago) with enlarging bilateral chronic SDH transferred from [facility] after CTH 8/7 without prior intervention    Plan:  Admitted medicine; q4h nc/vs  Repeat CTH 8/13 stable  - Recommend MMA embolization   - PLT goal >50k for MMA embo   - Repeat CTH after the procedure  INR goal <1.4, plt goal >100k if medically feasible  On Keppra 500 BID. Recommend consult to neurology +/- EEG and AED management.  VTE chemoprophylaxis held in the setting of thrombocytopenia   Please notify nsgy of any acute neurological decline or of any further questions/concerns  Preponderance of medical care per primary team    Dispo: ongoing

## 2024-08-15 NOTE — PT/OT/SLP EVAL
Occupational Therapy   Co-Evaluation    Name: Jam Card  MRN: [id number]  Admitting Diagnosis: Bilateral subdural hematomas  Recent Surgery: * No surgery found *      Recommendations:     Discharge Recommendations: High Intensity Therapy  Discharge Equipment Recommendations:  bedside commode  Barriers to discharge:  None    Assessment:     Jam Card is a 58 y.o. male with a medical diagnosis of Bilateral subdural hematomas.  He presents with decrease functional status secondary to medical diagnosis. Performance deficits affecting function: weakness, impaired balance, decreased safety awareness, impaired endurance, impaired self care skills, decreased coordination, impaired functional mobility, decreased upper extremity function, gait instability, decreased lower extremity function, impaired fine motor.  Patient with good tolerance to OT session; however, limited by AMS impairing patient motor processing speed, attention, and initiation. Patient with fair mobility tolerance requiring standby-minimal assistance for sit/stand and step transfer at this time. Patient however requiring increased assist for ADLs 2/2 balance deficits and AMS impairing patient ability to complete independent self care tasks.     Rehab Prognosis: Good; patient would benefit from acute skilled OT services to address these deficits and reach maximum level of function.       Plan:     Patient to be seen 4 x/week to address the above listed problems via self-care/home management, therapeutic activities, therapeutic exercises, neuromuscular re-education  Plan of Care Expires: 09/15/24  Plan of Care Reviewed with: patient    Subjective     Chief Complaint: none at this time   Patient/Family Comments/goals: DC    Occupational Profile:  Living Environment: Patient lives with elderly mother in a [facility] with no NAHUM. Patient with tub/shower with bench and GB.   Previous level of function: Patient requires assistance for zippers and buttons; patient  uses walker intermittently   Roles and Routines: DC  Equipment Used at Home: walker, rolling  Assistance upon Discharge: Caretaker     Patients cultural, spiritual, Nondenominational conflicts given the current situation: no    Objective:     Communicated with: RN prior to session.  Patient found supine with telemetry upon OT entry to room.    General Precautions: Standard, fall  Orthopedic Precautions: N/A  Braces: N/A  Respiratory Status: Room air    Occupational Performance:    Bed Mobility:    Patient completed Supine to Sit with stand by assistance  Patient completed Sit to Supine with stand by assistance  Patient sat EOB with standby assist and no AD    Functional Mobility/Transfers:  Patient completed Sit <> Stand Transfer   X2 from bedside with min x2 and RW   X2 from commode with min assist   Functional Mobility: Patient completed step transfer to commode with min assist and RW     Activities of Daily Living:  Toileting: maximal assistance for perineal hygiene via standing with RW and min assist for standing balance      Cognitive/Visual Perceptual:  Cognitive/Psychosocial Skills:     -       Oriented to: Person, Place, Time, and Situation   -       Follows Commands/attention:Follows one-step commands with increased timing and cueing   -       Communication: clear with increased timing   -       Safety awareness/insight to disability: intact   Visual/Perceptual:     Intact     Physical Exam:  Sensation:    -       Intact  Upper Extremity Range of Motion:     -       Right Upper Extremity: WFL  -       Left Upper Extremity: WFL  Upper Extremity Strength:    -       Right Upper Extremity: WFL  -       Left Upper Extremity: WFL   Strength:    -       Right Upper Extremity: WFL  -       Left Upper Extremity: WFL  Fine Motor Coordination:    -       Intact    Treatment & Education:  Co-evaluation completed due to patient medical instability and to ensure patient safety. Provided education regarding role of OT, POC,  & discharge recommendations.  Pt with no further questions/concerns at this time. OT provided education on home recommendations and fall prevention in preparation for D/C.     Patient left supine with all lines intact and call button in reach    GOALS:   Multidisciplinary Problems       Occupational Therapy Goals          Problem: Occupational Therapy    Goal Priority Disciplines Outcome Interventions   Occupational Therapy Goal     OT, PT/OT Progressing    Description: Goals to be met by: 9/15/24     Patient will increase functional independence with ADLs by performing:    UE Dressing with [place].  LE Dressing with [place].  Grooming while standing with [place].  Bathing from  tub bench with [place].  Stand pivot transfers with [place].  Step transfer with [place]  Toilet transfer to toilet with [place].                         History:     Past Medical History:   Diagnosis Date    CAD (coronary artery disease)     Hx of MI and cardiac cath    Chronic hepatitis C with cirrhosis     GERD (gastroesophageal reflux disease)     HTN (hypertension)     Human immunodeficiency virus (HIV) disease     Hyperlipidemia          Past Surgical History:   Procedure Laterality Date    CARDIAC CATHETERIZATION      CORONARY ARTERY BYPASS GRAFT      TONSILLECTOMY AND ADENOIDECTOMY      TRIGGER FINGER RELEASE         Time Tracking:     OT Date of Treatment: 08/15/24  OT Start Time: 1123  OT Stop Time: 1206  OT Total Time (min): 43 min    Billable Minutes:Evaluation 15  Self Care/Home Management 15  Therapeutic Activity 13    8/15/2024

## 2024-08-15 NOTE — PLAN OF CARE
Problem: Occupational Therapy  Goal: Occupational Therapy Goal  Description: Goals to be met by: 9/15/24     Patient will increase functional independence with ADLs by performing:    UE Dressing with [place].  LE Dressing with [place].  Grooming while standing with [place].  Bathing from  tub bench with [place].  Stand pivot transfers with [place].  Step transfer with [place]  Toilet transfer to toilet with [place].    Outcome: Progressing

## 2024-08-15 NOTE — CARE UPDATE
I have reviewed the chart of Jam Card who is hospitalized for the following:    Active Hospital Problems    Diagnosis    *Bilateral subdural hematomas    Hypophosphatemia     Monitor with labs  Replace      Hypomagnesemia     Monitor with labs  Replace      Acute blood loss anemia    JASON (acute kidney injury)    Portal hypertension    Ascites     Sequela of portal hypertension including splenomegaly and moderate volume ascites       Depression    Hepatic encephalopathy    Brain compression    Cirrhosis of liver with ascites    Pancytopenia    Chronic hepatitis C    Human immunodeficiency virus (HIV) disease        Heidi Fitzpatrick NP  Unit Based [facility]

## 2024-08-15 NOTE — PROGRESS NOTES
[facility]surgery ([facility])  Neurosurgery  Progress Note    Subjective:     History of Present Illness: Mr. Card is a 59 yo M with PMHx of Hep C cirrhosis, HIV, chronic thrombocytopenia, HTN, GERD, HLD, CAD s/p CABG (many years ago) who was initially admitted to [facility] under  after being referred to the ED by NSGY due to worsening SDH on imaging. Patient was previously admitted to [facility] for evaluation of HA (7/19/24) with a CT head showing b/l chronic extra-axial fluid collections which possibly could be CSF. Bur hole drainage was considered but coagulopathy and platelet issue needed to be corrected prior. Patient also did not want drainage at that time. Patient f/u with [facility] neurotrauma unit with repeat CT head (8/7/24) but no longer experienced HA, just gait disturbance. CT head showed blood developing since prior CT head which led to ED visit and evaluation. Heme/Onc was consulted and believe that thrombocytopenia could be to splenic sequestration so improvement of plt function may only be temporary at best. NSGY at [facility] requested inter facility transfer. Hepatology at [facility] also agreed. Patient transferred to [facility] for NSGY, Heme/Onc, and Hepatology.      Patient hemodynamically stable on arrival. MELD 27, Na 135, Cr 1.72 (1.8 baseline), INR 1.7, CBC 0.9 > 7.7 < 37 (s/p 2U plt and FFP, with initial INR 1.9). No focal neuro deficits at this time. GCS 15 on exam. Patient denies HA, weakness, numbness, vision disturbance, dizziness, photophobia, or any other complaints    CTH reordered to assess stability    Post-Op Info:  * No surgery found *       Interval History: 8/15: NAEO. MMA embo not done yesterday due to emergent cases. Planning for MMA embo today. Patient denies any new complaints.     Medications:  Continuous Infusions:  Scheduled Meds:   albumin human 25%  50 g Intravenous Q12H    atorvastatin  40 mg Oral Daily    atovaquone  1,500 mg Oral Daily    cetirizine  10 mg Oral Daily     cyanocobalamin  1,000 mcg Oral Daily    lgolpgct-ewjmotry-ifbyxk-tenof (STRIBILD) [phone] mg  1 tablet Oral Daily    ergocalciferol  50,000 Units Oral Q7 Days    fenofibrate  160 mg Oral Daily    FLUoxetine  20 mg Oral Daily    lactulose  30 g Oral Q6H    levETIRAcetam  500 mg Oral BID    metoprolol succinate  25 mg Oral Daily    pantoprazole  40 mg Oral Daily    potassium, sodium phosphates  2 packet Oral Q4H    pyridoxine (vitamin B6)  100 mg Oral Daily    rifAXImin  550 mg Oral BID    tamsulosin  0.4 mg Oral QHS    vitamin D  1,000 Units Oral Daily     PRN Meds:  Current Facility-Administered Medications:     benzonatate, 100 mg, Oral, TID PRN    bisacodyL, 10 mg, Rectal, Daily PRN    dextrose 10%, 12.5 g, Intravenous, PRN    dextrose 10%, 25 g, Intravenous, PRN    glucagon (human recombinant), 1 mg, Intramuscular, PRN    glucose, 16 g, Oral, PRN    glucose, 24 g, Oral, PRN    insulin aspart U-100, 0-5 Units, Subcutaneous, QID (AC + HS) PRN    midodrine, 2.5 mg, Oral, TID PRN    naloxone, 0.02 mg, Intravenous, PRN    sodium chloride 0.9%, 10 mL, Intravenous, Q12H PRN     Objective:     Weight: 116.2 kg (256 lb 2.8 oz)  Body mass index is 33.8 kg/m².  Vital Signs (Most Recent):  Temp: 97.9 °F (36.6 °C) (08/15/24 0725)  Pulse: (!) 52 (08/15/24 1524)  Resp: 18 (08/15/24 1132)  BP: 136/64 (08/15/24 1132)  SpO2: 96 % (08/15/24 1132) Vital Signs (24h Range):  Temp:  [97.3 °F (36.3 °C)-99 °F (37.2 °C)] 97.9 °F (36.6 °C)  Pulse:  [52-68] 52  Resp:  [16-18] 18  SpO2:  [94 %-96 %] 96 %  BP: (110-139)/(56-66) 136/64     Date 08/15/24 0700 - 08/16/24 0659   Shift 2643-0157 7498-5093 5400-3090 24 Hour Total   INTAKE   Shift Total(mL/kg)       OUTPUT   Urine(mL/kg/hr) 400(0.4)   400   Stool 4   4   Shift Total(mL/kg) 404(3.5)   404(3.5)   Weight (kg) 116.2 116.2 116.2 116.2     Male External Urinary Catheter 08/13/24 0800 Large (Active)   Collection Container Standard drainage bag 08/14/24 2000   Securement Method  secured to top of thigh w/ adhesive device 08/14/24 2000   Skin no redness 08/14/24 2000   Output (mL) 500 mL 08/14/24 2000     Neurosurgery Physical Exam  General: well developed, well nourished, no distress.   Head: normocephalic, atraumatic  Mental Status: Awake, Alert, Oriented x 4.  Speech: Slow but clear with content appropriate to conversation  Cranial nerves: face symmetric, CN II-XII grossly intact.   Eyes: pupils equal, round, reactive to light, EOMI.  Sensory: intact to light touch throughout  Motor Strength: Moves all extremities spontaneously with good tone.  Full strength upper and lower extremities. Myoclonic jerks with movement of extremities antigravity.  Pronator Drift: no drift noted  Finger-to-nose: Intact bilaterally    Significant Labs:  Recent Labs   Lab 08/14/24  0848 08/15/24  0252   GLU 88 90    138   K 3.8 3.7    110   CO2 20* 20*   BUN 15 15   CREATININE 1.5* 1.5*   CALCIUM 9.4 9.5   MG 1.7 1.9     Recent Labs   Lab 08/14/24  0848 08/14/24  1755 08/15/24  0824   WBC 1.39* 1.33* 1.19*   HGB 7.7* 8.1* 7.9*   HCT 23.4* 24.4* 24.3*   PLT 20* 21* 18*     Recent Labs   Lab 08/14/24  0848 08/14/24  1755 08/15/24  0824   INR 1.5* 1.6* 1.6*     Microbiology Results (last 7 days)       ** No results found for the last 168 hours. **          All pertinent labs from the last 24 hours have been reviewed.    Significant Diagnostics:  No new relevant imaging to discuss.   Assessment/Plan:     * Bilateral subdural hematomas  58M PMH HIV, thrombocytopenia, liver cirrhosis, CAD s/p CABG (many years ago) with enlarging bilateral chronic SDH transferred from [facility] after CTH 8/7 without prior intervention    Plan:  Admitted medicine; q4h nc/vs  Repeat CTH 8/13 stable  - Recommend MMA embolization   - PLT goal >50k for MMA embo   - Repeat CTH after the procedure  INR goal <1.4, plt goal >100k if medically feasible  On Keppra 500 BID. Recommend consult to neurology +/- EEG and AED management.  VTE  chemoprophylaxis held in the setting of thrombocytopenia   Please notify nsgy of any acute neurological decline or of any further questions/concerns  Preponderance of medical care per primary team    Dispo: ongoing        Tatyana Perez PA-C  Neurosurgery  [facility] - Neurosurgery ([facility])

## 2024-08-15 NOTE — SUBJECTIVE & OBJECTIVE
Interval History: 8/15: NAEO. MMA embo not done yesterday due to emergent cases. Planning for MMA embo today. Patient denies any new complaints.     Medications:  Continuous Infusions:  Scheduled Meds:   albumin human 25%  50 g Intravenous Q12H    atorvastatin  40 mg Oral Daily    atovaquone  1,500 mg Oral Daily    cetirizine  10 mg Oral Daily    cyanocobalamin  1,000 mcg Oral Daily    cjzdjjcg-gapxijvr-jwxzdg-tenof (STRIBILD) [phone] mg  1 tablet Oral Daily    ergocalciferol  50,000 Units Oral Q7 Days    fenofibrate  160 mg Oral Daily    FLUoxetine  20 mg Oral Daily    lactulose  30 g Oral Q6H    levETIRAcetam  500 mg Oral BID    metoprolol succinate  25 mg Oral Daily    pantoprazole  40 mg Oral Daily    potassium, sodium phosphates  2 packet Oral Q4H    pyridoxine (vitamin B6)  100 mg Oral Daily    rifAXImin  550 mg Oral BID    tamsulosin  0.4 mg Oral QHS    vitamin D  1,000 Units Oral Daily     PRN Meds:  Current Facility-Administered Medications:     benzonatate, 100 mg, Oral, TID PRN    bisacodyL, 10 mg, Rectal, Daily PRN    dextrose 10%, 12.5 g, Intravenous, PRN    dextrose 10%, 25 g, Intravenous, PRN    glucagon (human recombinant), 1 mg, Intramuscular, PRN    glucose, 16 g, Oral, PRN    glucose, 24 g, Oral, PRN    insulin aspart U-100, 0-5 Units, Subcutaneous, QID (AC + HS) PRN    midodrine, 2.5 mg, Oral, TID PRN    naloxone, 0.02 mg, Intravenous, PRN    sodium chloride 0.9%, 10 mL, Intravenous, Q12H PRN     Objective:     Weight: 116.2 kg (256 lb 2.8 oz)  Body mass index is 33.8 kg/m².  Vital Signs (Most Recent):  Temp: 97.9 °F (36.6 °C) (08/15/24 0725)  Pulse: (!) 52 (08/15/24 1524)  Resp: 18 (08/15/24 1132)  BP: 136/64 (08/15/24 1132)  SpO2: 96 % (08/15/24 1132) Vital Signs (24h Range):  Temp:  [97.3 °F (36.3 °C)-99 °F (37.2 °C)] 97.9 °F (36.6 °C)  Pulse:  [52-68] 52  Resp:  [16-18] 18  SpO2:  [94 %-96 %] 96 %  BP: (110-139)/(56-66) 136/64     Date 08/15/24 0700 - 08/16/24 0659   Shift 9876-4278  3139-1365 4211-7624 24 Hour Total   INTAKE   Shift Total(mL/kg)       OUTPUT   Urine(mL/kg/hr) 400(0.4)   400   Stool 4   4   Shift Total(mL/kg) 404(3.5)   404(3.5)   Weight (kg) 116.2 116.2 116.2 116.2     Male External Urinary Catheter 08/13/24 0800 Large (Active)   Collection Container Standard drainage bag 08/14/24 2000   Securement Method secured to top of thigh w/ adhesive device 08/14/24 2000   Skin no redness 08/14/24 2000   Output (mL) 500 mL 08/14/24 2000     Neurosurgery Physical Exam  General: well developed, well nourished, no distress.   Head: normocephalic, atraumatic  Mental Status: Awake, Alert, Oriented x 4.  Speech: Slow but clear with content appropriate to conversation  Cranial nerves: face symmetric, CN II-XII grossly intact.   Eyes: pupils equal, round, reactive to light, EOMI.  Sensory: intact to light touch throughout  Motor Strength: Moves all extremities spontaneously with good tone.  Full strength upper and lower extremities. Myoclonic jerks with movement of extremities antigravity.  Pronator Drift: no drift noted  Finger-to-nose: Intact bilaterally    Significant Labs:  Recent Labs   Lab 08/14/24  0848 08/15/24  0252   GLU 88 90    138   K 3.8 3.7    110   CO2 20* 20*   BUN 15 15   CREATININE 1.5* 1.5*   CALCIUM 9.4 9.5   MG 1.7 1.9     Recent Labs   Lab 08/14/24  0848 08/14/24  1755 08/15/24  0824   WBC 1.39* 1.33* 1.19*   HGB 7.7* 8.1* 7.9*   HCT 23.4* 24.4* 24.3*   PLT 20* 21* 18*     Recent Labs   Lab 08/14/24  0848 08/14/24  1755 08/15/24  0824   INR 1.5* 1.6* 1.6*     Microbiology Results (last 7 days)       ** No results found for the last 168 hours. **          All pertinent labs from the last 24 hours have been reviewed.    Significant Diagnostics:  No new relevant imaging to discuss.

## 2024-08-15 NOTE — PLAN OF CARE
Problem: Physical Therapy  Goal: Physical Therapy Goal  Description: Goals to be met by: 24    Patient will increase functional independence with mobility by performin. Supine to sit with Sabine  2. Sit to supine with Sabine  3. Rolling to Left and Right with Sabine.  4. Sit to stand transfer with Modified Sabine using LRAD as needed  5. Bed to chair transfer with Stand-by Assistance using LRAD as needed  6. Gait  x 50 feet with Stand-by Assistance using LRAD as needed   7. Sitting at edge of bed for 10 minutes with Sabine  8. Stand for 8 minutes with Supervision using LRAD as needed  9. Lower extremity exercise program x30 reps per handout, with assistance as needed    Outcome: Progressing    Evaluation Complete. Goals Appropriate.

## 2024-08-16 ENCOUNTER — ANESTHESIA EVENT (OUTPATIENT)
Dept: INTERVENTIONAL RADIOLOGY/VASCULAR | Facility: HOSPITAL | Age: 59
End: 2024-08-16
Payer: MEDICARE

## 2024-08-16 LAB
ABO + RH BLD: NORMAL
ALBUMIN SERPL BCP-MCNC: 4.2 G/DL (ref 3.5–5.2)
ALP SERPL-CCNC: 55 U/L (ref 55–135)
ALT SERPL W/O P-5'-P-CCNC: 14 U/L (ref 10–44)
ANION GAP SERPL CALC-SCNC: 10 MMOL/L (ref 8–16)
ANISOCYTOSIS BLD QL SMEAR: SLIGHT
AST SERPL-CCNC: 62 U/L (ref 10–40)
BASOPHILS # BLD AUTO: 0 K/UL (ref 0–0.2)
BASOPHILS NFR BLD: 0 % (ref 0–1.9)
BILIRUB SERPL-MCNC: 5 MG/DL (ref 0.1–1)
BLD GP AB SCN CELLS X3 SERPL QL: NORMAL
BLD PROD TYP BPU: NORMAL
BLD PROD TYP BPU: NORMAL
BLOOD UNIT EXPIRATION DATE: NORMAL
BLOOD UNIT EXPIRATION DATE: NORMAL
BLOOD UNIT TYPE CODE: 5100
BLOOD UNIT TYPE CODE: 600
BLOOD UNIT TYPE: NORMAL
BLOOD UNIT TYPE: NORMAL
BUN SERPL-MCNC: 15 MG/DL (ref 6–20)
BURR CELLS BLD QL SMEAR: ABNORMAL
CALCIUM SERPL-MCNC: 9.6 MG/DL (ref 8.7–10.5)
CHLORIDE SERPL-SCNC: 109 MMOL/L (ref 95–110)
CO2 SERPL-SCNC: 19 MMOL/L (ref 23–29)
CODING SYSTEM: NORMAL
CODING SYSTEM: NORMAL
CREAT SERPL-MCNC: 1.5 MG/DL (ref 0.5–1.4)
CROSSMATCH INTERPRETATION: NORMAL
CROSSMATCH INTERPRETATION: NORMAL
DACRYOCYTES BLD QL SMEAR: ABNORMAL
DIFFERENTIAL METHOD BLD: ABNORMAL
DISPENSE STATUS: NORMAL
DISPENSE STATUS: NORMAL
EOSINOPHIL # BLD AUTO: 0 K/UL (ref 0–0.5)
EOSINOPHIL NFR BLD: 1.4 % (ref 0–8)
ERYTHROCYTE [DISTWIDTH] IN BLOOD BY AUTOMATED COUNT: 18 % (ref 11.5–14.5)
EST. GFR  (NO RACE VARIABLE): 54.3 ML/MIN/1.73 M^2
FIBRINOGEN PPP-MCNC: 105 MG/DL (ref 182–400)
GIANT PLATELETS BLD QL SMEAR: PRESENT
GLUCOSE SERPL-MCNC: 96 MG/DL (ref 70–110)
HCT VFR BLD AUTO: 24.3 % (ref 40–54)
HGB BLD-MCNC: 7.8 G/DL (ref 14–18)
IMM GRANULOCYTES # BLD AUTO: 0 K/UL (ref 0–0.04)
IMM GRANULOCYTES NFR BLD AUTO: 0 % (ref 0–0.5)
INR PPP: 1.7 (ref 0.8–1.2)
LYMPHOCYTES # BLD AUTO: 0.2 K/UL (ref 1–4.8)
LYMPHOCYTES NFR BLD: 30.4 % (ref 18–48)
MAGNESIUM SERPL-MCNC: 1.9 MG/DL (ref 1.6–2.6)
MCH RBC QN AUTO: 33.8 PG (ref 27–31)
MCHC RBC AUTO-ENTMCNC: 32.1 G/DL (ref 32–36)
MCV RBC AUTO: 105 FL (ref 82–98)
MONOCYTES # BLD AUTO: 0 K/UL (ref 0.3–1)
MONOCYTES NFR BLD: 2.9 % (ref 4–15)
NEUTROPHILS # BLD AUTO: 0.5 K/UL (ref 1.8–7.7)
NEUTROPHILS NFR BLD: 65.3 % (ref 38–73)
NRBC BLD-RTO: 0 /100 WBC
NUM UNITS TRANS FFP: NORMAL
OVALOCYTES BLD QL SMEAR: ABNORMAL
PHOSPHATE SERPL-MCNC: 2.8 MG/DL (ref 2.7–4.5)
PLATELET # BLD AUTO: 19 K/UL (ref 150–450)
PLATELET BLD QL SMEAR: ABNORMAL
PMV BLD AUTO: 13.3 FL (ref 9.2–12.9)
POCT GLUCOSE: 151 MG/DL (ref 70–110)
POIKILOCYTOSIS BLD QL SMEAR: SLIGHT
POLYCHROMASIA BLD QL SMEAR: ABNORMAL
POTASSIUM SERPL-SCNC: 3.7 MMOL/L (ref 3.5–5.1)
PROT SERPL-MCNC: 7.1 G/DL (ref 6–8.4)
PROTHROMBIN TIME: 17.9 SEC (ref 9–12.5)
RBC # BLD AUTO: 2.31 M/UL (ref 4.6–6.2)
SODIUM SERPL-SCNC: 138 MMOL/L (ref 136–145)
SPECIMEN OUTDATE: NORMAL
SPHEROCYTES BLD QL SMEAR: ABNORMAL
UNIT NUMBER: NORMAL
WBC # BLD AUTO: 0.69 K/UL (ref 3.9–12.7)

## 2024-08-16 PROCEDURE — 25000003 PHARM REV CODE 250: Mod: NTX

## 2024-08-16 PROCEDURE — B31RYZZ FLUOROSCOPY OF INTRACRANIAL ARTERIES USING OTHER CONTRAST: ICD-10-PCS | Performed by: RADIOLOGY

## 2024-08-16 PROCEDURE — P9035 PLATELET PHERES LEUKOREDUCED: HCPCS | Mod: NTX | Performed by: ANESTHESIOLOGY

## 2024-08-16 PROCEDURE — 86901 BLOOD TYPING SEROLOGIC RH(D): CPT | Mod: NTX | Performed by: ANESTHESIOLOGY

## 2024-08-16 PROCEDURE — 03LG3DZ OCCLUSION OF INTRACRANIAL ARTERY WITH INTRALUMINAL DEVICE, PERCUTANEOUS APPROACH: ICD-10-PCS | Performed by: RADIOLOGY

## 2024-08-16 PROCEDURE — 63600175 PHARM REV CODE 636 W HCPCS: Mod: JZ,JG,NTX

## 2024-08-16 PROCEDURE — 85384 FIBRINOGEN ACTIVITY: CPT | Mod: NTX | Performed by: INTERNAL MEDICINE

## 2024-08-16 PROCEDURE — 36415 COLL VENOUS BLD VENIPUNCTURE: CPT | Mod: NTX

## 2024-08-16 PROCEDURE — 25000003 PHARM REV CODE 250: Mod: NTX | Performed by: NURSE ANESTHETIST, CERTIFIED REGISTERED

## 2024-08-16 PROCEDURE — P9017 PLASMA 1 DONOR FRZ W/IN 8 HR: HCPCS | Mod: NTX | Performed by: ANESTHESIOLOGY

## 2024-08-16 PROCEDURE — 99233 SBSQ HOSP IP/OBS HIGH 50: CPT | Mod: NTX,,, | Performed by: NEUROLOGICAL SURGERY

## 2024-08-16 PROCEDURE — 85610 PROTHROMBIN TIME: CPT | Mod: NTX | Performed by: INTERNAL MEDICINE

## 2024-08-16 PROCEDURE — 63600175 PHARM REV CODE 636 W HCPCS: Mod: NTX | Performed by: ANESTHESIOLOGY

## 2024-08-16 PROCEDURE — 83735 ASSAY OF MAGNESIUM: CPT | Mod: NTX

## 2024-08-16 PROCEDURE — 25000003 PHARM REV CODE 250: Mod: NTX | Performed by: INTERNAL MEDICINE

## 2024-08-16 PROCEDURE — B315YZZ FLUOROSCOPY OF BILATERAL COMMON CAROTID ARTERIES USING OTHER CONTRAST: ICD-10-PCS | Performed by: RADIOLOGY

## 2024-08-16 PROCEDURE — 25000003 PHARM REV CODE 250: Mod: NTX | Performed by: RADIOLOGY

## 2024-08-16 PROCEDURE — B31CYZZ FLUOROSCOPY OF BILATERAL EXTERNAL CAROTID ARTERIES USING OTHER CONTRAST: ICD-10-PCS | Performed by: RADIOLOGY

## 2024-08-16 PROCEDURE — 86850 RBC ANTIBODY SCREEN: CPT | Mod: NTX | Performed by: ANESTHESIOLOGY

## 2024-08-16 PROCEDURE — 85025 COMPLETE CBC W/AUTO DIFF WBC: CPT | Mod: NTX | Performed by: INTERNAL MEDICINE

## 2024-08-16 PROCEDURE — 25500020 PHARM REV CODE 255: Mod: NTX | Performed by: HOSPITALIST

## 2024-08-16 PROCEDURE — 63600175 PHARM REV CODE 636 W HCPCS: Mod: NTX | Performed by: NURSE ANESTHETIST, CERTIFIED REGISTERED

## 2024-08-16 PROCEDURE — P9047 ALBUMIN (HUMAN), 25%, 50ML: HCPCS | Mod: JZ,JG,NTX

## 2024-08-16 PROCEDURE — 36415 COLL VENOUS BLD VENIPUNCTURE: CPT | Mod: NTX | Performed by: INTERNAL MEDICINE

## 2024-08-16 PROCEDURE — 86900 BLOOD TYPING SEROLOGIC ABO: CPT | Mod: NTX | Performed by: ANESTHESIOLOGY

## 2024-08-16 PROCEDURE — 11000001 HC ACUTE MED/SURG PRIVATE ROOM: Mod: NTX

## 2024-08-16 PROCEDURE — 99499 UNLISTED E&M SERVICE: CPT | Mod: NTX,,, | Performed by: PSYCHIATRY & NEUROLOGY

## 2024-08-16 PROCEDURE — 80053 COMPREHEN METABOLIC PANEL: CPT | Mod: NTX

## 2024-08-16 PROCEDURE — 84100 ASSAY OF PHOSPHORUS: CPT | Mod: NTX

## 2024-08-16 RX ORDER — SODIUM CHLORIDE 0.9 % (FLUSH) 0.9 %
3 SYRINGE (ML) INJECTION
Status: DISCONTINUED | OUTPATIENT
Start: 2024-08-16 | End: 2024-08-17

## 2024-08-16 RX ORDER — FENTANYL CITRATE 50 UG/ML
INJECTION, SOLUTION INTRAMUSCULAR; INTRAVENOUS
Status: DISCONTINUED | OUTPATIENT
Start: 2024-08-16 | End: 2024-08-16

## 2024-08-16 RX ORDER — ONDANSETRON HYDROCHLORIDE 2 MG/ML
INJECTION, SOLUTION INTRAVENOUS
Status: DISCONTINUED | OUTPATIENT
Start: 2024-08-16 | End: 2024-08-16

## 2024-08-16 RX ORDER — HYDROMORPHONE HYDROCHLORIDE 1 MG/ML
0.2 INJECTION, SOLUTION INTRAMUSCULAR; INTRAVENOUS; SUBCUTANEOUS EVERY 5 MIN PRN
Status: DISCONTINUED | OUTPATIENT
Start: 2024-08-16 | End: 2024-08-17

## 2024-08-16 RX ORDER — DEXAMETHASONE SODIUM PHOSPHATE 4 MG/ML
INJECTION, SOLUTION INTRA-ARTICULAR; INTRALESIONAL; INTRAMUSCULAR; INTRAVENOUS; SOFT TISSUE
Status: DISCONTINUED | OUTPATIENT
Start: 2024-08-16 | End: 2024-08-16

## 2024-08-16 RX ORDER — VERAPAMIL HYDROCHLORIDE 2.5 MG/ML
INJECTION, SOLUTION INTRAVENOUS
Status: COMPLETED | OUTPATIENT
Start: 2024-08-16 | End: 2024-08-16

## 2024-08-16 RX ORDER — GLUCAGON 1 MG
1 KIT INJECTION
Status: DISCONTINUED | OUTPATIENT
Start: 2024-08-16 | End: 2024-08-17

## 2024-08-16 RX ORDER — IODIXANOL 320 MG/ML
120 INJECTION, SOLUTION INTRAVASCULAR
Status: COMPLETED | OUTPATIENT
Start: 2024-08-16 | End: 2024-08-16

## 2024-08-16 RX ORDER — MIDAZOLAM HYDROCHLORIDE 1 MG/ML
INJECTION, SOLUTION INTRAMUSCULAR; INTRAVENOUS
Status: DISCONTINUED | OUTPATIENT
Start: 2024-08-16 | End: 2024-08-16

## 2024-08-16 RX ORDER — SODIUM CHLORIDE 0.9 % (FLUSH) 0.9 %
10 SYRINGE (ML) INJECTION
Status: DISCONTINUED | OUTPATIENT
Start: 2024-08-16 | End: 2024-08-17

## 2024-08-16 RX ORDER — HALOPERIDOL 5 MG/ML
0.5 INJECTION INTRAMUSCULAR EVERY 10 MIN PRN
Status: DISCONTINUED | OUTPATIENT
Start: 2024-08-16 | End: 2024-08-17

## 2024-08-16 RX ORDER — ONDANSETRON HYDROCHLORIDE 2 MG/ML
4 INJECTION, SOLUTION INTRAVENOUS DAILY PRN
Status: DISCONTINUED | OUTPATIENT
Start: 2024-08-16 | End: 2024-08-17

## 2024-08-16 RX ORDER — LIDOCAINE HYDROCHLORIDE 20 MG/ML
INJECTION INTRAVENOUS
Status: DISCONTINUED | OUTPATIENT
Start: 2024-08-16 | End: 2024-08-16

## 2024-08-16 RX ORDER — ROCURONIUM BROMIDE 10 MG/ML
INJECTION, SOLUTION INTRAVENOUS
Status: DISCONTINUED | OUTPATIENT
Start: 2024-08-16 | End: 2024-08-16

## 2024-08-16 RX ORDER — PROPOFOL 10 MG/ML
VIAL (ML) INTRAVENOUS
Status: DISCONTINUED | OUTPATIENT
Start: 2024-08-16 | End: 2024-08-16

## 2024-08-16 RX ADMIN — PYRIDOXINE HCL TAB 50 MG 100 MG: 50 TAB at 01:08

## 2024-08-16 RX ADMIN — ATOVAQUONE 1500 MG: 750 SUSPENSION ORAL at 01:08

## 2024-08-16 RX ADMIN — MIDAZOLAM HYDROCHLORIDE 1 MG: 2 INJECTION, SOLUTION INTRAMUSCULAR; INTRAVENOUS at 08:08

## 2024-08-16 RX ADMIN — LEVETIRACETAM 500 MG: 500 TABLET, FILM COATED ORAL at 01:08

## 2024-08-16 RX ADMIN — CHOLECALCIFEROL TAB 25 MCG (1000 UNIT) 1000 UNITS: 25 TAB at 01:08

## 2024-08-16 RX ADMIN — FLUOXETINE HYDROCHLORIDE 20 MG: 20 CAPSULE ORAL at 01:08

## 2024-08-16 RX ADMIN — ALBUMIN (HUMAN) 50 G: 12.5 SOLUTION INTRAVENOUS at 01:08

## 2024-08-16 RX ADMIN — DEXAMETHASONE SODIUM PHOSPHATE 4 MG: 4 INJECTION, SOLUTION INTRAMUSCULAR; INTRAVENOUS at 09:08

## 2024-08-16 RX ADMIN — LACTULOSE 30 G: 20 SOLUTION ORAL at 05:08

## 2024-08-16 RX ADMIN — PROPOFOL 100 MG: 10 INJECTION, EMULSION INTRAVENOUS at 09:08

## 2024-08-16 RX ADMIN — VERAPAMIL HYDROCHLORIDE 10 MG: 2.5 INJECTION, SOLUTION INTRAVENOUS at 09:08

## 2024-08-16 RX ADMIN — RIFAXIMIN 550 MG: 550 TABLET ORAL at 01:08

## 2024-08-16 RX ADMIN — SUGAMMADEX 200 MG: 100 INJECTION, SOLUTION INTRAVENOUS at 11:08

## 2024-08-16 RX ADMIN — ROCURONIUM BROMIDE 50 MG: 10 INJECTION INTRAVENOUS at 09:08

## 2024-08-16 RX ADMIN — LEVETIRACETAM 500 MG: 500 TABLET, FILM COATED ORAL at 08:08

## 2024-08-16 RX ADMIN — IODIXANOL 120 ML: 320 INJECTION, SOLUTION INTRAVASCULAR at 12:08

## 2024-08-16 RX ADMIN — FENTANYL CITRATE 50 MCG: 50 INJECTION, SOLUTION INTRAMUSCULAR; INTRAVENOUS at 09:08

## 2024-08-16 RX ADMIN — PANTOPRAZOLE SODIUM 40 MG: 40 TABLET, DELAYED RELEASE ORAL at 01:08

## 2024-08-16 RX ADMIN — HYDROMORPHONE HYDROCHLORIDE 0.2 MG: 1 INJECTION, SOLUTION INTRAMUSCULAR; INTRAVENOUS; SUBCUTANEOUS at 08:08

## 2024-08-16 RX ADMIN — ELVITEGRAVIR, COBICISTAT, EMTRICITABINE, AND TENOFOVIR DISOPROXIL FUMARATE 1 TABLET: 150; 150; 200; 300 TABLET, FILM COATED ORAL at 01:08

## 2024-08-16 RX ADMIN — TAMSULOSIN HYDROCHLORIDE 0.4 MG: 0.4 CAPSULE ORAL at 08:08

## 2024-08-16 RX ADMIN — ONDANSETRON 4 MG: 2 INJECTION INTRAMUSCULAR; INTRAVENOUS at 11:08

## 2024-08-16 RX ADMIN — ATORVASTATIN CALCIUM 40 MG: 40 TABLET, FILM COATED ORAL at 01:08

## 2024-08-16 RX ADMIN — CETIRIZINE HYDROCHLORIDE 10 MG: 5 TABLET, FILM COATED ORAL at 01:08

## 2024-08-16 RX ADMIN — ROCURONIUM BROMIDE 20 MG: 10 INJECTION INTRAVENOUS at 09:08

## 2024-08-16 RX ADMIN — SODIUM CHLORIDE: 0.9 INJECTION, SOLUTION INTRAVENOUS at 08:08

## 2024-08-16 RX ADMIN — LIDOCAINE HYDROCHLORIDE 80 MG: 20 INJECTION INTRAVENOUS at 09:08

## 2024-08-16 RX ADMIN — ROCURONIUM BROMIDE 10 MG: 10 INJECTION INTRAVENOUS at 10:08

## 2024-08-16 RX ADMIN — LACTULOSE 30 G: 20 SOLUTION ORAL at 06:08

## 2024-08-16 RX ADMIN — METOPROLOL SUCCINATE 25 MG: 25 TABLET, EXTENDED RELEASE ORAL at 01:08

## 2024-08-16 RX ADMIN — RIFAXIMIN 550 MG: 550 TABLET ORAL at 08:08

## 2024-08-16 RX ADMIN — CYANOCOBALAMIN TAB 1000 MCG 1000 MCG: 1000 TAB at 01:08

## 2024-08-16 NOTE — SUBJECTIVE & OBJECTIVE
Interval History: NAEON. Now s/p MMA embolization with IR. Pending new labs for today.     Review of Systems  Objective:     Vital Signs (Most Recent):  Temp: 98.2 °F (36.8 °C) (08/16/24 1201)  Pulse: (!) 49 (08/16/24 1245)  Resp: 16 (08/16/24 1245)  BP: (!) 144/68 (08/16/24 1230)  SpO2: 96 % (08/16/24 1245) Vital Signs (24h Range):  Temp:  [97.7 °F (36.5 °C)-98.9 °F (37.2 °C)] 98.2 °F (36.8 °C)  Pulse:  [49-72] 49  Resp:  [16-20] 16  SpO2:  [93 %-100 %] 96 %  BP: (107-144)/(59-68) 144/68     Weight: 116.2 kg (256 lb 2.8 oz)  Body mass index is 33.8 kg/m².    Intake/Output Summary (Last 24 hours) at 8/16/2024 1325  Last data filed at 8/16/2024 1154  Gross per 24 hour   Intake 1254 ml   Output --   Net 1254 ml         Physical Exam  Constitutional:       General: He is not in acute distress.     Appearance: He is ill-appearing. He is not toxic-appearing.   HENT:      Head: Normocephalic and atraumatic.      Mouth/Throat:      Mouth: Mucous membranes are dry.   Eyes:      Extraocular Movements: Extraocular movements intact.      Conjunctiva/sclera: Conjunctivae normal.      Pupils: Pupils are equal, round, and reactive to light.   Cardiovascular:      Rate and Rhythm: Normal rate and regular rhythm.      Pulses: Normal pulses.      Heart sounds: Normal heart sounds.   Pulmonary:      Effort: Pulmonary effort is normal.      Breath sounds: Normal breath sounds.   Abdominal:      General: Abdomen is flat. Bowel sounds are normal. There is no distension.      Palpations: Abdomen is soft.      Tenderness: There is no abdominal tenderness.   Musculoskeletal:         General: Normal range of motion.   Skin:     General: Skin is warm and dry.      Capillary Refill: Capillary refill takes less than 2 seconds.      Coloration: Skin is not jaundiced.   Neurological:      General: No focal deficit present.      Mental Status: He is alert. Mental status is at baseline.      Motor: Abnormal muscle tone (atony present in BL hands)  present.   Psychiatric:         Mood and Affect: Mood normal.         Behavior: Behavior normal.         Significant Labs: All pertinent labs within the past 24 hours have been reviewed.    Significant Imaging: I have reviewed all pertinent imaging results/findings within the past 24 hours.

## 2024-08-16 NOTE — ANESTHESIA PREPROCEDURE EVALUATION
"                                                                                                             08/16/2024  Jam Card is a 58 y.o., male.    Procedure: IR ANGIOGRAM CAROTID INTERNAL INC ARCH AND CEREBRAL BILAT   Diagnosis:            Subdural hemorrhage [I62.00]   Pre-operative evaluation for * No procedures listed *      Encounter Diagnoses   Name Primary?    Subdural hemorrhage     Chest pain     Human immunodeficiency virus (HIV) disease Yes    Acute blood loss anemia        Review of patient's allergies indicates:   Allergen Reactions    Hydrocodone-acetaminophen Other (See Comments)     "cannot sleep when taking" Does not want to take    Lisinopril Other (See Comments)     Other reaction(s): Cough    Sulfamethoxazole-trimethoprim Rash       Medications Prior to Admission   Medication Sig Dispense Refill Last Dose    baclofen (LIORESAL) 10 MG tablet Take 1 tablet by mouth 3 (three) times daily.       bisacodyL (DULCOLAX) 10 mg Supp Place 1 suppository rectally daily as needed (constipation).       cyanocobalamin (VITAMIN B-12) 1000 MCG tablet Take 1,000 mcg by mouth once daily.   8/14/2024    fenofibrate 160 MG Tab TAKE 1 TABLET(160 MG) BY MOUTH DAILY       FLUoxetine 20 MG capsule Take 20 mg by mouth once daily.   8/14/2024    furosemide (LASIX) 20 MG tablet Take 2 tablets (40 mg total) by mouth once daily. 30 tablet 1     GENVOYA [phone] mg Tab TAKE ONE TABLET BY MOUTH DAILY WITH FOOD.       lactulose (CHRONULAC) 20 gram/30 mL Soln Take 30 mLs (20 g total) by mouth 2 (two) times daily. Goal of 3-5 soft stools each day 1800 mL 3     meclizine (ANTIVERT) 12.5 mg tablet Take 6.25 mg by mouth 2 (two) times daily as needed for Dizziness.       metoprolol succinate (TOPROL-XL) 25 MG 24 hr tablet Take 25 mg by mouth once daily.   8/14/2024    midodrine (PROAMATINE) 2.5 MG Tab Take 2.5 mg by mouth 2 (two) times daily.       ondansetron (ZOFRAN-ODT) 4 MG TbDL Take 1 tablet by mouth every 8 " (eight) hours as needed (nausea).       pantoprazole (PROTONIX) 40 MG tablet Take 40 mg by mouth once daily.   8/14/2024    pyridoxine, vitamin B6, (B-6) 100 MG Tab Take 100 mg by mouth once daily.   8/14/2024    rifAXIMin (XIFAXAN) 550 mg Tab Take 550 mg by mouth 2 (two) times daily.   8/14/2024    rosuvastatin (CRESTOR) 10 MG tablet Take 10 mg by mouth every evening.   8/14/2024    spironolactone (ALDACTONE) 25 MG tablet Take 50 mg by mouth once daily.       tamsulosin (FLOMAX) 0.4 mg Cap Take 0.4 mg by mouth every evening.       valsartan (DIOVAN) 40 MG tablet Take 40 mg by mouth every evening.       vitamin D (VITAMIN D3) 1000 units Tab Take 1,000 Units by mouth once daily.   8/14/2024    ergocalciferol (ERGOCALCIFEROL) 50,000 unit Cap Take 50,000 Units by mouth every 7 days.   Unknown    loratadine (CLARITIN) 10 mg tablet Take 10 mg by mouth once daily.   Unknown            No current facility-administered medications on file prior to encounter.     Current Outpatient Medications on File Prior to Encounter   Medication Sig Dispense Refill    baclofen (LIORESAL) 10 MG tablet Take 1 tablet by mouth 3 (three) times daily.      bisacodyL (DULCOLAX) 10 mg Supp Place 1 suppository rectally daily as needed (constipation).      cyanocobalamin (VITAMIN B-12) 1000 MCG tablet Take 1,000 mcg by mouth once daily.      fenofibrate 160 MG Tab TAKE 1 TABLET(160 MG) BY MOUTH DAILY      FLUoxetine 20 MG capsule Take 20 mg by mouth once daily.      furosemide (LASIX) 20 MG tablet Take 2 tablets (40 mg total) by mouth once daily. 30 tablet 1    GENVOYA [phone] mg Tab TAKE ONE TABLET BY MOUTH DAILY WITH FOOD.      lactulose (CHRONULAC) 20 gram/30 mL Soln Take 30 mLs (20 g total) by mouth 2 (two) times daily. Goal of 3-5 soft stools each day 1800 mL 3    meclizine (ANTIVERT) 12.5 mg tablet Take 6.25 mg by mouth 2 (two) times daily as needed for Dizziness.      metoprolol succinate (TOPROL-XL) 25 MG 24 hr tablet Take 25 mg by  mouth once daily.      midodrine (PROAMATINE) 2.5 MG Tab Take 2.5 mg by mouth 2 (two) times daily.      ondansetron (ZOFRAN-ODT) 4 MG TbDL Take 1 tablet by mouth every 8 (eight) hours as needed (nausea).      pantoprazole (PROTONIX) 40 MG tablet Take 40 mg by mouth once daily.      pyridoxine, vitamin B6, (B-6) 100 MG Tab Take 100 mg by mouth once daily.      rifAXIMin (XIFAXAN) 550 mg Tab Take 550 mg by mouth 2 (two) times daily.      rosuvastatin (CRESTOR) 10 MG tablet Take 10 mg by mouth every evening.      spironolactone (ALDACTONE) 25 MG tablet Take 50 mg by mouth once daily.      tamsulosin (FLOMAX) 0.4 mg Cap Take 0.4 mg by mouth every evening.      valsartan (DIOVAN) 40 MG tablet Take 40 mg by mouth every evening.      vitamin D (VITAMIN D3) 1000 units Tab Take 1,000 Units by mouth once daily.      ergocalciferol (ERGOCALCIFEROL) 50,000 unit Cap Take 50,000 Units by mouth every 7 days.      loratadine (CLARITIN) 10 mg tablet Take 10 mg by mouth once daily.         Past Medical History:  No date: CAD (coronary artery disease)      Comment:  Hx of MI and cardiac cath  No date: Chronic hepatitis C with cirrhosis  No date: GERD (gastroesophageal reflux disease)  No date: HTN (hypertension)  No date: Human immunodeficiency virus (HIV) disease  No date: Hyperlipidemia    Past Surgical History:   Procedure Laterality Date    CARDIAC CATHETERIZATION      CORONARY ARTERY BYPASS GRAFT      TONSILLECTOMY AND ADENOIDECTOMY      TRIGGER FINGER RELEASE         Social History     Tobacco Use   Smoking Status Not on file   Smokeless Tobacco Not on file       Social History     Substance and Sexual Activity   Alcohol Use None       Physical Activity: Patient Declined (8/10/2024)    Received from [facility]    Exercise Vital Sign     Days of Exercise per Week: Patient declined     Minutes of Exercise per Session: Patient declined   Recent Concern: Physical Activity - Inactive (8/9/2024)     "Received from [facility]    Exercise Vital Sign     Days of Exercise per Week: 0 days     Minutes of Exercise per Session: 0 min       No birth history on file.  Recent Labs     08/15/24  1814   HCT 23.0*     Recent Labs     08/15/24  1814   PLT 20*     Recent Labs     08/16/24  0212   K 3.7     Recent Labs     08/16/24  0212   CREATININE 1.5*     Recent Labs     08/16/24  0212   GLU 96     No results for input(s): "PT" in the last 72 hours.  Vitals:    08/15/24 2347 08/16/24 0303 08/16/24 0424 08/16/24 0545   BP: 107/61  (!) 126/59 122/64   BP Location: Right arm      Patient Position: Lying  Lying Lying   Pulse: 66 65 69 72   Resp: 16  18    Temp: 36.6 °C (97.8 °F)  36.7 °C (98 °F)    TempSrc: Oral  Oral    SpO2: 96%  (!) 93% (!) 94%   Weight:       Height:                           Pre-op Assessment          Review of Systems  Anesthesia Hx:  No problems with previous Anesthesia                Hematology/Oncology:    Oncology Normal    -- Anemia:               Hematology Comments: HIV pos  Chronic thrombocytopenia  Low fibrinogen  INR 1.7  Due to liver diseasse                    Cardiovascular:     Hypertension, well controlled  Past MI (2010, bypass then) CAD   CABG/stent (CABG with MI)    Denies Angina.                                  Pulmonary:    Denies COPD.  Denies Asthma.  Shortness of breath   SOB before paracentesis in april               Renal/:  Chronic Renal Disease, CKD                Hepatic/GI:     GERD, well controlled Liver Disease, Hepatitis, C           Neurological:  Denies TIA.  Denies CVA.    Denies Seizures.                                Endocrine:  Denies Diabetes.               Physical Exam  General: Well nourished, Cooperative, Alert and Oriented    Airway:  Mallampati: II   Mouth Opening: Normal  TM Distance: Normal  Tongue: Normal  Neck ROM: Normal ROM        Anesthesia Plan  Type of Anesthesia, risks & benefits discussed:    Anesthesia Type: " Gen ETT  Intra-op Monitoring Plan: Standard ASA Monitors  Post Op Pain Control Plan: multimodal analgesia and IV/PO Opioids PRN  Induction:  IV  Informed Consent: Informed consent signed with the Patient and all parties understand the risks and agree with anesthesia plan.  All questions answered.   ASA Score: 3 Emergent  Day of Surgery Review of History & Physical: H&P Update referred to the surgeon/provider.    Ready For Surgery From Anesthesia Perspective.     .   Latest Reference Range & Units 07/12/24 08:57 08/11/24 05:26 08/12/24 03:05 08/13/24 03:20 08/14/24 08:48 08/15/24 02:52 08/16/24 02:12   Creatinine 0.5 - 1.4 mg/dL 2.8 (H) 1.7 (H) 1.7 (H) 1.6 (H) 1.5 (H) 1.5 (H) 1.5 (H)   (H): Data is abnormally high    7/2024    Left Ventricle: The left ventricle is mildly dilated. Normal wall thickness. There is normal systolic function with a visually estimated ejection fraction of 55 - 60%. There is normal diastolic function.    Right Ventricle: Mild right ventricular enlargement. Systolic function is normal.    Left Atrium: Left atrium is severely dilated.    Aortic Valve: There is mild aortic valve sclerosis. Mildly restricted motion. There is mild stenosis. Aortic valve area by VTI is 2.12 cm². Aortic valve peak velocity is 2.34 m/s. Mean gradient is 10 mmHg. The dimensionless index is 0.58.    Tricuspid Valve: There is mild to moderate regurgitation.    Pulmonary Artery: The estimated pulmonary artery systolic pressure is 44 mmHg.    IVC/SVC: Normal venous pressure at 3 mmHg.    Stress Protocol: The patient was infused intravenously with dobutamine. The patient received a graduated infusion of the stress agent beginning at 10.0 mcg/kg/min to a peak dose of 40.0 mcg/kg/min. The peak heart rate was 144 bpm, which is 88% of age predicted maximum heart rate. The patient was also given atropine for a total dose of .5 mg. The patient reported no symptoms during the stress test. The test was stopped because the end of  the protocol was reached.    Baseline ECG: The Baseline ECG reveals sinus rhythm. The axis is normal. The ST segments are normal.    Stress ECG: There are no ST segment deviation identified during the protocol. There is normal blood pressure response with stress.    ECG Conclusion: The ECG portion of the study is negative for ischemia.    Post-stress Impression: The study is normal and negative with no echocardiographic evidence of stress induced ischemia.      Have ordered a unit of ffp and plt, will start when they get here.

## 2024-08-16 NOTE — ASSESSMENT & PLAN NOTE
58M PMH HIV, thrombocytopenia, liver cirrhosis, CAD s/p CABG (many years ago) with enlarging bilateral chronic SDH transferred from [facility] after CTH 8/7 without prior intervention    Plan:  Admitted medicine; q4h nc/vs  Repeat CTH 8/13 stable  - S/p MMA embolization today   - F/u repeat CTH after the procedure  INR goal <1.4, plt goal >100k if medically feasible  On Keppra 500 BID. Recommend consult to neurology +/- EEG and AED management.  VTE chemoprophylaxis held in the setting of thrombocytopenia   Please notify nsgy of any acute neurological decline or of any further questions/concerns  Preponderance of medical care per primary team    Dispo: ongoing

## 2024-08-16 NOTE — PLAN OF CARE
Bilateral MMA embolization procedure completed. Patient tolerated well. No s/s of complications noted. TR band closure device applied to right radial artery; hemostasis achieved at 1122, 14 cc air inflated, refer to MD order for deflate time. Right wrist site clean, dry, and intact; no bleeding or hematoma noted. Patient to be transferred to PACU for post-procedural recovery per MD. Report to be given at bedside to RN.      AxOx1 at baseline reported on documentation. Likely component of poor PO intake from advanced dementia.

## 2024-08-16 NOTE — NURSING TRANSFER
Nursing Transfer Note      8/16/2024   4:19 PM    Nurse giving handoff:VISHAL Paez RN  Nurse receiving handoff:dimitri ERICKSON    Reason patient is being transferred: meets criteria    Transfer To: 904    Transfer via stretcher    Transfer with cardiac monitoring    Transported by PCT      Order for Tele Monitor? Yes        Medicines sent: none    Any special needs or follow-up needed: routine, check at 1700 and 1800    Patient belongings transferred with patient:  not at bedside    Chart send with patient: Yes    Notified: mother/ proxy

## 2024-08-16 NOTE — TRANSFER OF CARE
"Anesthesia Transfer of Care Note    Patient: Jam Card    Procedure(s) Performed: * No procedures listed *    Patient location: PACU    Anesthesia Type: general    Transport from OR: Transported from OR on 6-10 L/min O2 by face mask with adequate spontaneous ventilation    Post pain: adequate analgesia    Post assessment: no apparent anesthetic complications and tolerated procedure well    Post vital signs: stable    Level of consciousness: awake and alert    Nausea/Vomiting: no nausea/vomiting    Complications: none    Transfer of care protocol was followedComments: Report given to recovery, RN. All questions answered.      Last vitals: Visit Vitals  /76   Pulse (!) 67   Temp 37.2 °C (98.9 °F) (Skin)   Resp 14   Ht 6' 1" (1.854 m)   Wt 116.2 kg (256 lb 2.8 oz)   SpO2 99%   BMI 33.80 kg/m²     "

## 2024-08-16 NOTE — ASSESSMENT & PLAN NOTE
Patient initially presented to OSH with HA and CT head showing b/l subdural hematomas. CT head at follow up visit showed increase in b/l subdural hematomas size.     - Improvement in mentation today  - NSGY consulted, appreciate recs  - interventional neurology scheduled to complete MMA embolization. Will most likely be pushed to Friday due to scheduling difficulties. Will transfuse 1 unit plt apheresis before procedure  - general neurology consulted, said to fix HE before they can intervene. Will complete EEG if NSGY deems it necessary.    - Keppra 500mg BID for seizure prophylaxis.   - SBP goal 140  - fall precautions

## 2024-08-16 NOTE — PT/OT/SLP PROGRESS
Physical Therapy      Patient Name:  Jam Card   MRN:  [id number]    Patient not seen today secondary to [facility] for MMA embo. Will follow-up as scheduled.

## 2024-08-16 NOTE — CARE UPDATE
Pt tolerated first 15 mins of FFP transfusion. CRNA and procedure nurse at bedside to bring patient to procedure.

## 2024-08-16 NOTE — PLAN OF CARE
Pt arrived to IR for bilateral MMA embolization with anesthesia. CRNAs at bedside. Pt oriented to unit and staff. Plan of care reviewed with patient, patient verbalizes understanding. Comfort measures utilized. Pt safely transferred from stretcher to procedural table. Fall risk reviewed with patient, fall risk interventions maintained. Safety strap applied, positioner pillows utilized to minimize pressure points. Blankets applied. Pt prepped and draped utilizing standard sterile technique. Patient placed on continuous monitoring, as required by sedation policy. Timeouts completed utilizing standard universal time-out, per department and facility policy. RN to remain at bedside, continuous monitoring maintained per CRNAs. Pt resting comfortably. Denies pain/discomfort. Will continue to monitor. See flow sheets for monitoring, medication administration, and updates.

## 2024-08-16 NOTE — PROGRESS NOTES
Esequiel Lea - Neurosurgery ([facility])  Neurosurgery  Progress Note    Subjective:     History of Present Illness: Mr. Card is a 57 yo M with PMHx of Hep C cirrhosis, HIV, chronic thrombocytopenia, HTN, GERD, HLD, CAD s/p CABG (many years ago) who was initially admitted to [facility] under  after being referred to the ED by NSGY due to worsening SDH on imaging. Patient was previously admitted to [facility] for evaluation of HA (7/19/24) with a CT head showing b/l chronic extra-axial fluid collections which possibly could be CSF. Bur hole drainage was considered but coagulopathy and platelet issue needed to be corrected prior. Patient also did not want drainage at that time. Patient f/u with [facility] neurotrauma unit with repeat CT head (8/7/24) but no longer experienced HA, just gait disturbance. CT head showed blood developing since prior CT head which led to ED visit and evaluation. Heme/Onc was consulted and believe that thrombocytopenia could be to splenic sequestration so improvement of plt function may only be temporary at best. NSGY at [facility] requested inter facility transfer. [place] [facility] [place]. [place] [facility] [place], Heme/Onc, and Hepatology.      Patient hemodynamically stable on arrival. MELD 27, Na 135, Cr 1.72 (1.8 baseline), INR 1.7, CBC 0.9 > 7.7 < 37 (s/p 2U plt and FFP, with initial INR 1.9). No focal neuro deficits at this time. GCS 15 on exam. Patient denies HA, weakness, numbness, vision disturbance, dizziness, photophobia, or any other complaints    CTH reordered to assess stability    Post-Op Info:  * No surgery found *       Interval History: 8/16: NAEO. Patient seen in PACU following bilateral MMA embolization. Patient doing well, denies headaches or any other complaints. Exam stable. Pending CT Head.     Medications:  Continuous Infusions:  Scheduled Meds:   albumin human 25%  50 g Intravenous Q12H    atorvastatin  40 mg Oral Daily    atovaquone  1,500 mg Oral Daily     cetirizine  10 mg Oral Daily    cyanocobalamin  1,000 mcg Oral Daily    riiixgmi-nseuflgv-whhfum-tenof (STRIBILD) [id number] mg  1 tablet Oral Daily    ergocalciferol  50,000 Units Oral Q7 Days    FLUoxetine  20 mg Oral Daily    lactulose  30 g Oral Q6H    levETIRAcetam  500 mg Oral BID    metoprolol succinate  25 mg Oral Daily    pantoprazole  40 mg Oral Daily    pyridoxine (vitamin B6)  100 mg Oral Daily    rifAXImin  550 mg Oral BID    tamsulosin  0.4 mg Oral QHS    vitamin D  1,000 Units Oral Daily     PRN Meds:  Current Facility-Administered Medications:     benzonatate, 100 mg, Oral, TID PRN    bisacodyL, 10 mg, Rectal, Daily PRN    dextrose 10%, 12.5 g, Intravenous, PRN    dextrose 10%, 25 g, Intravenous, PRN    glucagon (human recombinant), 1 mg, Intramuscular, PRN    glucagon (human recombinant), 1 mg, Intramuscular, PRN    glucose, 16 g, Oral, PRN    glucose, 24 g, Oral, PRN    haloperidol lactate, 0.5 mg, Intravenous, Q10 Min PRN    HYDROmorphone, 0.2 mg, Intravenous, Q5 Min PRN    insulin aspart U-100, 0-5 Units, Subcutaneous, QID (AC + HS) PRN    midodrine, 2.5 mg, Oral, TID PRN    naloxone, 0.02 mg, Intravenous, PRN    ondansetron, 4 mg, Intravenous, Daily PRN    sodium chloride 0.9%, 10 mL, Intravenous, Q12H PRN    sodium chloride 0.9%, 10 mL, Intravenous, PRN    sodium chloride 0.9%, 3 mL, Intravenous, PRN     Review of Systems  Objective:     Weight: 116.2 kg (256 lb 2.8 oz)  Body mass index is 33.8 kg/m².  Vital Signs (Most Recent):  Temp: 98.2 °F (36.8 °C) (08/16/24 1201)  Pulse: (!) 57 (08/16/24 1430)  Resp: 20 (08/16/24 1430)  BP: 136/70 (08/16/24 1430)  SpO2: 96 % (08/16/24 1430) Vital Signs (24h Range):  Temp:  [97.7 °F (36.5 °C)-98.9 °F (37.2 °C)] 98.2 °F (36.8 °C)  Pulse:  [49-72] 57  Resp:  [15-20] 20  SpO2:  [93 %-100 %] 96 %  BP: (107-144)/(59-81) 136/70     Date 08/16/24 0700 - 08/17/24 0659   Shift 9752-0730 6205-4152 4500-8776 24 Hour Total   INTAKE   Blood 554   554   IV  Piggyback 700   700   Shift Total(mL/kg) 1254(10.8)   1254(10.8)   OUTPUT   Shift Total(mL/kg)       Weight (kg) 116.2 116.2 116.2 116.2                       Male External Urinary Catheter 08/13/24 0800 Large (Active)   Collection Container Standard drainage bag 08/14/24 2000   Securement Method secured to top of thigh w/ adhesive device 08/14/24 2000   Skin no redness 08/14/24 2000   Output (mL) 500 mL 08/14/24 2000     Neurosurgery Physical Exam  General: well developed, well nourished, no distress.   Head: normocephalic, atraumatic  Mental Status: Awake, Alert, Oriented   Speech: Slow but clear with content appropriate to conversation  Cranial nerves: face symmetric, CN II-XII grossly intact.   Eyes: pupils equal, round, reactive to light, EOMI.  Sensory: intact to light touch throughout  Motor Strength: Moves all extremities spontaneously with good tone.    Pronator Drift: no drift noted  Finger-to-nose: Intact bilaterally    Significant Labs:  Recent Labs   Lab 08/15/24  0252 08/16/24  0212   GLU 90 96    138   K 3.7 3.7    109   CO2 20* 19*   BUN 15 15   CREATININE 1.5* 1.5*   CALCIUM 9.5 9.6   MG 1.9 1.9     Recent Labs   Lab 08/14/24  1755 08/15/24  0824 08/15/24  1814   WBC 1.33* 1.19* 1.26*   HGB 8.1* 7.9* 7.8*   HCT 24.4* 24.3* 23.0*   PLT 21* 18* 20*     Recent Labs   Lab 08/14/24  1755 08/15/24  0824 08/15/24  1814   INR 1.6* 1.6* 1.7*     Microbiology Results (last 7 days)       ** No results found for the last 168 hours. **          All pertinent labs from the last 24 hours have been reviewed.    Significant Diagnostics:  I have reviewed and interpreted all pertinent imaging results/findings within the past 24 hours.  Assessment/Plan:     * Bilateral subdural hematomas  58M PMH HIV, thrombocytopenia, liver cirrhosis, CAD s/p CABG (many years ago) with enlarging bilateral chronic SDH transferred from [facility] after CT 8/7 without prior intervention    Plan:  Admitted medicine; q4h  nc/vs  Repeat CTH 8/13 stable  - S/p MMA embolization today   - F/u repeat CTH after the procedure  INR goal <1.4, plt goal >100k if medically feasible  On Keppra 500 BID. Recommend consult to neurology +/- EEG and AED management.  VTE chemoprophylaxis held in the setting of thrombocytopenia   Please notify nsgy of any acute neurological decline or of any further questions/concerns  Preponderance of medical care per primary team    Dispo: ongoing        Tatyana Perez PA-C  Neurosurgery  Esequiel Lea - Neurosurgery ([facility])

## 2024-08-16 NOTE — PLAN OF CARE
CHW met with patient/family at bedside. Patient experience rounding completed and reviewed the following.     Do you know your discharge plan? Yes or No,    If yes, what is the plan?  Yes Home Health     Have you discussed your needs and preferences with your SW/CM? Yes     If you are discharging home, do you have help at home? Yes  Patient has help at home.    Do you think you will need help additional at home at discharge? No   Patient has no need for additional help.    Do you currently have difficulty keeping up with bills, affording medicine or buying food?  No  Patient has help with bills, food, and medicine.    Assigned SW/CM notified of any patient/family needs or concerns. Appropriate resources provided to address patient's needs.   Marleny ISRAEL, RSW  Case Management  [phone]

## 2024-08-16 NOTE — ANESTHESIA PROCEDURE NOTES
Intubation    Date/Time: 8/16/2024 9:11 AM    Performed by: Melinda Dhaliwal CRNA  Authorized by: Rudy Garg Jr., MD    Intubation:     Induction:  Intravenous    Intubated:  Postinduction    Mask Ventilation:  Easy mask    Attempted By:  Student    Method of Intubation:  Direct    Blade:  Acosta 2    Laryngeal View Grade: Grade I - full view of cords      Difficult Airway Encountered?: No      Complications:  None    Airway Device:  Oral endotracheal tube    Airway Device Size:  7.5    Style/Cuff Inflation:  Cuffed (inflated to minimal occlusive pressure)    Tube secured:  21    Secured at:  The lips    Placement Verified By:  Capnometry and Revisualization with laryngoscopy    Complicating Factors:  None    Findings Post-Intubation:  BS equal bilateral and atraumatic/condition of teeth unchanged  Notes:      Head and neck maintained in neutral position throughout.

## 2024-08-16 NOTE — PLAN OF CARE
Patient had MMA performed and is pending therapy evaluation.   CM is awaiting therapy rec's to determine discharge dispo.

## 2024-08-16 NOTE — PROGRESS NOTES
Esequiel Lea - Neurosurgery ([facility])  [facility] Medicine  Progress Note    Patient Name: Jam Card  MRN: [id number]  Patient Class: IP- Inpatient   Admission Date: 8/10/2024  Length of Stay: 6 days  Attending Physician: Rudy Sotomayor MD  Primary Care Provider: Ulysses Almaraz MD        Subjective:     Principal Problem:Bilateral subdural hematomas        HPI:  Mr. Card is a 57 yo M with PMHx of Hep C cirrhosis, HIV, chronic thrombocytopenia, HTN, GERD, HLD, CAD s/p CABG (many years ago) who was initially admitted to [facility] under  after being referred to the ED by NSGY due to worsening SDH on imaging. Patient was previously admitted to [facility] for evaluation of HA (7/19/24) with a CT head showing b/l chronic extra-axial fluid collections which possibly could be CSF. Bur hole drainage was considered but coagulopathy and platelet issue needed to be corrected prior. Patient also did not want drainage at that time. [facility] CT head (8/7/24) but no longer experienced HA, just gait disturbance. CT head showed blood developing since prior CT head which led to ED visit and evaluation. Heme/Onc was consulted and believe that thrombocytopenia could be to splenic sequestration so improvement of plt function may only be temporary at best. [facility]. Hepatology at [facility] also agreed. Patient transferred to [facility] for NSGY, Heme/Onc, and Hepatology.     Patient hemodynamically stable on arrival. MELD 27, Na 135, Cr 1.72 (1.8 baseline), INR 1.7, CBC 0.9 > 7.7 < 37 (s/p 2U plt and FFP, with initial INR 1.9). No focal neuro deficits at this time. GCS 15 on exam. Patient denies HA, weakness, numbness, vision disturbance, dizziness, photophobia, or any other complaints.     Overview/Hospital Course:  No notes on file    Interval History: NAEON. Now s/p MMA embolization with IR. Pending new labs for today.     Review of  Systems  Objective:     Vital Signs (Most Recent):  Temp: 98.2 °F (36.8 °C) (08/16/24 1201)  Pulse: (!) 49 (08/16/24 1245)  Resp: 16 (08/16/24 1245)  BP: (!) 144/68 (08/16/24 1230)  SpO2: 96 % (08/16/24 1245) Vital Signs (24h Range):  Temp:  [97.7 °F (36.5 °C)-98.9 °F (37.2 °C)] 98.2 °F (36.8 °C)  Pulse:  [49-72] 49  Resp:  [16-20] 16  SpO2:  [93 %-100 %] 96 %  BP: (107-144)/(59-68) 144/68     Weight: 116.2 kg (256 lb 2.8 oz)  Body mass index is 33.8 kg/m².    Intake/Output Summary (Last 24 hours) at 8/16/2024 1325  Last data filed at 8/16/2024 1154  Gross per 24 hour   Intake 1254 ml   Output --   Net 1254 ml         Physical Exam  Constitutional:       General: He is not in acute distress.     Appearance: He is ill-appearing. He is not toxic-appearing.   HENT:      Head: Normocephalic and atraumatic.      Mouth/Throat:      Mouth: Mucous membranes are dry.   Eyes:      Extraocular Movements: Extraocular movements intact.      Conjunctiva/sclera: Conjunctivae normal.      Pupils: Pupils are equal, round, and reactive to light.   Cardiovascular:      Rate and Rhythm: Normal rate and regular rhythm.      Pulses: Normal pulses.      Heart sounds: Normal heart sounds.   Pulmonary:      Effort: Pulmonary effort is normal.      Breath sounds: Normal breath sounds.   Abdominal:      General: Abdomen is flat. Bowel sounds are normal. There is no distension.      Palpations: Abdomen is soft.      Tenderness: There is no abdominal tenderness.   Musculoskeletal:         General: Normal range of motion.   Skin:     General: Skin is warm and dry.      Capillary Refill: Capillary refill takes less than 2 seconds.      Coloration: Skin is not jaundiced.   Neurological:      General: No focal deficit present.      Mental Status: He is alert. Mental status is at baseline.      Motor: Abnormal muscle tone (atony present in BL hands) present.   Psychiatric:         Mood and Affect: Mood normal.         Behavior: Behavior normal.          Significant Labs: All pertinent labs within the past 24 hours have been reviewed.    Significant Imaging: I have reviewed all pertinent imaging results/findings within the past 24 hours.    Assessment/Plan:      * Bilateral subdural hematomas  Patient initially presented to OSH with HA and CT head showing b/l subdural hematomas. CT head at follow up visit showed increase in b/l subdural hematomas size.     - Improvement in mentation today  - NSGY consulted, appreciate recs  - interventional neurology scheduled to complete MMA embolization. Will most likely be pushed to Friday due to scheduling difficulties. Will transfuse 1 unit plt apheresis before procedure  - general neurology consulted, said to fix HE before they can intervene. Will complete EEG if NSGY deems it necessary.    - Keppra 500mg BID for seizure prophylaxis.   - SBP goal 140  - fall precautions              JASON (acute kidney injury)  JASON is likely due to acute tubular necrosis caused by ammonia . Baseline creatinine is unknown. Most recent creatinine and eGFR are listed below.  Recent Labs     08/13/24  0320 08/14/24  0848 08/15/24  0252   CREATININE 1.6* 1.5* 1.5*   EGFRNORACEVR 50.3* 54.3* 54.3*        Plan  - JASON is improving  - Avoid nephrotoxins and renally dose meds for GFR listed above  - Monitor urine output, serial BMP, and adjust therapy as needed  - Cr and electrolytes improving. Most likely new baseline.   - continue 50g albumin q12H f    Depression  Continue fluoxetine 20mg      HLD (hyperlipidemia)  Continue statin      Pancytopenia  The likely etiology of thrombocytopenia is liver disease and platelet consumption from splenic sequestration . The patients 3 most recent labs are listed below.  Last platelet count at [facility] 37. S/p 2 units of platelets and 1 unit of FFP.  Plan  - given 1:1:1 rbc/PLT/ffP+CRYO overnight. Mild improvement in labs.   - Per heme/onc recs, recommend judicious use of PLT transfusions given he is appears to be  refractory likely due to splenic consumption. He is unlikely to get to >50k. Recommend FFP or cryo only if he has evidence of clinical significant bleeding. Do not transfuse for the sake of correcting his coags in the absence of bleeding as he is at high risk for TACO (he may have esophageal varices). Recommend GOC conversations as ultimately his overall clinical picture is secondary to cirrhosis.           Cirrhosis of liver with ascites  Patient with known Cirrhosis   MELD-Na score calculated; MELD 3.0: 22 at 8/15/2024  8:24 AM  MELD-Na: 22 at 8/15/2024  8:24 AM  Calculated from:  Serum Creatinine: 1.5 mg/dL at 8/15/2024  2:52 AM  Serum Sodium: 138 mmol/L (Using max of 137 mmol/L) at 8/15/2024  2:52 AM  Total Bilirubin: 4.9 mg/dL at 8/15/2024  2:52 AM  Serum Albumin: 3.9 g/dL (Using max of 3.5 g/dL) at 8/15/2024  2:52 AM  INR(ratio): 1.6 at 8/15/2024  8:24 AM  Age at listing (hypothetical): 58 years  Sex: Male at 8/15/2024  8:24 AM      Improvement in mentation today.   Continue chronic meds. Etiology likely Hepatitis. Will avoid any hepatotoxic meds, and monitor CBC/CMP/INR for synthetic function.   Can consider liver ultrasound, low suspicion of ascites on exam  F/u ammonia, dbili  Strict I/Os  Consulted PT/OT   Ordered low salt diet  Discontinued lasix 40mg and spironolactone 50mg in setting of JASON  Continue lactulose and rifaximin with goal 2-3 BM daily.   Hepatology consulted, appreciate recs  - transplant eval on hold in setting of low CD4 and SDH. Rec palliative care consult.         Human immunodeficiency virus (HIV) disease  Previously was taking GENVOYA.     Started STRIBILD, hospital equivalent. Will confirm with pharmacy       Chronic hepatitis C  Recent Hep C RNA showed elevated viral load 7/11/24. Not currently on treatment.     Can consider repeat viral load if clinically indicated          VTE Risk Mitigation (From admission, onward)           Ordered     IP VTE HIGH RISK PATIENT  Once          08/10/24 2347     Place sequential compression device  Until discontinued         08/10/24 2347                    Discharge Planning   CODI: 8/19/2024     Code Status: Full Code   Is the patient medically ready for discharge?:     Reason for patient still in hospital (select all that apply): Patient trending condition, Treatment, and Consult recommendations  Discharge Plan A: Home Health                  [facility]   Department of Hospital Medicine   [facility] - Neurosurgery ([facility])

## 2024-08-16 NOTE — SUBJECTIVE & OBJECTIVE
Interval History: 8/16: NAEO. Patient seen in PACU following bilaterally MMA embolization. Patient doing well, denies headaches or any other complaints. Exam stable. Pending CT Head.     Medications:  Continuous Infusions:  Scheduled Meds:   albumin human 25%  50 g Intravenous Q12H    atorvastatin  40 mg Oral Daily    atovaquone  1,500 mg Oral Daily    cetirizine  10 mg Oral Daily    cyanocobalamin  1,000 mcg Oral Daily    lkcscsao-rmmjfjxt-elsbvq-tenof (STRIBILD) [id number] mg  1 tablet Oral Daily    ergocalciferol  50,000 Units Oral Q7 Days    FLUoxetine  20 mg Oral Daily    lactulose  30 g Oral Q6H    levETIRAcetam  500 mg Oral BID    metoprolol succinate  25 mg Oral Daily    pantoprazole  40 mg Oral Daily    pyridoxine (vitamin B6)  100 mg Oral Daily    rifAXImin  550 mg Oral BID    tamsulosin  0.4 mg Oral QHS    vitamin D  1,000 Units Oral Daily     PRN Meds:  Current Facility-Administered Medications:     benzonatate, 100 mg, Oral, TID PRN    bisacodyL, 10 mg, Rectal, Daily PRN    dextrose 10%, 12.5 g, Intravenous, PRN    dextrose 10%, 25 g, Intravenous, PRN    glucagon (human recombinant), 1 mg, Intramuscular, PRN    glucagon (human recombinant), 1 mg, Intramuscular, PRN    glucose, 16 g, Oral, PRN    glucose, 24 g, Oral, PRN    haloperidol lactate, 0.5 mg, Intravenous, Q10 Min PRN    HYDROmorphone, 0.2 mg, Intravenous, Q5 Min PRN    insulin aspart U-100, 0-5 Units, Subcutaneous, QID (AC + HS) PRN    midodrine, 2.5 mg, Oral, TID PRN    naloxone, 0.02 mg, Intravenous, PRN    ondansetron, 4 mg, Intravenous, Daily PRN    sodium chloride 0.9%, 10 mL, Intravenous, Q12H PRN    sodium chloride 0.9%, 10 mL, Intravenous, PRN    sodium chloride 0.9%, 3 mL, Intravenous, PRN     Review of Systems  Objective:     Weight: 116.2 kg (256 lb 2.8 oz)  Body mass index is 33.8 kg/m².  Vital Signs (Most Recent):  Temp: 98.2 °F (36.8 °C) (08/16/24 1201)  Pulse: (!) 57 (08/16/24 1430)  Resp: 20 (08/16/24 1430)  BP: 136/70  (08/16/24 1430)  SpO2: 96 % (08/16/24 1430) Vital Signs (24h Range):  Temp:  [97.7 °F (36.5 °C)-98.9 °F (37.2 °C)] 98.2 °F (36.8 °C)  Pulse:  [49-72] 57  Resp:  [15-20] 20  SpO2:  [93 %-100 %] 96 %  BP: (107-144)/(59-81) 136/70     Date 08/16/24 0700 - 08/17/24 0659   Shift 7685-1790 9510-7971 7466-7245 24 Hour Total   INTAKE   Blood 554   554   IV Piggyback 700   700   Shift Total(mL/kg) 1254(10.8)   1254(10.8)   OUTPUT   Shift Total(mL/kg)       Weight (kg) 116.2 116.2 116.2 116.2                       Male External Urinary Catheter 08/13/24 0800 Large (Active)   Collection Container Standard drainage bag 08/14/24 2000   Securement Method secured to top of thigh w/ adhesive device 08/14/24 2000   Skin no redness 08/14/24 2000   Output (mL) 500 mL 08/14/24 2000     Neurosurgery Physical Exam  General: well developed, well nourished, no distress.   Head: normocephalic, atraumatic  Mental Status: Awake, Alert, Oriented   Speech: Slow but clear with content appropriate to conversation  Cranial nerves: face symmetric, CN II-XII grossly intact.   Eyes: pupils equal, round, reactive to light, EOMI.  Sensory: intact to light touch throughout  Motor Strength: Moves all extremities spontaneously with good tone.    Pronator Drift: no drift noted  Finger-to-nose: Intact bilaterally    Significant Labs:  Recent Labs   Lab 08/15/24  0252 08/16/24  0212   GLU 90 96    138   K 3.7 3.7    109   CO2 20* 19*   BUN 15 15   CREATININE 1.5* 1.5*   CALCIUM 9.5 9.6   MG 1.9 1.9     Recent Labs   Lab 08/14/24  1755 08/15/24  0824 08/15/24  1814   WBC 1.33* 1.19* 1.26*   HGB 8.1* 7.9* 7.8*   HCT 24.4* 24.3* 23.0*   PLT 21* 18* 20*     Recent Labs   Lab 08/14/24  1755 08/15/24  0824 08/15/24  1814   INR 1.6* 1.6* 1.7*     Microbiology Results (last 7 days)       ** No results found for the last 168 hours. **          All pertinent labs from the last 24 hours have been reviewed.    Significant Diagnostics:  I have reviewed and  interpreted all pertinent imaging results/findings within the past 24 hours.

## 2024-08-16 NOTE — ANESTHESIA POSTPROCEDURE EVALUATION
Anesthesia Post Evaluation    Patient: Jam Card    Procedure(s) Performed: * No procedures listed *    Final Anesthesia Type: general      Patient location during evaluation: PACU  Patient participation: Yes- Able to Participate  Level of consciousness: awake and alert and oriented  Post-procedure vital signs: reviewed and stable  Pain management: adequate  Airway patency: patent    PONV status at discharge: No PONV  Anesthetic complications: no      Cardiovascular status: stable  Respiratory status: unassisted, spontaneous ventilation and room air  Hydration status: euvolemic  Follow-up not needed.              Vitals Value Taken Time   /63 08/16/24 1217   Temp 36.8 °C (98.2 °F) 08/16/24 1201   Pulse 57 08/16/24 1220   Resp 22 08/16/24 1220   SpO2 98 % 08/16/24 1220   Vitals shown include unfiled device data.      No case tracking events are documented in the log.      Pain/Myrna Score: Myrna Score: 9 (8/16/2024 12:15 PM)

## 2024-08-16 NOTE — PT/OT/SLP PROGRESS
Occupational Therapy      Patient Name:  Jam Card   MRN:  [id number]    Patient not seen today secondary to off floor for procedure followed by mandatory bedrest . Will follow-up as appropriate.    8/16/2024

## 2024-08-16 NOTE — CONSULTS
"[facility] [place]  Inpatient Consult Note  Interventional Neuroradiology       CC: Bilateral subdural hematomas    SUBJECTIVE:     History of Present Illness: PER Chart " Mr. Card is a 59 yo M with PMHx of Hep C cirrhosis, HIV, chronic thrombocytopenia, HTN, GERD, HLD, CAD s/p CABG (many years ago) who was initially admitted to [facility] under  after being referred to the ED by NSGY due to worsening SDH on imaging. Patient was previously admitted to [facility] for evaluation of HA (7/19/24) with a CT head showing b/l chronic extra-axial fluid collections which possibly could be CSF. Bur hole drainage was considered but coagulopathy and platelet issue needed to be corrected prior. Patient also did not want drainage at that time. Patient f/u with [facility] neurotrauma unit with repeat CT head (8/7/24) but no longer experienced HA, just gait disturbance. CT head showed blood developing since prior CT head which led to ED visit and evaluation. Heme/Onc was consulted and believe that thrombocytopenia could be to splenic sequestration so improvement of plt function may only be temporary at best. NSGY at [facility] requested inter facility transfer. Hepatology at [facility] also agreed. Patient transferred to [facility] for NSGY, Heme/Onc, and Hepatology.      Patient hemodynamically stable on arrival. MELD 27, Na 135, Cr 1.72 (1.8 baseline), INR 1.7, CBC 0.9 > 7.7 < 37 (s/p 2U plt and FFP, with initial INR 1.9). No focal neuro deficits at this time. GCS 15 on exam. Patient denies HA, weakness, numbness, vision disturbance, dizziness, photophobia, or any other complaints"    Sedation History: General    Past Medical History:   Diagnosis Date    CAD (coronary artery disease)     Hx of MI and cardiac cath    Chronic hepatitis C with cirrhosis     GERD (gastroesophageal reflux disease)     HTN (hypertension)     Human immunodeficiency virus (HIV) disease     Hyperlipidemia       Past Surgical History:   Procedure " Laterality Date    CARDIAC CATHETERIZATION      CORONARY ARTERY BYPASS GRAFT      TONSILLECTOMY AND ADENOIDECTOMY      TRIGGER FINGER RELEASE        No current facility-administered medications on file prior to encounter.     Current Outpatient Medications on File Prior to Encounter   Medication Sig Dispense Refill    baclofen (LIORESAL) 10 MG tablet Take 1 tablet by mouth 3 (three) times daily.      bisacodyL (DULCOLAX) 10 mg Supp Place 1 suppository rectally daily as needed (constipation).      cyanocobalamin (VITAMIN B-12) 1000 MCG tablet Take 1,000 mcg by mouth once daily.      fenofibrate 160 MG Tab TAKE 1 TABLET(160 MG) BY MOUTH DAILY      FLUoxetine 20 MG capsule Take 20 mg by mouth once daily.      furosemide (LASIX) 20 MG tablet Take 2 tablets (40 mg total) by mouth once daily. 30 tablet 1    GENVOYA [phone] mg Tab TAKE ONE TABLET BY MOUTH DAILY WITH FOOD.      lactulose (CHRONULAC) 20 gram/30 mL Soln Take 30 mLs (20 g total) by mouth 2 (two) times daily. Goal of 3-5 soft stools each day 1800 mL 3    meclizine (ANTIVERT) 12.5 mg tablet Take 6.25 mg by mouth 2 (two) times daily as needed for Dizziness.      metoprolol succinate (TOPROL-XL) 25 MG 24 hr tablet Take 25 mg by mouth once daily.      midodrine (PROAMATINE) 2.5 MG Tab Take 2.5 mg by mouth 2 (two) times daily.      ondansetron (ZOFRAN-ODT) 4 MG TbDL Take 1 tablet by mouth every 8 (eight) hours as needed (nausea).      pantoprazole (PROTONIX) 40 MG tablet Take 40 mg by mouth once daily.      pyridoxine, vitamin B6, (B-6) 100 MG Tab Take 100 mg by mouth once daily.      rifAXIMin (XIFAXAN) 550 mg Tab Take 550 mg by mouth 2 (two) times daily.      rosuvastatin (CRESTOR) 10 MG tablet Take 10 mg by mouth every evening.      spironolactone (ALDACTONE) 25 MG tablet Take 50 mg by mouth once daily.      tamsulosin (FLOMAX) 0.4 mg Cap Take 0.4 mg by mouth every evening.      valsartan (DIOVAN) 40 MG tablet Take 40 mg by mouth every evening.       "vitamin D (VITAMIN D3) 1000 units Tab Take 1,000 Units by mouth once daily.      ergocalciferol (ERGOCALCIFEROL) 50,000 unit Cap Take 50,000 Units by mouth every 7 days.      loratadine (CLARITIN) 10 mg tablet Take 10 mg by mouth once daily.        Review of patient's allergies indicates:   Allergen Reactions    Hydrocodone-acetaminophen Other (See Comments)     "cannot sleep when taking" Does not want to take    Lisinopril Other (See Comments)     Other reaction(s): Cough    Sulfamethoxazole-trimethoprim Rash       No family history on file.         Antiplatelets/Anticoagulants: no    Review of Systems:  ROS    OBJECTIVE:     Vital Signs (Most Recent):  Temp: 97.8 °F (36.6 °C) (08/16/24 0730)  Pulse: 63 (08/16/24 0730)  Resp: 20 (08/16/24 0730)  BP: (!) 143/65 (08/16/24 0730)  SpO2: 98 % (08/16/24 0730) Vital Signs (24h Range):  Temp:  [97.7 °F (36.5 °C)-98 °F (36.7 °C)] 97.8 °F (36.6 °C)  Pulse:  [52-72] 63  Resp:  [16-20] 20  SpO2:  [93 %-98 %] 98 %  BP: (107-143)/(59-65) 143/65       Physical Exam:  Physical Exam  Alert and oriented to time, place and person  No aphasia, no drift in arms and legs      ASA: III  Mallampati: II    Labs:  CBC/Anemia Profile:   Recent Labs   Lab 08/12/24  0305 08/12/24  0904 08/14/24  0848 08/14/24  1755 08/15/24  0824 08/15/24  1814   WBC 0.73*   < > 1.39* 1.33* 1.19* 1.26*   HGB 7.1*   < > 7.7* 8.1* 7.9* 7.8*   HCT 20.4*   < > 23.4* 24.4* 24.3* 23.0*   PLT 22*   < > 20* 21* 18* 20*   *   < > 101* 102* 103* 102*   RDW 17.4*   < > 17.2* 17.3* 17.2* 17.5*   RETIC 3.1*  --   --   --   --   --    FOLATE  --   --  6.2  --   --   --    [id number]  --   --  >2000*  --   --   --     < > = values in this interval not displayed.        Coags:   Recent Labs   Lab 08/11/24  0001 08/11/24  1707 08/12/24  0904 08/13/24  0036 08/13/24  0929 08/14/24  1755 08/15/24  0824 08/15/24  1814   INR 1.6*   < >  --  1.6*   < > 1.6* 1.6* 1.7*   APTT 35.8*  --  35.3* 34.3*  --   --   --   --     < > = " values in this interval not displayed.        Chemistries:   Recent Labs   Lab 08/14/24  0848 08/15/24  0252 08/16/24  0212    138 138   K 3.8 3.7 3.7    110 109   CO2 20* 20* 19*   BUN 15 15 15   CREATININE 1.5* 1.5* 1.5*   CALCIUM 9.4 9.5 9.6   PROT 6.6 6.9 7.1   BILITOT 5.0* 4.9* 5.0*   ALKPHOS 62 60 55   ALT 15 13 14   AST 58* 58* 62*   MG 1.7 1.9 1.9   PHOS 2.7 2.5* 2.8        Imaging:   CT brain: Stable appearance of mixed attenuation bilateral acute on chronic subdural hematomas. No new or enlarging extra-axial blood or fluid collections.        ASSESSMENT/PLAN:     Informed consent obtained from patient for bilateral middle meningeal artery embolization.  Risk versus benefits explained in detail.  Patient agreed with the treatment plan.  Sedation Plan:  General        Imaging reviewed with Radiology staff, Dr. Singh.            Vesta Cage MD  Fellow, NeuroEndovascular Surgery, [facility] [place] [employer] [facility]  Board certified Vascular Neurologist  [place], [place]

## 2024-08-16 NOTE — PROCEDURES
Interventional Neuroradiology Post-Procedure Note    Pre Op Diagnosis: Bilateral SDH    Post Op Diagnosis: Same    Procedure: Bilateral MMA Embo    Procedure performed by: Samantha ROQUE, Dandre;  Fauzia ROQUE, Vesta    Written Informed Consent Obtained: Yes    Specimen Removed: NO    Estimated Blood Loss: Minimal      Procedure report:     A 5F slender sheath was placed into the right radial artery and a 5F Fry 2 catheter was advanced over 0.035 glidewire into the aortic arch.  Bilateral CCA/ECA were subselected and angiography of the brain was performed after injection into each of these vessels.  It was followed by successful PVA particles embolization bilaterally into middle meningeal arteries.    Preliminary interpretation:  Successful -50 particles embolized into bilateral middle meningeal artery.  Please see Imaging report for full details.    A right radial artery angiogram was performed, the sheath removed and hemostasis achieved using transradial bend with closing pressure of 14cc.  No hematoma was present at the time of hemostasis.    The patient tolerated the procedure well.     Plan:  -To recovery for 2h  -Avoid checking blood pressure in right arm   -Avoid carrying heavy weights with right arm > 10 lbs x 24 hrs   -Remove groin dressing tomorrow               Vesta Cage MD  Fellow, NeuroEndovascular Surgery, [facility] [place] [facility] [place]  Board certified Vascular Neurologist  [place], [place]

## 2024-08-17 LAB
ALBUMIN SERPL BCP-MCNC: 4.7 G/DL (ref 3.5–5.2)
ALP SERPL-CCNC: 56 U/L (ref 55–135)
ALT SERPL W/O P-5'-P-CCNC: 13 U/L (ref 10–44)
ANION GAP SERPL CALC-SCNC: 9 MMOL/L (ref 8–16)
ANISOCYTOSIS BLD QL SMEAR: SLIGHT
AST SERPL-CCNC: 54 U/L (ref 10–40)
BASOPHILS # BLD AUTO: 0 K/UL (ref 0–0.2)
BASOPHILS NFR BLD: 0 % (ref 0–1.9)
BILIRUB SERPL-MCNC: 4.6 MG/DL (ref 0.1–1)
BUN SERPL-MCNC: 22 MG/DL (ref 6–20)
BURR CELLS BLD QL SMEAR: ABNORMAL
CALCIUM SERPL-MCNC: 10.2 MG/DL (ref 8.7–10.5)
CHLORIDE SERPL-SCNC: 110 MMOL/L (ref 95–110)
CO2 SERPL-SCNC: 19 MMOL/L (ref 23–29)
CREAT SERPL-MCNC: 1.7 MG/DL (ref 0.5–1.4)
DACRYOCYTES BLD QL SMEAR: ABNORMAL
DIFFERENTIAL METHOD BLD: ABNORMAL
EOSINOPHIL # BLD AUTO: 0 K/UL (ref 0–0.5)
EOSINOPHIL NFR BLD: 0 % (ref 0–8)
ERYTHROCYTE [DISTWIDTH] IN BLOOD BY AUTOMATED COUNT: 17.4 % (ref 11.5–14.5)
EST. GFR  (NO RACE VARIABLE): 46.7 ML/MIN/1.73 M^2
FIBRINOGEN PPP-MCNC: 89 MG/DL (ref 182–400)
GLUCOSE SERPL-MCNC: 154 MG/DL (ref 70–110)
HCT VFR BLD AUTO: 22.3 % (ref 40–54)
HGB BLD-MCNC: 7.4 G/DL (ref 14–18)
HYPOCHROMIA BLD QL SMEAR: ABNORMAL
IMM GRANULOCYTES # BLD AUTO: 0.02 K/UL (ref 0–0.04)
IMM GRANULOCYTES NFR BLD AUTO: 1 % (ref 0–0.5)
INR PPP: 1.9 (ref 0.8–1.2)
LYMPHOCYTES # BLD AUTO: 0.4 K/UL (ref 1–4.8)
LYMPHOCYTES NFR BLD: 17.8 % (ref 18–48)
MAGNESIUM SERPL-MCNC: 2 MG/DL (ref 1.6–2.6)
MCH RBC QN AUTO: 34.1 PG (ref 27–31)
MCHC RBC AUTO-ENTMCNC: 33.2 G/DL (ref 32–36)
MCV RBC AUTO: 103 FL (ref 82–98)
MONOCYTES # BLD AUTO: 0.1 K/UL (ref 0.3–1)
MONOCYTES NFR BLD: 5.6 % (ref 4–15)
NEUTROPHILS # BLD AUTO: 1.5 K/UL (ref 1.8–7.7)
NEUTROPHILS NFR BLD: 75.6 % (ref 38–73)
NRBC BLD-RTO: 0 /100 WBC
OVALOCYTES BLD QL SMEAR: ABNORMAL
PHOSPHATE SERPL-MCNC: 1.9 MG/DL (ref 2.7–4.5)
PLATELET # BLD AUTO: 17 K/UL (ref 150–450)
PLATELET BLD QL SMEAR: ABNORMAL
PMV BLD AUTO: 12.3 FL (ref 9.2–12.9)
POCT GLUCOSE: 137 MG/DL (ref 70–110)
POCT GLUCOSE: 141 MG/DL (ref 70–110)
POCT GLUCOSE: 155 MG/DL (ref 70–110)
POIKILOCYTOSIS BLD QL SMEAR: SLIGHT
POLYCHROMASIA BLD QL SMEAR: ABNORMAL
POTASSIUM SERPL-SCNC: 3.9 MMOL/L (ref 3.5–5.1)
PROT SERPL-MCNC: 7.6 G/DL (ref 6–8.4)
PROTHROMBIN TIME: 19.9 SEC (ref 9–12.5)
RBC # BLD AUTO: 2.17 M/UL (ref 4.6–6.2)
SODIUM SERPL-SCNC: 138 MMOL/L (ref 136–145)
WBC # BLD AUTO: 1.97 K/UL (ref 3.9–12.7)

## 2024-08-17 PROCEDURE — 25000003 PHARM REV CODE 250: Mod: NTX

## 2024-08-17 PROCEDURE — 85025 COMPLETE CBC W/AUTO DIFF WBC: CPT | Mod: NTX | Performed by: INTERNAL MEDICINE

## 2024-08-17 PROCEDURE — 11000001 HC ACUTE MED/SURG PRIVATE ROOM: Mod: NTX

## 2024-08-17 PROCEDURE — 85384 FIBRINOGEN ACTIVITY: CPT | Mod: NTX | Performed by: INTERNAL MEDICINE

## 2024-08-17 PROCEDURE — 84100 ASSAY OF PHOSPHORUS: CPT | Mod: NTX

## 2024-08-17 PROCEDURE — 80053 COMPREHEN METABOLIC PANEL: CPT | Mod: NTX

## 2024-08-17 PROCEDURE — 83735 ASSAY OF MAGNESIUM: CPT | Mod: NTX

## 2024-08-17 PROCEDURE — 85610 PROTHROMBIN TIME: CPT | Mod: NTX | Performed by: INTERNAL MEDICINE

## 2024-08-17 PROCEDURE — P9047 ALBUMIN (HUMAN), 25%, 50ML: HCPCS | Mod: JZ,JG,NTX

## 2024-08-17 PROCEDURE — 25000003 PHARM REV CODE 250: Mod: NTX | Performed by: INTERNAL MEDICINE

## 2024-08-17 PROCEDURE — 63600175 PHARM REV CODE 636 W HCPCS: Mod: JZ,JG,NTX

## 2024-08-17 PROCEDURE — 25000003 PHARM REV CODE 250: Mod: NTX | Performed by: HOSPITALIST

## 2024-08-17 PROCEDURE — 99233 SBSQ HOSP IP/OBS HIGH 50: CPT | Mod: NTX,,, | Performed by: NEUROLOGICAL SURGERY

## 2024-08-17 PROCEDURE — 36415 COLL VENOUS BLD VENIPUNCTURE: CPT | Mod: NTX

## 2024-08-17 PROCEDURE — 36415 COLL VENOUS BLD VENIPUNCTURE: CPT | Mod: NTX | Performed by: INTERNAL MEDICINE

## 2024-08-17 RX ORDER — SODIUM,POTASSIUM PHOSPHATES 280-250MG
2 POWDER IN PACKET (EA) ORAL
Status: COMPLETED | OUTPATIENT
Start: 2024-08-17 | End: 2024-08-18

## 2024-08-17 RX ORDER — ACETAMINOPHEN 325 MG/1
650 TABLET ORAL EVERY 8 HOURS PRN
Status: DISCONTINUED | OUTPATIENT
Start: 2024-08-17 | End: 2024-08-20

## 2024-08-17 RX ORDER — ACETAMINOPHEN 325 MG/1
650 TABLET ORAL EVERY 6 HOURS PRN
Status: DISCONTINUED | OUTPATIENT
Start: 2024-08-17 | End: 2024-08-17

## 2024-08-17 RX ORDER — HYDROMORPHONE HYDROCHLORIDE 1 MG/ML
0.2 INJECTION, SOLUTION INTRAMUSCULAR; INTRAVENOUS; SUBCUTANEOUS EVERY 6 HOURS PRN
Status: DISCONTINUED | OUTPATIENT
Start: 2024-08-17 | End: 2024-08-17

## 2024-08-17 RX ORDER — LACTULOSE 10 G/15ML
30 SOLUTION ORAL 3 TIMES DAILY
Status: DISCONTINUED | OUTPATIENT
Start: 2024-08-17 | End: 2024-08-22 | Stop reason: HOSPADM

## 2024-08-17 RX ADMIN — FLUOXETINE HYDROCHLORIDE 20 MG: 20 CAPSULE ORAL at 09:08

## 2024-08-17 RX ADMIN — LEVETIRACETAM 500 MG: 500 TABLET, FILM COATED ORAL at 09:08

## 2024-08-17 RX ADMIN — LACTULOSE 30 G: 20 SOLUTION ORAL at 09:08

## 2024-08-17 RX ADMIN — PYRIDOXINE HCL TAB 50 MG 100 MG: 50 TAB at 09:08

## 2024-08-17 RX ADMIN — POTASSIUM & SODIUM PHOSPHATES POWDER PACK 280-160-250 MG 2 PACKET: 280-160-250 PACK at 04:08

## 2024-08-17 RX ADMIN — ELVITEGRAVIR, COBICISTAT, EMTRICITABINE, AND TENOFOVIR DISOPROXIL FUMARATE 1 TABLET: 150; 150; 200; 300 TABLET, FILM COATED ORAL at 09:08

## 2024-08-17 RX ADMIN — PANTOPRAZOLE SODIUM 40 MG: 40 TABLET, DELAYED RELEASE ORAL at 09:08

## 2024-08-17 RX ADMIN — TAMSULOSIN HYDROCHLORIDE 0.4 MG: 0.4 CAPSULE ORAL at 09:08

## 2024-08-17 RX ADMIN — RIFAXIMIN 550 MG: 550 TABLET ORAL at 09:08

## 2024-08-17 RX ADMIN — CHOLECALCIFEROL TAB 25 MCG (1000 UNIT) 1000 UNITS: 25 TAB at 09:08

## 2024-08-17 RX ADMIN — CYANOCOBALAMIN TAB 1000 MCG 1000 MCG: 1000 TAB at 09:08

## 2024-08-17 RX ADMIN — LACTULOSE 30 G: 20 SOLUTION ORAL at 12:08

## 2024-08-17 RX ADMIN — METOPROLOL SUCCINATE 25 MG: 25 TABLET, EXTENDED RELEASE ORAL at 09:08

## 2024-08-17 RX ADMIN — CETIRIZINE HYDROCHLORIDE 10 MG: 5 TABLET, FILM COATED ORAL at 09:08

## 2024-08-17 RX ADMIN — LACTULOSE 30 G: 20 SOLUTION ORAL at 06:08

## 2024-08-17 RX ADMIN — MENTHOL, METHYL SALICYLATE: 10; 15 CREAM TOPICAL at 03:08

## 2024-08-17 RX ADMIN — ACETAMINOPHEN 650 MG: 325 TABLET ORAL at 06:08

## 2024-08-17 RX ADMIN — ALBUMIN (HUMAN) 50 G: 12.5 SOLUTION INTRAVENOUS at 01:08

## 2024-08-17 RX ADMIN — ATORVASTATIN CALCIUM 40 MG: 40 TABLET, FILM COATED ORAL at 09:08

## 2024-08-17 RX ADMIN — POTASSIUM & SODIUM PHOSPHATES POWDER PACK 280-160-250 MG 2 PACKET: 280-160-250 PACK at 09:08

## 2024-08-17 RX ADMIN — ATOVAQUONE 1500 MG: 750 SUSPENSION ORAL at 09:08

## 2024-08-17 NOTE — ASSESSMENT & PLAN NOTE
Patient initially presented to OSH with HA and CT head showing b/l subdural hematomas. CT head at follow up visit showed increase in b/l subdural hematomas size. IR performed MMA embolization 8/16/24. Pt tolerated procedure well. Post-op CT head revealing stable SDH. AAOx4.     Plan:   - Keppra 500mg BID for seizure prophylaxis.   - SBP goal 140  - fall precautions

## 2024-08-17 NOTE — SUBJECTIVE & OBJECTIVE
Interval History: overnight pt complained of headache. Today pt says headache is gone but complains of chronic back pain. Mentation continues to improve. Urinating and having consistent BM. IR access site clean. No ascites.     Review of Systems  Objective:     Vital Signs (Most Recent):  Temp: 98.2 °F (36.8 °C) (08/17/24 1053)  Pulse: (!) 58 (08/17/24 1501)  Resp: 16 (08/17/24 1053)  BP: 129/63 (08/17/24 1053)  SpO2: (!) 94 % (08/17/24 1053) Vital Signs (24h Range):  Temp:  [97 °F (36.1 °C)-99.2 °F (37.3 °C)] 98.2 °F (36.8 °C)  Pulse:  [53-70] 58  Resp:  [16-20] 16  SpO2:  [94 %-98 %] 94 %  BP: (124-141)/(60-70) 129/63     Weight: 116.2 kg (256 lb 2.8 oz)  Body mass index is 33.8 kg/m².    Intake/Output Summary (Last 24 hours) at 8/17/2024 1535  Last data filed at 8/16/2024 1549  Gross per 24 hour   Intake --   Output 450 ml   Net -450 ml         Physical Exam  Constitutional:       Appearance: He is ill-appearing.   HENT:      Head: Normocephalic and atraumatic.      Mouth/Throat:      Mouth: Mucous membranes are dry.   Eyes:      Extraocular Movements: Extraocular movements intact.      Conjunctiva/sclera: Conjunctivae normal.      Pupils: Pupils are equal, round, and reactive to light.   Cardiovascular:      Rate and Rhythm: Normal rate and regular rhythm.      Pulses: Normal pulses.      Heart sounds: Normal heart sounds.   Pulmonary:      Effort: Pulmonary effort is normal.      Breath sounds: Normal breath sounds.   Abdominal:      General: Abdomen is flat. Bowel sounds are normal.      Palpations: Abdomen is soft.   Musculoskeletal:         General: Normal range of motion.      Lumbar back: No swelling, signs of trauma or tenderness.   Skin:     General: Skin is warm and dry.      Capillary Refill: Capillary refill takes less than 2 seconds.      Coloration: Skin is not jaundiced.      Comments: Clean R wrist access site.    Neurological:      Mental Status: He is alert and oriented to person, place, and  time.      Motor: No abnormal muscle tone.   Psychiatric:         Mood and Affect: Mood normal.             Significant Labs: All pertinent labs within the past 24 hours have been reviewed.    Significant Imaging: I have reviewed all pertinent imaging results/findings within the past 24 hours.

## 2024-08-17 NOTE — SUBJECTIVE & OBJECTIVE
Interval History: 8/17: CTH stable. No headaches. Plts 19 this am in setting of chronic thrombocytopenia. S/p bilateral mma embo.    Medications:  Continuous Infusions:  Scheduled Meds:   atorvastatin  40 mg Oral Daily    atovaquone  1,500 mg Oral Daily    cetirizine  10 mg Oral Daily    cyanocobalamin  1,000 mcg Oral Daily    fkyoivdz-bvuxzdmr-rytwfi-tenof (STRIBILD) [id number] mg  1 tablet Oral Daily    ergocalciferol  50,000 Units Oral Q7 Days    FLUoxetine  20 mg Oral Daily    lactulose  30 g Oral Q6H    levETIRAcetam  500 mg Oral BID    methyl salicylate-menthol 15-10%   Topical (Top) TID    metoprolol succinate  25 mg Oral Daily    pantoprazole  40 mg Oral Daily    pyridoxine (vitamin B6)  100 mg Oral Daily    rifAXImin  550 mg Oral BID    tamsulosin  0.4 mg Oral QHS    vitamin D  1,000 Units Oral Daily     PRN Meds:  Current Facility-Administered Medications:     benzonatate, 100 mg, Oral, TID PRN    bisacodyL, 10 mg, Rectal, Daily PRN    dextrose 10%, 12.5 g, Intravenous, PRN    dextrose 10%, 25 g, Intravenous, PRN    glucagon (human recombinant), 1 mg, Intramuscular, PRN    glucose, 16 g, Oral, PRN    glucose, 24 g, Oral, PRN    insulin aspart U-100, 0-5 Units, Subcutaneous, QID (AC + HS) PRN    midodrine, 2.5 mg, Oral, TID PRN    naloxone, 0.02 mg, Intravenous, PRN    sodium chloride 0.9%, 10 mL, Intravenous, Q12H PRN    sodium chloride 0.9%, 10 mL, Intravenous, PRN     Review of Systems  Objective:     Weight: 116.2 kg (256 lb 2.8 oz)  Body mass index is 33.8 kg/m².  Vital Signs (Most Recent):  Temp: 97.6 °F (36.4 °C) (08/17/24 0731)  Pulse: (!) 59 (08/17/24 0731)  Resp: 20 (08/17/24 0731)  BP: 133/60 (08/17/24 0731)  SpO2: 97 % (08/17/24 0731) Vital Signs (24h Range):  Temp:  [97 °F (36.1 °C)-99.2 °F (37.3 °C)] 97.6 °F (36.4 °C)  Pulse:  [49-70] 59  Resp:  [15-20] 20  SpO2:  [93 %-100 %] 97 %  BP: (103-144)/(60-81) 133/60                         Male External Urinary Catheter 08/13/24 0800 Large  (Active)   Collection Container Standard drainage bag 08/17/24 0600   Securement Method secured to top of thigh w/ adhesive device 08/17/24 0600   Skin no redness;no breakdown 08/17/24 0600   Output (mL) 500 mL 08/14/24 2000       Neurosurgery Physical Exam    General: AOx3, GCS [id number], slow to respond  CNII-XII: Intact on detailed exam, PERRL, visual fields grossly intact, EOMi, facial sensation preserved, no facial assymetry, tongue/uvula/palate midline, shoulder shrug equal, no pronator drift  Extremities: 5/5 motor throughout, sensorium intact throughout, coordination intact throughout, DTRs 2+, no pathological reflexes, no sensory level present      Significant Labs:  Recent Labs   Lab 08/16/24  0212 08/17/24  0234   GLU 96 154*    138   K 3.7 3.9    110   CO2 19* 19*   BUN 15 22*   CREATININE 1.5* 1.7*   CALCIUM 9.6 10.2   MG 1.9 2.0     Recent Labs   Lab 08/15/24  1814 08/16/24  1749   WBC 1.26* 0.69*   HGB 7.8* 7.8*   HCT 23.0* 24.3*   PLT 20* 19*     Recent Labs   Lab 08/15/24  1814 08/16/24  1749   INR 1.7* 1.7*     Microbiology Results (last 7 days)       ** No results found for the last 168 hours. **          All pertinent labs from the last 24 hours have been reviewed.    Significant Diagnostics:  I have reviewed all pertinent imaging in the last 24 hours.

## 2024-08-17 NOTE — ASSESSMENT & PLAN NOTE
The likely etiology of thrombocytopenia is liver disease and platelet consumption from splenic sequestration .   Plt consistently low and barely respond to transfusion.   Plan  - transfuse if plt <10,000  - Per heme/onc recs, recommend judicious use of PLT transfusions given he is appears to be refractory likely due to splenic consumption. He is unlikely to get to >50k. Recommend FFP or cryo only if he has evidence of clinical significant bleeding. Do not transfuse for the sake of correcting his coags in the absence of bleeding as he is at high risk for TACO (he may have esophageal varices). Recommend GOC conversations as ultimately his overall clinical picture is secondary to cirrhosis.

## 2024-08-17 NOTE — ASSESSMENT & PLAN NOTE
58M PMH HIV, thrombocytopenia, liver cirrhosis, CAD s/p CABG (many years ago) with enlarging bilateral chronic SDH transferred from [facility] beds at the [facility] after CTH 8/7 without prior intervention    Plan:  Admitted medicine; q4h nc/vs  Repeat CTH 8/13 stable  - S/p MMA embolization yesterday   - repeat CTH post procedure is stable  - platelets low, per heme do not give plts as pt likely has splenic consumption and won't get > 50k   - FFP per primary  INR goal <1.4, plt goal >100k if medically feasible  On Keppra 500 BID. Recommend consult to neurology +/- EEG and AED management.  VTE chemoprophylaxis held in the setting of thrombocytopenia   Please notify nsgy of any acute neurological decline or of any further questions/concerns  Preponderance of medical care per primary team    Dispo: ongoing

## 2024-08-17 NOTE — PROGRESS NOTES
Esequiel Lea - Neurosurgery ([facility])  [facility]  Progress Note    Patient Name: Jam Card  MRN: [id number]  Patient Class: IP- Inpatient   Admission Date: 8/10/2024  Length of Stay: 7 days  Attending Physician: Rudy Sotomayor MD  Primary Care Provider: Ulysses Almaraz MD        Subjective:     Principal Problem:Bilateral subdural hematomas        HPI:  Mr. Card is a 57 yo M with PMHx of Hep C cirrhosis, HIV, chronic thrombocytopenia, HTN, GERD, HLD, CAD s/p CABG (many years ago) who was initially admitted to [facility] under  after being referred to the ED by NSGY due to worsening SDH on imaging. Patient was previously admitted to [facility] for evaluation of HA (7/19/24) with a CT head showing b/l chronic extra-axial fluid collections which possibly could be CSF. Bur hole drainage was considered but coagulopathy and platelet issue needed to be corrected prior. Patient also did not want drainage at that time. Patient f/u with [facility] neurotrauma unit with repeat CT head (8/7/24) but no longer experienced HA, just gait disturbance. CT head showed blood developing since prior CT head which led to ED visit and evaluation. Heme/Onc was consulted and believe that thrombocytopenia could be to splenic sequestration so improvement of plt function may only be temporary at best. NSGY at [facility] requested inter facility transfer. Hepatology at [facility] also agreed. Patient transferred to [facility] for NSGY, Heme/Onc, and Hepatology.     Patient hemodynamically stable on arrival. MELD 27, Na 135, Cr 1.72 (1.8 baseline), INR 1.7, CBC 0.9 > 7.7 < 37 (s/p 2U plt and FFP, with initial INR 1.9). No focal neuro deficits at this time. GCS 15 on exam. Patient denies HA, weakness, numbness, vision disturbance, dizziness, photophobia, or any other complaints.     Overview/Hospital Course:  See assessment and plan.     Interval History: overnight pt complained of headache. Today pt says headache is gone but complains of chronic  back pain. Mentation continues to improve. Urinating and having consistent BM. IR access site clean. No ascites.     Review of Systems  Objective:     Vital Signs (Most Recent):  Temp: 98.2 °F (36.8 °C) (08/17/24 1053)  Pulse: (!) 58 (08/17/24 1501)  Resp: 16 (08/17/24 1053)  BP: 129/63 (08/17/24 1053)  SpO2: (!) 94 % (08/17/24 1053) Vital Signs (24h Range):  Temp:  [97 °F (36.1 °C)-99.2 °F (37.3 °C)] 98.2 °F (36.8 °C)  Pulse:  [53-70] 58  Resp:  [16-20] 16  SpO2:  [94 %-98 %] 94 %  BP: (124-141)/(60-70) 129/63     Weight: 116.2 kg (256 lb 2.8 oz)  Body mass index is 33.8 kg/m².    Intake/Output Summary (Last 24 hours) at 8/17/2024 1535  Last data filed at 8/16/2024 1549  Gross per 24 hour   Intake --   Output 450 ml   Net -450 ml         Physical Exam  Constitutional:       Appearance: He is ill-appearing.   HENT:      Head: Normocephalic and atraumatic.      Mouth/Throat:      Mouth: Mucous membranes are dry.   Eyes:      Extraocular Movements: Extraocular movements intact.      Conjunctiva/sclera: Conjunctivae normal.      Pupils: Pupils are equal, round, and reactive to light.   Cardiovascular:      Rate and Rhythm: Normal rate and regular rhythm.      Pulses: Normal pulses.      Heart sounds: Normal heart sounds.   Pulmonary:      Effort: Pulmonary effort is normal.      Breath sounds: Normal breath sounds.   Abdominal:      General: Abdomen is flat. Bowel sounds are normal.      Palpations: Abdomen is soft.   Musculoskeletal:         General: Normal range of motion.      Lumbar back: No swelling, signs of trauma or tenderness.   Skin:     General: Skin is warm and dry.      Capillary Refill: Capillary refill takes less than 2 seconds.      Coloration: Skin is not jaundiced.      Comments: Clean R wrist access site.    Neurological:      Mental Status: He is alert and oriented to person, place, and time.      Motor: No abnormal muscle tone.   Psychiatric:         Mood and Affect: Mood normal.              Significant Labs: All pertinent labs within the past 24 hours have been reviewed.    Significant Imaging: I have reviewed all pertinent imaging results/findings within the past 24 hours.    Assessment/Plan:      * Bilateral subdural hematomas  Patient initially presented to OSH with HA and CT head showing b/l subdural hematomas. CT head at follow up visit showed increase in b/l subdural hematomas size. IR performed MMA embolization 8/16/24. Pt tolerated procedure well. Post-op CT head revealing stable SDH. AAOx4.     Plan:   - Keppra 500mg BID for seizure prophylaxis.   - SBP goal 140  - fall precautions              JASON (acute kidney injury)  JASON is likely due to acute tubular necrosis caused by ammonia . Baseline creatinine is unknown. Most recent creatinine and eGFR are listed below. Overall, Cr and electrolytes improving. Most likely new baseline.   Recent Labs     08/15/24  0252 08/16/24  0212 08/17/24  0234   CREATININE 1.5* 1.5* 1.7*   EGFRNORACEVR 54.3* 54.3* 46.7*        Plan  - Avoid nephrotoxins and renally dose meds for GFR listed above  - Monitor urine output, serial BMP, and adjust therapy as needed  - discontinued albumin as he is in normal range.     Depression  Continue fluoxetine 20mg      HLD (hyperlipidemia)  Continue statin      Pancytopenia  The likely etiology of thrombocytopenia is liver disease and platelet consumption from splenic sequestration .   Plt consistently low and barely respond to transfusion.   Plan  - transfuse if plt <10,000  - Per heme/onc recs, recommend judicious use of PLT transfusions given he is appears to be refractory likely due to splenic consumption. He is unlikely to get to >50k. Recommend FFP or cryo only if he has evidence of clinical significant bleeding. Do not transfuse for the sake of correcting his coags in the absence of bleeding as he is at high risk for TACO (he may have esophageal varices). Recommend GOC conversations as ultimately his overall clinical  picture is secondary to cirrhosis.           Cirrhosis of liver with ascites  Patient with known Cirrhosis. Currently AAOx4 with ammonia down-trending.     MELD-Na score calculated; MELD 3.0: 25 at 8/17/2024  8:39 AM  MELD-Na: 24 at 8/17/2024  8:39 AM  Calculated from:  Serum Creatinine: 1.7 mg/dL at 8/17/2024  2:34 AM  Serum Sodium: 138 mmol/L (Using max of 137 mmol/L) at 8/17/2024  2:34 AM  Total Bilirubin: 4.6 mg/dL at 8/17/2024  2:34 AM  Serum Albumin: 4.7 g/dL (Using max of 3.5 g/dL) at 8/17/2024  2:34 AM  INR(ratio): 1.9 at 8/17/2024  8:39 AM  Age at listing (hypothetical): 58 years  Sex: Male at 8/17/2024  8:39 AM    Plan:  -Continue chronic meds. Etiology likely Hepatitis. Will avoid any hepatotoxic meds, and monitor CBC/CMP/INR for synthetic function.   - F/u ammonia, dbili  - Strict I/Os  - continue PT/OT, waiting on placement   - low salt diet  - Discontinued lasix 40mg and spironolactone 50mg in setting of JASON  - Continue lactulose and rifaximin with goal 2-3 BM daily.   - Hepatology consulted, appreciate recs  - transplant eval on hold in setting of low CD4 and SDH. Rec palliative care consult.         Human immunodeficiency virus (HIV) disease  Previously was taking GENVOYA.     Started STRIBILD, hospital equivalent. Will confirm with pharmacy       Chronic hepatitis C  Recent Hep C RNA showed elevated viral load 7/11/24. Not currently on treatment.     Can consider repeat viral load if clinically indicated          VTE Risk Mitigation (From admission, onward)           Ordered     IP VTE HIGH RISK PATIENT  Once         08/10/24 2347     Place sequential compression device  Until discontinued         08/10/24 2347                    Discharge Planning   CODI: 8/20/2024     Code Status: Full Code   Is the patient medically ready for discharge?:     Reason for patient still in hospital (select all that apply): Patient trending condition  Discharge Plan A: Home Health                  Ryann Almazan  MD  Department of Hospital Medicine   Esequiel Lea - Neurosurgery ([facility])

## 2024-08-17 NOTE — ASSESSMENT & PLAN NOTE
Patient with known Cirrhosis. Currently AAOx4 with ammonia down-trending.     MELD-Na score calculated; MELD 3.0: 25 at 8/17/2024  8:39 AM  MELD-Na: 24 at 8/17/2024  8:39 AM  Calculated from:  Serum Creatinine: 1.7 mg/dL at 8/17/2024  2:34 AM  Serum Sodium: 138 mmol/L (Using max of 137 mmol/L) at 8/17/2024  2:34 AM  Total Bilirubin: 4.6 mg/dL at 8/17/2024  2:34 AM  Serum Albumin: 4.7 g/dL (Using max of 3.5 g/dL) at 8/17/2024  2:34 AM  INR(ratio): 1.9 at 8/17/2024  8:39 AM  Age at listing (hypothetical): 58 years  Sex: Male at 8/17/2024  8:39 AM    Plan:  -Continue chronic meds. Etiology likely Hepatitis. Will avoid any hepatotoxic meds, and monitor CBC/CMP/INR for synthetic function.   - F/u ammonia, dbili  - Strict I/Os  - continue PT/OT, waiting on placement   - low salt diet  - Discontinued lasix 40mg and spironolactone 50mg in setting of JASON  - Continue lactulose and rifaximin with goal 2-3 BM daily.   - Hepatology consulted, appreciate recs  - transplant eval on hold in setting of low CD4 and SDH. Rec palliative care consult.

## 2024-08-17 NOTE — ASSESSMENT & PLAN NOTE
JASON is likely due to acute tubular necrosis caused by ammonia . Baseline creatinine is unknown. Most recent creatinine and eGFR are listed below. Overall, Cr and electrolytes improving. Most likely new baseline.   Recent Labs     08/15/24  0252 08/16/24  0212 08/17/24  0234   CREATININE 1.5* 1.5* 1.7*   EGFRNORACEVR 54.3* 54.3* 46.7*        Plan  - Avoid nephrotoxins and renally dose meds for GFR listed above  - Monitor urine output, serial BMP, and adjust therapy as needed  - discontinued albumin as he is in normal range.

## 2024-08-17 NOTE — PROGRESS NOTES
Esequiel Lea - Neurosurgery ([facility])  Neurosurgery  Progress Note    Subjective:     History of Present Illness: Mr. Card is a 57 yo M with PMHx of Hep C cirrhosis, HIV, chronic thrombocytopenia, HTN, GERD, HLD, CAD s/p CABG (many years ago) who was initially admitted to [facility] under  after being referred to the ED by NSGY due to worsening SDH on imaging. Patient was previously admitted to [facility] for evaluation of HA (7/19/24) with a CT head showing b/l chronic extra-axial fluid collections which possibly could be CSF. Bur hole drainage was considered but coagulopathy and platelet issue needed to be corrected prior. Patient also did not want drainage at that time. Patient f/u with [facility] neurotrauma unit with repeat CT head (8/7/24) but no longer experienced HA, just gait disturbance. CT head showed blood developing since prior CT head which led to ED visit and evaluation. Heme/Onc was consulted and believe that thrombocytopenia could be to splenic sequestration so improvement of plt function may only be temporary at best. NSGY at [facility] requested inter facility transfer. Hepatology at [facility] also agreed. Patient transferred to [facility] for NSGY, Heme/Onc, and Hepatology.      Patient hemodynamically stable on arrival. MELD 27, Na 135, Cr 1.72 (1.8 baseline), INR 1.7, CBC 0.9 > 7.7 < 37 (s/p 2U plt and FFP, with initial INR 1.9). No focal neuro deficits at this time. GCS 15 on exam. Patient denies HA, weakness, numbness, vision disturbance, dizziness, photophobia, or any other complaints    CTH reordered to assess stability    Post-Op Info:  * No surgery found *       Interval History: 8/17: CTH stable. No headaches. Plts 19 this am in setting of chronic thrombocytopenia. S/p bilateral mma embo.    Medications:  Continuous Infusions:  Scheduled Meds:   atorvastatin  40 mg Oral Daily    atovaquone  1,500 mg Oral Daily    cetirizine  10 mg Oral Daily    cyanocobalamin  1,000 mcg Oral Daily     xeojhohy-zolderdh-ujjukn-tenof (STRIBILD) [phone] mg  1 tablet Oral Daily    ergocalciferol  50,000 Units Oral Q7 Days    FLUoxetine  20 mg Oral Daily    lactulose  30 g Oral Q6H    levETIRAcetam  500 mg Oral BID    methyl salicylate-menthol 15-10%   Topical (Top) TID    metoprolol succinate  25 mg Oral Daily    pantoprazole  40 mg Oral Daily    pyridoxine (vitamin B6)  100 mg Oral Daily    rifAXImin  550 mg Oral BID    tamsulosin  0.4 mg Oral QHS    vitamin D  1,000 Units Oral Daily     PRN Meds:  Current Facility-Administered Medications:     benzonatate, 100 mg, Oral, TID PRN    bisacodyL, 10 mg, Rectal, Daily PRN    dextrose 10%, 12.5 g, Intravenous, PRN    dextrose 10%, 25 g, Intravenous, PRN    glucagon (human recombinant), 1 mg, Intramuscular, PRN    glucose, 16 g, Oral, PRN    glucose, 24 g, Oral, PRN    insulin aspart U-100, 0-5 Units, Subcutaneous, QID (AC + HS) PRN    midodrine, 2.5 mg, Oral, TID PRN    naloxone, 0.02 mg, Intravenous, PRN    sodium chloride 0.9%, 10 mL, Intravenous, Q12H PRN    sodium chloride 0.9%, 10 mL, Intravenous, PRN     Review of Systems  Objective:     Weight: 116.2 kg (256 lb 2.8 oz)  Body mass index is 33.8 kg/m².  Vital Signs (Most Recent):  Temp: 97.6 °F (36.4 °C) (08/17/24 0731)  Pulse: (!) 59 (08/17/24 0731)  Resp: 20 (08/17/24 0731)  BP: 133/60 (08/17/24 0731)  SpO2: 97 % (08/17/24 0731) Vital Signs (24h Range):  Temp:  [97 °F (36.1 °C)-99.2 °F (37.3 °C)] 97.6 °F (36.4 °C)  Pulse:  [49-70] 59  Resp:  [15-20] 20  SpO2:  [93 %-100 %] 97 %  BP: (103-144)/(60-81) 133/60                         Male External Urinary Catheter 08/13/24 0800 Large (Active)   Collection Container Standard drainage bag 08/17/24 0600   Securement Method secured to top of thigh w/ adhesive device 08/17/24 0600   Skin no redness;no breakdown 08/17/24 0600   Output (mL) 500 mL 08/14/24 2000       Neurosurgery Physical Exam    General: AOx3, GCS [id number], slow to respond  CNII-XII: Intact on  detailed exam, PERRL, visual fields grossly intact, EOMi, facial sensation preserved, no facial assymetry, tongue/uvula/palate midline, shoulder shrug equal, no pronator drift  Extremities: 5/5 motor throughout, sensorium intact throughout, coordination intact throughout, DTRs 2+, no pathological reflexes, no sensory level present      Significant Labs:  Recent Labs   Lab 08/16/24  0212 08/17/24  0234   GLU 96 154*    138   K 3.7 3.9    110   CO2 19* 19*   BUN 15 22*   CREATININE 1.5* 1.7*   CALCIUM 9.6 10.2   MG 1.9 2.0     Recent Labs   Lab 08/15/24  1814 08/16/24  1749   WBC 1.26* 0.69*   HGB 7.8* 7.8*   HCT 23.0* 24.3*   PLT 20* 19*     Recent Labs   Lab 08/15/24  1814 08/16/24  1749   INR 1.7* 1.7*     Microbiology Results (last 7 days)       ** No results found for the last 168 hours. **          All pertinent labs from the last 24 hours have been reviewed.    Significant Diagnostics:  I have reviewed all pertinent imaging in the last 24 hours.  Assessment/Plan:     * Bilateral subdural hematomas  58M PMH HIV, thrombocytopenia, liver cirrhosis, CAD s/p CABG (many years ago) with enlarging bilateral chronic SDH transferred from [facility] after CTH 8/7 without prior intervention    Plan:  Admitted medicine; q4h nc/vs  Repeat CTH 8/13 stable  - S/p MMA embolization yesterday   - repeat CTH post procedure is stable  - platelets low, per heme do not give plts as pt likely has splenic consumption and won't get > 50k   - FFP per primary  INR goal <1.4, plt goal >100k if medically feasible  On Keppra 500 BID. Recommend consult to neurology +/- EEG and AED management.  VTE chemoprophylaxis held in the setting of thrombocytopenia   Please notify nsgy of any acute neurological decline or of any further questions/concerns  Preponderance of medical care per primary team    Dispo: ongoing        Allen Miller MD  Neurosurgery  Esequiel jenelle - Neurosurgery ([facility])

## 2024-08-17 NOTE — HOSPITAL COURSE
BL SDH:   Patient initially presented to OSH with HA and CT head showing b/l subdural hematomas. CT head at follow up visit showed increase in b/l subdural hematomas size. IR performed MMA embolization 8/16/24. Pt tolerated procedure well. Post-op CT head revealing stable SDH. AAOx4. NSGY signed off. Will arrange outpatient follow-up in 2 weeks with CTH. Continue Keppra 500mg BID for seizure prophylaxis.     Liver failure:   Patient with known Cirrhosis, etiology most likely hepititis. Discontinued lasix 40mg and spironolactone 50mg in setting of JASON. Currently AAOx4 with ammonia down-trending. Hepatology on board, postponed transplant evaluation in setting of low CD4 and BL SDH. Labs improved with lactulose and rifaximin. CMP 1 week after DC to monitor Tbili and ammonia. Home health.    MELD-Na score calculated; MELD 3.0: 24 at 8/22/2024  3:47 AM  MELD-Na: 24 at 8/22/2024  3:47 AM  Calculated from:  Serum Creatinine: 1.2 mg/dL at 8/22/2024  3:47 AM  Serum Sodium: 135 mmol/L at 8/22/2024  3:47 AM  Total Bilirubin: 7.1 mg/dL at 8/22/2024  3:47 AM  Serum Albumin: 3.4 g/dL at 8/22/2024  3:47 AM  INR(ratio): 1.9 at 8/21/2024  3:34 AM  Age at listing (hypothetical): 58 years  Sex: Male at 8/22/2024  3:47 AM      Pancytopenia:   The likely etiology of thrombocytopenia is liver disease and platelet consumption from splenic sequestration.   Plt consistently low and barely respond to transfusion. Per heme/onc recs, recommend judicious use of PLT transfusions given he is appears to be refractory likely due to splenic consumption. He is unlikely to get to >50k. Recommend FFP or cryo only if he has evidence of clinical significant bleeding. Do not transfuse for the sake of correcting his coags in the absence of bleeding as he is at high risk for TACO (he may have esophageal varices).      JASON:  JASON is likely due to acute tubular necrosis caused by ammonia. Baseline creatinine is unknown. Most recent creatinine and eGFR are  listed below. Overall, Cr and electrolytes improving. Most likely new baseline.   Recent Labs     08/20/24  0218 08/21/24  0334 08/22/24  0347   CREATININE 1.5* 1.3 1.2   EGFRNORACEVR 54.3* >60.0 >60.0       Lumbar back pain:  Chronic lumbar back pain. No swelling, tenderness to palpation, or ulcer. Most likely due to being sedentary for an extended period of time. Improves with baclofen and tylenol.

## 2024-08-18 LAB
POCT GLUCOSE: 129 MG/DL (ref 70–110)
POCT GLUCOSE: 138 MG/DL (ref 70–110)

## 2024-08-18 PROCEDURE — 97535 SELF CARE MNGMENT TRAINING: CPT | Mod: NTX

## 2024-08-18 PROCEDURE — 97530 THERAPEUTIC ACTIVITIES: CPT | Mod: NTX,CQ

## 2024-08-18 PROCEDURE — 97110 THERAPEUTIC EXERCISES: CPT | Mod: NTX,CQ

## 2024-08-18 PROCEDURE — 25000003 PHARM REV CODE 250: Mod: NTX

## 2024-08-18 PROCEDURE — 99233 SBSQ HOSP IP/OBS HIGH 50: CPT | Mod: NTX,,, | Performed by: NEUROLOGICAL SURGERY

## 2024-08-18 PROCEDURE — 25000003 PHARM REV CODE 250: Mod: NTX | Performed by: HOSPITALIST

## 2024-08-18 PROCEDURE — 97116 GAIT TRAINING THERAPY: CPT | Mod: NTX,CQ

## 2024-08-18 PROCEDURE — 11000001 HC ACUTE MED/SURG PRIVATE ROOM: Mod: NTX

## 2024-08-18 RX ORDER — BACLOFEN 10 MG/1
10 TABLET ORAL ONCE
Status: COMPLETED | OUTPATIENT
Start: 2024-08-18 | End: 2024-08-18

## 2024-08-18 RX ADMIN — CETIRIZINE HYDROCHLORIDE 10 MG: 5 TABLET, FILM COATED ORAL at 08:08

## 2024-08-18 RX ADMIN — ATOVAQUONE 1500 MG: 750 SUSPENSION ORAL at 08:08

## 2024-08-18 RX ADMIN — PYRIDOXINE HCL TAB 50 MG 100 MG: 50 TAB at 08:08

## 2024-08-18 RX ADMIN — ACETAMINOPHEN 650 MG: 325 TABLET ORAL at 03:08

## 2024-08-18 RX ADMIN — CYANOCOBALAMIN TAB 1000 MCG 1000 MCG: 1000 TAB at 08:08

## 2024-08-18 RX ADMIN — TAMSULOSIN HYDROCHLORIDE 0.4 MG: 0.4 CAPSULE ORAL at 08:08

## 2024-08-18 RX ADMIN — POTASSIUM & SODIUM PHOSPHATES POWDER PACK 280-160-250 MG 2 PACKET: 280-160-250 PACK at 06:08

## 2024-08-18 RX ADMIN — LACTULOSE 30 G: 20 SOLUTION ORAL at 03:08

## 2024-08-18 RX ADMIN — LACTULOSE 30 G: 20 SOLUTION ORAL at 08:08

## 2024-08-18 RX ADMIN — BACLOFEN 10 MG: 10 TABLET ORAL at 10:08

## 2024-08-18 RX ADMIN — RIFAXIMIN 550 MG: 550 TABLET ORAL at 08:08

## 2024-08-18 RX ADMIN — POTASSIUM & SODIUM PHOSPHATES POWDER PACK 280-160-250 MG 2 PACKET: 280-160-250 PACK at 11:08

## 2024-08-18 RX ADMIN — FLUOXETINE HYDROCHLORIDE 20 MG: 20 CAPSULE ORAL at 08:08

## 2024-08-18 RX ADMIN — ERGOCALCIFEROL 50000 UNITS: 1.25 CAPSULE ORAL at 08:08

## 2024-08-18 RX ADMIN — CHOLECALCIFEROL TAB 25 MCG (1000 UNIT) 1000 UNITS: 25 TAB at 08:08

## 2024-08-18 RX ADMIN — ELVITEGRAVIR, COBICISTAT, EMTRICITABINE, AND TENOFOVIR DISOPROXIL FUMARATE 1 TABLET: 150; 150; 200; 300 TABLET, FILM COATED ORAL at 08:08

## 2024-08-18 RX ADMIN — ATORVASTATIN CALCIUM 40 MG: 40 TABLET, FILM COATED ORAL at 08:08

## 2024-08-18 RX ADMIN — PANTOPRAZOLE SODIUM 40 MG: 40 TABLET, DELAYED RELEASE ORAL at 06:08

## 2024-08-18 NOTE — PT/OT/SLP PROGRESS
"Occupational Therapy   Treatment    Name: Jam Card  MRN: [id number]  Admitting Diagnosis:  Bilateral subdural hematomas       Recommendations:     Discharge Recommendations: High Intensity Therapy  Discharge Equipment Recommendations:  bedside commode  Barriers to discharge:  None    Assessment:     Jam Card is a 58 y.o. male with a medical diagnosis of Bilateral subdural hematomas.  He presents with the following performance deficits affecting function: weakness, impaired endurance, impaired self care skills, impaired functional mobility, decreased safety awareness, gait instability, impaired balance, impaired cardiopulmonary response to activity, decreased coordination. Pt was willing to participate and tolerated session fairly well overall. Pt was able to perform functional mobility and ADLs assessed with little to no assistance. Pt required verbal cueing for safety awareness during t/f to BSC and encouragement to complete ADLs assessed with as much independence as possible.     Rehab Prognosis:  Good; patient would benefit from acute skilled OT services to address these deficits and reach maximum level of function.       Plan:     Patient to be seen 4 x/week to address the above listed problems via self-care/home management, therapeutic activities, therapeutic exercises, neuromuscular re-education  Plan of Care Expires: 09/15/24  Plan of Care Reviewed with: patient    Subjective     Chief Complaint: none stated  Patient/Family Comments/goals: "I need to use the bathroom."  Pain/Comfort:  Pain Rating 1: 0/10  Pain Rating Post-Intervention 1: 0/10    Objective:     Communicated with: RN prior to session.  Patient found supine with telemetry upon OT entry to room.    General Precautions: Standard, fall    Orthopedic Precautions:N/A  Braces: N/A  Respiratory Status: Room air     Occupational Performance:     Bed Mobility:    Patient completed Rolling/Turning to Left with  supervision  Patient completed " Scooting/Bridging with supervision  Patient completed Supine to Sit with supervision  Patient completed Sit to Supine with supervision     Functional Mobility/Transfers:  Patient completed Sit <> Stand Transfer with contact guard assistance  with  hand-held assist   Bed>BSC: CGA c/ HHA  BSC>bed: SBA no AD  Functional Mobility: ~6 steps to BSC; CGA c/ HHA; no LOB; pt c/ decreased coordination noted in BLE's  Mod vc's for sequencing c/ BSC t/f    Activities of Daily Living:  Lower Body Dressing: setup; pt donned socks sitting eob    Toileting: setup/Min A; setup c/ wipes; assistance c/ managing gown; pt performed perineal care in standing         [facility] 6 Click ADL: 21    Treatment & Education:  Pt edu on role of OT, POC, safety when performing self care tasks , benefit of performing OOB activity, and safety when performing functional transfers and mobility.  - White board updated  - Self care tasks completed-- as noted above      Patient left supine with all lines intact, call button in reach, bed alarm on, and RN notified    GOALS:   Multidisciplinary Problems       Occupational Therapy Goals          Problem: Occupational Therapy    Goal Priority Disciplines Outcome Interventions   Occupational Therapy Goal     OT, PT/OT Progressing    Description: Goals to be met by: 9/15/24     Patient will increase functional independence with ADLs by performing:    UE Dressing with Shiner.  LE Dressing with Shiner.  Grooming while standing with Shiner.  Bathing from  tub bench with Shiner.  Stand pivot transfers with Shiner.  Step transfer with Shiner  Toilet transfer to toilet with Shiner.                         Time Tracking:     OT Date of Treatment: 08/18/24  OT Start Time: 1352  OT Stop Time: 1407  OT Total Time (min): 15 min    Billable Minutes:Self Care/Home Management 15    OT/ANJALI: OT          8/18/2024

## 2024-08-18 NOTE — PROGRESS NOTES
Esequiel Lea - Neurosurgery ([facility])  Neurosurgery  Progress Note    Subjective:     History of Present Illness: Mr. Card is a 57 yo M with PMHx of Hep C cirrhosis, HIV, chronic thrombocytopenia, HTN, GERD, HLD, CAD s/p CABG (many years ago) who was initially admitted to [facility] under  after being referred to the ED by NSGY due to worsening SDH on imaging. Patient was previously admitted to [facility] for evaluation of HA (7/19/24) with a CT head showing b/l chronic extra-axial fluid collections which possibly could be CSF. Bur hole drainage was considered but coagulopathy and platelet issue needed to be corrected prior. Patient also did not want drainage at that time. Patient f/u with [facility] neurotrauma unit with repeat CT head (8/7/24) but no longer experienced HA, just gait disturbance. CT head showed blood developing since prior CT head which led to ED visit and evaluation. Heme/Onc was consulted and believe that thrombocytopenia could be to splenic sequestration so improvement of plt function may only be temporary at best. NSGY at [facility] requested inter facility transfer. Hepatology at [facility] also agreed. Patient transferred to [facility] for NSGY, Heme/Onc, and Hepatology.      Patient hemodynamically stable on arrival. MELD 27, Na 135, Cr 1.72 (1.8 baseline), INR 1.7, CBC 0.9 > 7.7 < 37 (s/p 2U plt and FFP, with initial INR 1.9). No focal neuro deficits at this time. GCS 15 on exam. Patient denies HA, weakness, numbness, vision disturbance, dizziness, photophobia, or any other complaints    CTH reordered to assess stability    Post-Op Info:  * No surgery found *       Interval History: 08/18/2024 No headaches, dizziness, visual changes. IPR pending per hospital medicine team. Plts 17, but per primary team will not transfuse unless < 10 given likelihood of splenic consumption.    Medications:  Continuous Infusions:  Scheduled Meds:   atorvastatin  40 mg Oral Daily    atovaquone  1,500 mg Oral Daily    cetirizine   10 mg Oral Daily    cyanocobalamin  1,000 mcg Oral Daily    yiqaatdy-cdrivcvj-cvzxbp-tenof (STRIBILD) [id number] mg  1 tablet Oral Daily    ergocalciferol  50,000 Units Oral Q7 Days    FLUoxetine  20 mg Oral Daily    lactulose  30 g Oral TID    pantoprazole  40 mg Oral Daily    potassium, sodium phosphates  2 packet Oral QID (AC & HS)    pyridoxine (vitamin B6)  100 mg Oral Daily    rifAXImin  550 mg Oral BID    tamsulosin  0.4 mg Oral QHS    vitamin D  1,000 Units Oral Daily     PRN Meds:  Current Facility-Administered Medications:     acetaminophen, 650 mg, Oral, Q8H PRN    benzonatate, 100 mg, Oral, TID PRN    bisacodyL, 10 mg, Rectal, Daily PRN    dextrose 10%, 12.5 g, Intravenous, PRN    dextrose 10%, 25 g, Intravenous, PRN    glucagon (human recombinant), 1 mg, Intramuscular, PRN    glucose, 16 g, Oral, PRN    glucose, 24 g, Oral, PRN    insulin aspart U-100, 0-5 Units, Subcutaneous, QID (AC + HS) PRN    midodrine, 2.5 mg, Oral, TID PRN    naloxone, 0.02 mg, Intravenous, PRN    sodium chloride 0.9%, 10 mL, Intravenous, Q12H PRN     Review of Systems  Objective:     Weight: 116.2 kg (256 lb 2.8 oz)  Body mass index is 33.8 kg/m².  Vital Signs (Most Recent):  Temp: 97.9 °F (36.6 °C) (08/18/24 0709)  Pulse: (!) 57 (08/18/24 0709)  Resp: 20 (08/18/24 0709)  BP: (!) 143/64 (08/18/24 0709)  SpO2: 98 % (08/18/24 0709) Vital Signs (24h Range):  Temp:  [97.5 °F (36.4 °C)-98.2 °F (36.8 °C)] 97.9 °F (36.6 °C)  Pulse:  [49-64] 57  Resp:  [16-20] 20  SpO2:  [94 %-98 %] 98 %  BP: (117-143)/(57-65) 143/64                         Male External Urinary Catheter 08/13/24 0800 Large (Active)   Collection Container Urimeter 08/18/24 0400   Securement Method secured to top of thigh w/ adhesive device 08/18/24 0400   Skin no redness;no breakdown 08/18/24 0400   Tolerance no signs/symptoms of discomfort 08/18/24 0400   Output (mL) 500 mL 08/14/24 2000     Neurosurgery Physical Exam     General: AOx3, GCS E4V5M6, slow to  respond  CNII-XII: Intact on detailed exam, PERRL, visual fields grossly intact, EOMi, facial sensation preserved, no facial assymetry, tongue/uvula/palate midline, shoulder shrug equal, no pronator drift  Extremities: 5/5 motor throughout, sensorium intact throughout, coordination intact throughout, DTRs 2+, no pathological reflexes, no sensory level present    Significant Labs:  Recent Labs   Lab 08/17/24  0234   *      K 3.9      CO2 19*   BUN 22*   CREATININE 1.7*   CALCIUM 10.2   MG 2.0     Recent Labs   Lab 08/16/24  1749 08/17/24  0839   WBC 0.69* 1.97*   HGB 7.8* 7.4*   HCT 24.3* 22.3*   PLT 19* 17*     Recent Labs   Lab 08/16/24  1749 08/17/24  0839   INR 1.7* 1.9*     Microbiology Results (last 7 days)       ** No results found for the last 168 hours. **          All pertinent labs from the last 24 hours have been reviewed.    Significant Diagnostics:  I have reviewed all pertinent imaging results/findings within the past 24 hours.  Assessment/Plan:     * Bilateral subdural hematomas  58M PMH HIV, thrombocytopenia, liver cirrhosis, CAD s/p CABG (many years ago) with enlarging bilateral chronic SDH transferred from [facility] after CTH 8/7 without prior intervention. Now s/p bilateral MMA embolization on 8/16.    Plan:  Admitted medicine; q4h nc/vs  Repeat CTH 8/13 stable  - S/p MMA embolization 8/16   - repeat CTH post procedure stable  - platelets low, per heme do not give plts as pt likely has splenic consumption and won't get > 50k   - FFP vs cryo per primary  VTE chemoprophylaxis held in the setting of thrombocytopenia   Please notify nsgy of any acute neurological decline or of any further questions/concerns  Preponderance of medical care per primary team    Dispo: ongoing        Allen Miller MD  Neurosurgery  Esequiel Lea - Neurosurgery ([facility])

## 2024-08-18 NOTE — SUBJECTIVE & OBJECTIVE
Interval History: 08/18/2024 No headaches, dizziness, visual changes. IPR pending per hospital medicine team. Plts 17, but per primary team will not transfuse unless < 10 given likelihood of splenic consumption.    Medications:  Continuous Infusions:  Scheduled Meds:   atorvastatin  40 mg Oral Daily    atovaquone  1,500 mg Oral Daily    cetirizine  10 mg Oral Daily    cyanocobalamin  1,000 mcg Oral Daily    tsmiuqwg-tucolqsh-uruppu-tenof (STRIBILD) [phone] mg  1 tablet Oral Daily    ergocalciferol  50,000 Units Oral Q7 Days    FLUoxetine  20 mg Oral Daily    lactulose  30 g Oral TID    pantoprazole  40 mg Oral Daily    potassium, sodium phosphates  2 packet Oral QID (AC & HS)    pyridoxine (vitamin B6)  100 mg Oral Daily    rifAXImin  550 mg Oral BID    tamsulosin  0.4 mg Oral QHS    vitamin D  1,000 Units Oral Daily     PRN Meds:  Current Facility-Administered Medications:     acetaminophen, 650 mg, Oral, Q8H PRN    benzonatate, 100 mg, Oral, TID PRN    bisacodyL, 10 mg, Rectal, Daily PRN    dextrose 10%, 12.5 g, Intravenous, PRN    dextrose 10%, 25 g, Intravenous, PRN    glucagon (human recombinant), 1 mg, Intramuscular, PRN    glucose, 16 g, Oral, PRN    glucose, 24 g, Oral, PRN    insulin aspart U-100, 0-5 Units, Subcutaneous, QID (AC + HS) PRN    midodrine, 2.5 mg, Oral, TID PRN    naloxone, 0.02 mg, Intravenous, PRN    sodium chloride 0.9%, 10 mL, Intravenous, Q12H PRN     Review of Systems  Objective:     Weight: 116.2 kg (256 lb 2.8 oz)  Body mass index is 33.8 kg/m².  Vital Signs (Most Recent):  Temp: 97.9 °F (36.6 °C) (08/18/24 0709)  Pulse: (!) 57 (08/18/24 0709)  Resp: 20 (08/18/24 0709)  BP: (!) 143/64 (08/18/24 0709)  SpO2: 98 % (08/18/24 0709) Vital Signs (24h Range):  Temp:  [97.5 °F (36.4 °C)-98.2 °F (36.8 °C)] 97.9 °F (36.6 °C)  Pulse:  [49-64] 57  Resp:  [16-20] 20  SpO2:  [94 %-98 %] 98 %  BP: (117-143)/(57-65) 143/64                         Male External Urinary Catheter 08/13/24 0800  Large (Active)   Collection Container Urimeter 08/18/24 0400   Securement Method secured to top of thigh w/ adhesive device 08/18/24 0400   Skin no redness;no breakdown 08/18/24 0400   Tolerance no signs/symptoms of discomfort 08/18/24 0400   Output (mL) 500 mL 08/14/24 2000     Neurosurgery Physical Exam     General: AOx3, GCS E4V5M6, slow to respond  CNII-XII: Intact on detailed exam, PERRL, visual fields grossly intact, EOMi, facial sensation preserved, no facial assymetry, tongue/uvula/palate midline, shoulder shrug equal, no pronator drift  Extremities: 5/5 motor throughout, sensorium intact throughout, coordination intact throughout, DTRs 2+, no pathological reflexes, no sensory level present    Significant Labs:  Recent Labs   Lab 08/17/24  0234   *      K 3.9      CO2 19*   BUN 22*   CREATININE 1.7*   CALCIUM 10.2   MG 2.0     Recent Labs   Lab 08/16/24  1749 08/17/24  0839   WBC 0.69* 1.97*   HGB 7.8* 7.4*   HCT 24.3* 22.3*   PLT 19* 17*     Recent Labs   Lab 08/16/24  1749 08/17/24  0839   INR 1.7* 1.9*     Microbiology Results (last 7 days)       ** No results found for the last 168 hours. **          All pertinent labs from the last 24 hours have been reviewed.    Significant Diagnostics:  I have reviewed all pertinent imaging results/findings within the past 24 hours.

## 2024-08-18 NOTE — ASSESSMENT & PLAN NOTE
58M PMH HIV, thrombocytopenia, liver cirrhosis, CAD s/p CABG (many years ago) with enlarging bilateral chronic SDH transferred from [facility] after CTH 8/7 without prior intervention. Now s/p bilateral MMA embolization on 8/16.    Plan:  Admitted medicine; q4h nc/vs  Repeat CTH 8/13 stable  - S/p MMA embolization 8/16   - repeat CTH post procedure stable  - platelets low, per heme do not give plts as pt likely has splenic consumption and won't get > 50k   - FFP vs cryo per primary  VTE chemoprophylaxis held in the setting of thrombocytopenia   Please notify nsgy of any acute neurological decline or of any further questions/concerns  Preponderance of medical care per primary team    Dispo: ongoing

## 2024-08-18 NOTE — ASSESSMENT & PLAN NOTE
JASON is likely due to acute tubular necrosis caused by ammonia . Baseline creatinine is unknown. Most recent creatinine and eGFR are listed below. Overall, Cr and electrolytes improving. Most likely new baseline.   Recent Labs     08/16/24  0212 08/17/24  0234   CREATININE 1.5* 1.7*   EGFRNORACEVR 54.3* 46.7*        Plan  - Avoid nephrotoxins and renally dose meds for GFR listed above  - Monitor urine output, serial BMP, and adjust therapy as needed

## 2024-08-18 NOTE — PLAN OF CARE
Problem: Adult Inpatient Plan of Care  Goal: Plan of Care Review  8/18/2024 0413 by Kelly Costa RN  Outcome: Progressing  8/18/2024 0413 by Kelly Costa RN  Outcome: Not Progressing  Goal: Patient-Specific Goal (Individualized)  8/18/2024 0413 by Kelly Costa RN  Outcome: Progressing  8/18/2024 0413 by Kelly Costa RN  Outcome: Not Progressing  Goal: Absence of Hospital-Acquired Illness or Injury  8/18/2024 0413 by Kelly Costa RN  Outcome: Progressing  8/18/2024 0413 by Kelly Costa RN  Outcome: Not Progressing  Goal: Optimal Comfort and Wellbeing  8/18/2024 0413 by Kelly Costa RN  Outcome: Progressing  8/18/2024 0413 by Kelly Costa RN  Outcome: Not Progressing  Goal: Readiness for Transition of Care  8/18/2024 0413 by Kelly Costa RN  Outcome: Progressing  8/18/2024 0413 by Kelly Costa RN  Outcome: Not Progressing     Problem: Fall Injury Risk  Goal: Absence of Fall and Fall-Related Injury  8/18/2024 0413 by Kelly Costa RN  Outcome: Progressing  8/18/2024 0413 by Kelly Costa RN  Outcome: Not Progressing     Problem: Pain Acute  Goal: Optimal Pain Control and Function  8/18/2024 0413 by Kelly Costa RN  Outcome: Progressing  8/18/2024 0413 by Kelly Costa RN  Outcome: Not Progressing     Problem: Comorbidity Management  Goal: Blood Pressure in Desired Range  8/18/2024 0413 by Kelly Costa RN  Outcome: Progressing  8/18/2024 0413 by Kelly Costa RN  Outcome: Not Progressing     Problem: Infection  Goal: Absence of Infection Signs and Symptoms  8/18/2024 0413 by Kelly Costa RN  Outcome: Progressing  8/18/2024 0413 by Kelly Costa RN  Outcome: Not Progressing     Problem: Skin Injury Risk Increased  Goal: Skin Health and Integrity  8/18/2024 0413 by Kelly Costa RN  Outcome: Progressing  8/18/2024 0413 by Kelly Costa RN  Outcome: Not Progressing     Problem: Acute Kidney Injury/Impairment  Goal: Fluid and Electrolyte  Balance  8/18/2024 0413 by Kelly Costa RN  Outcome: Progressing  8/18/2024 0413 by Kelly Costa RN  Outcome: Not Progressing  Goal: Improved Oral Intake  8/18/2024 0413 by Kelly Costa RN  Outcome: Progressing  8/18/2024 0413 by Kelly Costa RN  Outcome: Not Progressing  Goal: Effective Renal Function  8/18/2024 0413 by Kelly Costa RN  Outcome: Progressing  8/18/2024 0413 by Kelly Costa RN  Outcome: Not Progressing     Problem: Wound  Goal: Optimal Coping  8/18/2024 0413 by Kelly Costa RN  Outcome: Progressing  8/18/2024 0413 by Kelly Costa RN  Outcome: Not Progressing  Goal: Optimal Functional Ability  8/18/2024 0413 by Kelly Costa RN  Outcome: Progressing  8/18/2024 0413 by Kelly Costa RN  Outcome: Not Progressing  Goal: Absence of Infection Signs and Symptoms  8/18/2024 0413 by Kelly Costa RN  Outcome: Progressing  8/18/2024 0413 by Kelly Costa RN  Outcome: Not Progressing  Goal: Improved Oral Intake  8/18/2024 0413 by Kelly Costa RN  Outcome: Progressing  8/18/2024 0413 by Kelly Costa RN  Outcome: Not Progressing  Goal: Optimal Pain Control and Function  8/18/2024 0413 by Kelly Costa RN  Outcome: Progressing  8/18/2024 0413 by Kelly Costa RN  Outcome: Not Progressing  Goal: Skin Health and Integrity  8/18/2024 0413 by Kelly Costa RN  Outcome: Progressing  8/18/2024 0413 by Kelly Costa RN  Outcome: Not Progressing  Goal: Optimal Wound Healing  8/18/2024 0413 by Kelly Costa RN  Outcome: Progressing  8/18/2024 0413 by Kelly Costa RN  Outcome: Not Progressing

## 2024-08-18 NOTE — ASSESSMENT & PLAN NOTE
Previously was taking GENVOYA.     Continue STRIBILD, [facility] equivalent. Will confirm with pharmacy

## 2024-08-18 NOTE — PROGRESS NOTES
Esequiel Lea - Neurosurgery ([facility])  [facility] Medicine  Progress Note    Patient Name: Jam Card  MRN: [id number]  Patient Class: IP- Inpatient   Admission Date: 8/10/2024  Length of Stay: 8 days  Attending Physician: Rudy Sotomayor MD  Primary Care Provider: Ulysses Almaraz MD        Subjective:     Principal Problem:Bilateral subdural hematomas        HPI:  Mr. Card is a 59 yo M with PMHx of Hep C cirrhosis, HIV, chronic thrombocytopenia, HTN, GERD, HLD, CAD s/p CABG (many years ago) who was initially admitted to [facility] under  after being referred to the ED by NSGY due to worsening SDH on imaging. Patient was previously admitted to [facility] for evaluation of HA (7/19/24) with a CT head showing b/l chronic extra-axial fluid collections which possibly could be CSF. Bur hole drainage was considered but coagulopathy and platelet issue needed to be corrected prior. Patient also did not want drainage at that time. Patient f/u with [facility] neurotrauma unit with repeat CT head (8/7/24) but no longer experienced HA, just gait disturbance. CT head showed blood developing since prior CT head which led to ED visit and evaluation. Heme/Onc was consulted and believe that thrombocytopenia could be to splenic sequestration so improvement of plt function may only be temporary at best. NSGY at [facility] requested inter facility transfer. Hepatology at [facility] also agreed. Patient transferred to [facility] for NSGY, Heme/Onc, and Hepatology.     Patient hemodynamically stable on arrival. MELD 27, Na 135, Cr 1.72 (1.8 baseline), INR 1.7, CBC 0.9 > 7.7 < 37 (s/p 2U plt and FFP, with initial INR 1.9). No focal neuro deficits at this time. GCS 15 on exam. Patient denies HA, weakness, numbness, vision disturbance, dizziness, photophobia, or any other complaints.     Overview/Hospital Course:  See assessment and plan.     Interval History: Overnight, pt complained of headache and lumbar pain, given tylenol. No complaints this  morning. Denies headache, back pain, CP, SOB, GI/ symptoms.     Review of Systems  Objective:     Vital Signs (Most Recent):  Temp: 97.9 °F (36.6 °C) (08/18/24 0709)  Pulse: (!) 57 (08/18/24 0709)  Resp: 20 (08/18/24 0709)  BP: (!) 143/64 (08/18/24 0709)  SpO2: 98 % (08/18/24 0709) Vital Signs (24h Range):  Temp:  [97.5 °F (36.4 °C)-98.2 °F (36.8 °C)] 97.9 °F (36.6 °C)  Pulse:  [49-64] 57  Resp:  [16-20] 20  SpO2:  [94 %-98 %] 98 %  BP: (117-143)/(57-65) 143/64     Weight: 116.2 kg (256 lb 2.8 oz)  Body mass index is 33.8 kg/m².    Intake/Output Summary (Last 24 hours) at 8/18/2024 0840  Last data filed at 8/18/2024 0400  Gross per 24 hour   Intake --   Output 400 ml   Net -400 ml         Physical Exam  Constitutional:       Appearance: He is ill-appearing.   HENT:      Head: Normocephalic and atraumatic.      Mouth/Throat:      Mouth: Mucous membranes are dry.   Eyes:      Extraocular Movements: Extraocular movements intact.      Conjunctiva/sclera: Conjunctivae normal.      Pupils: Pupils are equal, round, and reactive to light.   Cardiovascular:      Rate and Rhythm: Normal rate and regular rhythm.      Pulses: Normal pulses.      Heart sounds: Normal heart sounds.   Pulmonary:      Effort: Pulmonary effort is normal.      Breath sounds: Normal breath sounds.   Abdominal:      General: Abdomen is flat. Bowel sounds are normal.      Palpations: Abdomen is soft.   Musculoskeletal:         General: Normal range of motion.      Lumbar back: No swelling, signs of trauma or tenderness.   Skin:     General: Skin is warm and dry.      Capillary Refill: Capillary refill takes less than 2 seconds.      Coloration: Skin is not jaundiced.      Comments: Clean R wrist access site.    Neurological:      Mental Status: He is alert and oriented to person, place, and time.      Motor: No abnormal muscle tone.   Psychiatric:         Mood and Affect: Mood normal.             Significant Labs: All pertinent labs within the past 24  hours have been reviewed.    Significant Imaging: I have reviewed all pertinent imaging results/findings within the past 24 hours.    Assessment/Plan:      * Bilateral subdural hematomas  Patient initially presented to OSH with HA and CT head showing b/l subdural hematomas. CT head at follow up visit showed increase in b/l subdural hematomas size. IR performed MMA embolization 8/16/24. Pt tolerated procedure well. Post-op CT head revealing stable SDH. AAOx4.     Plan:   - Keppra 500mg BID for seizure prophylaxis.   - SBP goal 140  - fall precautions              JASON (acute kidney injury)  JASON is likely due to acute tubular necrosis caused by ammonia . Baseline creatinine is unknown. Most recent creatinine and eGFR are listed below. Overall, Cr and electrolytes improving. Most likely new baseline.   Recent Labs     08/16/24  0212 08/17/24  0234   CREATININE 1.5* 1.7*   EGFRNORACEVR 54.3* 46.7*        Plan  - Avoid nephrotoxins and renally dose meds for GFR listed above  - Monitor urine output, serial BMP, and adjust therapy as needed    Depression  Continue fluoxetine 20mg      HLD (hyperlipidemia)  Continue statin      Pancytopenia  The likely etiology of thrombocytopenia is liver disease and platelet consumption from splenic sequestration .   Plt consistently low and barely respond to transfusion.   Plan  - transfuse if plt <10,000  - Per heme/onc recs, recommend judicious use of PLT transfusions given he is appears to be refractory likely due to splenic consumption. He is unlikely to get to >50k. Recommend FFP or cryo only if he has evidence of clinical significant bleeding. Do not transfuse for the sake of correcting his coags in the absence of bleeding as he is at high risk for TACO (he may have esophageal varices). Recommend GOC conversations as ultimately his overall clinical picture is secondary to cirrhosis.           Cirrhosis of liver with ascites  Patient with known Cirrhosis. Currently AAOx4 with ammonia  down-trending.     MELD-Na score calculated; MELD 3.0: 25 at 8/17/2024  8:39 AM  MELD-Na: 24 at 8/17/2024  8:39 AM  Calculated from:  Serum Creatinine: 1.7 mg/dL at 8/17/2024  2:34 AM  Serum Sodium: 138 mmol/L (Using max of 137 mmol/L) at 8/17/2024  2:34 AM  Total Bilirubin: 4.6 mg/dL at 8/17/2024  2:34 AM  Serum Albumin: 4.7 g/dL (Using max of 3.5 g/dL) at 8/17/2024  2:34 AM  INR(ratio): 1.9 at 8/17/2024  8:39 AM  Age at listing (hypothetical): 58 years  Sex: Male at 8/17/2024  8:39 AM    Plan:  -Continue chronic meds. Etiology likely Hepatitis. Will avoid any hepatotoxic meds, and monitor CBC/CMP/INR for synthetic function.   - F/u ammonia, dbili  - Strict I/Os  - continue PT/OT, recommended IP rehab. Pt would like to be placed at [facility] in [place].    - low salt diet  - Discontinued lasix 40mg and spironolactone 50mg in setting of JASON  - Continue lactulose and rifaximin with goal 2-3 BM daily.   - Hepatology consulted, appreciate recs  - transplant eval on hold in setting of low CD4 and SDH. Rec palliative care consult.         Human immunodeficiency virus (HIV) disease  Previously was taking GENVOYA.     Continue STRIBILD, hospital equivalent. Will confirm with pharmacy       Chronic hepatitis C  Recent Hep C RNA showed elevated viral load 7/11/24. Not currently on treatment.     Can consider repeat viral load if clinically indicated          VTE Risk Mitigation (From admission, onward)           Ordered     IP VTE HIGH RISK PATIENT  Once         08/10/24 2347     Place sequential compression device  Until discontinued         08/10/24 2347                    Discharge Planning   CODI: 8/20/2024     Code Status: Full Code   Is the patient medically ready for discharge?:     Reason for patient still in hospital (select all that apply): Pending disposition  Discharge Plan A: Home Health                  Ryann Almazan MD  Department of Hospital Medicine   [facility] - Neurosurgery ([facility])

## 2024-08-18 NOTE — ASSESSMENT & PLAN NOTE
Patient with known Cirrhosis. Currently AAOx4 with ammonia down-trending.     MELD-Na score calculated; MELD 3.0: 25 at 8/17/2024  8:39 AM  MELD-Na: 24 at 8/17/2024  8:39 AM  Calculated from:  Serum Creatinine: 1.7 mg/dL at 8/17/2024  2:34 AM  Serum Sodium: 138 mmol/L (Using max of 137 mmol/L) at 8/17/2024  2:34 AM  Total Bilirubin: 4.6 mg/dL at 8/17/2024  2:34 AM  Serum Albumin: 4.7 g/dL (Using max of 3.5 g/dL) at 8/17/2024  2:34 AM  INR(ratio): 1.9 at 8/17/2024  8:39 AM  Age at listing (hypothetical): 58 years  Sex: Male at 8/17/2024  8:39 AM    Plan:  -Continue chronic meds. Etiology likely Hepatitis. Will avoid any hepatotoxic meds, and monitor CBC/CMP/INR for synthetic function.   - F/u ammonia, dbili  - Strict I/Os  - continue PT/OT, recommended IP rehab. Pt would like to be placed at [facility] in [place].    - low salt diet  - Discontinued lasix 40mg and spironolactone 50mg in setting of JASON  - Continue lactulose and rifaximin with goal 2-3 BM daily.   - Hepatology consulted, appreciate recs  - transplant eval on hold in setting of low CD4 and SDH. Rec palliative care consult.

## 2024-08-18 NOTE — PT/OT/SLP PROGRESS
Physical Therapy Treatment    Patient Name:  Jam Card   MRN:  [id number]    Recommendations:     Discharge Recommendations: High Intensity Therapy  Discharge Equipment Recommendations: bedside commode  Barriers to discharge: Inaccessible home and Decreased caregiver support    Assessment:     Jam Card is a 58 y.o. male admitted with a medical diagnosis of Bilateral subdural hematomas.  He presents with the following impairments/functional limitations: weakness, impaired endurance, impaired self care skills, impaired functional mobility, gait instability, impaired balance, decreased coordination, impaired cardiopulmonary response to activity . Patient showed good participation and initiative. Improved transfer ability and walking range was noted today, but decreased step length, clearance and base of support continues to limit independence with gait.    Rehab Prognosis: Good; patient would benefit from acute skilled PT services to address these deficits and reach maximum level of function.    Recent Surgery: * No surgery found *      Plan:     During this hospitalization, patient to be seen 4 x/week to address the identified rehab impairments via gait training, therapeutic activities, therapeutic exercises, neuromuscular re-education and progress toward the following goals:    Plan of Care Expires:  09/15/24    Subjective     Chief Complaint: B LE weakness, more pronounced on the Right.  Patient/Family Comments/goals: To gain his independence back.  Pain/Comfort:  Pain Rating 1: 0/10  Pain Rating Post-Intervention 1: 0/10      Objective:     Communicated with NSG prior to session.  Patient found HOB elevated with telemetry upon PT entry to room.     General Precautions: Standard, fall  Orthopedic Precautions: N/A  Braces: N/A  Respiratory Status: Room air     Functional Mobility:  Bed Mobility:     Rolling Left:  stand by assistance  Scooting: stand by assistance  Supine to Sit: stand by  assistance  Transfers:     Sit to Stand:  contact guard assistance and minimum assistance with rolling walker  Bed to Chair: contact guard assistance and minimum assistance with  rolling walker  using  Stand Pivot  Gait: 72 ft x 2 trials with RW and min assistance, with bedside chair follow by Patient's sister In-law and cues to correct posture.      AM-PAC 6 CLICK MOBILITY  Turning over in bed (including adjusting bedclothes, sheets and blankets)?: 4  Sitting down on and standing up from a chair with arms (e.g., wheelchair, bedside commode, etc.): 3  Moving from lying on back to sitting on the side of the bed?: 4  Moving to and from a bed to a chair (including a wheelchair)?: 3  Need to walk in hospital room?: 3  Climbing 3-5 steps with a railing?: 2  Basic Mobility Total Score: 19       Treatment & Education:  Donned a second gown and gripping socks. Patient transferred to bedside chair with RW and min to CGA. There ex in sitting: LAQ, HIP FLEX AND HEEL/TOE RAISES 2X12 REPS B LE alternating legs to promote coordination.    Patient left up in chair with all lines intact, call button in reach, RN notified, and Sister In-Law present..    GOALS:   Multidisciplinary Problems       Physical Therapy Goals          Problem: Physical Therapy    Goal Priority Disciplines Outcome Goal Variances Interventions   Physical Therapy Goal     PT, PT/OT Progressing     Description: Goals to be met by: 24    Patient will increase functional independence with mobility by performin. Supine to sit with [place]  2. Sit to supine with [place]  3. Rolling to Left and Right with [place].  4. Sit to stand transfer with Modified [place] using LRAD as needed  5. Bed to chair transfer with Stand-by Assistance using LRAD as needed  6. Gait  x 50 feet with Stand-by Assistance using LRAD as needed   7. Sitting at edge of bed for 10 minutes with [place]  8. Stand for 8 minutes with Supervision using LRAD as  needed  9. Lower extremity exercise program x30 reps per handout, with assistance as needed                         Time Tracking:     PT Received On: 08/18/24  PT Start Time: 1130     PT Stop Time: 1204  PT Total Time (min): 34 min     Billable Minutes: Gait Training 15, Therapeutic Activity 10, and Therapeutic Exercise 9    Treatment Type: Treatment  PT/PTA: PTA     Number of PTA visits since last PT visit: 1 08/18/2024

## 2024-08-18 NOTE — SUBJECTIVE & OBJECTIVE
Interval History: Overnight, pt complained of headache and lumbar pain, given tylenol. No complaints this morning. Denies headache, back pain, CP, SOB, GI/ symptoms.     Review of Systems  Objective:     Vital Signs (Most Recent):  Temp: 97.9 °F (36.6 °C) (08/18/24 0709)  Pulse: (!) 57 (08/18/24 0709)  Resp: 20 (08/18/24 0709)  BP: (!) 143/64 (08/18/24 0709)  SpO2: 98 % (08/18/24 0709) Vital Signs (24h Range):  Temp:  [97.5 °F (36.4 °C)-98.2 °F (36.8 °C)] 97.9 °F (36.6 °C)  Pulse:  [49-64] 57  Resp:  [16-20] 20  SpO2:  [94 %-98 %] 98 %  BP: (117-143)/(57-65) 143/64     Weight: 116.2 kg (256 lb 2.8 oz)  Body mass index is 33.8 kg/m².    Intake/Output Summary (Last 24 hours) at 8/18/2024 0840  Last data filed at 8/18/2024 0400  Gross per 24 hour   Intake --   Output 400 ml   Net -400 ml         Physical Exam  Constitutional:       Appearance: He is ill-appearing.   HENT:      Head: Normocephalic and atraumatic.      Mouth/Throat:      Mouth: Mucous membranes are dry.   Eyes:      Extraocular Movements: Extraocular movements intact.      Conjunctiva/sclera: Conjunctivae normal.      Pupils: Pupils are equal, round, and reactive to light.   Cardiovascular:      Rate and Rhythm: Normal rate and regular rhythm.      Pulses: Normal pulses.      Heart sounds: Normal heart sounds.   Pulmonary:      Effort: Pulmonary effort is normal.      Breath sounds: Normal breath sounds.   Abdominal:      General: Abdomen is flat. Bowel sounds are normal.      Palpations: Abdomen is soft.   Musculoskeletal:         General: Normal range of motion.      Lumbar back: No swelling, signs of trauma or tenderness.   Skin:     General: Skin is warm and dry.      Capillary Refill: Capillary refill takes less than 2 seconds.      Coloration: Skin is not jaundiced.      Comments: Clean R wrist access site.    Neurological:      Mental Status: He is alert and oriented to person, place, and time.      Motor: No abnormal muscle tone.   Psychiatric:          Mood and Affect: Mood normal.             Significant Labs: All pertinent labs within the past 24 hours have been reviewed.    Significant Imaging: I have reviewed all pertinent imaging results/findings within the past 24 hours.

## 2024-08-19 ENCOUNTER — TELEPHONE (OUTPATIENT)
Dept: NEUROSURGERY | Facility: CLINIC | Age: 59
End: 2024-08-19
Payer: MEDICARE

## 2024-08-19 ENCOUNTER — PATIENT MESSAGE (OUTPATIENT)
Dept: TRANSPLANT | Facility: CLINIC | Age: 59
End: 2024-08-19
Payer: MEDICARE

## 2024-08-19 DIAGNOSIS — I62.00 NONTRAUMATIC SUBDURAL HEMORRHAGE, UNSPECIFIED: Primary | ICD-10-CM

## 2024-08-19 PROBLEM — M54.9 BACK PAIN: Status: ACTIVE | Noted: 2024-08-19

## 2024-08-19 PROBLEM — E87.1 HYPONATREMIA: Status: ACTIVE | Noted: 2024-08-19

## 2024-08-19 LAB
ALBUMIN SERPL BCP-MCNC: 4 G/DL (ref 3.5–5.2)
ALP SERPL-CCNC: 72 U/L (ref 55–135)
ALT SERPL W/O P-5'-P-CCNC: 15 U/L (ref 10–44)
ANION GAP SERPL CALC-SCNC: 7 MMOL/L (ref 8–16)
ANISOCYTOSIS BLD QL SMEAR: SLIGHT
AST SERPL-CCNC: 49 U/L (ref 10–40)
BASOPHILS # BLD AUTO: 0 K/UL (ref 0–0.2)
BASOPHILS NFR BLD: 0 % (ref 0–1.9)
BILIRUB SERPL-MCNC: 4.3 MG/DL (ref 0.1–1)
BUN SERPL-MCNC: 24 MG/DL (ref 6–20)
BURR CELLS BLD QL SMEAR: ABNORMAL
CALCIUM SERPL-MCNC: 9.5 MG/DL (ref 8.7–10.5)
CHLORIDE SERPL-SCNC: 109 MMOL/L (ref 95–110)
CO2 SERPL-SCNC: 19 MMOL/L (ref 23–29)
COPPER SERPL-MCNC: 750 UG/L (ref 665–1480)
CREAT SERPL-MCNC: 1.5 MG/DL (ref 0.5–1.4)
DACRYOCYTES BLD QL SMEAR: ABNORMAL
DIFFERENTIAL METHOD BLD: ABNORMAL
EOSINOPHIL # BLD AUTO: 0 K/UL (ref 0–0.5)
EOSINOPHIL NFR BLD: 0.4 % (ref 0–8)
ERYTHROCYTE [DISTWIDTH] IN BLOOD BY AUTOMATED COUNT: 18.8 % (ref 11.5–14.5)
EST. GFR  (NO RACE VARIABLE): 54.3 ML/MIN/1.73 M^2
GLUCOSE SERPL-MCNC: 85 MG/DL (ref 70–110)
HCT VFR BLD AUTO: 24.7 % (ref 40–54)
HGB BLD-MCNC: 8.2 G/DL (ref 14–18)
IMM GRANULOCYTES # BLD AUTO: 0.01 K/UL (ref 0–0.04)
IMM GRANULOCYTES NFR BLD AUTO: 0.4 % (ref 0–0.5)
INR PPP: 1.9 (ref 0.8–1.2)
LYMPHOCYTES # BLD AUTO: 0.5 K/UL (ref 1–4.8)
LYMPHOCYTES NFR BLD: 20.6 % (ref 18–48)
MAGNESIUM SERPL-MCNC: 1.9 MG/DL (ref 1.6–2.6)
MCH RBC QN AUTO: 34.9 PG (ref 27–31)
MCHC RBC AUTO-ENTMCNC: 33.2 G/DL (ref 32–36)
MCV RBC AUTO: 105 FL (ref 82–98)
MONOCYTES # BLD AUTO: 0.2 K/UL (ref 0.3–1)
MONOCYTES NFR BLD: 8.5 % (ref 4–15)
NEUTROPHILS # BLD AUTO: 1.7 K/UL (ref 1.8–7.7)
NEUTROPHILS NFR BLD: 70.1 % (ref 38–73)
NRBC BLD-RTO: 0 /100 WBC
OVALOCYTES BLD QL SMEAR: ABNORMAL
PHOSPHATE SERPL-MCNC: 2.9 MG/DL (ref 2.7–4.5)
PLATELET # BLD AUTO: 17 K/UL (ref 150–450)
PMV BLD AUTO: 14.1 FL (ref 9.2–12.9)
POIKILOCYTOSIS BLD QL SMEAR: SLIGHT
POLYCHROMASIA BLD QL SMEAR: ABNORMAL
POTASSIUM SERPL-SCNC: 4.3 MMOL/L (ref 3.5–5.1)
PROT SERPL-MCNC: 6.7 G/DL (ref 6–8.4)
PROTHROMBIN TIME: 20.2 SEC (ref 9–12.5)
RBC # BLD AUTO: 2.35 M/UL (ref 4.6–6.2)
SODIUM SERPL-SCNC: 135 MMOL/L (ref 136–145)
WBC # BLD AUTO: 2.47 K/UL (ref 3.9–12.7)

## 2024-08-19 PROCEDURE — 85025 COMPLETE CBC W/AUTO DIFF WBC: CPT | Mod: NTX | Performed by: HOSPITALIST

## 2024-08-19 PROCEDURE — 25000003 PHARM REV CODE 250: Mod: NTX

## 2024-08-19 PROCEDURE — 97535 SELF CARE MNGMENT TRAINING: CPT | Mod: NTX

## 2024-08-19 PROCEDURE — 85610 PROTHROMBIN TIME: CPT | Mod: NTX | Performed by: HOSPITALIST

## 2024-08-19 PROCEDURE — 11000001 HC ACUTE MED/SURG PRIVATE ROOM: Mod: NTX

## 2024-08-19 PROCEDURE — 36415 COLL VENOUS BLD VENIPUNCTURE: CPT | Mod: NTX | Performed by: HOSPITALIST

## 2024-08-19 PROCEDURE — 83735 ASSAY OF MAGNESIUM: CPT | Mod: NTX

## 2024-08-19 PROCEDURE — 84100 ASSAY OF PHOSPHORUS: CPT | Mod: NTX

## 2024-08-19 PROCEDURE — 25000003 PHARM REV CODE 250: Mod: NTX | Performed by: HOSPITALIST

## 2024-08-19 PROCEDURE — 97110 THERAPEUTIC EXERCISES: CPT | Mod: NTX

## 2024-08-19 PROCEDURE — 80053 COMPREHEN METABOLIC PANEL: CPT | Mod: NTX

## 2024-08-19 PROCEDURE — 99232 SBSQ HOSP IP/OBS MODERATE 35: CPT | Mod: NTX,,, | Performed by: PHYSICIAN ASSISTANT

## 2024-08-19 RX ORDER — BACLOFEN 5 MG/1
5 TABLET ORAL 3 TIMES DAILY PRN
Status: DISCONTINUED | OUTPATIENT
Start: 2024-08-19 | End: 2024-08-22 | Stop reason: HOSPADM

## 2024-08-19 RX ADMIN — PANTOPRAZOLE SODIUM 40 MG: 40 TABLET, DELAYED RELEASE ORAL at 08:08

## 2024-08-19 RX ADMIN — TAMSULOSIN HYDROCHLORIDE 0.4 MG: 0.4 CAPSULE ORAL at 10:08

## 2024-08-19 RX ADMIN — CHOLECALCIFEROL TAB 25 MCG (1000 UNIT) 1000 UNITS: 25 TAB at 08:08

## 2024-08-19 RX ADMIN — ATOVAQUONE 1500 MG: 750 SUSPENSION ORAL at 08:08

## 2024-08-19 RX ADMIN — RIFAXIMIN 550 MG: 550 TABLET ORAL at 08:08

## 2024-08-19 RX ADMIN — CYANOCOBALAMIN TAB 1000 MCG 1000 MCG: 1000 TAB at 08:08

## 2024-08-19 RX ADMIN — ATORVASTATIN CALCIUM 40 MG: 40 TABLET, FILM COATED ORAL at 08:08

## 2024-08-19 RX ADMIN — ACETAMINOPHEN 650 MG: 325 TABLET ORAL at 02:08

## 2024-08-19 RX ADMIN — LACTULOSE 30 G: 20 SOLUTION ORAL at 10:08

## 2024-08-19 RX ADMIN — LACTULOSE 30 G: 20 SOLUTION ORAL at 02:08

## 2024-08-19 RX ADMIN — PYRIDOXINE HCL TAB 50 MG 100 MG: 50 TAB at 08:08

## 2024-08-19 RX ADMIN — RIFAXIMIN 550 MG: 550 TABLET ORAL at 10:08

## 2024-08-19 RX ADMIN — CETIRIZINE HYDROCHLORIDE 10 MG: 5 TABLET, FILM COATED ORAL at 08:08

## 2024-08-19 RX ADMIN — FLUOXETINE HYDROCHLORIDE 20 MG: 20 CAPSULE ORAL at 08:08

## 2024-08-19 RX ADMIN — ACETAMINOPHEN 650 MG: 325 TABLET ORAL at 08:08

## 2024-08-19 RX ADMIN — LACTULOSE 30 G: 20 SOLUTION ORAL at 08:08

## 2024-08-19 RX ADMIN — ELVITEGRAVIR, COBICISTAT, EMTRICITABINE, AND TENOFOVIR DISOPROXIL FUMARATE 1 TABLET: 150; 150; 200; 300 TABLET, FILM COATED ORAL at 08:08

## 2024-08-19 NOTE — PT/OT/SLP PROGRESS
Occupational Therapy   Treatment    Name: Jam Card  MRN: [id number]  Admitting Diagnosis:  Bilateral subdural hematomas       Recommendations:     Discharge Recommendations: High Intensity Therapy  Discharge Equipment Recommendations:  bedside commode  Barriers to discharge:  None    Assessment:     Jam Card is a 58 y.o. male with a medical diagnosis of Bilateral subdural hematomas.  He presents with Performance deficits affecting function are weakness, impaired self care skills, impaired endurance, impaired functional mobility, impaired balance, decreased safety awareness, decreased coordination, gait instability. Pt presents A&O x4 , and agreeable to tx. Pt participated well overall given contact-guard assist t/o session focused on UE Dressing, LE Dressing, Grooming, and toileting and  functional mobility as in home distances, and B UE therapuetic exercise to support coordination and activity tolerance .     Rehab Prognosis:  Good; patient would benefit from acute skilled OT services to address these deficits and reach maximum level of function.       Plan:     Patient to be seen 4 x/week to address the above listed problems via self-care/home management, therapeutic activities, therapeutic exercises, neuromuscular re-education  Plan of Care Expires: 09/15/24  Plan of Care Reviewed with: patient, significant other    Subjective     Chief Complaint: Decreased balance  Patient/Family Comments/goals: To gain IND  Pain/Comfort:  Pain Rating 1: 0/10  Pain Rating Post-Intervention 1: 0/10    Objective:     Communicated with: Nsfaizan prior to session.  Patient found HOB elevated with telemetry upon OT entry to room.    General Precautions: Standard, fall    Orthopedic Precautions:N/A  Braces: N/A  Respiratory Status: Room air     Occupational Performance:     Bed Mobility:    Patient completed Scooting/Bridging with stand by assistance  Patient completed Supine to Sit with stand by assistance     Functional  Mobility/Transfers:  Patient completed Sit <> Stand Transfer with contact guard assistance  with  no assistive device   Patient completed Bed <> Chair Transfer using Step Transfer technique with contact guard assistance with hand-held assist  Patient completed Toilet Transfer Step Transfer technique with contact guard assistance with  hand-held assist  Functional Mobility: Pt ambulates EOB > sinkside > toilet > chair with CGA HH A, decreased coordination and balance t/o with increased cues for safety t/o.    Activities of Daily Living:  Feeding:  setup seated in arm chair before therapist left  Grooming: contact guard assistance standing sinkside  Upper Body Dressing: setup and A to tie bow   Lower Body Dressing: stand by assistance seated EOB to manage socks  Toileting: contact guard assistance at toilet      [facility] 6 Click ADL: 22    Treatment & Education:  AROM provided to BUE in 3 planes of motion 1 x 10 reps. Completed to facilitate normalized movement to increase performance during functional activities.   B UE isometrics 5x 5second holds from 90deg shoulder flexion: shoulder flexion, shoulder extension; from 90deg elbow flexion: bicep flexion, tricep extension against moderate resistance of therapist to facilitate normalized movement for improved UE reaching and function for (I)ADLs and ease of mobility.      -Education on energy conservation and task modification to maximize safety and (I) during ADLs and mobility  -Education on importance of OOB activity to improve overall activity tolerance and promote recovery  -Pt educated to call for assistance and to transfer with hospital staff only    Pt had no further questions & when asked whether there were any concerns pt reported none.     Patient left up in chair with all lines intact and call button in reach    GOALS:   Multidisciplinary Problems       Occupational Therapy Goals          Problem: Occupational Therapy    Goal Priority Disciplines Outcome  Interventions   Occupational Therapy Goal     OT, PT/OT Progressing    Description: Goals to be met by: 9/15/24     Patient will increase functional independence with ADLs by performing:    UE Dressing with [place].  LE Dressing with [place].  Grooming while standing with [place].  Bathing from  tub bench with [place].  Stand pivot transfers with [place].  Step transfer with [place]  Toilet transfer to toilet with [place].                         Time Tracking:     OT Date of Treatment: 08/19/24  OT Start Time: 1137  OT Stop Time: 1203  OT Total Time (min): 26 min    Billable Minutes:Self Care/Home Management 15  Therapeutic Exercise 11    OT/ANJALI: OT          8/19/2024

## 2024-08-19 NOTE — PROGRESS NOTES
[facility] - Neurosurgery ([facility])  Neurosurgery  Progress Note    Subjective:     History of Present Illness: Mr. Card is a 59 yo M with PMHx of Hep C cirrhosis, HIV, chronic thrombocytopenia, HTN, GERD, HLD, CAD s/p CABG (many years ago) who was initially admitted to [facility] under  after being referred to the ED by NSGY due to worsening SDH on imaging. Patient was previously admitted to [facility] for evaluation of HA (7/19/24) with a CT head showing b/l chronic extra-axial fluid collections which possibly could be CSF. Bur hole drainage was considered but coagulopathy and platelet issue needed to be corrected prior. Patient also did not want drainage at that time. Patient f/u with [facility] neurotrauma unit with repeat CT head (8/7/24) but no longer experienced HA, just gait disturbance. CT head showed blood developing since prior CT head which led to ED visit and evaluation. Heme/Onc was consulted and believe that thrombocytopenia could be to splenic sequestration so improvement of plt function may only be temporary at best. NSGY at [facility] requested inter facility transfer. [facility]atology at [facility] [place]. Patient transferred to [facility] for NSGY, Heme/Onc, and Hepatology.      Patient hemodynamically stable on arrival. MELD 27, Na 135, Cr 1.72 (1.8 baseline), INR 1.7, CBC 0.9 > 7.7 < 37 (s/p 2U plt and FFP, with initial INR 1.9). No focal neuro deficits at this time. GCS 15 on exam. Patient denies HA, weakness, numbness, vision disturbance, dizziness, photophobia, or any other complaints    CTH reordered to assess stability    Post-Op Info:  * No surgery found *       Interval History: NAEON. Pt doing well, reports mild HA last night that self resolved. Feels his coordination is improving. No other complaints. Plt count stable at 17k. Pending IPR placement.    Medications:  Continuous Infusions:  Scheduled Meds:   atorvastatin  40 mg Oral Daily    atovaquone  1,500 mg Oral Daily    cetirizine  10 mg Oral Daily     cyanocobalamin  1,000 mcg Oral Daily    pxfndmwi-nufhzyxc-kpeipa-tenof (STRIBILD) [phone] mg  1 tablet Oral Daily    ergocalciferol  50,000 Units Oral Q7 Days    FLUoxetine  20 mg Oral Daily    lactulose  30 g Oral TID    pantoprazole  40 mg Oral Daily    pyridoxine (vitamin B6)  100 mg Oral Daily    rifAXImin  550 mg Oral BID    tamsulosin  0.4 mg Oral QHS    vitamin D  1,000 Units Oral Daily     PRN Meds:  Current Facility-Administered Medications:     acetaminophen, 650 mg, Oral, Q8H PRN    baclofen, 5 mg, Oral, TID PRN    benzonatate, 100 mg, Oral, TID PRN    bisacodyL, 10 mg, Rectal, Daily PRN    dextrose 10%, 12.5 g, Intravenous, PRN    dextrose 10%, 25 g, Intravenous, PRN    glucagon (human recombinant), 1 mg, Intramuscular, PRN    glucose, 16 g, Oral, PRN    glucose, 24 g, Oral, PRN    insulin aspart U-100, 0-5 Units, Subcutaneous, QID (AC + HS) PRN    midodrine, 2.5 mg, Oral, TID PRN    naloxone, 0.02 mg, Intravenous, PRN    sodium chloride 0.9%, 10 mL, Intravenous, Q12H PRN     Review of Systems  Objective:     Weight: 116.2 kg (256 lb 2.8 oz)  Body mass index is 33.8 kg/m².  Vital Signs (Most Recent):  Temp: 97.8 °F (36.6 °C) (08/19/24 0709)  Pulse: (!) 58 (08/19/24 0707)  Resp: 15 (08/19/24 0707)  BP: 131/63 (08/19/24 0707)  SpO2: (!) 94 % (08/19/24 0707) Vital Signs (24h Range):  Temp:  [97.7 °F (36.5 °C)-98.3 °F (36.8 °C)] 97.8 °F (36.6 °C)  Pulse:  [52-59] 58  Resp:  [15-18] 15  SpO2:  [93 %-98 %] 94 %  BP: (124-143)/(60-66) 131/63     Date 08/19/24 0700 - 08/20/24 0659   Shift 0802-1500 3257-2905 6199-5138 24 Hour Total   INTAKE   Shift Total(mL/kg)       OUTPUT   Urine(mL/kg/hr) 300   300   Shift Total(mL/kg) 300(2.6)   300(2.6)   Weight (kg) 116.2 116.2 116.2 116.2                       Male External Urinary Catheter 08/13/24 0800 Large (Active)   Collection Container Urimeter 08/19/24 0000   Securement Method secured to top of thigh w/ adhesive device 08/19/24 0000   Skin no redness;no  breakdown 08/19/24 0000   Tolerance no signs/symptoms of discomfort 08/19/24 0000   Output (mL) 200 mL 08/19/24 0000          Physical Exam         Neurosurgery Physical Exam    General: AOx3, GCS [id number], slow to respond  CNII-XII: Intact on detailed exam, PERRL, visual fields grossly intact, EOMi, facial sensation preserved, no facial assymetry, tongue/uvula/palate midline, shoulder shrug equal, no pronator drift  Extremities: 5/5 motor throughout, sensorium intact throughout, coordination intact throughout, no sensory level present     Significant Labs:  Recent Labs   Lab 08/19/24  0343   GLU 85   *   K 4.3      CO2 19*   BUN 24*   CREATININE 1.5*   CALCIUM 9.5   MG 1.9     Recent Labs   Lab 08/19/24  0343   WBC 2.47*   HGB 8.2*   HCT 24.7*   PLT 17*     Recent Labs   Lab 08/19/24  0343   INR 1.9*     Microbiology Results (last 7 days)       ** No results found for the last 168 hours. **          All pertinent labs from the last 24 hours have been reviewed.    Significant Diagnostics:  I have reviewed and interpreted all pertinent imaging results/findings within the past 24 hours.  Assessment/Plan:     * Bilateral subdural hematomas  58M PMH HIV, thrombocytopenia, liver cirrhosis, CAD s/p CABG (many years ago) with enlarging bilateral chronic [facility] without prior intervention. Now s/p bilateral MMA embolization on 8/16.    Plan:  Admitted medicine; q4h nc/vs  Repeat CTH 8/13 stable  - S/p MMA embolization 8/16   - repeat CTH post procedure stable  - platelets low, per heme do not give plts as pt likely has splenic consumption and won't get > 50k   - FFP vs cryo per primary (for Plt count < 10k)  VTE chemoprophylaxis held in the setting of thrombocytopenia   Please notify nsgy of any acute neurological decline or of any further questions/concerns  Preponderance of medical care per primary team    Okay for discharge from NSGY standpoint; pending IPR placement  Will arrange  outpatient follow-up in 2 weeks with [facility]  NSGY will sign off, please do not hesitate to contact us with any questions/concerns or should the pt experience any neurological decline    Discussed with attending staff Dr. Soumya Waldron PA-C  Neurosurgery  [facility] - Neurosurgery ([facility])

## 2024-08-19 NOTE — SUBJECTIVE & OBJECTIVE
Interval History: Overnight, pt complained of lumbar pain, given baclofen. No complaints this morning. Mentation stable. No ascites. Ready to be discharged and start IP rehab.Denies headache, back pain, CP, SOB, GI/ symptoms.      Review of Systems  Objective:     Vital Signs (Most Recent):  Temp: 97.8 °F (36.6 °C) (08/19/24 0709)  Pulse: (!) 58 (08/19/24 0707)  Resp: 15 (08/19/24 0707)  BP: 131/63 (08/19/24 0707)  SpO2: (!) 94 % (08/19/24 0707) Vital Signs (24h Range):  Temp:  [97.7 °F (36.5 °C)-98.3 °F (36.8 °C)] 97.8 °F (36.6 °C)  Pulse:  [52-59] 58  Resp:  [15-18] 15  SpO2:  [93 %-98 %] 94 %  BP: (124-143)/(60-66) 131/63     Weight: 116.2 kg (256 lb 2.8 oz)  Body mass index is 33.8 kg/m².    Intake/Output Summary (Last 24 hours) at 8/19/2024 0810  Last data filed at 8/19/2024 0707  Gross per 24 hour   Intake 840 ml   Output 1650 ml   Net -810 ml         Physical Exam  Constitutional:       Appearance: He is ill-appearing.   HENT:      Head: Normocephalic and atraumatic.      Mouth/Throat:      Mouth: Mucous membranes are dry.   Eyes:      Extraocular Movements: Extraocular movements intact.      Conjunctiva/sclera: Conjunctivae normal.      Pupils: Pupils are equal, round, and reactive to light.   Cardiovascular:      Rate and Rhythm: Normal rate and regular rhythm.      Pulses: Normal pulses.      Heart sounds: Normal heart sounds.   Pulmonary:      Effort: Pulmonary effort is normal.      Breath sounds: Normal breath sounds.   Abdominal:      General: Abdomen is flat. Bowel sounds are normal.      Palpations: Abdomen is soft.   Musculoskeletal:         General: Normal range of motion.      Lumbar back: No swelling, signs of trauma or tenderness.   Skin:     General: Skin is warm and dry.      Capillary Refill: Capillary refill takes less than 2 seconds.      Coloration: Skin is not jaundiced.      Comments: Clean R wrist access site.    Neurological:      Mental Status: He is alert and oriented to person,  place, and time.      Motor: No abnormal muscle tone.   Psychiatric:         Mood and Affect: Mood normal.             Significant Labs: All pertinent labs within the past 24 hours have been reviewed.    Significant Imaging: I have reviewed all pertinent imaging results/findings within the past 24 hours.

## 2024-08-19 NOTE — PLAN OF CARE
Problem: Adult Inpatient Plan of Care  Goal: Plan of Care Review  Outcome: Progressing     Problem: Adult Inpatient Plan of Care  Goal: Patient-Specific Goal (Individualized)  Outcome: Progressing     Problem: Adult Inpatient Plan of Care  Goal: Optimal Comfort and Wellbeing  Outcome: Progressing     Problem: Adult Inpatient Plan of Care  Goal: Readiness for Transition of Care  Outcome: Progressing     Problem: Fall Injury Risk  Goal: Absence of Fall and Fall-Related Injury  Outcome: Progressing     Problem: Pain Acute  Goal: Optimal Pain Control and Function  Outcome: Progressing

## 2024-08-19 NOTE — PLAN OF CARE
Met with pt and caregiver, Neeta to review discharge recommendation of rehab and pt. is agreeable to plan.    Patient/family provided list of facilities in-network with patient's payor plan. Providers that are owned, operated, or affiliated with [facility] are included on the list.     Notified that referral sent to below listed facilities from in-network list based on proximity to home/family support:   1.[facility]  2.[place]  3.  4.  5. (can send more than 5)    Patient/family instructed to identify preference.    Preferred Facility: (if more than 1, listed in order of descending preference)  1.[facility]    If an additional preferred facility not listed above is identified, additional referral to be sent. If above facilities unable to accept, will send additional referrals to in-network providers.     11:40am  SW left a message for [place] with [facility] [phone] regarding referral. [facility] is not listed in [facility] for referral, so a fax number is needed to submit clinicals for review for placement.     Tonia Matias LMSW

## 2024-08-19 NOTE — ASSESSMENT & PLAN NOTE
58M PMH HIV, thrombocytopenia, liver cirrhosis, CAD s/p CABG (many years ago) with enlarging bilateral chronic SDH transferred from [facility] after CTH 8/7 without prior intervention. Now s/p bilateral MMA embolization on 8/16.    Plan:  Admitted medicine; q4h nc/vs  Repeat CTH 8/13 stable  - S/p MMA embolization 8/16   - repeat CTH post procedure stable  - platelets low, per heme do not give plts as pt likely has splenic consumption and won't get > 50k   - FFP vs cryo per primary (for Plt count < 10k)  VTE chemoprophylaxis held in the setting of thrombocytopenia   Please notify nsgy of any acute neurological decline or of any further questions/concerns  Preponderance of medical care per primary team    Okay for discharge from NSGY standpoint; pending IPR placement  Will arrange outpatient follow-up in 2 weeks with CTH  NSGY will sign off, please do not hesitate to contact us with any questions/concerns or should the pt experience any neurological decline    Discussed with attending staff Dr. Dooley

## 2024-08-19 NOTE — PROGRESS NOTES
Esequiel Lea - Neurosurgery ([facility])  [facility] Medicine  Progress Note    Patient Name: Jam Card  MRN: [id number]  Patient Class: IP- Inpatient   Admission Date: 8/10/2024  Length of Stay: 9 days  Attending Physician: Rudy Sotomayor MD  Primary Care Provider: Ulysses Almaraz MD        Subjective:     Principal Problem:Bilateral subdural hematomas        HPI:  Mr. Card is a 57 yo M with PMHx of Hep C cirrhosis, HIV, chronic thrombocytopenia, HTN, GERD, HLD, CAD s/p CABG (many years ago) who was initially admitted to [facility] under  after being referred to the ED by NSGY due to worsening SDH on imaging. Patient was previously admitted to [facility] for evaluation of HA (7/19/24) with a CT head showing b/l chronic extra-axial fluid collections which possibly could be CSF. Bur hole drainage was considered but coagulopathy and platelet issue needed to be corrected prior. Patient also did not want drainage at that time. Patient f/u with [facility] neurotrauma unit with repeat CT head (8/7/24) but no longer experienced HA, just gait disturbance. CT head showed blood developing since prior CT head which led to ED visit and evaluation. Heme/Onc was consulted and believe that thrombocytopenia could be to splenic sequestration so improvement of plt function may only be temporary at best. NSGY at [facility] requested inter facility transfer. Hepatology at [facility] also agreed. Patient transferred to [facility] for NSGY, Heme/Onc, and Hepatology.     Patient hemodynamically stable on arrival. MELD 27, Na 135, Cr 1.72 (1.8 baseline), INR 1.7, CBC 0.9 > 7.7 < 37 (s/p 2U plt and FFP, with initial INR 1.9). No focal neuro deficits at this time. GCS 15 on exam. Patient denies HA, weakness, numbness, vision disturbance, dizziness, photophobia, or any other complaints.     Overview/Hospital Course:  See assessment and plan.     Interval History: Overnight, pt complained of lumbar pain, given baclofen. No complaints this morning.  Mentation stable. No ascites. Ready to be discharged and start IP rehab.Denies headache, back pain, CP, SOB, GI/ symptoms.      Review of Systems  Objective:     Vital Signs (Most Recent):  Temp: 97.8 °F (36.6 °C) (08/19/24 0709)  Pulse: (!) 58 (08/19/24 0707)  Resp: 15 (08/19/24 0707)  BP: 131/63 (08/19/24 0707)  SpO2: (!) 94 % (08/19/24 0707) Vital Signs (24h Range):  Temp:  [97.7 °F (36.5 °C)-98.3 °F (36.8 °C)] 97.8 °F (36.6 °C)  Pulse:  [52-59] 58  Resp:  [15-18] 15  SpO2:  [93 %-98 %] 94 %  BP: (124-143)/(60-66) 131/63     Weight: 116.2 kg (256 lb 2.8 oz)  Body mass index is 33.8 kg/m².    Intake/Output Summary (Last 24 hours) at 8/19/2024 0810  Last data filed at 8/19/2024 0707  Gross per 24 hour   Intake 840 ml   Output 1650 ml   Net -810 ml         Physical Exam  Constitutional:       Appearance: He is ill-appearing.   HENT:      Head: Normocephalic and atraumatic.      Mouth/Throat:      Mouth: Mucous membranes are dry.   Eyes:      Extraocular Movements: Extraocular movements intact.      Conjunctiva/sclera: Conjunctivae normal.      Pupils: Pupils are equal, round, and reactive to light.   Cardiovascular:      Rate and Rhythm: Normal rate and regular rhythm.      Pulses: Normal pulses.      Heart sounds: Normal heart sounds.   Pulmonary:      Effort: Pulmonary effort is normal.      Breath sounds: Normal breath sounds.   Abdominal:      General: Abdomen is flat. Bowel sounds are normal.      Palpations: Abdomen is soft.   Musculoskeletal:         General: Normal range of motion.      Lumbar back: No swelling, signs of trauma or tenderness.   Skin:     General: Skin is warm and dry.      Capillary Refill: Capillary refill takes less than 2 seconds.      Coloration: Skin is not jaundiced.      Comments: Clean R wrist access site.    Neurological:      Mental Status: He is alert and oriented to person, place, and time.      Motor: No abnormal muscle tone.   Psychiatric:         Mood and Affect: Mood  normal.             Significant Labs: All pertinent labs within the past 24 hours have been reviewed.    Significant Imaging: I have reviewed all pertinent imaging results/findings within the past 24 hours.    Assessment/Plan:      * Bilateral subdural hematomas  Patient initially presented to OSH with HA and CT head showing b/l subdural hematomas. CT head at follow up visit showed increase in b/l subdural hematomas size. IR performed MMA embolization 8/16/24. Pt tolerated procedure well. Post-op CT head revealing stable SDH. AAOx4.     Plan:   - Keppra 500mg BID for seizure prophylaxis.   - SBP goal 140  - fall precautions              Back pain  Pt complains of lumbar back pain. No swelling, tenderness to palpation, or ulcer. Most likely due to being sedentary for an extended period of time.   - baclofen 5mg TID PRN      JASON (acute kidney injury)  JASON is likely due to acute tubular necrosis caused by ammonia . Baseline creatinine is unknown. Most recent creatinine and eGFR are listed below. Overall, Cr and electrolytes improving. Most likely new baseline.   Recent Labs     08/16/24  0212 08/17/24  0234   CREATININE 1.5* 1.7*   EGFRNORACEVR 54.3* 46.7*        Plan  - Avoid nephrotoxins and renally dose meds for GFR listed above  - Monitor urine output, serial BMP, and adjust therapy as needed    Depression  Continue fluoxetine 20mg      HLD (hyperlipidemia)  Continue statin      Pancytopenia  The likely etiology of thrombocytopenia is liver disease and platelet consumption from splenic sequestration .   Plt consistently low and barely respond to transfusion.   Plan  - transfuse if plt <10,000  - Per heme/onc recs, recommend judicious use of PLT transfusions given he is appears to be refractory likely due to splenic consumption. He is unlikely to get to >50k. Recommend FFP or cryo only if he has evidence of clinical significant bleeding. Do not transfuse for the sake of correcting his coags in the absence of  bleeding as he is at high risk for TACO (he may have esophageal varices). Recommend GOC conversations as ultimately his overall clinical picture is secondary to cirrhosis.           Cirrhosis of liver with ascites  Patient with known Cirrhosis. Currently AAOx4 with ammonia down-trending.     MELD-Na score calculated; MELD 3.0: 25 at 8/17/2024  8:39 AM  MELD-Na: 24 at 8/17/2024  8:39 AM  Calculated from:  Serum Creatinine: 1.7 mg/dL at 8/17/2024  2:34 AM  Serum Sodium: 138 mmol/L (Using max of 137 mmol/L) at 8/17/2024  2:34 AM  Total Bilirubin: 4.6 mg/dL at 8/17/2024  2:34 AM  Serum Albumin: 4.7 g/dL (Using max of 3.5 g/dL) at 8/17/2024  2:34 AM  INR(ratio): 1.9 at 8/17/2024  8:39 AM  Age at listing (hypothetical): 58 years  Sex: Male at 8/17/2024  8:39 AM    Plan:  -Continue chronic meds. Etiology likely Hepatitis. Will avoid any hepatotoxic meds, and monitor CBC/CMP/INR for synthetic function.   - F/u ammonia, dbili  - Strict I/Os  - continue PT/OT, recommended IP rehab. Pt would like to be placed at [facility] in [place].    - low salt diet  - Discontinued lasix 40mg and spironolactone 50mg in setting of JASON  - Continue lactulose and rifaximin with goal 2-3 BM daily.   - Hepatology consulted, appreciate recs  - transplant eval on hold in setting of low CD4 and SDH. Rec palliative care consult.         Human immunodeficiency virus (HIV) disease  Previously was taking GENVOYA.     Continue STRIBILD, hospital equivalent. Will confirm with pharmacy       Chronic hepatitis C  Recent Hep C RNA showed elevated viral load 7/11/24. Not currently on treatment.     Can consider repeat viral load if clinically indicated          VTE Risk Mitigation (From admission, onward)           Ordered     IP VTE HIGH RISK PATIENT  Once         08/10/24 2347     Place sequential compression device  Until discontinued         08/10/24 2347                    Discharge Planning   CODI: 8/20/2024     Code Status: Full Code    Is the patient medically ready for discharge?:     Reason for patient still in hospital (select all that apply): Pending disposition  Discharge Plan A: Home Health                  Ryann Almazan MD  Department of Hospital Medicine   [facility] - Neurosurgery ([facility])

## 2024-08-19 NOTE — ASSESSMENT & PLAN NOTE
Patient initially presented to OSH with HA and CT head showing b/l subdural hematomas. CT head at follow up visit showed increase in b/l subdural hematomas size. IR performed MMA embolization 8/16/24. Pt tolerated procedure well. Post-op CT head revealing stable SDH. AAOx4.     Plan:   - NSGY signed off. Will arrange outpatient follow-up in 2 weeks with [facility].   - Keppra 500mg BID for seizure prophylaxis.   - SBP goal 140  - fall precautions

## 2024-08-19 NOTE — PLAN OF CARE
Problem: Adult Inpatient Plan of Care  Goal: Plan of Care Review  Outcome: Progressing  Goal: Patient-Specific Goal (Individualized)  Outcome: Progressing  Goal: Absence of Hospital-Acquired Illness or Injury  Outcome: Progressing  Goal: Optimal Comfort and Wellbeing  Outcome: Progressing  Goal: Readiness for Transition of Care  Outcome: Progressing     Problem: Fall Injury Risk  Goal: Absence of Fall and Fall-Related Injury  Outcome: Progressing     Problem: Pain Acute  Goal: Optimal Pain Control and Function  Outcome: Progressing     Problem: Comorbidity Management  Goal: Blood Pressure in Desired Range  Outcome: Progressing     Problem: Infection  Goal: Absence of Infection Signs and Symptoms  Outcome: Progressing     Problem: Skin Injury Risk Increased  Goal: Skin Health and Integrity  Outcome: Progressing     Problem: Acute Kidney Injury/Impairment  Goal: Fluid and Electrolyte Balance  Outcome: Progressing  Goal: Improved Oral Intake  Outcome: Progressing  Goal: Effective Renal Function  Outcome: Progressing     Problem: Wound  Goal: Optimal Coping  Outcome: Progressing  Goal: Optimal Functional Ability  Outcome: Progressing  Goal: Absence of Infection Signs and Symptoms  Outcome: Progressing  Goal: Improved Oral Intake  Outcome: Progressing  Goal: Optimal Pain Control and Function  Outcome: Progressing  Goal: Skin Health and Integrity  Outcome: Progressing  Goal: Optimal Wound Healing  Outcome: Progressing     POC updated and reviewed with the patient/family at the bedside. Questions regarding POC were encouraged and addressed. VSS, see flowsheets. Patient is AOX 4 at this time. Tele maintained per order.  Fall and safety precautions maintained, no signs of injury noted during shift. Patient repositioned independently in bed for comfort. Upon exiting room, patient's bed locked in low position, side rails up x 3, bed alarm on, with call light within reach. Instructed patient to call staff for mobility,  verbalized understanding. No acute sign of distress noted. POC ongoing.

## 2024-08-19 NOTE — TELEPHONE ENCOUNTER
----- Message from Rebecca Waldron PA-C sent at 8/19/2024 11:08 AM CDT -----  Hi, pt needs f/u in [facility] clinic in about 2 weeks with [facility] before visit for f/u of SDH, can be virtual visit. Thanks!

## 2024-08-19 NOTE — ASSESSMENT & PLAN NOTE
Pt complains of lumbar back pain. No swelling, tenderness to palpation, or ulcer. Most likely due to being sedentary for an extended period of time.   - baclofen 5mg TID PRN

## 2024-08-19 NOTE — NURSING
Received a critical lab for patient. Platelets were critical at 17. On call for HM-1 aware and no new orders noted.

## 2024-08-19 NOTE — PT/OT/SLP PROGRESS
Physical Therapy  Continue Current Plan of Care     Patient Name:  Jam Card   MRN:  [id number]  Admitting Diagnosis:  Bilateral subdural hematomas   Recent Surgery: * No surgery found *    Admit Date: 8/10/2024  Length of Stay: 9 days    Patient not seen on this date, however per chart review pt continues to benefit from acute PT services. PT to follow up as POC allows. Please continue progressive mobility as appropriate.    Will follow up: 8/20/24

## 2024-08-19 NOTE — ASSESSMENT & PLAN NOTE
Patient with known Cirrhosis. Discontinued lasix 40mg and spironolactone 50mg in setting of AKICurrently AAOx4 with ammonia down-trending. Hepatology on board, pending transplant evaluation in setting of low CD4 and BL SDH.     MELD-Na score calculated; MELD 3.0: 24 at 8/19/2024  3:43 AM  MELD-Na: 24 at 8/19/2024  3:43 AM  Calculated from:  Serum Creatinine: 1.5 mg/dL at 8/19/2024  3:43 AM  Serum Sodium: 135 mmol/L at 8/19/2024  3:43 AM  Total Bilirubin: 4.3 mg/dL at 8/19/2024  3:43 AM  Serum Albumin: 4.0 g/dL (Using max of 3.5 g/dL) at 8/19/2024  3:43 AM  INR(ratio): 1.9 at 8/19/2024  3:43 AM  Age at listing (hypothetical): 58 years  Sex: Male at 8/19/2024  3:43 AM    Plan:  -Continue chronic meds. Etiology likely Hepatitis. Will avoid any hepatotoxic meds, and monitor CBC/CMP/INR for synthetic function.   - F/u ammonia, dbili  - Strict I/Os  - continue PT/OT, recommended IP rehab. Pt would like to be placed at [facility] in [place]. Referrals sent to rehab in [place], [place]. Pending acceptance.   - low salt diet  - Continue lactulose and rifaximin with goal 2-3 BM daily.

## 2024-08-19 NOTE — SUBJECTIVE & OBJECTIVE
Interval History: NAEON. Pt doing well, reports mild HA last night that self resolved. Feels his coordination is improving. No other complaints. Plt count stable at 17k. Pending IPR placement.    Medications:  Continuous Infusions:  Scheduled Meds:   atorvastatin  40 mg Oral Daily    atovaquone  1,500 mg Oral Daily    cetirizine  10 mg Oral Daily    cyanocobalamin  1,000 mcg Oral Daily    zktbbvcy-bxnqqbwn-xxwxwr-tenof (STRIBILD) [phone] mg  1 tablet Oral Daily    ergocalciferol  50,000 Units Oral Q7 Days    FLUoxetine  20 mg Oral Daily    lactulose  30 g Oral TID    pantoprazole  40 mg Oral Daily    pyridoxine (vitamin B6)  100 mg Oral Daily    rifAXImin  550 mg Oral BID    tamsulosin  0.4 mg Oral QHS    vitamin D  1,000 Units Oral Daily     PRN Meds:  Current Facility-Administered Medications:     acetaminophen, 650 mg, Oral, Q8H PRN    baclofen, 5 mg, Oral, TID PRN    benzonatate, 100 mg, Oral, TID PRN    bisacodyL, 10 mg, Rectal, Daily PRN    dextrose 10%, 12.5 g, Intravenous, PRN    dextrose 10%, 25 g, Intravenous, PRN    glucagon (human recombinant), 1 mg, Intramuscular, PRN    glucose, 16 g, Oral, PRN    glucose, 24 g, Oral, PRN    insulin aspart U-100, 0-5 Units, Subcutaneous, QID (AC + HS) PRN    midodrine, 2.5 mg, Oral, TID PRN    naloxone, 0.02 mg, Intravenous, PRN    sodium chloride 0.9%, 10 mL, Intravenous, Q12H PRN     Review of Systems  Objective:     Weight: 116.2 kg (256 lb 2.8 oz)  Body mass index is 33.8 kg/m².  Vital Signs (Most Recent):  Temp: 97.8 °F (36.6 °C) (08/19/24 0709)  Pulse: (!) 58 (08/19/24 0707)  Resp: 15 (08/19/24 0707)  BP: 131/63 (08/19/24 0707)  SpO2: (!) 94 % (08/19/24 0707) Vital Signs (24h Range):  Temp:  [97.7 °F (36.5 °C)-98.3 °F (36.8 °C)] 97.8 °F (36.6 °C)  Pulse:  [52-59] 58  Resp:  [15-18] 15  SpO2:  [93 %-98 %] 94 %  BP: (124-143)/(60-66) 131/63     Date 08/19/24 0700 - 08/20/24 0659   Shift 3227-6710 0246-0674 5635-0296 24 Hour Total   INTAKE   Shift  Total(mL/kg)       OUTPUT   Urine(mL/kg/hr) 300   300   Shift Total(mL/kg) 300(2.6)   300(2.6)   Weight (kg) 116.2 116.2 116.2 116.2                       Male External Urinary Catheter 08/13/24 0800 Large (Active)   Collection Container Urimeter 08/19/24 0000   Securement Method secured to top of thigh w/ adhesive device 08/19/24 0000   Skin no redness;no breakdown 08/19/24 0000   Tolerance no signs/symptoms of discomfort 08/19/24 0000   Output (mL) 200 mL 08/19/24 0000          Physical Exam         Neurosurgery Physical Exam    General: AOx3, GCS [id number], slow to respond  CNII-XII: Intact on detailed exam, PERRL, visual fields grossly intact, EOMi, facial sensation preserved, no facial assymetry, tongue/uvula/palate midline, shoulder shrug equal, no pronator drift  Extremities: 5/5 motor throughout, sensorium intact throughout, coordination intact throughout, no sensory level present     Significant Labs:  Recent Labs   Lab 08/19/24  0343   GLU 85   *   K 4.3      CO2 19*   BUN 24*   CREATININE 1.5*   CALCIUM 9.5   MG 1.9     Recent Labs   Lab 08/19/24  0343   WBC 2.47*   HGB 8.2*   HCT 24.7*   PLT 17*     Recent Labs   Lab 08/19/24  0343   INR 1.9*     Microbiology Results (last 7 days)       ** No results found for the last 168 hours. **          All pertinent labs from the last 24 hours have been reviewed.    Significant Diagnostics:  I have reviewed and interpreted all pertinent imaging results/findings within the past 24 hours.

## 2024-08-19 NOTE — NURSING
Nurses Note -- 4 Eyes      8/19/2024   9:42 AM      Skin assessed during: Q Shift Change      [] No Altered Skin Integrity Present    []Prevention Measures Documented      [x] Yes- Altered Skin Integrity Present or Discovered   [] LDA Added if Not in Epic (Describe Wound)   [] New Altered Skin Integrity was Present on Admit and Documented in LDA   [] Wound Image Taken    Wound Care Consulted? no    Attending Nurse:  Ada Webber RN/Staff Member:  rik

## 2024-08-19 NOTE — NURSING
BS at . Blood sugar machine would not scan bracelet. Received one time dose of Baclofen for muscle spasms.

## 2024-08-19 NOTE — CARE UPDATE
I have reviewed the chart of Jam Card who is hospitalized for the following:    Active Hospital Problems    Diagnosis    *Bilateral subdural hematomas    Back pain    Hyponatremia     Monitor with daily labs  Replace as needed      Hypophosphatemia     Monitor with labs  Replace      Hypomagnesemia     Monitor with labs  Replace      Acute blood loss anemia    JASON (acute kidney injury)    Portal hypertension    Ascites     Sequela of portal hypertension including splenomegaly and moderate volume ascites       Depression    Hepatic encephalopathy    Brain compression     Close neurological monitor and imaging  S/p MMA embolization       Cirrhosis of liver with ascites    Pancytopenia    Chronic hepatitis C    Human immunodeficiency virus (HIV) disease        Heidi Fitzpatrick NP  Unit Based [facility]

## 2024-08-20 LAB
ALBUMIN SERPL BCP-MCNC: 3.7 G/DL (ref 3.5–5.2)
ALP SERPL-CCNC: 80 U/L (ref 55–135)
ALT SERPL W/O P-5'-P-CCNC: 15 U/L (ref 10–44)
ANION GAP SERPL CALC-SCNC: 7 MMOL/L (ref 8–16)
ANISOCYTOSIS BLD QL SMEAR: SLIGHT
AST SERPL-CCNC: 52 U/L (ref 10–40)
BASOPHILS # BLD AUTO: 0.01 K/UL (ref 0–0.2)
BASOPHILS NFR BLD: 0.5 % (ref 0–1.9)
BILIRUB SERPL-MCNC: 4.6 MG/DL (ref 0.1–1)
BUN SERPL-MCNC: 21 MG/DL (ref 6–20)
BURR CELLS BLD QL SMEAR: ABNORMAL
CALCIUM SERPL-MCNC: 9.9 MG/DL (ref 8.7–10.5)
CHLORIDE SERPL-SCNC: 111 MMOL/L (ref 95–110)
CO2 SERPL-SCNC: 18 MMOL/L (ref 23–29)
CREAT SERPL-MCNC: 1.5 MG/DL (ref 0.5–1.4)
DIFFERENTIAL METHOD BLD: ABNORMAL
EOSINOPHIL # BLD AUTO: 0 K/UL (ref 0–0.5)
EOSINOPHIL NFR BLD: 1.4 % (ref 0–8)
ERYTHROCYTE [DISTWIDTH] IN BLOOD BY AUTOMATED COUNT: 18.8 % (ref 11.5–14.5)
EST. GFR  (NO RACE VARIABLE): 54.3 ML/MIN/1.73 M^2
GLUCOSE SERPL-MCNC: 100 MG/DL (ref 70–110)
HCT VFR BLD AUTO: 26.1 % (ref 40–54)
HGB BLD-MCNC: 8.6 G/DL (ref 14–18)
HYPOCHROMIA BLD QL SMEAR: ABNORMAL
IMM GRANULOCYTES # BLD AUTO: 0.01 K/UL (ref 0–0.04)
IMM GRANULOCYTES NFR BLD AUTO: 0.5 % (ref 0–0.5)
LYMPHOCYTES # BLD AUTO: 0.7 K/UL (ref 1–4.8)
LYMPHOCYTES NFR BLD: 30.5 % (ref 18–48)
MAGNESIUM SERPL-MCNC: 1.8 MG/DL (ref 1.6–2.6)
MCH RBC QN AUTO: 34.1 PG (ref 27–31)
MCHC RBC AUTO-ENTMCNC: 33 G/DL (ref 32–36)
MCV RBC AUTO: 104 FL (ref 82–98)
MONOCYTES # BLD AUTO: 0.2 K/UL (ref 0.3–1)
MONOCYTES NFR BLD: 7.7 % (ref 4–15)
NEUTROPHILS # BLD AUTO: 1.3 K/UL (ref 1.8–7.7)
NEUTROPHILS NFR BLD: 59.4 % (ref 38–73)
NRBC BLD-RTO: 0 /100 WBC
OVALOCYTES BLD QL SMEAR: ABNORMAL
PHOSPHATE SERPL-MCNC: 2.1 MG/DL (ref 2.7–4.5)
PLATELET # BLD AUTO: 15 K/UL (ref 150–450)
PLATELET BLD QL SMEAR: ABNORMAL
PMV BLD AUTO: 14.2 FL (ref 9.2–12.9)
POIKILOCYTOSIS BLD QL SMEAR: SLIGHT
POLYCHROMASIA BLD QL SMEAR: ABNORMAL
POTASSIUM SERPL-SCNC: 4.1 MMOL/L (ref 3.5–5.1)
PROT SERPL-MCNC: 6.4 G/DL (ref 6–8.4)
RBC # BLD AUTO: 2.52 M/UL (ref 4.6–6.2)
SODIUM SERPL-SCNC: 136 MMOL/L (ref 136–145)
WBC # BLD AUTO: 2.2 K/UL (ref 3.9–12.7)

## 2024-08-20 PROCEDURE — 63600175 PHARM REV CODE 636 W HCPCS: Mod: NTX

## 2024-08-20 PROCEDURE — 85025 COMPLETE CBC W/AUTO DIFF WBC: CPT | Mod: NTX | Performed by: HOSPITALIST

## 2024-08-20 PROCEDURE — 80053 COMPREHEN METABOLIC PANEL: CPT | Mod: NTX

## 2024-08-20 PROCEDURE — 97116 GAIT TRAINING THERAPY: CPT | Mod: NTX,CQ

## 2024-08-20 PROCEDURE — 25000003 PHARM REV CODE 250: Mod: NTX | Performed by: HOSPITALIST

## 2024-08-20 PROCEDURE — 25000003 PHARM REV CODE 250: Mod: NTX

## 2024-08-20 PROCEDURE — 83735 ASSAY OF MAGNESIUM: CPT | Mod: NTX

## 2024-08-20 PROCEDURE — 36415 COLL VENOUS BLD VENIPUNCTURE: CPT | Mod: NTX

## 2024-08-20 PROCEDURE — 84100 ASSAY OF PHOSPHORUS: CPT | Mod: NTX

## 2024-08-20 PROCEDURE — 97535 SELF CARE MNGMENT TRAINING: CPT | Mod: NTX

## 2024-08-20 PROCEDURE — 97530 THERAPEUTIC ACTIVITIES: CPT | Mod: NTX

## 2024-08-20 PROCEDURE — 97530 THERAPEUTIC ACTIVITIES: CPT | Mod: NTX,CQ

## 2024-08-20 PROCEDURE — 11000001 HC ACUTE MED/SURG PRIVATE ROOM: Mod: NTX

## 2024-08-20 RX ORDER — ACETAMINOPHEN 500 MG
500 TABLET ORAL EVERY 6 HOURS PRN
Status: DISCONTINUED | OUTPATIENT
Start: 2024-08-21 | End: 2024-08-22 | Stop reason: HOSPADM

## 2024-08-20 RX ORDER — MAGNESIUM SULFATE HEPTAHYDRATE 40 MG/ML
2 INJECTION, SOLUTION INTRAVENOUS ONCE
Status: COMPLETED | OUTPATIENT
Start: 2024-08-20 | End: 2024-08-20

## 2024-08-20 RX ADMIN — ELVITEGRAVIR, COBICISTAT, EMTRICITABINE, AND TENOFOVIR DISOPROXIL FUMARATE 1 TABLET: 150; 150; 200; 300 TABLET, FILM COATED ORAL at 09:08

## 2024-08-20 RX ADMIN — ATORVASTATIN CALCIUM 40 MG: 40 TABLET, FILM COATED ORAL at 09:08

## 2024-08-20 RX ADMIN — PANTOPRAZOLE SODIUM 40 MG: 40 TABLET, DELAYED RELEASE ORAL at 09:08

## 2024-08-20 RX ADMIN — MAGNESIUM SULFATE HEPTAHYDRATE 2 G: 40 INJECTION, SOLUTION INTRAVENOUS at 03:08

## 2024-08-20 RX ADMIN — CHOLECALCIFEROL TAB 25 MCG (1000 UNIT) 1000 UNITS: 25 TAB at 09:08

## 2024-08-20 RX ADMIN — Medication 1 TABLET: at 06:08

## 2024-08-20 RX ADMIN — TAMSULOSIN HYDROCHLORIDE 0.4 MG: 0.4 CAPSULE ORAL at 09:08

## 2024-08-20 RX ADMIN — PYRIDOXINE HCL TAB 50 MG 100 MG: 50 TAB at 09:08

## 2024-08-20 RX ADMIN — Medication 1 TABLET: at 10:08

## 2024-08-20 RX ADMIN — CYANOCOBALAMIN TAB 1000 MCG 1000 MCG: 1000 TAB at 09:08

## 2024-08-20 RX ADMIN — LACTULOSE 30 G: 20 SOLUTION ORAL at 09:08

## 2024-08-20 RX ADMIN — LACTULOSE 30 G: 20 SOLUTION ORAL at 03:08

## 2024-08-20 RX ADMIN — ACETAMINOPHEN 650 MG: 325 TABLET ORAL at 08:08

## 2024-08-20 RX ADMIN — CETIRIZINE HYDROCHLORIDE 10 MG: 5 TABLET, FILM COATED ORAL at 09:08

## 2024-08-20 RX ADMIN — RIFAXIMIN 550 MG: 550 TABLET ORAL at 09:08

## 2024-08-20 RX ADMIN — BACLOFEN 5 MG: 5 TABLET ORAL at 08:08

## 2024-08-20 RX ADMIN — ACETAMINOPHEN 650 MG: 325 TABLET ORAL at 03:08

## 2024-08-20 RX ADMIN — ATOVAQUONE 1500 MG: 750 SUSPENSION ORAL at 09:08

## 2024-08-20 RX ADMIN — ACETAMINOPHEN 650 MG: 325 TABLET ORAL at 11:08

## 2024-08-20 RX ADMIN — FLUOXETINE HYDROCHLORIDE 20 MG: 20 CAPSULE ORAL at 09:08

## 2024-08-20 NOTE — ASSESSMENT & PLAN NOTE
JASON is likely due to acute tubular necrosis caused by ammonia . Baseline creatinine is unknown. Most recent creatinine and eGFR are listed below. Overall, Cr and electrolytes improving. Most likely new baseline.   Recent Labs     08/19/24  0343 08/20/24  0218   CREATININE 1.5* 1.5*   EGFRNORACEVR 54.3* 54.3*        Plan  - Avoid nephrotoxins and renally dose meds for GFR listed above  - Monitor urine output, serial BMP, and adjust therapy as needed

## 2024-08-20 NOTE — PLAN OF CARE
Patients mother called  for an update on patients placement.  informed patients mother that referrals have been sent to both rehab  facilities. [facility] is going to call  in the morning to confirm if patient is accepted or denied. Elias Eason does not have an answer at this time. Patients mother thanked WASHINGTON for the update.    CORTEZ Acevedo  [facility]  [phone]

## 2024-08-20 NOTE — PLAN OF CARE
WASHINGTON called [facility] [phone] this morning around 9:30am- stated admissions coordinator is not in yet and took WASHINGTON's info down to leave a message for a call back.    WASHINGTON called back around 12:30pm-admissions coordinator stated she still has yet to look at patient referral and will call WASHINGTON back once she has.     WASHINGTON reached out to [facility] [phone] to receive fax number for admissions.   (F) [phone].   Your fax has been successfully sent to [phone] at [phone].  ------------------------------------------------------------  From: [id number]  ------------------------------------------------------------  8/20/2024 12:56:19 PM Transmission Record   Sent to [phone] with remote ID "   Result: (0/339;0/0) Success   Page record: 1 - 43   Elapsed time: 14:56 on channel 54      Neeta Domingo, patients care giver, reached out to WASHINGTON for update at 12:55pm. WASHINGTON informed Ms. Domingo that both rehab referrals were sent on yesterday and resent today. No accepting rehabs at this time. MsDonnell Jose L thanked WASHINGTON for the update.   WASHINGTON following.       CORTEZ Acevedo  [facility]  [phone]

## 2024-08-20 NOTE — PT/OT/SLP PROGRESS
Occupational Therapy   Treatment    Name: Jam Card  MRN: [id number]  Admitting Diagnosis:  Bilateral subdural hematomas       Recommendations:     Discharge Recommendations: High Intensity Therapy  Discharge Equipment Recommendations:  grab bar, shower chair  Barriers to discharge:  None    Assessment:     Jam Card is a 58 y.o. male with a medical diagnosis of Bilateral subdural hematomas.  He presents with the following performance deficits affecting function: weakness, impaired self care skills, impaired functional mobility, gait instability, impaired balance, decreased coordination, decreased safety awareness. Pt agreeable to session and motivated to participate. Pt presenting to session with a pleasant demeanor and tolerated session well. Pt would continue to benefit from skilled OT services in the acute care setting to promote improved dynamic standing balance, increase participation in self-care tasks and ADL routines, and to facilitate a return to PLOF and least restrictive home environment.     Rehab Prognosis:  Good; patient would benefit from acute skilled OT services to address these deficits and reach maximum level of function.       Plan:     Patient to be seen 4 x/week to address the above listed problems via self-care/home management, therapeutic activities, therapeutic exercises, neuromuscular re-education  Plan of Care Expires: 09/15/24  Plan of Care Reviewed with: patient, family    Subjective     Chief Complaint: none reported  Patient/Family Comments/goals: Pt inquiring about POC and discharge from acute care setting.  Pain/Comfort:  Pain Rating 1: 0/10    Objective:     Communicated with: Nursing prior to session.  Patient found supine with telemetry upon OT entry to room.    General Precautions: Standard, fall    Orthopedic Precautions:N/A  Braces: N/A  Respiratory Status: Room air     Occupational Performance:     Bed Mobility:    Patient completed Supine to Sit with stand by  assistance     Functional Mobility/Transfers:  Patient completed Sit <> Stand Transfer with stand by assistance  with  rolling walker   Patient completed Toilet Transfer Stand Pivot technique with contact guard assistance with  no AD  Functional Mobility: Pt able to tolerate ambulating throughout room and bathroom with RW and CGA provided for safety. Pt demonstrating slight anteroposterior sway while performing grooming tasks at sink but no significant LOB noted.     Activities of Daily Living:  Grooming: contact guard assistance for performing oral care in standing at sink and washing hands in standing at sink after toileting  Lower Body Dressing: stand by assistance for donning hospital socks prior to ambulation  Toileting: stand by assistance for radha hygiene and clothing management      [facility] 6 Click ADL: 23    Treatment & Education:  Provided education on the role of OT, POC, and therapy goals while in the acute care setting. Provided education on the importance of continued mobilization and participation in OOB activities to increase functional endurance and activity tolerance for increased participation in ADL routines. Provided education on safe transfer techniques and proper hand placement to promote safety awareness and prevent falls with functional transfers. Encouraged pt to ambulate to the bathroom versus using a bedpan or urinal to simulate home routines while in the acute care setting - pt verbalized understanding to have someone with him at all times when ambulating around room and bathroom. All questions within the scope of OT answered, and pt verbalized understanding.     Patient left up in chair with all lines intact, call button in reach, and family present    GOALS:   Multidisciplinary Problems       Occupational Therapy Goals          Problem: Occupational Therapy    Goal Priority Disciplines Outcome Interventions   Occupational Therapy Goal     OT, PT/OT Progressing    Description: Goals to be  met by: 9/15/24     Patient will increase functional independence with ADLs by performing:    UE Dressing with [place].  LE Dressing with [place].  Grooming while standing with [place].  Bathing from  tub bench with [place].  Stand pivot transfers with [place].  Step transfer with [place]  Toilet transfer to toilet with [place].                         Time Tracking:     OT Date of Treatment: 08/20/24  OT Start Time: 0943  OT Stop Time: 1011  OT Total Time (min): 28 min    Billable Minutes:Self Care/Home Management 18 minutes  Therapeutic Activity 10 minutes    OT/ANJALI: OT          8/20/2024

## 2024-08-20 NOTE — NURSING
@4632 Hematology lab communicated that preliminary platelets 15,000. Team notified. No new order received.

## 2024-08-20 NOTE — PT/OT/SLP PROGRESS
"Physical Therapy Treatment    Patient Name:  Jam Card   MRN:  [id number]    Recommendations:     Discharge Recommendations: High Intensity Therapy  Discharge Equipment Recommendations: bedside commode  Barriers to discharge: Inaccessible home and Decreased caregiver support    Assessment:     Jam Card is a 58 y.o. male admitted with a medical diagnosis of Bilateral subdural hematomas.  He presents with the following impairments/functional limitations: weakness, impaired self care skills, impaired endurance, impaired functional mobility, impaired balance, decreased safety awareness, decreased coordination, gait instability.    Pt met with HOB elevated and agreeable to session. Pt tolerates session well with emphasis on bed mobility, transfers, and gait training. Pt making progress towards therapy goals as indicated by decreased assistance required with bed mobility and gait training. Pt also ambulates increased total ambulation distance. Pt is motivated to return to [facility]. Pt will continue to benefit from skilled therapy services.    Rehab Prognosis: Good; patient would benefit from acute skilled PT services to address these deficits and reach maximum level of function.    Recent Surgery: * No surgery found *      Plan:     During this hospitalization, patient to be seen 4 x/week to address the identified rehab impairments via gait training, therapeutic activities, therapeutic exercises, neuromuscular re-education and progress toward the following goals:    Plan of Care Expires:  09/15/24    Subjective     Chief Complaint: none stated  Patient/Family Comments/goals: "I'm ready to get out of here"  Pain/Comfort:  Pain Rating 1: 0/10  Pain Rating Post-Intervention 1: 0/10      Objective:     Communicated with RN (Estefany) prior to session.  Patient found HOB elevated with telemetry, caregiver present upon PTA entry to room.     General Precautions: Standard, fall  Orthopedic Precautions: N/A  Braces: " N/A  Respiratory Status: Room air    Cognition:   Orientation:  Affect: pleasant and cooperative  Alertness: eyes open 100 % of session  Insight: good insight into deficits  Attention: attends to task  Command Following: patient follows 100 % of 1-step commands  Communication:  Sequencing: Intact     Functional Mobility:  Bed Mobility:     Rolling Left:  stand by assistance  Scooting: anteriorly to EOB stand by assistance  Supine to Sit: stand by assistance  Transfers:     Sit to Stand:  x2 trials from EOB contact guard assistance with rolling walker  Verbal cues required to push up from EOB with [place]OX[place]NA UE  Toilet Transfer: CGA with RW via step transfer  Gait:   Pt ambulates x2 trials 100 feet with contact guard assistance, rolling walker, and close chair follow.  Deviations noted: decreased step length, decreased beverly, decreased gait speed  Verbal cues required to keep RW within close proximity  All lines remained intact and gait belt utilized.      AM-PAC 6 CLICK MOBILITY  Turning over in bed (including adjusting bedclothes, sheets and blankets)?: 4  Sitting down on and standing up from a chair with arms (e.g., wheelchair, bedside commode, etc.): 3  Moving from lying on back to sitting on the side of the bed?: 4  Moving to and from a bed to a chair (including a wheelchair)?: 3  Need to walk in hospital room?: 3  Climbing 3-5 steps with a railing?: 2  Basic Mobility Total Score: 19       Treatment & Education:  Patient provided with daily orientation and goals of this PT session.     Pt  educated to call for assistance and to transfer with hospital staff only.  Also, pt was educated on the effects of prolonged immobility and the importance of performing OOB activity and exercises to promote healing and reduce recovery time.    Patient left HOB elevated with all lines intact, call button in reach, bed alarm on, RN notified, and Caregiver  present.    GOALS:   Multidisciplinary Problems       Physical Therapy Goals           Problem: Physical Therapy    Goal Priority Disciplines Outcome Goal Variances Interventions   Physical Therapy Goal     PT, PT/OT Progressing     Description: Goals to be met by: 24    Patient will increase functional independence with mobility by performin. Supine to sit with [place]  2. Sit to supine with [place]  3. Rolling to Left and Right with [place].  4. Sit to stand transfer with Modified [place] using LRAD as needed  5. Bed to chair transfer with Stand-by Assistance using LRAD as needed  6. Gait  x 50 feet with Stand-by Assistance using LRAD as needed   7. Sitting at edge of bed for 10 minutes with [place]  8. Stand for 8 minutes with Supervision using LRAD as needed  9. Lower extremity exercise program x30 reps per handout, with assistance as needed                         Time Tracking:     PT Received On: 24  PT Start Time: 1232     PT Stop Time: 1259  PT Total Time (min): 27 min     Billable Minutes: Gait Training 15 and Therapeutic Activity 12    Treatment Type: Treatment  PT/PTA: PTA     Number of PTA visits since last PT visit: 2     2024

## 2024-08-20 NOTE — NURSING
@2000 Pt having 5/10 headache. Team notified. PRN acetaminophen give early. No more PRN acetaminophen to be given until 0300.

## 2024-08-20 NOTE — PLAN OF CARE
[facility] - [facility] (Hospital)  Discharge Reassessment    Primary Care Provider: Ulysses Almaraz MD    Expected Discharge Date: 8/22/2024    Reassessment (most recent)       Discharge Reassessment - 08/20/24 1335          Discharge Reassessment    Did the patient's condition or plan change since previous assessment? No     Discharge Plan discussed with: Caregiver     Name(s) and Number(s) Ashley Domingo [phone]     Communicated CODI with patient/caregiver Date not available/Unable to determine     Discharge Plan A Rehab     Discharge Plan B Home     Transition of Care Barriers None     Why the patient remains in the hospital Acute bed availability        Post-Acute Status    Post-Acute Authorization Placement     Post-Acute Placement Status Referrals Sent     Coverage Payor: [employer] MANAGED MEDICARE - [employer] MED[employer]ARE [employer]AGE -     Discharge Delays None known at this time                   Patient pending rehab acceptance. Patient medically stable.    CORTEZ Acevedo  [facility] [place] [facility]  [phone]

## 2024-08-20 NOTE — SUBJECTIVE & OBJECTIVE
Interval History: NAEON. Pt has no complaints and is ready to be discharged to rehab facility. Denies CP, SOB, GI/ symptoms.     Review of Systems  Objective:     Vital Signs (Most Recent):  Temp: 98 °F (36.7 °C) (08/20/24 1502)  Pulse: 60 (08/20/24 1502)  Resp: 18 (08/20/24 1502)  BP: (!) 161/74 (08/20/24 1502)  SpO2: 97 % (08/20/24 1502) Vital Signs (24h Range):  Temp:  [97.6 °F (36.4 °C)-98.4 °F (36.9 °C)] 98 °F (36.7 °C)  Pulse:  [53-60] 60  Resp:  [16-20] 18  SpO2:  [18 %-98 %] 97 %  BP: (121-161)/(58-74) 161/74     Weight: 116.2 kg (256 lb 2.8 oz)  Body mass index is 33.8 kg/m².    Intake/Output Summary (Last 24 hours) at 8/20/2024 1557  Last data filed at 8/20/2024 0300  Gross per 24 hour   Intake 180 ml   Output 454 ml   Net -274 ml         Physical Exam  Constitutional:       Appearance: He is ill-appearing.   HENT:      Head: Normocephalic and atraumatic.      Mouth/Throat:      Mouth: Mucous membranes are dry.   Eyes:      Extraocular Movements: Extraocular movements intact.      Conjunctiva/sclera: Conjunctivae normal.      Pupils: Pupils are equal, round, and reactive to light.   Cardiovascular:      Rate and Rhythm: Normal rate and regular rhythm.      Pulses: Normal pulses.      Heart sounds: Normal heart sounds.   Pulmonary:      Effort: Pulmonary effort is normal.      Breath sounds: Normal breath sounds.   Abdominal:      General: Abdomen is flat. Bowel sounds are normal.      Palpations: Abdomen is soft.   Musculoskeletal:         General: Normal range of motion.      Lumbar back: No swelling, signs of trauma or tenderness.   Skin:     General: Skin is warm and dry.      Capillary Refill: Capillary refill takes less than 2 seconds.      Coloration: Skin is not jaundiced.      Comments: Clean R wrist access site.    Neurological:      Mental Status: He is alert and oriented to person, place, and time.      Motor: No abnormal muscle tone.   Psychiatric:         Mood and Affect: Mood normal.              Significant Labs: All pertinent labs within the past 24 hours have been reviewed.    Significant Imaging: I have reviewed all pertinent imaging results/findings within the past 24 hours.

## 2024-08-20 NOTE — PROGRESS NOTES
"[facility] - [facility] ([facility])  Adult Nutrition  Progress Note    SUMMARY       Recommendations    Continue regular diet as tolerated.      RD to monitor and follow up.      Goals: Meet % EEN/EPN by RD follow up.  Nutrition Goal Status: progressing towards goal  Communication of RD Recs: other (comment) (POC)    Assessment and Plan    Nutrition Problem  Increased energy needs     Related to (etiology):   Physiological needs     Signs and Symptoms (as evidenced by):    Bilateral subdural hematomas         Interventions/Recommendations (treatment strategy):  Collaboration of nutrition care w/ other providers     Nutrition Diagnosis Status:   New         Reason for Assessment    Reason For Assessment: RD follow-up  Diagnosis: other (see comments) (Bilateral subdural hematomas)  Relevant Medical History: Chronic hep C, cirrhosis, HIV, CAD, HTN, GERD, HLD  Interdisciplinary Rounds: did not attend  General Information Comments: RD follow up. Pt is now on a regular diet. Per chart pt consuming between % of meals. NO N/V/C reported, noted pt experiencing loose stools. Following.   Nutrition Discharge Planning: Pending medical course    Nutrition Risk Screen    Nutrition Risk Screen: no indicators present    Nutrition/Diet History    Spiritual, Cultural Beliefs, Spiritism Practices, Values that Affect Care: no  Food Allergies: NKFA  Factors Affecting Nutritional Intake: diarrhea, headache    Anthropometrics    Temp: 98 °F (36.7 °C)  Height Method: Measured  Height: 6' 1" (185.4 cm)  Height (inches): 73 in  Weight Method: Bed Scale  Weight: 116.2 kg (256 lb 2.8 oz)  Weight (lb): 256.18 lb  Ideal Body Weight (IBW), Male: 184 lb  % Ideal Body Weight, Male (lb): 139.23 %  BMI (Calculated): 33.8  BMI Grade: 30 - 34.9- obesity - grade I       Lab/Procedures/Meds    Pertinent Labs Reviewed: reviewed  Pertinent Labs Comments: BUN: 21, Creatinine: 1.5, eGFR: 54.3, AST: 52, Vit B12: >2000  Pertinent Medications " Reviewed: reviewed  Pertinent Medications Comments: Cyanocobalamin, ergocalciferol, lactulose, pyridoxine, vit D        Estimated/Assessed Needs    Weight Used For Calorie Calculations: 116.2 kg (256 lb 2.8 oz)  Energy Calorie Requirements (kcal): 2046 (MSJ x 1.0 PAL)     Protein Requirements: 151.38 (1.3 g/kg ABW)  Weight Used For Protein Calculations: 116.2 kg (256 lb 2.8 oz)  Fluid Requirements (mL): Per MD     RDA Method (mL): 2046         Nutrition Prescription Ordered    Current Diet Order: NPO    Evaluation of Received Nutrient/Fluid Intake    I/O: - 1,733.6 net I/O  Comments: LBM: 8/19  % Intake of Estimated Energy Needs: 50 - 75 %  % Meal Intake: 50 - 75 %    Nutrition Risk    Nutrition Risk Screen: no indicators present     Monitor and Evaluation    Food and Nutrient Intake: energy intake, food and beverage intake  Food and Nutrient Adminstration: diet order  Knowledge/Beliefs/Attitudes: food and nutrition knowledge/skill  Physical Activity and Function: nutrition-related ADLs and IADLs  Anthropometric Measurements: body mass index, weight change, weight  Biochemical Data, Medical Tests and Procedures: lipid profile, inflammatory profile, glucose/endocrine profile, gastrointestinal profile, electrolyte and renal panel  Nutrition-Focused Physical Findings: overall appearance     Nutrition Follow-Up    RD Follow-up?: Yes    Tamiko Oakley, Registration Eligible, Provisional LDN

## 2024-08-20 NOTE — PLAN OF CARE
Recommendations    Continue regular diet as tolerated.      RD to monitor and follow up.      Goals: Meet % EEN/EPN by RD follow up.  Nutrition Goal Status: progressing towards goal  Communication of RD Recs: other (comment) (POC)   Screen#: [id number]  Screen Date: 2023  Screen Comment: N/A

## 2024-08-20 NOTE — PLAN OF CARE
Problem: Adult Inpatient Plan of Care  Goal: Patient-Specific Goal (Individualized)  Description: Pt will maintain sbp below 180  Outcome: Progressing  Flowsheets (Taken 8/20/2024 0328)  Individualized Care Needs: active listening  Anxieties, Fears or Concerns: none  Goal: Optimal Comfort and Wellbeing  Outcome: Progressing  Intervention: Monitor Pain and Promote Comfort  Flowsheets (Taken 8/20/2024 0328)  Pain Management Interventions: care clustered  Intervention: Provide Person-Centered Care  Flowsheets (Taken 8/20/2024 0328)  Trust Relationship/Rapport:   choices provided   care explained     @2000 Pt headache 5/10, Team notified, acetaminophen given early @ 2000 and was not to be given until 0300. Acetaminophen given x 1 for pain @0310. VSS. Bed in lowest position, side rails x 3, and call light in use.

## 2024-08-20 NOTE — PROGRESS NOTES
Esequiel Lea - Neurosurgery ([facility])  [facility] Medicine  Progress Note    Patient Name: Jam Card  MRN: [id number]  Patient Class: IP- Inpatient   Admission Date: 8/10/2024  Length of Stay: 10 days  Attending Physician: Rudy Sotomayor MD  Primary Care Provider: Ulysses Almaraz MD        Subjective:     Principal Problem:Bilateral subdural hematomas        HPI:  Mr. Card is a 57 yo M with PMHx of Hep C cirrhosis, HIV, chronic thrombocytopenia, HTN, GERD, HLD, CAD s/p CABG (many years ago) who was initially admitted to [facility] under  after being referred to the ED by NSGY due to worsening SDH on imaging. Patient was previously admitted to [facility] for evaluation of HA (7/19/24) with a CT head showing b/l chronic extra-axial fluid collections which possibly could be CSF. Bur hole drainage was considered but coagulopathy and platelet issue needed to be corrected prior. Patient also did not want drainage at that time. Patient f/u with [facility] neurotrauma unit with repeat CT head (8/7/24) but no longer experienced HA, just gait disturbance. CT head showed blood developing since prior CT head which led to ED visit and evaluation. Heme/Onc was consulted and believe that thrombocytopenia could be to splenic sequestration so improvement of plt function may only be temporary at best. NSGY at [facility] requested inter facility transfer. Hepatology at [facility] also agreed. Patient transferred to [facility] for NSGY, Heme/Onc, and Hepatology.     Patient hemodynamically stable on arrival. MELD 27, Na 135, Cr 1.72 (1.8 baseline), INR 1.7, CBC 0.9 > 7.7 < 37 (s/p 2U plt and FFP, with initial INR 1.9). No focal neuro deficits at this time. GCS 15 on exam. Patient denies HA, weakness, numbness, vision disturbance, dizziness, photophobia, or any other complaints.     Overview/Hospital Course:  See assessment and plan.     Interval History: NAEON. Pt has no complaints and is ready to be discharged to rehab facility. Denies CP,  SOB, GI/ symptoms.     Review of Systems  Objective:     Vital Signs (Most Recent):  Temp: 98 °F (36.7 °C) (08/20/24 1502)  Pulse: 60 (08/20/24 1502)  Resp: 18 (08/20/24 1502)  BP: (!) 161/74 (08/20/24 1502)  SpO2: 97 % (08/20/24 1502) Vital Signs (24h Range):  Temp:  [97.6 °F (36.4 °C)-98.4 °F (36.9 °C)] 98 °F (36.7 °C)  Pulse:  [53-60] 60  Resp:  [16-20] 18  SpO2:  [18 %-98 %] 97 %  BP: (121-161)/(58-74) 161/74     Weight: 116.2 kg (256 lb 2.8 oz)  Body mass index is 33.8 kg/m².    Intake/Output Summary (Last 24 hours) at 8/20/2024 1557  Last data filed at 8/20/2024 0300  Gross per 24 hour   Intake 180 ml   Output 454 ml   Net -274 ml         Physical Exam  Constitutional:       Appearance: He is ill-appearing.   HENT:      Head: Normocephalic and atraumatic.      Mouth/Throat:      Mouth: Mucous membranes are dry.   Eyes:      Extraocular Movements: Extraocular movements intact.      Conjunctiva/sclera: Conjunctivae normal.      Pupils: Pupils are equal, round, and reactive to light.   Cardiovascular:      Rate and Rhythm: Normal rate and regular rhythm.      Pulses: Normal pulses.      Heart sounds: Normal heart sounds.   Pulmonary:      Effort: Pulmonary effort is normal.      Breath sounds: Normal breath sounds.   Abdominal:      General: Abdomen is flat. Bowel sounds are normal.      Palpations: Abdomen is soft.   Musculoskeletal:         General: Normal range of motion.      Lumbar back: No swelling, signs of trauma or tenderness.   Skin:     General: Skin is warm and dry.      Capillary Refill: Capillary refill takes less than 2 seconds.      Coloration: Skin is not jaundiced.      Comments: Clean R wrist access site.    Neurological:      Mental Status: He is alert and oriented to person, place, and time.      Motor: No abnormal muscle tone.   Psychiatric:         Mood and Affect: Mood normal.             Significant Labs: All pertinent labs within the past 24 hours have been reviewed.    Significant  Imaging: I have reviewed all pertinent imaging results/findings within the past 24 hours.    Assessment/Plan:      * Bilateral subdural hematomas  Patient initially presented to OSH with HA and CT head showing b/l subdural hematomas. CT head at follow up visit showed increase in b/l subdural hematomas size. IR performed MMA embolization 8/16/24. Pt tolerated procedure well. Post-op CT head revealing stable SDH. AAOx4.     Plan:   - NSGY signed off. Will arrange outpatient follow-up in 2 weeks with [facility].   - Keppra 500mg BID for seizure prophylaxis.   - SBP goal 140  - fall precautions              Back pain  Pt complains of lumbar back pain. No swelling, tenderness to palpation, or ulcer. Most likely due to being sedentary for an extended period of time.   - baclofen 5mg TID PRN      JASON (acute kidney injury)  JASON is likely due to acute tubular necrosis caused by ammonia . Baseline creatinine is unknown. Most recent creatinine and eGFR are listed below. Overall, Cr and electrolytes improving. Most likely new baseline.   Recent Labs     08/19/24  0343 08/20/24  0218   CREATININE 1.5* 1.5*   EGFRNORACEVR 54.3* 54.3*        Plan  - Avoid nephrotoxins and renally dose meds for GFR listed above  - Monitor urine output, serial BMP, and adjust therapy as needed    Depression  Continue fluoxetine 20mg      HLD (hyperlipidemia)  Continue statin      Pancytopenia  The likely etiology of thrombocytopenia is liver disease and platelet consumption from splenic sequestration .   Plt consistently low and barely respond to transfusion.   Plan  - transfuse if plt <10,000  - Per heme/onc recs, recommend judicious use of PLT transfusions given he is appears to be refractory likely due to splenic consumption. He is unlikely to get to >50k. Recommend FFP or cryo only if he has evidence of clinical significant bleeding. Do not transfuse for the sake of correcting his coags in the absence of bleeding as he is at high risk for TACO (he may  have esophageal varices). Recommend GOC conversations as ultimately his overall clinical picture is secondary to cirrhosis.           Cirrhosis of liver with ascites  Patient with known Cirrhosis. Discontinued lasix 40mg and spironolactone 50mg in setting of AKICurrently AAOx4 with ammonia down-trending. Hepatology on board, pending transplant evaluation in setting of low CD4 and BL SDH.     MELD-Na score calculated; MELD 3.0: 24 at 8/19/2024  3:43 AM  MELD-Na: 24 at 8/19/2024  3:43 AM  Calculated from:  Serum Creatinine: 1.5 mg/dL at 8/19/2024  3:43 AM  Serum Sodium: 135 mmol/L at 8/19/2024  3:43 AM  Total Bilirubin: 4.3 mg/dL at 8/19/2024  3:43 AM  Serum Albumin: 4.0 g/dL (Using max of 3.5 g/dL) at 8/19/2024  3:43 AM  INR(ratio): 1.9 at 8/19/2024  3:43 AM  Age at listing (hypothetical): 58 years  Sex: Male at 8/19/2024  3:43 AM    Plan:  -Continue chronic meds. Etiology likely Hepatitis. Will avoid any hepatotoxic meds, and monitor CBC/CMP/INR for synthetic function.   - F/u ammonia, dbili  - Strict I/Os  - continue PT/OT, recommended IP rehab. Pt would like to be placed at [facility] in [place]. Referrals sent to rehab in [place], [place]. Pending acceptance.   - low salt diet  - Continue lactulose and rifaximin with goal 2-3 BM daily.         Human immunodeficiency virus (HIV) disease  Previously was taking GENVOYA.     Continue STRIBILD, hospital equivalent. Will confirm with pharmacy       Chronic hepatitis C  Recent Hep C RNA showed elevated viral load 7/11/24. Not currently on treatment.     Can consider repeat viral load if clinically indicated          VTE Risk Mitigation (From admission, onward)           Ordered     IP VTE HIGH RISK PATIENT  Once         08/10/24 2347     Place sequential compression device  Until discontinued         08/10/24 2347                    Discharge Planning   OCDI: 8/22/2024     Code Status: Full Code   Is the patient medically ready for discharge?:     Reason  for patient still in hospital (select all that apply): Pending disposition  Discharge Plan A: Rehab   Discharge Delays: None known at this time              Ryann Almazan MD  Department of Hospital Medicine   [facility] - Neurosurgery ([facility])

## 2024-08-21 LAB
ALBUMIN SERPL BCP-MCNC: 3.5 G/DL (ref 3.5–5.2)
ALP SERPL-CCNC: 82 U/L (ref 55–135)
ALT SERPL W/O P-5'-P-CCNC: 15 U/L (ref 10–44)
ANION GAP SERPL CALC-SCNC: 9 MMOL/L (ref 8–16)
ANISOCYTOSIS BLD QL SMEAR: SLIGHT
AST SERPL-CCNC: 54 U/L (ref 10–40)
BASOPHILS # BLD AUTO: 0.01 K/UL (ref 0–0.2)
BASOPHILS NFR BLD: 0.5 % (ref 0–1.9)
BILIRUB SERPL-MCNC: 5.8 MG/DL (ref 0.1–1)
BUN SERPL-MCNC: 19 MG/DL (ref 6–20)
BURR CELLS BLD QL SMEAR: ABNORMAL
CALCIUM SERPL-MCNC: 9.8 MG/DL (ref 8.7–10.5)
CHLORIDE SERPL-SCNC: 108 MMOL/L (ref 95–110)
CO2 SERPL-SCNC: 17 MMOL/L (ref 23–29)
CREAT SERPL-MCNC: 1.3 MG/DL (ref 0.5–1.4)
DIFFERENTIAL METHOD BLD: ABNORMAL
EOSINOPHIL # BLD AUTO: 0.1 K/UL (ref 0–0.5)
EOSINOPHIL NFR BLD: 2.8 % (ref 0–8)
ERYTHROCYTE [DISTWIDTH] IN BLOOD BY AUTOMATED COUNT: 19.1 % (ref 11.5–14.5)
EST. GFR  (NO RACE VARIABLE): >60 ML/MIN/1.73 M^2
GLUCOSE SERPL-MCNC: 79 MG/DL (ref 70–110)
HCT VFR BLD AUTO: 26.4 % (ref 40–54)
HGB BLD-MCNC: 8.7 G/DL (ref 14–18)
HYPOCHROMIA BLD QL SMEAR: ABNORMAL
IMM GRANULOCYTES # BLD AUTO: 0.01 K/UL (ref 0–0.04)
IMM GRANULOCYTES NFR BLD AUTO: 0.5 % (ref 0–0.5)
INR PPP: 1.9 (ref 0.8–1.2)
LYMPHOCYTES # BLD AUTO: 0.8 K/UL (ref 1–4.8)
LYMPHOCYTES NFR BLD: 37 % (ref 18–48)
MAGNESIUM SERPL-MCNC: 1.8 MG/DL (ref 1.6–2.6)
MCH RBC QN AUTO: 34.1 PG (ref 27–31)
MCHC RBC AUTO-ENTMCNC: 33 G/DL (ref 32–36)
MCV RBC AUTO: 104 FL (ref 82–98)
MONOCYTES # BLD AUTO: 0.2 K/UL (ref 0.3–1)
MONOCYTES NFR BLD: 10 % (ref 4–15)
NEUTROPHILS # BLD AUTO: 1 K/UL (ref 1.8–7.7)
NEUTROPHILS NFR BLD: 49.2 % (ref 38–73)
NRBC BLD-RTO: 0 /100 WBC
OVALOCYTES BLD QL SMEAR: ABNORMAL
PHOSPHATE SERPL-MCNC: 2.2 MG/DL (ref 2.7–4.5)
PLATELET # BLD AUTO: 18 K/UL (ref 150–450)
PLATELET BLD QL SMEAR: ABNORMAL
PMV BLD AUTO: ABNORMAL FL (ref 9.2–12.9)
POIKILOCYTOSIS BLD QL SMEAR: SLIGHT
POLYCHROMASIA BLD QL SMEAR: ABNORMAL
POTASSIUM SERPL-SCNC: 4 MMOL/L (ref 3.5–5.1)
PROT SERPL-MCNC: 6.3 G/DL (ref 6–8.4)
PROTHROMBIN TIME: 20.3 SEC (ref 9–12.5)
RBC # BLD AUTO: 2.55 M/UL (ref 4.6–6.2)
SODIUM SERPL-SCNC: 134 MMOL/L (ref 136–145)
WBC # BLD AUTO: 2.11 K/UL (ref 3.9–12.7)

## 2024-08-21 PROCEDURE — 85610 PROTHROMBIN TIME: CPT | Mod: NTX | Performed by: HOSPITALIST

## 2024-08-21 PROCEDURE — 84100 ASSAY OF PHOSPHORUS: CPT | Mod: NTX

## 2024-08-21 PROCEDURE — 97116 GAIT TRAINING THERAPY: CPT | Mod: NTX

## 2024-08-21 PROCEDURE — 25000003 PHARM REV CODE 250: Mod: NTX

## 2024-08-21 PROCEDURE — 80053 COMPREHEN METABOLIC PANEL: CPT | Mod: NTX

## 2024-08-21 PROCEDURE — 63600175 PHARM REV CODE 636 W HCPCS: Mod: NTX

## 2024-08-21 PROCEDURE — 36415 COLL VENOUS BLD VENIPUNCTURE: CPT | Mod: NTX | Performed by: HOSPITALIST

## 2024-08-21 PROCEDURE — 97530 THERAPEUTIC ACTIVITIES: CPT | Mod: NTX

## 2024-08-21 PROCEDURE — 85025 COMPLETE CBC W/AUTO DIFF WBC: CPT | Mod: NTX | Performed by: HOSPITALIST

## 2024-08-21 PROCEDURE — 83735 ASSAY OF MAGNESIUM: CPT | Mod: NTX

## 2024-08-21 PROCEDURE — 11000001 HC ACUTE MED/SURG PRIVATE ROOM: Mod: NTX

## 2024-08-21 RX ADMIN — RIFAXIMIN 550 MG: 550 TABLET ORAL at 10:08

## 2024-08-21 RX ADMIN — ACETAMINOPHEN 500 MG: 500 TABLET ORAL at 08:08

## 2024-08-21 RX ADMIN — PANTOPRAZOLE SODIUM 40 MG: 40 TABLET, DELAYED RELEASE ORAL at 10:08

## 2024-08-21 RX ADMIN — BACLOFEN 5 MG: 5 TABLET ORAL at 08:08

## 2024-08-21 RX ADMIN — LACTULOSE 30 G: 20 SOLUTION ORAL at 10:08

## 2024-08-21 RX ADMIN — BACLOFEN 5 MG: 5 TABLET ORAL at 04:08

## 2024-08-21 RX ADMIN — DIBASIC SODIUM PHOSPHATE, MONOBASIC POTASSIUM PHOSPHATE AND MONOBASIC SODIUM PHOSPHATE 2 TABLET: 852; 155; 130 TABLET ORAL at 06:08

## 2024-08-21 RX ADMIN — PHYTONADIONE 10 MG: 10 INJECTION, EMULSION INTRAMUSCULAR; INTRAVENOUS; SUBCUTANEOUS at 03:08

## 2024-08-21 RX ADMIN — RIFAXIMIN 550 MG: 550 TABLET ORAL at 08:08

## 2024-08-21 RX ADMIN — ELVITEGRAVIR, COBICISTAT, EMTRICITABINE, AND TENOFOVIR DISOPROXIL FUMARATE 1 TABLET: 150; 150; 200; 300 TABLET, FILM COATED ORAL at 10:08

## 2024-08-21 RX ADMIN — LACTULOSE 30 G: 20 SOLUTION ORAL at 03:08

## 2024-08-21 RX ADMIN — ATOVAQUONE 1500 MG: 750 SUSPENSION ORAL at 10:08

## 2024-08-21 RX ADMIN — TAMSULOSIN HYDROCHLORIDE 0.4 MG: 0.4 CAPSULE ORAL at 08:08

## 2024-08-21 RX ADMIN — DIBASIC SODIUM PHOSPHATE, MONOBASIC POTASSIUM PHOSPHATE AND MONOBASIC SODIUM PHOSPHATE 2 TABLET: 852; 155; 130 TABLET ORAL at 03:08

## 2024-08-21 RX ADMIN — LACTULOSE 30 G: 20 SOLUTION ORAL at 08:08

## 2024-08-21 RX ADMIN — CYANOCOBALAMIN TAB 1000 MCG 1000 MCG: 1000 TAB at 10:08

## 2024-08-21 RX ADMIN — ACETAMINOPHEN 500 MG: 500 TABLET ORAL at 06:08

## 2024-08-21 RX ADMIN — PYRIDOXINE HCL TAB 50 MG 100 MG: 50 TAB at 10:08

## 2024-08-21 RX ADMIN — ATORVASTATIN CALCIUM 40 MG: 40 TABLET, FILM COATED ORAL at 10:08

## 2024-08-21 RX ADMIN — CHOLECALCIFEROL TAB 25 MCG (1000 UNIT) 1000 UNITS: 25 TAB at 10:08

## 2024-08-21 RX ADMIN — CETIRIZINE HYDROCHLORIDE 10 MG: 5 TABLET, FILM COATED ORAL at 10:08

## 2024-08-21 RX ADMIN — FLUOXETINE HYDROCHLORIDE 20 MG: 20 CAPSULE ORAL at 10:08

## 2024-08-21 NOTE — PT/OT/SLP PROGRESS
"Physical Therapy Re-assessment & Treatment    Patient Name:  Jam Card   MRN:  [id number]    Recommendations:     Discharge Recommendations: Low Intensity Therapy  Discharge Equipment Recommendations: grab bar, shower chair  Barriers to discharge: None    Assessment:     Jam Card is a 58 y.o. male admitted with a medical diagnosis of Bilateral subdural hematomas. Patient presented with increased motivation to participate in treatment session, with good response to completed activities and provided education. Patient demonstrates that they will now greatly benefit from low intensity skilled physical therapy services post-acutely . Performance deficits impacting function include gait instability, impaired functional mobility, decreased lower extremity function, decreased safety awareness, impaired coordination, weakness.    Rehab Prognosis: Good; patient would benefit from acute skilled PT services to address these deficits and reach maximum level of function.    Recent Surgery: * No surgery found *      Plan:     During this hospitalization, patient to be seen 4 x/week to address the identified rehab impairments via gait training, therapeutic activities, therapeutic exercises, neuromuscular re-education and progress toward the following goals:    Plan of Care Expires:  09/15/24    Subjective     Chief Complaint: "They won't let me walk to the bathroom at night"  Patient/Family Comments/goals: To go home ; "I think I just get ahead of myself"   Pain/Comfort:  Pain Rating 1: 0/10  Pain Rating Post-Intervention 1: 0/10      Objective:     Communicated with Nsg prior to session.  Patient found left sidelying with  (no active lines) upon PT entry to room.     General Precautions: Standard, fall   Orthopedic Precautions:N/A   Braces: N/A   Body mass index is 33.8 kg/m².  Oxygen Device: Room Air  Vitals: BP (!) 140/63 (BP Location: Left arm, Patient Position: Sitting)   Pulse (!) 58   Temp 98 °F (36.7 °C) (Oral)   " "Resp 18   Ht 6' 1" (1.854 m)   Wt 116.2 kg (256 lb 2.8 oz)   SpO2 97%   BMI 33.80 kg/m²      Functional Mobility:  Bed Mobility:     EOB Scooting:   Anterior: Stand-By Assistance  Lateral (L): Stand-By Assistance  Supine>Sit: x1, Stand-By Assistance  Sit>Supine: x1, Close Stand-By Assistance    Transfers:     Sit<>Stand:   x2, Close Stand-By Assistance from Edge of Bed with Rolling Walker  x1, Contact Guard Assistance from low Toilet with Rolling Walker and LUE support via sink  Toilet Transfer: x1, Contact Guard Assistance with Rolling Walker using step transfer technique  *VC/TC for AD management    Gait:  x~250ft, Contact Guard Assistance with Rolling Walker  Gait Assessment: decreased step length (L>R), decreased step height (L>R), increased gait speed, narrow base of support, tendency to ambulate outside AD SYEDA  Total Distance: ~250ft  *VC/TC for step length, step height, decreased gait speed, AD management, safety awareness    Balance:   Static Sitting: Independent  Dynamic Sitting: Supervision - Stand-By Assistance    Static Standing: Stand-By Assistance - Contact Guard Assistance  Dynamic Standing: Stand-By Assistance - Contact Guard Assistance    AM-PAC 6 CLICK MOBILITY  Turning over in bed (including adjusting bedclothes, sheets and blankets)?: 4  Sitting down on and standing up from a chair with arms (e.g., wheelchair, bedside commode, etc.): 3  Moving from lying on back to sitting on the side of the bed?: 4  Moving to and from a bed to a chair (including a wheelchair)?: 3  Need to walk in hospital room?: 3  Climbing 3-5 steps with a railing?: 2  Basic Mobility Total Score: 19     Treatment & Education:  Patient Education Provided on:  The role of physical therapy and how the patient can benefit from skilled services  The negative effects of prolonged bed rest/sedentary behavior, along with the importance of OOB activity & patient participation with PT  The importance of contacting RN, via call light, " for mobility throughout the day  Pt white board updated with current therapists name and level of mobility assistance needed.     Patient Verbalized understanding of all topics touched on this date. All patient questions answered within the PT scope of practice    Patient left HOB elevated with call button in reach and RN notified.    GOALS:   Multidisciplinary Problems       Physical Therapy Goals          Problem: Physical Therapy    Goal Priority Disciplines Outcome Goal Variances Interventions   Physical Therapy Goal     PT, PT/OT Progressing     Description: Goals to be met by: 24    Patient will increase functional independence with mobility by performin. Supine to sit with [place]  2. Sit to supine with [place]  3. Rolling to Left and Right with [place].  4. Sit to stand transfer with Modified [place] using LRAD as needed  5. Bed to chair transfer with Stand-by Assistance using LRAD as needed  6. Gait  x 50 feet with Stand-by Assistance using LRAD as needed   7. Sitting at edge of bed for 10 minutes with [place]  8. Stand for 8 minutes with Supervision using LRAD as needed  9. Lower extremity exercise program x30 reps per handout, with assistance as needed                         Time Tracking:     PT Received On: 24  PT Start Time: 1324     PT Stop Time: 1352  PT Total Time (min): 28 min     Billable Minutes: Gait Training 12 and Therapeutic Activity 16    Treatment Type: Treatment  PT/PTA: PT     Number of PTA visits since last PT visit: 0     2024

## 2024-08-21 NOTE — SUBJECTIVE & OBJECTIVE
Interval History: NAEON. Patient doing well. Possible d/c home with home health per patient request. Will have PT work with patient and call family first thing in am to help coordinate transportation as family lives in [place].     Review of Systems  Objective:     Vital Signs (Most Recent):  Temp: 98 °F (36.7 °C) (08/21/24 1125)  Pulse: (!) 58 (08/21/24 1125)  Resp: 18 (08/21/24 1125)  BP: (!) 140/63 (08/21/24 1125)  SpO2: 97 % (08/21/24 1125) Vital Signs (24h Range):  Temp:  [97.6 °F (36.4 °C)-98.2 °F (36.8 °C)] 98 °F (36.7 °C)  Pulse:  [55-64] 58  Resp:  [18-20] 18  SpO2:  [97 %-98 %] 97 %  BP: (131-161)/(60-74) 140/63     Weight: 116.2 kg (256 lb 2.8 oz)  Body mass index is 33.8 kg/m².    Intake/Output Summary (Last 24 hours) at 8/21/2024 1313  Last data filed at 8/21/2024 0800  Gross per 24 hour   Intake 110 ml   Output 504 ml   Net -394 ml         Physical Exam  Constitutional:       General: He is not in acute distress.     Appearance: He is not diaphoretic.   HENT:      Head: Normocephalic and atraumatic.      Mouth/Throat:      Mouth: Mucous membranes are dry.   Eyes:      Extraocular Movements: Extraocular movements intact.      Conjunctiva/sclera: Conjunctivae normal.      Pupils: Pupils are equal, round, and reactive to light.   Cardiovascular:      Rate and Rhythm: Normal rate and regular rhythm.      Pulses: Normal pulses.      Heart sounds: Normal heart sounds.   Pulmonary:      Effort: Pulmonary effort is normal.      Breath sounds: Normal breath sounds.   Abdominal:      General: Abdomen is flat. Bowel sounds are normal.      Palpations: Abdomen is soft.   Musculoskeletal:         General: Normal range of motion.      Lumbar back: No swelling, signs of trauma or tenderness.   Skin:     General: Skin is warm and dry.      Capillary Refill: Capillary refill takes less than 2 seconds.      Coloration: Skin is not jaundiced.   Neurological:      Mental Status: He is alert and oriented to person, place, and  time.      Motor: No abnormal muscle tone.   Psychiatric:         Mood and Affect: Mood normal.         Behavior: Behavior normal.         Significant Labs: All pertinent labs within the past 24 hours have been reviewed.    Significant Imaging: I have reviewed all pertinent imaging results/findings within the past 24 hours.

## 2024-08-21 NOTE — PLAN OF CARE
2087 CM spoke with Kallie at  [facility] [phone] and inquired about an update on referral. She stated she would call CM after her morning meeting to find out when the facility will have bed avaialble.     CM spoke with admission at [facility]  [phone] and facility didn't receive referral. Asked CM to fax referral to [phone]    [phone] Fax sent successfully    1135 CM met with patient to update on referral status for rehab. He stated that he really would prefer to go home instead of a rehab facility. Informed CM that he was current with [facility].     CM informed patient that will contacted  team to update on discharge preference.     CM sent a secure chat to HM team with update from patient. HM team wants to make sure his family is in agreement with dispo home w/ HH and stop by to speak with patient.     1152 CM sent a secure chat to [facility] staff for an update on patient's progress.  CM awaiting a response     122 Therapy will assess patient later today and update CM     1429 CM spoke with therapy and patient is now low intensity and can d/c home with home health.   CM updated  team and they will discharge patient home with home on tomorrow

## 2024-08-21 NOTE — PROGRESS NOTES
Esequiel Lea - Neurosurgery ([facility])  [facility] Medicine  Progress Note    Patient Name: Jam Card  MRN: [id number]  Patient Class: IP- Inpatient   Admission Date: 8/10/2024  Length of Stay: 11 days  Attending Physician: Rudy Sotomayor MD  Primary Care Provider: Ulysses Almaraz MD        Subjective:     Principal Problem:Bilateral subdural hematomas        HPI:  Mr. Card is a 57 yo M with PMHx of Hep C cirrhosis, HIV, chronic thrombocytopenia, HTN, GERD, HLD, CAD s/p CABG (many years ago) who was initially admitted to [facility] under  after being referred to the ED by NSGY due to worsening SDH on imaging. Patient was previously admitted to [facility] for evaluation of HA (7/19/24) with a CT head showing b/l chronic extra-axial fluid collections which possibly could be CSF. Bur hole drainage was considered but coagulopathy and platelet issue needed to be corrected prior. Patient also did not want drainage at that time. Patient f/u with [facility] neurotrauma unit with repeat CT head (8/7/24) but no longer experienced HA, just gait disturbance. CT head showed blood developing since prior CT head which led to ED visit and evaluation. Heme/Onc was consulted and believe that thrombocytopenia could be to splenic sequestration so improvement of plt function may only be temporary at best. NSGY at [facility] requested inter facility transfer. Hepatology at [facility] also agreed. Patient transferred to [facility] for NSGY, Heme/Onc, and Hepatology.     Patient hemodynamically stable on arrival. MELD 27, Na 135, Cr 1.72 (1.8 baseline), INR 1.7, CBC 0.9 > 7.7 < 37 (s/p 2U plt and FFP, with initial INR 1.9). No focal neuro deficits at this time. GCS 15 on exam. Patient denies HA, weakness, numbness, vision disturbance, dizziness, photophobia, or any other complaints.     Overview/Hospital Course:  See assessment and plan.     Interval History: NAEON. Patient doing well. Possible d/c home with home health per patient request. Will  have PT work with patient and call family first thing in am to help coordinate transportation as family lives in [place].     Review of Systems  Objective:     Vital Signs (Most Recent):  Temp: 98 °F (36.7 °C) (08/21/24 1125)  Pulse: (!) 58 (08/21/24 1125)  Resp: 18 (08/21/24 1125)  BP: (!) 140/63 (08/21/24 1125)  SpO2: 97 % (08/21/24 1125) Vital Signs (24h Range):  Temp:  [97.6 °F (36.4 °C)-98.2 °F (36.8 °C)] 98 °F (36.7 °C)  Pulse:  [55-64] 58  Resp:  [18-20] 18  SpO2:  [97 %-98 %] 97 %  BP: (131-161)/(60-74) 140/63     Weight: 116.2 kg (256 lb 2.8 oz)  Body mass index is 33.8 kg/m².    Intake/Output Summary (Last 24 hours) at 8/21/2024 1313  Last data filed at 8/21/2024 0800  Gross per 24 hour   Intake 110 ml   Output 504 ml   Net -394 ml         Physical Exam  Constitutional:       General: He is not in acute distress.     Appearance: He is not diaphoretic.   HENT:      Head: Normocephalic and atraumatic.      Mouth/Throat:      Mouth: Mucous membranes are dry.   Eyes:      Extraocular Movements: Extraocular movements intact.      Conjunctiva/sclera: Conjunctivae normal.      Pupils: Pupils are equal, round, and reactive to light.   Cardiovascular:      Rate and Rhythm: Normal rate and regular rhythm.      Pulses: Normal pulses.      Heart sounds: Normal heart sounds.   Pulmonary:      Effort: Pulmonary effort is normal.      Breath sounds: Normal breath sounds.   Abdominal:      General: Abdomen is flat. Bowel sounds are normal.      Palpations: Abdomen is soft.   Musculoskeletal:         General: Normal range of motion.      Lumbar back: No swelling, signs of trauma or tenderness.   Skin:     General: Skin is warm and dry.      Capillary Refill: Capillary refill takes less than 2 seconds.      Coloration: Skin is not jaundiced.   Neurological:      Mental Status: He is alert and oriented to person, place, and time.      Motor: No abnormal muscle tone.   Psychiatric:         Mood and Affect: Mood normal.          Behavior: Behavior normal.         Significant Labs: All pertinent labs within the past 24 hours have been reviewed.    Significant Imaging: I have reviewed all pertinent imaging results/findings within the past 24 hours.     Assessment/Plan:      * Bilateral subdural hematomas  Patient initially presented to OSH with HA and CT head showing b/l subdural hematomas. CT head at follow up visit showed increase in b/l subdural hematomas size. IR performed MMA embolization 8/16/24. Pt tolerated procedure well. Post-op CT head revealing stable SDH. AAOx4.     Plan:   - NSGY signed off. Will arrange outpatient follow-up in 2 weeks with [facility].   - Keppra 500mg BID for seizure prophylaxis.   - SBP goal 140  - fall precautions              Back pain  Pt complains of lumbar back pain. No swelling, tenderness to palpation, or ulcer. Most likely due to being sedentary for an extended period of time.   - baclofen 5mg TID PRN      JASON (acute kidney injury)  JASON is likely due to acute tubular necrosis caused by ammonia . Baseline creatinine is unknown. Most recent creatinine and eGFR are listed below. Overall, Cr and electrolytes improving. Most likely new baseline.   Recent Labs     08/19/24  0343 08/20/24  0218   CREATININE 1.5* 1.5*   EGFRNORACEVR 54.3* 54.3*        Plan  - Avoid nephrotoxins and renally dose meds for GFR listed above  - Monitor urine output, serial BMP, and adjust therapy as needed    Depression  Continue fluoxetine 20mg      HLD (hyperlipidemia)  Continue statin      Pancytopenia  The likely etiology of thrombocytopenia is liver disease and platelet consumption from splenic sequestration .   Plt consistently low and barely respond to transfusion.   Plan  - transfuse if plt <10,000  - Per heme/onc recs, recommend judicious use of PLT transfusions given he is appears to be refractory likely due to splenic consumption. He is unlikely to get to >50k. Recommend FFP or cryo only if he has evidence of clinical  significant bleeding. Do not transfuse for the sake of correcting his coags in the absence of bleeding as he is at high risk for TACO (he may have esophageal varices). Recommend GOC conversations as ultimately his overall clinical picture is secondary to cirrhosis.           Cirrhosis of liver with ascites  Patient with known Cirrhosis. Discontinued lasix 40mg and spironolactone 50mg in setting of AKICurrently AAOx4 with ammonia down-trending. Hepatology on board, pending transplant evaluation in setting of low CD4 and BL SDH.     MELD-Na score calculated; MELD 3.0: 24 at 8/19/2024  3:43 AM  MELD-Na: 24 at 8/19/2024  3:43 AM  Calculated from:  Serum Creatinine: 1.5 mg/dL at 8/19/2024  3:43 AM  Serum Sodium: 135 mmol/L at 8/19/2024  3:43 AM  Total Bilirubin: 4.3 mg/dL at 8/19/2024  3:43 AM  Serum Albumin: 4.0 g/dL (Using max of 3.5 g/dL) at 8/19/2024  3:43 AM  INR(ratio): 1.9 at 8/19/2024  3:43 AM  Age at listing (hypothetical): 58 years  Sex: Male at 8/19/2024  3:43 AM    Plan:  -Continue chronic meds. Etiology likely Hepatitis. Will avoid any hepatotoxic meds, and monitor CBC/CMP/INR for synthetic function.   - F/u ammonia, dbili  - Strict I/Os  - continue PT/OT, recommended IP rehab. Pt would like to be placed at [facility] in [place]. Referrals sent to rehab in [place], [place]. Pending acceptance.   - low salt diet  - Continue lactulose and rifaximin with goal 2-3 BM daily.         Human immunodeficiency virus (HIV) disease  Previously was taking GENVOYA.     Continue STRIBILD, hospital equivalent. Will confirm with pharmacy       Chronic hepatitis C  Recent Hep C RNA showed elevated viral load 7/11/24. Not currently on treatment.     Can consider repeat viral load if clinically indicated          VTE Risk Mitigation (From admission, onward)           Ordered     IP VTE HIGH RISK PATIENT  Once         08/10/24 2347     Place sequential compression device  Until discontinued         08/10/24 2345                     Discharge Planning   CODI: 8/22/2024     Code Status: Full Code   Is the patient medically ready for discharge?:     Reason for patient still in hospital (select all that apply): Patient trending condition, Laboratory test, and Treatment  Discharge Plan A: Rehab   Discharge Delays: None known at this time              [place], DO  Department of Hospital Medicine   [facility] Neurosurgery ([facility])

## 2024-08-21 NOTE — PLAN OF CARE
Problem: Fall Injury Risk  Goal: Absence of Fall and Fall-Related Injury  Outcome: Progressing     Problem: Pain Acute  Goal: Optimal Pain Control and Function  Outcome: Progressing     Problem: Skin Injury Risk Increased  Goal: Skin Health and Integrity  Outcome: Progressing

## 2024-08-21 NOTE — PLAN OF CARE
Problem: Physical Therapy  Goal: Physical Therapy Goal  Description: Goals to be met by: 24    Patient will increase functional independence with mobility by performin. Supine to sit with Andrew  2. Sit to supine with Andrew  3. Rolling to Left and Right with Andrew.  4. Sit to stand transfer with Modified Andrew using LRAD as needed  5. Bed to chair transfer with Stand-by Assistance using LRAD as needed  6. Gait  x 50 feet with Stand-by Assistance using LRAD as needed   7. Sitting at edge of bed for 10 minutes with Andrew  8. Stand for 8 minutes with Supervision using LRAD as needed  9. Lower extremity exercise program x30 reps per handout, with assistance as needed    Outcome: Progressing

## 2024-08-22 VITALS
TEMPERATURE: 98 F | RESPIRATION RATE: 18 BRPM | BODY MASS INDEX: 33.95 KG/M2 | SYSTOLIC BLOOD PRESSURE: 142 MMHG | HEIGHT: 73 IN | WEIGHT: 256.19 LBS | DIASTOLIC BLOOD PRESSURE: 65 MMHG | HEART RATE: 65 BPM | OXYGEN SATURATION: 98 %

## 2024-08-22 LAB
ALBUMIN SERPL BCP-MCNC: 3.4 G/DL (ref 3.5–5.2)
ALP SERPL-CCNC: 72 U/L (ref 55–135)
ALT SERPL W/O P-5'-P-CCNC: 18 U/L (ref 10–44)
ANION GAP SERPL CALC-SCNC: 8 MMOL/L (ref 8–16)
ANISOCYTOSIS BLD QL SMEAR: SLIGHT
AST SERPL-CCNC: 64 U/L (ref 10–40)
BASOPHILS # BLD AUTO: 0.01 K/UL (ref 0–0.2)
BASOPHILS NFR BLD: 0.6 % (ref 0–1.9)
BILIRUB SERPL-MCNC: 7.1 MG/DL (ref 0.1–1)
BUN SERPL-MCNC: 20 MG/DL (ref 6–20)
BURR CELLS BLD QL SMEAR: ABNORMAL
CALCIUM SERPL-MCNC: 9.4 MG/DL (ref 8.7–10.5)
CHLORIDE SERPL-SCNC: 111 MMOL/L (ref 95–110)
CO2 SERPL-SCNC: 16 MMOL/L (ref 23–29)
CREAT SERPL-MCNC: 1.2 MG/DL (ref 0.5–1.4)
DIFFERENTIAL METHOD BLD: ABNORMAL
EOSINOPHIL # BLD AUTO: 0 K/UL (ref 0–0.5)
EOSINOPHIL NFR BLD: 2.3 % (ref 0–8)
ERYTHROCYTE [DISTWIDTH] IN BLOOD BY AUTOMATED COUNT: 19.2 % (ref 11.5–14.5)
EST. GFR  (NO RACE VARIABLE): >60 ML/MIN/1.73 M^2
GLUCOSE SERPL-MCNC: 89 MG/DL (ref 70–110)
HCT VFR BLD AUTO: 23.2 % (ref 40–54)
HGB BLD-MCNC: 8.3 G/DL (ref 14–18)
HYPOCHROMIA BLD QL SMEAR: ABNORMAL
IMM GRANULOCYTES # BLD AUTO: 0.01 K/UL (ref 0–0.04)
IMM GRANULOCYTES NFR BLD AUTO: 0.6 % (ref 0–0.5)
LYMPHOCYTES # BLD AUTO: 0.6 K/UL (ref 1–4.8)
LYMPHOCYTES NFR BLD: 32.9 % (ref 18–48)
MAGNESIUM SERPL-MCNC: 1.7 MG/DL (ref 1.6–2.6)
MCH RBC QN AUTO: 36.6 PG (ref 27–31)
MCHC RBC AUTO-ENTMCNC: 35.8 G/DL (ref 32–36)
MCV RBC AUTO: 102 FL (ref 82–98)
MONOCYTES # BLD AUTO: 0.2 K/UL (ref 0.3–1)
MONOCYTES NFR BLD: 10.4 % (ref 4–15)
NEUTROPHILS # BLD AUTO: 0.9 K/UL (ref 1.8–7.7)
NEUTROPHILS NFR BLD: 53.2 % (ref 38–73)
NRBC BLD-RTO: 0 /100 WBC
OVALOCYTES BLD QL SMEAR: ABNORMAL
PHOSPHATE SERPL-MCNC: 3 MG/DL (ref 2.7–4.5)
PLATELET # BLD AUTO: 18 K/UL (ref 150–450)
PLATELET BLD QL SMEAR: ABNORMAL
PMV BLD AUTO: ABNORMAL FL (ref 9.2–12.9)
POIKILOCYTOSIS BLD QL SMEAR: SLIGHT
POLYCHROMASIA BLD QL SMEAR: ABNORMAL
POTASSIUM SERPL-SCNC: 4 MMOL/L (ref 3.5–5.1)
PROT SERPL-MCNC: 6.1 G/DL (ref 6–8.4)
RBC # BLD AUTO: 2.27 M/UL (ref 4.6–6.2)
SODIUM SERPL-SCNC: 135 MMOL/L (ref 136–145)
SPHEROCYTES BLD QL SMEAR: ABNORMAL
WBC # BLD AUTO: 1.73 K/UL (ref 3.9–12.7)

## 2024-08-22 PROCEDURE — 25000003 PHARM REV CODE 250: Mod: NTX

## 2024-08-22 PROCEDURE — 99233 SBSQ HOSP IP/OBS HIGH 50: CPT | Mod: NTX,,, | Performed by: NEUROLOGICAL SURGERY

## 2024-08-22 PROCEDURE — 85025 COMPLETE CBC W/AUTO DIFF WBC: CPT | Mod: NTX | Performed by: HOSPITALIST

## 2024-08-22 PROCEDURE — 83735 ASSAY OF MAGNESIUM: CPT | Mod: NTX

## 2024-08-22 PROCEDURE — 97535 SELF CARE MNGMENT TRAINING: CPT | Mod: NTX

## 2024-08-22 PROCEDURE — 84100 ASSAY OF PHOSPHORUS: CPT | Mod: NTX

## 2024-08-22 PROCEDURE — 36415 COLL VENOUS BLD VENIPUNCTURE: CPT | Mod: NTX

## 2024-08-22 PROCEDURE — 80053 COMPREHEN METABOLIC PANEL: CPT | Mod: NTX

## 2024-08-22 RX ORDER — BACLOFEN 5 MG/1
10 TABLET ORAL 3 TIMES DAILY PRN
Start: 2024-08-22

## 2024-08-22 RX ORDER — MIDODRINE HYDROCHLORIDE 2.5 MG/1
2.5 TABLET ORAL 3 TIMES DAILY PRN
Start: 2024-08-22 | End: 2025-08-22

## 2024-08-22 RX ORDER — ATOVAQUONE 750 MG/5ML
1500 SUSPENSION ORAL DAILY
Qty: 300 ML | Refills: 3 | Status: SHIPPED | OUTPATIENT
Start: 2024-08-22

## 2024-08-22 RX ORDER — LEVETIRACETAM 500 MG/1
500 TABLET ORAL 2 TIMES DAILY
Qty: 60 TABLET | Refills: 3 | Status: SHIPPED | OUTPATIENT
Start: 2024-08-22

## 2024-08-22 RX ORDER — LEVETIRACETAM 500 MG/1
500 TABLET ORAL 2 TIMES DAILY
Status: DISCONTINUED | OUTPATIENT
Start: 2024-08-22 | End: 2024-08-22 | Stop reason: HOSPADM

## 2024-08-22 RX ADMIN — ATOVAQUONE 1500 MG: 750 SUSPENSION ORAL at 08:08

## 2024-08-22 RX ADMIN — CETIRIZINE HYDROCHLORIDE 10 MG: 5 TABLET, FILM COATED ORAL at 08:08

## 2024-08-22 RX ADMIN — PANTOPRAZOLE SODIUM 40 MG: 40 TABLET, DELAYED RELEASE ORAL at 08:08

## 2024-08-22 RX ADMIN — LEVETIRACETAM 500 MG: 500 TABLET, FILM COATED ORAL at 10:08

## 2024-08-22 RX ADMIN — CHOLECALCIFEROL TAB 25 MCG (1000 UNIT) 1000 UNITS: 25 TAB at 09:08

## 2024-08-22 RX ADMIN — ACETAMINOPHEN 500 MG: 500 TABLET ORAL at 10:08

## 2024-08-22 RX ADMIN — LACTULOSE 30 G: 20 SOLUTION ORAL at 08:08

## 2024-08-22 RX ADMIN — FLUOXETINE HYDROCHLORIDE 20 MG: 20 CAPSULE ORAL at 08:08

## 2024-08-22 RX ADMIN — CYANOCOBALAMIN TAB 1000 MCG 1000 MCG: 1000 TAB at 08:08

## 2024-08-22 RX ADMIN — PYRIDOXINE HCL TAB 50 MG 100 MG: 50 TAB at 08:08

## 2024-08-22 RX ADMIN — RIFAXIMIN 550 MG: 550 TABLET ORAL at 08:08

## 2024-08-22 RX ADMIN — ELVITEGRAVIR, COBICISTAT, EMTRICITABINE, AND TENOFOVIR DISOPROXIL FUMARATE 1 TABLET: 150; 150; 200; 300 TABLET, FILM COATED ORAL at 08:08

## 2024-08-22 NOTE — PLAN OF CARE
"  Esequiel Lea - Neurosurgery ([facility])      HOME HEALTH ORDERS  FACE TO FACE ENCOUNTER    Patient Name: Jam Card  YOB: 1965    PCP: Ulysses Almaraz MD   PCP Address: [address] [facility] / [place]  PCP Phone Number: [phone]  PCP Fax: [phone]    Encounter Date: 8/10/24    Admit to Home Health    Diagnoses:  Active Hospital Problems    Diagnosis  POA    *Bilateral subdural hematomas [S06.5XAA]  Yes    Back pain [M54.9]  Yes    Hyponatremia [E87.1]  No     Monitor with daily labs  Replace as needed      Hypophosphatemia [E83.39]  No     Monitor with labs  Replace      Hypomagnesemia [E83.42]  No     Monitor with labs  Replace      Acute blood loss anemia [D62]  Yes    JASON (acute kidney injury) [N17.9]  Yes    Portal hypertension [K76.6]  Yes    Ascites [R18.8]  Yes     Sequela of portal hypertension including splenomegaly and moderate volume ascites       Depression [F32.A]  Yes    Hepatic encephalopathy [K76.82]  Yes    Brain compression [G93.5]  Yes     Close neurological monitor and imaging  S/p MMA embolization       Cirrhosis of liver with ascites [K74.60, R18.8]  Yes    Pancytopenia [D61.818]  Yes    Chronic hepatitis C [B18.2]  Yes    Human immunodeficiency virus (HIV) disease [B20]  Yes      Resolved Hospital Problems   No resolved problems to display.       Follow Up Appointments:  Future Appointments   Date Time Provider Department Center   9/3/2024 10:00 AM [facility] CT1 AQUILION PRIME 80 LIMIT 450 LBS [facility] CTSCAN [facility] [address]   9/10/2024  1:30 PM [facility]iel Lea       Allergies:  Review of patient's allergies indicates:   Allergen Reactions    Hydrocodone-acetaminophen Other (See Comments)     "cannot sleep when taking" Does not want to take    Lisinopril Other (See Comments)     Other reaction(s): Cough    Sulfamethoxazole-trimethoprim Rash       Medications: Review discharge medications with patient and family " and provide education.    Current Facility-Administered Medications   Medication Dose Route Frequency Provider Last Rate Last Admin    acetaminophen tablet 500 mg  500 mg Oral Q6H PRN Mindi Deluca MD   500 mg at 08/22/24 1046    atovaquone 750 mg/5 mL oral liquid 1,500 mg  1,500 mg Oral Daily Kris Owens DO   1,500 mg at 08/22/24 0828    baclofen tablet 5 mg  5 mg Oral TID PRN Ryann Almazan MD   5 mg at 08/21/24 2049    benzonatate capsule 100 mg  100 mg Oral TID PRN Dina Hobbs MD        bisacodyL suppository 10 mg  10 mg Rectal Daily PRN Dandre Avendaño MD        cetirizine tablet 10 mg  10 mg Oral Daily Dandre Avendaño MD   10 mg at 08/22/24 0827    cyanocobalamin tablet 1,000 mcg  1,000 mcg Oral Daily Dandre Avendaño MD   1,000 mcg at 08/22/24 0827    dextrose 10% bolus 125 mL 125 mL  12.5 g Intravenous PRN Rudy Garg Jr., MD        dextrose 10% bolus 250 mL 250 mL  25 g Intravenous PRN Rudy Garg Jr., MD        plaittja-xfmeisqa-fqkxzm-tenof (STRIBILD) [phone] mg per tablet 1 tablet  1 tablet Oral Daily Dina Hobbs MD   1 tablet at 08/22/24 0828    ergocalciferol capsule 50,000 Units  50,000 Units Oral Q7 Days Dandre Avendaño MD   50,000 Units at 08/18/24 0829    FLUoxetine capsule 20 mg  20 mg Oral Daily Dina Hobbs MD   20 mg at 08/22/24 0827    glucagon (human recombinant) injection 1 mg  1 mg Intramuscular PRN Dina Hobbs MD        glucose chewable tablet 16 g  16 g Oral PRN Dina Hobbs MD        glucose chewable tablet 24 g  24 g Oral PRN Dina Hobbs MD        lactulose 20 gram/30 mL solution Soln 30 g  30 g Oral TID Ryann Almazan MD   30 g at 08/22/24 0826    levETIRAcetam tablet 500 mg  500 mg Oral BID Onur Spain DO   500 mg at 08/22/24 1021    midodrine tablet 2.5 mg  2.5 mg Oral TID PRN Dina Hobbs MD        naloxone 0.4 mg/mL injection 0.02 mg  0.02 mg Intravenous PRN Dina Hobbs MD        pantoprazole EC tablet 40 mg  40 mg Oral Daily Dina Hobbs,  MD   40 mg at 08/22/24 0827    pyridoxine (vitamin B6) tablet 100 mg  100 mg Oral Daily Dandre Avendaño MD   100 mg at 08/22/24 0827    rifAXIMin tablet 550 mg  550 mg Oral BID Ryann Almazan MD   550 mg at 08/22/24 0827    sodium chloride 0.9% flush 10 mL  10 mL Intravenous Q12H PRN Dina Hobbs MD        tamsulosin 24 hr capsule 0.4 mg  0.4 mg Oral QHS Dina Hobbs MD   0.4 mg at 08/21/24 2048    vitamin D 1000 units tablet 1,000 Units  1,000 Units Oral Daily Dandre Avendaño MD   1,000 Units at 08/22/24 0900        Medication List        START taking these medications      atovaquone 750 mg/5 mL Susp oral liquid  Commonly known as: MEPRON  Take 10 mLs (1,500 mg total) by mouth once daily.     levETIRAcetam 500 MG Tab  Commonly known as: KEPPRA  Take 1 tablet (500 mg total) by mouth 2 (two) times daily. Take medication until follow-up with your provider.  Notes to patient: Take medication until follow-up with your provider.            CHANGE how you take these medications      baclofen 5 mg Tab tablet  Commonly known as: LIORESAL  Take 2 tablets (10 mg total) by mouth 3 (three) times daily as needed (Muscle Spasticity).  What changed:   medication strength  when to take this  reasons to take this     midodrine 2.5 MG Tab  Commonly known as: PROAMATINE  Take 1 tablet (2.5 mg total) by mouth 3 (three) times daily as needed (if systolic blood pressure (top number) is less than 100).  What changed:   when to take this  reasons to take this     valsartan 40 MG tablet  Commonly known as: DIOVAN  Take 40 mg by mouth every evening.  Notes to patient: Please hold until seen by provider            CONTINUE taking these medications      bisacodyL 10 mg Supp  Commonly known as: DULCOLAX  Place 1 suppository rectally daily as needed (constipation).     cyanocobalamin 1000 MCG tablet  Commonly known as: VITAMIN B-12  Take 1,000 mcg by mouth once daily.     ergocalciferol 50,000 unit Cap  Commonly known as: ERGOCALCIFEROL  Take  50,000 Units by mouth every 7 days.     FLUoxetine 20 MG capsule  Take 20 mg by mouth once daily.     furosemide 20 MG tablet  Commonly known as: LASIX  Take 2 tablets (40 mg total) by mouth once daily.     GENVOYA [phone] mg Tab  Generic drug: elviteg-cob-emtri-tenof ALAFEN  TAKE ONE TABLET BY MOUTH DAILY WITH FOOD.     lactulose 20 gram/30 mL Soln  Commonly known as: CHRONULAC  Take 30 mLs (20 g total) by mouth 2 (two) times daily. Goal of 3-5 soft stools each day     loratadine 10 mg tablet  Commonly known as: CLARITIN  Take 10 mg by mouth once daily.     meclizine 12.5 mg tablet  Commonly known as: ANTIVERT  Take 6.25 mg by mouth 2 (two) times daily as needed for Dizziness.     metoprolol succinate 25 MG 24 hr tablet  Commonly known as: TOPROL-XL  Take 25 mg by mouth once daily.     ondansetron 4 MG Tbdl  Commonly known as: ZOFRAN-ODT  Take 1 tablet by mouth every 8 (eight) hours as needed (nausea).     pantoprazole 40 MG tablet  Commonly known as: PROTONIX  Take 40 mg by mouth once daily.     pyridoxine (vitamin B6) 100 MG Tab  Commonly known as: B-6  Take 100 mg by mouth once daily.     rifAXIMin 550 mg Tab  Commonly known as: XIFAXAN  Take 550 mg by mouth 2 (two) times daily.     rosuvastatin 10 MG tablet  Commonly known as: CRESTOR  Take 10 mg by mouth every evening.     spironolactone 25 MG tablet  Commonly known as: ALDACTONE  Take 50 mg by mouth once daily.     tamsulosin 0.4 mg Cap  Commonly known as: FLOMAX  Take 0.4 mg by mouth every evening.     vitamin D 1000 units Tab  Commonly known as: VITAMIN D3  Take 1,000 Units by mouth once daily.            STOP taking these medications      fenofibrate 160 MG Tab                I have seen and examined this patient within the last 30 days. My clinical findings that support the need for the home health skilled services and home bound status are the following:no   Requiring assistive device to leave home due to unsteady gait caused by  Liver Disease.      Diet:   regular diet    Labs:  CMP in one week     Referrals/ Consults  Physical Therapy to evaluate and treat. Evaluate for home safety and equipment needs; Establish/upgrade home exercise program. Perform / instruct on therapeutic exercises, gait training, transfer training, and Range of Motion.  Occupational Therapy to evaluate and treat. Evaluate home environment for safety and equipment needs. Perform/Instruct on transfers, ADL training, ROM, and therapeutic exercises.    Activities:   activity as tolerated    Nursing:   Agency to admit patient within 24 hours of hospital discharge unless specified on physician order or at patient request    SN to complete comprehensive assessment including routine vital signs. Instruct on disease process and s/s of complications to report to MD. Review/verify medication list sent home with the patient at time of discharge  and instruct patient/caregiver as needed. Frequency may be adjusted depending on start of care date.     Skilled nurse to perform up to 3 visits PRN for symptoms related to diagnosis    Notify MD if SBP > 160 or < 90; DBP > 90 or < 50; HR > 120 or < 50; Temp > 101; O2 < 88%; Other:         Ok to schedule additional visits based on staff availability and patient request on consecutive days within the home health episode.    When multiple disciplines ordered:    Start of Care occurs on Sunday - Wednesday schedule remaining discipline evaluations as ordered on separate consecutive days following the start of care.    Thursday SOC -schedule subsequent evaluations Friday and Monday the following week.     Friday - Saturday SOC - schedule subsequent discipline evaluations on consecutive days starting Monday of the following week.    For all post-discharge communication and subsequent orders please contact patient's primary care physician. If unable to reach primary care physician or do not receive response within 30 minutes, please contact  for clinical staff  order clarification    Miscellaneous       Home Health Aide:  Physical Therapy Weekly and Occupational Therapy Weekly    Wound Care Orders  no    I certify that this patient is confined to his home and needs physical therapy and occupational therapy.

## 2024-08-22 NOTE — ASSESSMENT & PLAN NOTE
JASON is likely due to acute tubular necrosis caused by ammonia . Baseline creatinine is unknown. Most recent creatinine and eGFR are listed below. Overall, Cr and electrolytes improving. Most likely new baseline.   Recent Labs     08/20/24  0218 08/21/24  0334 08/22/24  0347   CREATININE 1.5* 1.3 1.2   EGFRNORACEVR 54.3* >60.0 >60.0        Plan  - Avoid nephrotoxins and renally dose meds for GFR listed above  - Monitor urine output, serial BMP, and adjust therapy as needed

## 2024-08-22 NOTE — DISCHARGE SUMMARY
Esequiel Lea - Neurosurgery ([facility])  [facility] Medicine  Discharge Summary      Patient Name: Jam Card  MRN: [id number]  DIAMANTE: [id number]  Patient Class: IP- Inpatient  Admission Date: 8/10/2024  Hospital Length of Stay: 12 days  Discharge Date and Time:  08/22/2024 1:05 PM  Attending Physician: Leo Peres MD   Discharging Provider: Ryann Almazan MD  Primary Care Provider: Ulysses Almaraz MD  [facility] Medicine Team: [facility] H[facility]P [facility] 1 Ryann Almazan MD  Primary Care Team: [facility] 1    HPI:   Mr. Card is a 59 yo M with PMHx of Hep C cirrhosis, HIV, chronic thrombocytopenia, HTN, GERD, HLD, CAD s/p CABG (many years ago) who was initially admitted to [facility] under  after being referred to the ED by NSGY due to worsening SDH on imaging. Patient was previously admitted to [facility] for evaluation of HA (7/19/24) with a CT head showing b/l chronic extra-axial fluid collections which possibly could be CSF. Bur hole drainage was considered but coagulopathy and platelet issue needed to be corrected prior. Patient also did not want drainage at that time. Patient f/u with [facility] neurotrauma unit with repeat CT head (8/7/24) but no longer experienced HA, just gait disturbance. CT head showed blood developing since prior CT head which led to ED visit and evaluation. Heme/Onc was consulted and believe that thrombocytopenia could be to splenic sequestration so improvement of plt function may only be temporary at best. NSGY at [facility] requested inter facility transfer. Hepatology at [facility] also agreed. Patient transferred to [facility] for NSGY, Heme/Onc, and Hepatology.     Patient hemodynamically stable on arrival. MELD 27, Na 135, Cr 1.72 (1.8 baseline), INR 1.7, CBC 0.9 > 7.7 < 37 (s/p 2U plt and FFP, with initial INR 1.9). No focal neuro deficits at this time. GCS 15 on exam. Patient denies HA, weakness, numbness, vision disturbance, dizziness, photophobia, or any other complaints.     * No surgery found *       Hospital Course:   BL SDH:   Patient initially presented to OSH with HA and CT head showing b/l subdural hematomas. CT head at follow up visit showed increase in b/l subdural hematomas size. IR performed MMA embolization 8/16/24. Pt tolerated procedure well. Post-op CT head revealing stable SDH. AAOx4. NSGY signed off. Will arrange outpatient follow-up in 2 weeks with CTH. Continue Keppra 500mg BID for seizure prophylaxis.     Liver failure:   Patient with known Cirrhosis, etiology most likely hepititis. Discontinued lasix 40mg and spironolactone 50mg in setting of JASON. Currently AAOx4 with ammonia down-trending. Hepatology on board, postponed transplant evaluation in setting of low CD4 and BL SDH. Labs improved with lactulose and rifaximin. CMP 1 week after DC to monitor Tbili and ammonia. Home health.    MELD-Na score calculated; MELD 3.0: 24 at 8/22/2024  3:47 AM  MELD-Na: 24 at 8/22/2024  3:47 AM  Calculated from:  Serum Creatinine: 1.2 mg/dL at 8/22/2024  3:47 AM  Serum Sodium: 135 mmol/L at 8/22/2024  3:47 AM  Total Bilirubin: 7.1 mg/dL at 8/22/2024  3:47 AM  Serum Albumin: 3.4 g/dL at 8/22/2024  3:47 AM  INR(ratio): 1.9 at 8/21/2024  3:34 AM  Age at listing (hypothetical): 58 years  Sex: Male at 8/22/2024  3:47 AM      Pancytopenia:   The likely etiology of thrombocytopenia is liver disease and platelet consumption from splenic sequestration.   Plt consistently low and barely respond to transfusion. Per heme/onc recs, recommend judicious use of PLT transfusions given he is appears to be refractory likely due to splenic consumption. He is unlikely to get to >50k. Recommend FFP or cryo only if he has evidence of clinical significant bleeding. Do not transfuse for the sake of correcting his coags in the absence of bleeding as he is at high risk for TACO (he may have esophageal varices).      JASON:  JASON is likely due to acute tubular necrosis caused by ammonia. Baseline creatinine is unknown. Most recent  creatinine and eGFR are listed below. Overall, Cr and electrolytes improving. Most likely new baseline.   Recent Labs     08/20/24  0218 08/21/24  0334 08/22/24  0347   CREATININE 1.5* 1.3 1.2   EGFRNORACEVR 54.3* >60.0 >60.0       Lumbar back pain:  Chronic lumbar back pain. No swelling, tenderness to palpation, or ulcer. Most likely due to being sedentary for an extended period of time. Improves with baclofen and tylenol.      Goals of Care Treatment Preferences:  Code Status: Full Code      SDOH Screening:  The patient was screened for utility difficulties, food insecurity, transport difficulties, housing insecurity, and interpersonal safety and there were no concerns identified this admission.     Consults:   Consults (From admission, onward)          Status Ordering Provider     Inpatient consult to Neurology  Once        Provider:  (Not yet assigned)    Completed DOM DARBY     Inpatient consult to Infectious Diseases  Once        Provider:  (Not yet assigned)    Completed SOLE BALDERAS     Inpatient consult to Registered Dietitian/Nutritionist  Once        Provider:  (Not yet assigned)    Completed BANDAR RAJPUT     Inpatient consult to Hepatology  Once        Provider:  (Not yet assigned)    Completed CR CARRASCO     Inpatient consult to Hematology/Oncology  Once        Provider:  (Not yet assigned)    Completed CR CARRASCO     Inpatient consult to Neurosurgery  Once        Provider:  (Not yet assigned)    Completed CR CARRASCO            Neuro  * Bilateral subdural hematomas  Patient initially presented to OSH with HA and CT head showing b/l subdural hematomas. CT head at follow up visit showed increase in b/l subdural hematomas size. IR performed MMA embolization 8/16/24. Pt tolerated procedure well. Post-op CT head revealing stable SDH. AAOx4.     Plan:   - NSGY signed off. Will arrange outpatient follow-up in 2 weeks with CTH.   - Keppra 500mg BID for seizure prophylaxis.   - SBP goal 140  -  fall precautions              Renal/  JASON (acute kidney injury)  JASON is likely due to acute tubular necrosis caused by ammonia . Baseline creatinine is unknown. Most recent creatinine and eGFR are listed below. Overall, Cr and electrolytes improving. Most likely new baseline.   Recent Labs     08/20/24  0218 08/21/24  0334 08/22/24  0347   CREATININE 1.5* 1.3 1.2   EGFRNORACEVR 54.3* >60.0 >60.0        Plan  - Avoid nephrotoxins and renally dose meds for GFR listed above  - Monitor urine output, serial BMP, and adjust therapy as needed    Oncology  Pancytopenia  The likely etiology of thrombocytopenia is liver disease and platelet consumption from splenic sequestration .   Plt consistently low and barely respond to transfusion.   Plan  - transfuse if plt <10,000  - Per heme/onc recs, recommend judicious use of PLT transfusions given he is appears to be refractory likely due to splenic consumption. He is unlikely to get to >50k. Recommend FFP or cryo only if he has evidence of clinical significant bleeding. Do not transfuse for the sake of correcting his coags in the absence of bleeding as he is at high risk for TACO (he may have esophageal varices). Recommend GOC conversations as ultimately his overall clinical picture is secondary to cirrhosis.           GI  Cirrhosis of liver with ascites  Patient with known Cirrhosis. Discontinued lasix 40mg and spironolactone 50mg in setting of AKICurrently AAOx4 with ammonia down-trending. Hepatology on board, pending transplant evaluation in setting of low CD4 and BL SDH.     MELD-Na score calculated; MELD 3.0: 24 at 8/22/2024  3:47 AM  MELD-Na: 24 at 8/22/2024  3:47 AM  Calculated from:  Serum Creatinine: 1.2 mg/dL at 8/22/2024  3:47 AM  Serum Sodium: 135 mmol/L at 8/22/2024  3:47 AM  Total Bilirubin: 7.1 mg/dL at 8/22/2024  3:47 AM  Serum Albumin: 3.4 g/dL at 8/22/2024  3:47 AM  INR(ratio): 1.9 at 8/21/2024  3:34 AM  Age at listing (hypothetical): 58 years  Sex: Male at  8/22/2024  3:47 AM    Plan:  -Continue chronic meds. Etiology likely Hepatitis. Will avoid any hepatotoxic meds, and monitor CBC/CMP/INR for synthetic function.   - F/u ammonia, dbili  - Strict I/Os  - continue PT/OT, recommended IP rehab. Pt would like to be placed at [facility] in [place]. Referrals sent to rehab in [place], [place]. Pending acceptance.   - low salt diet  - Continue lactulose and rifaximin with goal 2-3 BM daily.         Orthopedic  Back pain  Pt complains of lumbar back pain. No swelling, tenderness to palpation, or ulcer. Most likely due to being sedentary for an extended period of time.   - baclofen 5mg TID PRN        Final Active Diagnoses:    Diagnosis Date Noted POA    PRINCIPAL PROBLEM:  Bilateral subdural hematomas [S06.5XAA] 08/10/2024 Yes    Back pain [M54.9] 08/19/2024 Yes    Hyponatremia [E87.1] 08/19/2024 No    Hypophosphatemia [E83.39] 08/15/2024 No    Hypomagnesemia [E83.42] 08/15/2024 No    Acute blood loss anemia [D62] 08/13/2024 Yes    JASON (acute kidney injury) [N17.9] 08/13/2024 Yes    Portal hypertension [K76.6] 08/12/2024 Yes    Ascites [R18.8] 08/12/2024 Yes    Depression [F32.A] 08/11/2024 Yes    Hepatic encephalopathy [K76.82] 08/11/2024 Yes    Brain compression [G93.5] 08/11/2024 Yes    Cirrhosis of liver with ascites [K74.60, R18.8] 08/10/2024 Yes    Pancytopenia [D61.818] 08/10/2024 Yes    Chronic hepatitis C [B18.2]  Yes    Human immunodeficiency virus (HIV) disease [B20]  Yes      Problems Resolved During this Admission:       Discharged Condition: good    Disposition: Home or Self Care    Follow Up:   Follow-up Information       Ulysses Almaraz MD. Schedule an appointment as soon as possible for a visit.    Specialty: Family Medicine  Contact information:  [address]  [facility] [place] [address] [phone]               Hospice, [facility] Follow up.    Specialties: Hospice Services, Home  Health Services  Why: they will call you to schedule first appointment  Contact information:  [address] [place] [address] [place] [address]  [phone]                           Patient Instructions:      Diet Adult Regular     Notify your health care provider if you experience any of the following:  temperature >100.4     Notify your health care provider if you experience any of the following:  persistent dizziness, light-headedness, or visual disturbances     Notify your health care provider if you experience any of the following:  increased confusion or weakness     Activity as tolerated       Significant Diagnostic Studies: Labs: CMP   Recent Labs   Lab 08/21/24  0334 08/22/24  0347   * 135*   K 4.0 4.0    111*   CO2 17* 16*   GLU 79 89   BUN 19 20   CREATININE 1.3 1.2   CALCIUM 9.8 9.4   PROT 6.3 6.1   ALBUMIN 3.5 3.4*   BILITOT 5.8* 7.1*   ALKPHOS 82 72   AST 54* 64*   ALT 15 18   ANIONGAP 9 8       Pending Diagnostic Studies:       None           Medications:  Reconciled Home Medications:      Medication List        START taking these medications      atovaquone 750 mg/5 mL Susp oral liquid  Commonly known as: MEPRON  Take 10 mLs (1,500 mg total) by mouth once daily.     levETIRAcetam 500 MG Tab  Commonly known as: KEPPRA  Take 1 tablet (500 mg total) by mouth 2 (two) times daily. Take medication until follow-up with your provider.  Notes to patient: Take medication until follow-up with your provider.            CHANGE how you take these medications      baclofen 5 mg Tab tablet  Commonly known as: LIORESAL  Take 2 tablets (10 mg total) by mouth 3 (three) times daily as needed (Muscle Spasticity).  What changed:   medication strength  when to take this  reasons to take this     midodrine 2.5 MG Tab  Commonly known as: PROAMATINE  Take 1 tablet (2.5 mg total) by mouth 3 (three) times daily as needed (if systolic blood pressure (top number) is less than 100).  What changed:   when to take  this  reasons to take this     valsartan 40 MG tablet  Commonly known as: DIOVAN  Take 40 mg by mouth every evening.  Notes to patient: Please hold until seen by provider            CONTINUE taking these medications      bisacodyL 10 mg Supp  Commonly known as: DULCOLAX  Place 1 suppository rectally daily as needed (constipation).     cyanocobalamin 1000 MCG tablet  Commonly known as: VITAMIN B-12  Take 1,000 mcg by mouth once daily.     ergocalciferol 50,000 unit Cap  Commonly known as: ERGOCALCIFEROL  Take 50,000 Units by mouth every 7 days.     FLUoxetine 20 MG capsule  Take 20 mg by mouth once daily.     furosemide 20 MG tablet  Commonly known as: LASIX  Take 2 tablets (40 mg total) by mouth once daily.     GENVOYA [phone] mg Tab  Generic drug: elviteg-cob-emtri-tenof ALAFEN  TAKE ONE TABLET BY MOUTH DAILY WITH FOOD.     lactulose 20 gram/30 mL Soln  Commonly known as: CHRONULAC  Take 30 mLs (20 g total) by mouth 2 (two) times daily. Goal of 3-5 soft stools each day     loratadine 10 mg tablet  Commonly known as: CLARITIN  Take 10 mg by mouth once daily.     meclizine 12.5 mg tablet  Commonly known as: ANTIVERT  Take 6.25 mg by mouth 2 (two) times daily as needed for Dizziness.     metoprolol succinate 25 MG 24 hr tablet  Commonly known as: TOPROL-XL  Take 25 mg by mouth once daily.     ondansetron 4 MG Tbdl  Commonly known as: ZOFRAN-ODT  Take 1 tablet by mouth every 8 (eight) hours as needed (nausea).     pantoprazole 40 MG tablet  Commonly known as: PROTONIX  Take 40 mg by mouth once daily.     pyridoxine (vitamin B6) 100 MG Tab  Commonly known as: B-6  Take 100 mg by mouth once daily.     rifAXIMin 550 mg Tab  Commonly known as: XIFAXAN  Take 550 mg by mouth 2 (two) times daily.     rosuvastatin 10 MG tablet  Commonly known as: CRESTOR  Take 10 mg by mouth every evening.     spironolactone 25 MG tablet  Commonly known as: ALDACTONE  Take 50 mg by mouth once daily.     tamsulosin 0.4 mg Cap  Commonly  known as: FLOMAX  Take 0.4 mg by mouth every evening.     vitamin D 1000 units Tab  Commonly known as: VITAMIN D3  Take 1,000 Units by mouth once daily.            STOP taking these medications      fenofibrate 160 MG Tab              Indwelling Lines/Drains at time of discharge:   Lines/Drains/Airways       None                   Time spent on the discharge of patient: 30 minutes         Ryann Almazan MD  Department of Hospital Medicine  [facility] Neurosurgery ([facility])

## 2024-08-22 NOTE — PLAN OF CARE
CHW met with patient/family at bedside. Patient experience rounding completed and reviewed the following.     Do you know your discharge plan? Yes or No,    If yes, what is the plan? (Home, Home Health, Rehab, SNF, LTAC, or Other)    Home/HH    Have you discussed your needs and preferences with your SW/CM? Yes or No    Yes    If you are discharging home, do you have help at home? Yes or No   HH    Do you think you will need help additional at home at discharge? Yes or No  No    Do you currently have difficulty keeping up with bills, affording medicine or buying food? Yes or No     No    Assigned SW/CM notified of any patient/family needs or concerns. Appropriate resources provided to address patient's needs.  Marleny ISRAEL  Case Management   [phone]

## 2024-08-22 NOTE — PT/OT/SLP PROGRESS
Occupational Therapy   Treatment    Name: Jam Card  MRN: [id number]  Admitting Diagnosis:  Bilateral subdural hematomas       Recommendations:     Discharge Recommendations: Low Intensity Therapy  Discharge Equipment Recommendations:  none  Barriers to discharge:  None    Assessment:     Jam Card is a 58 y.o. male with a medical diagnosis of Bilateral subdural hematomas.  He presents with some general unsteadiness but no LOB. Extensive discussion on not leaving RW behind to reduce fall risk with patient verbalizing understanding but not demonstrating. Performance deficits affecting function are weakness, impaired endurance, impaired self care skills, impaired functional mobility, gait instability, decreased upper extremity function, decreased lower extremity function, decreased safety awareness. Discharge recommendation updated appropriately.  Patient continues to demonstrate the need for low intensity therapy on a scheduled basis exhibited by decreased independence with self-care and functional mobility     Rehab Prognosis:  Good; patient would benefit from acute skilled OT services to address these deficits and reach maximum level of function.       Plan:     Patient to be seen 4 x/week to address the above listed problems via self-care/home management, therapeutic activities, therapeutic exercises, neuromuscular re-education  Plan of Care Expires: 09/15/24  Plan of Care Reviewed with: patient    Subjective     Chief Complaint: need to urinate  Patient/Family Comments/goals: go home  Pain/Comfort:  Pain Rating 1: 0/10  Pain Rating Post-Intervention 1: 0/10    Objective:     Communicated with: nursing prior to session.  Patient found HOB elevated with  (no active lines) upon OT entry to room.    General Precautions: Standard, fall    Orthopedic Precautions:N/A  Braces: N/A  Respiratory Status: Room air     Occupational Performance:     Bed Mobility:    Patient completed Supine to Sit with supervision    Patient sat EOB for ~ 8 minutes with Supervision     Functional Mobility/Transfers:  Patient completed Sit <> Stand Transfer with stand by assistance  with  rolling walker   Patient completed Toilet Transfer Step Transfer technique with stand by assistance with  rolling walker  Functional Mobility: patient ambulated to/from bathroom with RW; requiring cueing for RW management especially not abandoning RW during tasks    Activities of Daily Living:  Grooming: supervision to brush teeth in stance; forward flexed posture noted but patient reporting baseline  Upper Body Dressing: contact guard assistance to don gown like jacket  Lower Body Dressing: supervision to don/doff socks  Toileting: contact guard assistance; voiding in stance with urination all over toilet and floor  Cleaned by therapist prior to mobility to reduce fall risk     [facility] 6 Click ADL: 22    Treatment & Education:    Patient educated on:   -purpose of OT and OT POC  -facilitation and education on proper body mechanics, energy conservation, and safety  -importance of early mobility and out of bed activities with staff assist  -overall benefits of therapy     All questions answered within OT scope and to patient's satisfaction    Patient left up in chair with all lines intact, call button in reach, and nurisng notified    GOALS:   Multidisciplinary Problems       Occupational Therapy Goals          Problem: Occupational Therapy    Goal Priority Disciplines Outcome Interventions   Occupational Therapy Goal     OT, PT/OT Progressing    Description: Goals to be met by: 9/15/24     Patient will increase functional independence with ADLs by performing:    UE Dressing with Archer.  LE Dressing with Archer.  Grooming while standing with Archer.  Bathing from  tub bench with Archer.  Stand pivot transfers with Archer.  Step transfer with Archer  Toilet transfer to toilet with Archer.                         Time  Tracking:     OT Date of Treatment: 08/22/24  OT Start Time: 0941  OT Stop Time: 1002  OT Total Time (min): 21 min    Billable Minutes:Self Care/Home Management 21    OT/ANJALI: OT     Number of ANJALI visits since last OT visit: 0    8/22/2024

## 2024-08-22 NOTE — PLAN OF CARE
Esequiel Lea - Neurosurgery (Hospital)  Discharge Final Note    Primary Care Provider: Ulysses Almaraz MD    Expected Discharge Date: 8/22/2024    Final Discharge Note (most recent)       Final Note - 08/22/24 1154          Final Note    Assessment Type Final Discharge Note     Anticipated Discharge Disposition Home-Health Care [facility]     What phone number can be called within the next 1-3 days to see how you are doing after discharge? [phone] (P)      Hospital Resources/Appts/Education Provided Provided patient/caregiver with written discharge plan information;Provided education on problems/symptoms using teachback;Appointments scheduled and added to AVS (P)         Post-Acute Status    Post-Acute Authorization Home Health (P)      Home Health Status Set-up Complete/Auth obtained (P)                      Important Message from Medicare  Important Message from Medicare regarding Discharge Appeal Rights: Given to patient/caregiver, Explained to patient/caregiver, Signed/date by patient/caregiver     Date IMM was signed: 08/22/24  Time IMM was signed: 0910    Contact Info       Ulysses Almaraz MD   Specialty: Family Medicine   Relationship: PCP - [facility] [address] [address]  [facility] [place] [address]   Phone: [phone]       Next Steps: Schedule an appointment as soon as possible for a visit    [facility]   Specialty: Hospice Services, Home Health Services    [address] [place] [address] [place] [address]   Phone: [phone]       Next Steps: Follow up    Instructions: they will call you to schedule first appointment            Patient is discharge home with home health, accepted by [facility]. Family is enroute to pick patient up from the hospital.

## 2024-08-22 NOTE — PLAN OF CARE
Problem: Adult Inpatient Plan of Care  Goal: Plan of Care Review  Outcome: Progressing  Goal: Patient-Specific Goal (Individualized)  Description: Pt will maintain sbp below 180  Outcome: Progressing  Goal: Absence of Hospital-Acquired Illness or Injury  Outcome: Progressing

## 2024-08-22 NOTE — ASSESSMENT & PLAN NOTE
Patient with known Cirrhosis. Discontinued lasix 40mg and spironolactone 50mg in setting of AKICurrently AAOx4 with ammonia down-trending. Hepatology on board, pending transplant evaluation in setting of low CD4 and BL SDH.     MELD-Na score calculated; MELD 3.0: 24 at 8/22/2024  3:47 AM  MELD-Na: 24 at 8/22/2024  3:47 AM  Calculated from:  Serum Creatinine: 1.2 mg/dL at 8/22/2024  3:47 AM  Serum Sodium: 135 mmol/L at 8/22/2024  3:47 AM  Total Bilirubin: 7.1 mg/dL at 8/22/2024  3:47 AM  Serum Albumin: 3.4 g/dL at 8/22/2024  3:47 AM  INR(ratio): 1.9 at 8/21/2024  3:34 AM  Age at listing (hypothetical): 58 years  Sex: Male at 8/22/2024  3:47 AM    Plan:  -Continue chronic meds. Etiology likely Hepatitis. Will avoid any hepatotoxic meds, and monitor CBC/CMP/INR for synthetic function.   - F/u ammonia, dbili  - Strict I/Os  - continue PT/OT, recommended IP rehab. Pt would like to be placed at [facility] in [place]. Referrals sent to rehab in [place], [place]. Pending acceptance.   - low salt diet  - Continue lactulose and rifaximin with goal 2-3 BM daily.

## 2024-08-27 NOTE — PHYSICIAN QUERY
Please specify the diagnosis associated with the clinical findings.       Unspecified Acute Kidney Failure/Injury

## 2024-08-29 ENCOUNTER — TELEPHONE (OUTPATIENT)
Dept: NEUROSURGERY | Facility: CLINIC | Age: 59
End: 2024-08-29
Payer: MEDICARE

## 2024-08-29 NOTE — TELEPHONE ENCOUNTER
Called pt and spoke with mom at pts request. CTH to be scheduled at [facility]. Orders faxed to [facility] and they will call pt to schedule. Called mom back and she babs.

## 2024-08-30 ENCOUNTER — PATIENT MESSAGE (OUTPATIENT)
Dept: TRANSPLANT | Facility: CLINIC | Age: 59
End: 2024-08-30
Payer: MEDICARE

## 2024-09-03 ENCOUNTER — TELEPHONE (OUTPATIENT)
Dept: NEUROSURGERY | Facility: CLINIC | Age: 59
End: 2024-09-03
Payer: MEDICARE

## 2024-09-03 NOTE — TELEPHONE ENCOUNTER
"----- Message from Caron Moodymodesto sent at 9/3/2024  2:24 PM CDT -----  Regarding: pt advice  Contact: [phone]  .Name Of Caller: [facility]/ [facility]    Contact Preference?:[phone]     What is the nature of the call?  Pt [facility] as insurance provider, needing the authorization   & date. Pls call        FAX [phone]        Additional Notes:  "Thank you for all that you do for our patients"  "

## 2024-09-04 ENCOUNTER — PATIENT MESSAGE (OUTPATIENT)
Dept: TRANSPLANT | Facility: CLINIC | Age: 59
End: 2024-09-04
Payer: MEDICARE

## 2024-09-10 ENCOUNTER — OFFICE VISIT (OUTPATIENT)
Dept: NEUROSURGERY | Facility: CLINIC | Age: 59
End: 2024-09-10
Payer: MEDICARE

## 2024-09-10 VITALS
SYSTOLIC BLOOD PRESSURE: 124 MMHG | DIASTOLIC BLOOD PRESSURE: 58 MMHG | HEART RATE: 54 BPM | HEIGHT: 73 IN | WEIGHT: 243 LBS | BODY MASS INDEX: 32.2 KG/M2

## 2024-09-10 DIAGNOSIS — I62.00 NONTRAUMATIC SUBDURAL HEMORRHAGE, UNSPECIFIED: Primary | ICD-10-CM

## 2024-09-10 PROCEDURE — 1159F MED LIST DOCD IN RCRD: CPT | Mod: CPTII,NTX,S$GLB, | Performed by: PHYSICIAN ASSISTANT

## 2024-09-10 PROCEDURE — 3078F DIAST BP <80 MM HG: CPT | Mod: CPTII,NTX,S$GLB, | Performed by: PHYSICIAN ASSISTANT

## 2024-09-10 PROCEDURE — 99999 PR PBB SHADOW E&M-EST. PATIENT-LVL III: CPT | Mod: PBBFAC,TXP,, | Performed by: PHYSICIAN ASSISTANT

## 2024-09-10 PROCEDURE — 99215 OFFICE O/P EST HI 40 MIN: CPT | Mod: NTX,S$GLB,, | Performed by: PHYSICIAN ASSISTANT

## 2024-09-10 PROCEDURE — 3008F BODY MASS INDEX DOCD: CPT | Mod: CPTII,NTX,S$GLB, | Performed by: PHYSICIAN ASSISTANT

## 2024-09-10 PROCEDURE — 4010F ACE/ARB THERAPY RXD/TAKEN: CPT | Mod: CPTII,NTX,S$GLB, | Performed by: PHYSICIAN ASSISTANT

## 2024-09-10 PROCEDURE — 1111F DSCHRG MED/CURRENT MED MERGE: CPT | Mod: CPTII,NTX,S$GLB, | Performed by: PHYSICIAN ASSISTANT

## 2024-09-10 PROCEDURE — 3074F SYST BP LT 130 MM HG: CPT | Mod: CPTII,NTX,S$GLB, | Performed by: PHYSICIAN ASSISTANT

## 2024-09-10 NOTE — PROGRESS NOTES
Neurosurgery  Established Patient    SUBJECTIVE:     History of Present Illness:  Jam Card is a 58 y.o. male with PMH of HIV, Hep C with liver cirrhosis and chronic thrombocytopenia, who presents to clinic for bilateral SDH.    HPI from hospitalization 8/11/24:  Patient was previously admitted to [facility] for evaluation of HA (7/19/24) with a CT head showing b/l chronic extra-axial fluid collections which possibly could be CSF. Bur hole drainage was considered but coagulopathy and platelet issue needed to be corrected prior. Patient also did not want drainage at that time. Patient f/u with [facility] neurotrauma unit with repeat CT head (8/7/24) but no longer experienced HA, just gait disturbance. CT head showed blood developing since prior CT head which led to ED visit and evaluation. Heme/Onc was consulted and believe that thrombocytopenia could be to splenic sequestration so improvement of plt function may only be temporary at best. NSGY at [facility] requested inter facility transfer. Hepatology at [facility] also agreed. Patient transferred to [facility] for NSGY, Heme/Onc, and Hepatology.      Patient hemodynamically stable on arrival. MELD 27, Na 135, Cr 1.72 (1.8 baseline), INR 1.7, CBC 0.9 > 7.7 < 37 (s/p 2U plt and FFP, with initial INR 1.9). No focal neuro deficits at this time. GCS 15 on exam. Patient denies HA, weakness, numbness, vision disturbance, dizziness, photophobia, or any other complaints    Hospital Course:  - Underwent bilateral MMA embolization 8/16/24  - Postop CTH was stable, pt remained neurologically stable  - Discharged on 8/22/24    Interval History 9/10:  Presents to clinic today accompanied by family. Reports he has been doing well overall. Pt denies any HA's. Reports some ongoing balance difficulty, but this has been chronic and he feels it is improving. Has had some minor falls without any head trauma. Continues to have easy bruising. He denies any other new or worsening neurologic symptoms.  "Following with Hematology Liver Transplant team, currently on hold from Transplant list until cleared in regards to subdural hematomas. He got an updated CTH on 9/4, which shows some interval improvement of R SDH and stable L SDH, with overall decreased attenuation.     Review of patient's allergies indicates:   Allergen Reactions    Hydrocodone-acetaminophen Other (See Comments)     "cannot sleep when taking" Does not want to take    Lisinopril Other (See Comments)     Other reaction(s): Cough    Sulfamethoxazole-trimethoprim Rash       Current Outpatient Medications   Medication Sig Dispense Refill    atovaquone (MEPRON) 750 mg/5 mL Susp oral liquid Take 10 mLs (1,500 mg total) by mouth once daily. 300 mL 3    baclofen (LIORESAL) 5 mg Tab tablet Take 2 tablets (10 mg total) by mouth 3 (three) times daily as needed (Muscle Spasticity).      bisacodyL (DULCOLAX) 10 mg Supp Place 1 suppository rectally daily as needed (constipation).      cyanocobalamin (VITAMIN B-12) 1000 MCG tablet Take 1,000 mcg by mouth once daily.      ergocalciferol (ERGOCALCIFEROL) 50,000 unit Cap Take 50,000 Units by mouth every 7 days.      FLUoxetine 20 MG capsule Take 20 mg by mouth once daily.      furosemide (LASIX) 20 MG tablet Take 2 tablets (40 mg total) by mouth once daily. 30 tablet 1    GENVOYA 126-786-283-10 mg Tab TAKE ONE TABLET BY MOUTH DAILY WITH FOOD.      lactulose (CHRONULAC) 20 gram/30 mL Soln Take 30 mLs (20 g total) by mouth 2 (two) times daily. Goal of 3-5 soft stools each day 1800 mL 3    levETIRAcetam (KEPPRA) 500 MG Tab Take 1 tablet (500 mg total) by mouth 2 (two) times daily. Take medication until follow-up with your provider. 60 tablet 3    loratadine (CLARITIN) 10 mg tablet Take 10 mg by mouth once daily.      meclizine (ANTIVERT) 12.5 mg tablet Take 6.25 mg by mouth 2 (two) times daily as needed for Dizziness.      metoprolol succinate (TOPROL-XL) 25 MG 24 hr tablet Take 25 mg by mouth once daily.      midodrine " (PROAMATINE) 2.5 MG Tab Take 1 tablet (2.5 mg total) by mouth 3 (three) times daily as needed (if systolic blood pressure (top number) is less than 100).      ondansetron (ZOFRAN-ODT) 4 MG TbDL Take 1 tablet by mouth every 8 (eight) hours as needed (nausea).      pantoprazole (PROTONIX) 40 MG tablet Take 40 mg by mouth once daily.      pyridoxine, vitamin B6, (B-6) 100 MG Tab Take 100 mg by mouth once daily.      rifAXIMin (XIFAXAN) 550 mg Tab Take 550 mg by mouth 2 (two) times daily.      rosuvastatin (CRESTOR) 10 MG tablet Take 10 mg by mouth every evening.      spironolactone (ALDACTONE) 25 MG tablet Take 50 mg by mouth once daily.      tamsulosin (FLOMAX) 0.4 mg Cap Take 0.4 mg by mouth every evening.      valsartan (DIOVAN) 40 MG tablet Take 40 mg by mouth every evening.      vitamin D (VITAMIN D3) 1000 units Tab Take 1,000 Units by mouth once daily.       No current facility-administered medications for this visit.       Past Medical History:   Diagnosis Date    CAD (coronary artery disease) 03/2010    Hx of MI, CABG,  and cardiac cath    Chronic hepatitis C with cirrhosis     GERD (gastroesophageal reflux disease)     HTN (hypertension)     Human immunodeficiency virus (HIV) disease     Hyperlipidemia     Organic brain syndrome      Past Surgical History:   Procedure Laterality Date    CARDIAC CATHETERIZATION      CORONARY ARTERY BYPASS GRAFT      TONSILLECTOMY AND ADENOIDECTOMY      TRIGGER FINGER RELEASE       Family History    None       Social History     Socioeconomic History    Marital status: Single   Tobacco Use    Smoking status: Never    Smokeless tobacco: Never     Social Determinants of Health     Financial Resource Strain: Medium Risk (8/11/2024)    Overall Financial Resource Strain (CARDIA)     Difficulty of Paying Living Expenses: Somewhat hard   Food Insecurity: No Food Insecurity (8/11/2024)    Hunger Vital Sign     Worried About Running Out of Food in the Last Year: Never true     Ran Out of  "Food in the Last Year: Never true   Transportation Needs: No Transportation Needs (8/11/2024)    TRANSPORTATION NEEDS     Transportation : No   Physical Activity: Patient Declined (8/10/2024)    Received from [facility]    Exercise Vital Sign     Days of Exercise per Week: Patient declined     Minutes of Exercise per Session: Patient declined   Recent Concern: Physical Activity - Inactive (8/9/2024)    Received from [facility]    Exercise Vital Sign     Days of Exercise per Week: 0 days     Minutes of Exercise per Session: 0 min   Stress: No Stress Concern Present (8/11/2024)    [employer] [place] - Occupational Stress Questionnaire     Feeling of Stress : Only a little   Housing Stability: Low Risk  (8/11/2024)    Housing Stability Vital Sign     Unable to Pay for Housing in the Last Year: No     Homeless in the Last Year: No       Review of Systems  Positive per HPI, otherwise a pertinent ROS was performed and was negative.        OBJECTIVE:     Vital Signs  Pulse: (!) 54  BP: (!) 124/58  Pain Score: 0-No pain  Height: 6' 1" (185.4 cm)  Weight: 110.2 kg (243 lb)  Body mass index is 32.06 kg/m².    Neurosurgery Physical Exam  General: well developed, well nourished, no distress.   Head: normocephalic, atraumatic  Neck: No tracheal deviation. No palpable masses. Full ROM.   Neurologic: Alert and oriented. Thought content appropriate.  GCS: E4 V5 M6; Total: 15  Mental Status: Awake, Alert, Oriented x 4  Language: No aphasia. Dysarthric speech, slowed but comprehensible  Cranial nerves: face symmetric, tongue midline, CN II-XII grossly intact.   Eyes: pupils equal, round, reactive to light with accomodation, EOMI.   Pulmonary: normal respirations, no signs of respiratory distress  Abdomen: soft, non-distended, not tender to palpation  Skin: Skin is warm, dry and intact. Jaundice present.    Sensory: intact to light touch " throughout  Motor: Moves all extremities spontaneously with good tone.  Anti-gravity upper and lower extremities with equal strength. No abnormal movements seen.     Finger-to-nose: intact bilaterally   Pronator drift: absent bilaterally        Diagnostic Results:  Imaging was independently reviewed by myself.    CTH 9/4: mild interval decrease in R sided SDH (now 5-6 mm max thickness) and stable L sided SDH (7-8 mm max thickness) with overall decreased attenuation compared to prior    ASSESSMENT/PLAN:     Jam Card is a 58 y.o. male with PMH significant for Hep C cirrhosis, HIV, chronic thrombocytopenia who is being followed for bilateral SDH. He is s/p bilateral MMA embolizations.    Pt clinically doing well with improvement of symptoms. Updated CTH shows improvement with overall decrease in volume of SDH on the right and stable on the left, but with little to no acute component.    - At this stage, it is our opinion that the benefit of liver transplant outweighs any perioperative risk that the subdural hematomas may present, and patient is therefore cleared to proceed with liver transplant from neurosurgical perspective  - Recommend continued surveillance, repeat CTH in 3-4 weeks and follow up via virtual visit after scan  - Encouraged patient and family to call the clinic with any questions, concerns, or exam changes prior to follow up appt.           Diagnoses addressed and orders placed this encounter:      Nontraumatic subdural hemorrhage, unspecified  -     CT Head Without Contrast; Future; Expected date: 10/10/2024          Rebecca Waldron PA-C  Neurosurgery  [facility]      Time spent on this encounter: 49 minutes. This includes face to face time and non-face to face time preparing to see the patient (eg, review of tests), obtaining and/or reviewing separately obtained history, documenting clinical information in the electronic or other health record, independently interpreting  results and communicating results to the patient/family/caregiver, or care coordinator.

## 2024-09-12 RX ORDER — LACTULOSE 10 G/15ML
SOLUTION ORAL; RECTAL
Qty: 946 ML | Refills: 6 | Status: SHIPPED | OUTPATIENT
Start: 2024-09-12

## 2024-09-19 ENCOUNTER — TELEPHONE (OUTPATIENT)
Dept: NEUROSURGERY | Facility: CLINIC | Age: 59
End: 2024-09-19
Payer: MEDICARE

## 2024-09-19 DIAGNOSIS — I62.00 NONTRAUMATIC SUBDURAL HEMORRHAGE, UNSPECIFIED: Primary | ICD-10-CM

## 2024-09-19 NOTE — TELEPHONE ENCOUNTER
----- Message from Naila Agarwal sent at 9/19/2024  2:27 PM CDT -----  Regarding: pt advice  Contact: Chelita [phone]  Chelita mckinnon calling to provide phone number to [facility]. Phone #[phone]

## 2024-09-19 NOTE — TELEPHONE ENCOUNTER
I called [facility]. Fax number [phone]. Pt instructed to have disc sent to clinic. Pt babs.

## 2024-09-19 NOTE — TELEPHONE ENCOUNTER
----- Message from Yobani Karimi sent at 9/19/2024 10:53 AM CDT -----  Type:  Patient Returning Call    Who Called:PT  Who Left Message for Patient:  Does the patient know what this is regarding?:CT SCAN ORDERS  Would the patient rather a call back or a response via [facility]? CALL  Best Call Back Number:[phone]  Additional Information: Pt states he would like to see if provider can fax orders for CT over to [facility]. thank you

## 2024-09-24 ENCOUNTER — PATIENT MESSAGE (OUTPATIENT)
Dept: TRANSPLANT | Facility: CLINIC | Age: 59
End: 2024-09-24
Payer: MEDICARE

## 2024-09-26 ENCOUNTER — OFFICE VISIT (OUTPATIENT)
Dept: TRANSPLANT | Facility: CLINIC | Age: 59
End: 2024-09-26
Payer: MEDICARE

## 2024-09-26 ENCOUNTER — DOCUMENTATION ONLY (OUTPATIENT)
Dept: TRANSPLANT | Facility: CLINIC | Age: 59
End: 2024-09-26
Payer: MEDICARE

## 2024-09-26 ENCOUNTER — LAB VISIT (OUTPATIENT)
Dept: LAB | Facility: HOSPITAL | Age: 59
End: 2024-09-26
Attending: INTERNAL MEDICINE
Payer: MEDICARE

## 2024-09-26 VITALS
SYSTOLIC BLOOD PRESSURE: 109 MMHG | BODY MASS INDEX: 32.14 KG/M2 | HEART RATE: 54 BPM | WEIGHT: 242.5 LBS | HEIGHT: 73 IN | OXYGEN SATURATION: 99 % | DIASTOLIC BLOOD PRESSURE: 53 MMHG | TEMPERATURE: 99 F

## 2024-09-26 DIAGNOSIS — B18.2 CHRONIC HEPATITIS C WITHOUT HEPATIC COMA: ICD-10-CM

## 2024-09-26 DIAGNOSIS — Z76.82 ORGAN TRANSPLANT CANDIDATE: ICD-10-CM

## 2024-09-26 DIAGNOSIS — Z76.82 ORGAN TRANSPLANT CANDIDATE: Primary | ICD-10-CM

## 2024-09-26 LAB
ALBUMIN SERPL BCP-MCNC: 2.6 G/DL (ref 3.5–5.2)
ALP SERPL-CCNC: 104 U/L (ref 55–135)
ALT SERPL W/O P-5'-P-CCNC: 27 U/L (ref 10–44)
AMPHET+METHAMPHET UR QL: NEGATIVE
ANION GAP SERPL CALC-SCNC: 7 MMOL/L (ref 8–16)
ANISOCYTOSIS BLD QL SMEAR: SLIGHT
AST SERPL-CCNC: 101 U/L (ref 10–40)
BARBITURATES UR QL SCN>200 NG/ML: NEGATIVE
BASOPHILS NFR BLD: 1 % (ref 0–1.9)
BENZODIAZ UR QL SCN>200 NG/ML: NEGATIVE
BILIRUB SERPL-MCNC: 5 MG/DL (ref 0.1–1)
BUN SERPL-MCNC: 13 MG/DL (ref 6–20)
BZE UR QL SCN: NEGATIVE
CALCIUM SERPL-MCNC: 8.9 MG/DL (ref 8.7–10.5)
CANNABINOIDS UR QL SCN: NEGATIVE
CHLORIDE SERPL-SCNC: 111 MMOL/L (ref 95–110)
CO2 SERPL-SCNC: 21 MMOL/L (ref 23–29)
CREAT SERPL-MCNC: 1.4 MG/DL (ref 0.5–1.4)
CREAT UR-MCNC: 53 MG/DL (ref 23–375)
DACRYOCYTES BLD QL SMEAR: ABNORMAL
DIFFERENTIAL METHOD BLD: ABNORMAL
EOSINOPHIL NFR BLD: 3 % (ref 0–8)
ERYTHROCYTE [DISTWIDTH] IN BLOOD BY AUTOMATED COUNT: 15.2 % (ref 11.5–14.5)
EST. GFR  (NO RACE VARIABLE): 59 ML/MIN/1.73 M^2
ETHANOL UR-MCNC: <10 MG/DL
GLUCOSE SERPL-MCNC: 120 MG/DL (ref 70–110)
HCT VFR BLD AUTO: 25.5 % (ref 40–54)
HGB BLD-MCNC: 8.4 G/DL (ref 14–18)
IMM GRANULOCYTES # BLD AUTO: ABNORMAL K/UL (ref 0–0.04)
IMM GRANULOCYTES NFR BLD AUTO: ABNORMAL % (ref 0–0.5)
INR PPP: 1.8 (ref 0.8–1.2)
LYMPHOCYTES NFR BLD: 47 % (ref 18–48)
MCH RBC QN AUTO: 36.2 PG (ref 27–31)
MCHC RBC AUTO-ENTMCNC: 32.9 G/DL (ref 32–36)
MCV RBC AUTO: 110 FL (ref 82–98)
METHADONE UR QL SCN>300 NG/ML: NEGATIVE
MONOCYTES NFR BLD: 3 % (ref 4–15)
NEUTROPHILS NFR BLD: 46 % (ref 38–73)
NRBC BLD-RTO: 0 /100 WBC
OPIATES UR QL SCN: NEGATIVE
OVALOCYTES BLD QL SMEAR: ABNORMAL
PCP UR QL SCN>25 NG/ML: NEGATIVE
PLATELET # BLD AUTO: 15 K/UL (ref 150–450)
PMV BLD AUTO: 13 FL (ref 9.2–12.9)
POIKILOCYTOSIS BLD QL SMEAR: SLIGHT
POLYCHROMASIA BLD QL SMEAR: ABNORMAL
POTASSIUM SERPL-SCNC: 3.5 MMOL/L (ref 3.5–5.1)
PROT SERPL-MCNC: 5.8 G/DL (ref 6–8.4)
PROTHROMBIN TIME: 18.7 SEC (ref 9–12.5)
RBC # BLD AUTO: 2.32 M/UL (ref 4.6–6.2)
SODIUM SERPL-SCNC: 139 MMOL/L (ref 136–145)
TOXICOLOGY INFORMATION: NORMAL
WBC # BLD AUTO: 1.54 K/UL (ref 3.9–12.7)

## 2024-09-26 PROCEDURE — 85007 BL SMEAR W/DIFF WBC COUNT: CPT | Mod: TXP | Performed by: INTERNAL MEDICINE

## 2024-09-26 PROCEDURE — 80053 COMPREHEN METABOLIC PANEL: CPT | Mod: TXP | Performed by: INTERNAL MEDICINE

## 2024-09-26 PROCEDURE — 99999 PR PBB SHADOW E&M-EST. PATIENT-LVL III: CPT | Mod: PBBFAC,TXP,, | Performed by: INTERNAL MEDICINE

## 2024-09-26 PROCEDURE — 85027 COMPLETE CBC AUTOMATED: CPT | Mod: TXP | Performed by: INTERNAL MEDICINE

## 2024-09-26 PROCEDURE — 80307 DRUG TEST PRSMV CHEM ANLYZR: CPT | Mod: TXP | Performed by: INTERNAL MEDICINE

## 2024-09-26 PROCEDURE — 36415 COLL VENOUS BLD VENIPUNCTURE: CPT | Mod: TXP | Performed by: INTERNAL MEDICINE

## 2024-09-26 PROCEDURE — 80321 ALCOHOLS BIOMARKERS 1OR 2: CPT | Mod: TXP | Performed by: INTERNAL MEDICINE

## 2024-09-26 PROCEDURE — 85610 PROTHROMBIN TIME: CPT | Mod: TXP | Performed by: INTERNAL MEDICINE

## 2024-09-26 NOTE — PROGRESS NOTES
Subjective:       Patient ID: Jam Card is a 58 y.o. male.    Chief Complaint: No chief complaint on file.    HPI  I saw this 58 y.o. man with HCV cirrhosis and HIV who has decompensated liver disease.    Became unwell with liver disease only in March 2024.  - recent diagnosis of HCV- March 2024  - ascites + edema  - jaundice  - HE  - hypoalbuminemia, thrombocytopenia and coagulaopathy    - first seen by Marychuy Zapata on 5/7/24.    AFP 35 on 5/7/24  AFP 65 on 7/11/24  AFP 24 on 8/12/24    Hospitalizations x 2 for HE in last couple of months    Admitted to [facility] in Aug 2024 with Subdural Hematoma and subsequently [place] [facility]  - IR performed MMA embolization on 8/16/24  - on Keppra prophylaxis    Last CT head- on 9/4/2024- decrease in SDH    MRI abdo: 6/12/2024  Cirrhotic-appearing liver with splenomegaly. Prominent portal vein and probable small gastric varices     Chest CT: 6/5/24  8.5 mm nodular density in the right lower lobe, not amenable to vertex biopsy due to position. Recommend PET/CT     MELD 3.0: 24 at 8/22/2024  3:47 AM  MELD-Na: 24 at 8/22/2024  3:47 AM  Calculated from:  Serum Creatinine: 1.2 mg/dL at 8/22/2024  3:47 AM  Serum Sodium: 135 mmol/L at 8/22/2024  3:47 AM  Total Bilirubin: 7.1 mg/dL at 8/22/2024  3:47 AM  Serum Albumin: 3.4 g/dL at 8/22/2024  3:47 AM  INR(ratio): 1.9 at 8/21/2024  3:34 AM  Age at listing (hypothetical): 58 years  Sex: Male at 8/22/2024  3:47 AM      This is a screening tool and clinical judgement should be used in the interpretation of these results.   PMH:  HIV- since 1997  Chronic Brain syndrome- 2009    Appendictomy- 2005  CABG 2010    ?NSTEMI    SH:  No alcohol  Ex smoker- June 2024-1/3 pack per day for decades    Lives with mom (85)  Caregiver Neeta here with him      FH:  CAD- father      Review of Systems   Constitutional:  Negative for activity change, appetite change, chills, fatigue, fever and unexpected weight change.   HENT:  Negative for  hearing loss.    Eyes:  Negative for discharge and visual disturbance.   Respiratory:  Negative for cough, chest tightness, shortness of breath and wheezing.    Cardiovascular:  Negative for chest pain, palpitations and leg swelling.   Gastrointestinal:  Negative for abdominal distention, abdominal pain, constipation, diarrhea and nausea.   Genitourinary:  Negative for dysuria and frequency.   Musculoskeletal:  Negative for arthralgias and back pain.   Skin:  Negative for pallor and rash.   Neurological:  Negative for dizziness, tremors, speech difficulty and headaches.   Hematological:  Negative for adenopathy.   Psychiatric/Behavioral:  Negative for agitation and confusion.            Lab Results   Component Value Date    ALT 18 08/22/2024    AST 64 (H) 08/22/2024    GGT 74 (H) 07/11/2024    ALKPHOS 72 08/22/2024    BILITOT 7.1 (H) 08/22/2024     Past Medical History:   Diagnosis Date    CAD (coronary artery disease) 03/2010    Hx of MI, CABG,  and cardiac cath    Chronic hepatitis C with cirrhosis     GERD (gastroesophageal reflux disease)     HTN (hypertension)     Human immunodeficiency virus (HIV) disease     Hyperlipidemia     Organic brain syndrome      Past Surgical History:   Procedure Laterality Date    CARDIAC CATHETERIZATION      CORONARY ARTERY BYPASS GRAFT      TONSILLECTOMY AND ADENOIDECTOMY      TRIGGER FINGER RELEASE       Current Outpatient Medications   Medication Sig    atovaquone (MEPRON) 750 mg/5 mL Susp oral liquid Take 10 mLs (1,500 mg total) by mouth once daily.    baclofen (LIORESAL) 5 mg Tab tablet Take 2 tablets (10 mg total) by mouth 3 (three) times daily as needed (Muscle Spasticity).    bisacodyL (DULCOLAX) 10 mg Supp Place 1 suppository rectally daily as needed (constipation).    cyanocobalamin (VITAMIN B-12) 1000 MCG tablet Take 1,000 mcg by mouth once daily.    ergocalciferol (ERGOCALCIFEROL) 50,000 unit Cap Take 50,000 Units by mouth every 7 days.    FLUoxetine 20 MG capsule Take  20 mg by mouth once daily.    furosemide (LASIX) 20 MG tablet Take 2 tablets (40 mg total) by mouth once daily.    GENVOYA 500-283-170-10 mg Tab TAKE ONE TABLET BY MOUTH DAILY WITH FOOD.    lactulose (CHRONULAC) 10 gram/15 mL solution TAKE 30 MLS BY MOUTH TWICE DAILY. GOAL OF 3-5 SOFT STOOLS EACH DAY    levETIRAcetam (KEPPRA) 500 MG Tab Take 1 tablet (500 mg total) by mouth 2 (two) times daily. Take medication until follow-up with your provider.    loratadine (CLARITIN) 10 mg tablet Take 10 mg by mouth once daily.    meclizine (ANTIVERT) 12.5 mg tablet Take 6.25 mg by mouth 2 (two) times daily as needed for Dizziness.    metoprolol succinate (TOPROL-XL) 25 MG 24 hr tablet Take 25 mg by mouth once daily.    midodrine (PROAMATINE) 2.5 MG Tab Take 1 tablet (2.5 mg total) by mouth 3 (three) times daily as needed (if systolic blood pressure (top number) is less than 100).    ondansetron (ZOFRAN-ODT) 4 MG TbDL Take 1 tablet by mouth every 8 (eight) hours as needed (nausea).    pantoprazole (PROTONIX) 40 MG tablet Take 40 mg by mouth once daily.    pyridoxine, vitamin B6, (B-6) 100 MG Tab Take 100 mg by mouth once daily.    rifAXIMin (XIFAXAN) 550 mg Tab Take 550 mg by mouth 2 (two) times daily.    rosuvastatin (CRESTOR) 10 MG tablet Take 10 mg by mouth every evening.    spironolactone (ALDACTONE) 25 MG tablet Take 50 mg by mouth once daily.    tamsulosin (FLOMAX) 0.4 mg Cap Take 0.4 mg by mouth every evening.    valsartan (DIOVAN) 40 MG tablet Take 40 mg by mouth every evening.    vitamin D (VITAMIN D3) 1000 units Tab Take 1,000 Units by mouth once daily.     No current facility-administered medications for this visit.       Objective:      Physical Exam  HENT:      Head: Normocephalic.   Eyes:      Pupils: Pupils are equal, round, and reactive to light.   Neck:      Thyroid: No thyromegaly.   Cardiovascular:      Rate and Rhythm: Normal rate and regular rhythm.      Heart sounds: Normal heart sounds.   Pulmonary:       Effort: Pulmonary effort is normal.      Breath sounds: Normal breath sounds. No wheezing.   Abdominal:      General: There is no distension.      Palpations: Abdomen is soft. There is no mass.      Tenderness: There is no abdominal tenderness.   Lymphadenopathy:      Cervical: No cervical adenopathy.   Skin:     General: Skin is warm.      Findings: No erythema or rash.   Neurological:      Mental Status: He is alert and oriented to person, place, and time.   Psychiatric:         Behavior: Behavior normal.         Assessment:       1. Organ transplant candidate        Plan:   This 58 year old man with HIV infection that is well controlled has also recently been found to have decompensated HCV cirrhosis.  He had fluid retention and HE and was started on diuretics recently.    Unfortunately his insurance does not cover non-transplant care.    Today he did not have any evidence of HE- his slow speech is a result of his brain injury in 2009. His functional status and mobility are very good.    1) Needs to see hematology re platelets- ?ITP- benefits from steroids  2) may need angiogram for further investigation of his heart    I will ask our financial team to speak to him re his insurance and the possibility of changing this to a carrier that allows non-tranplant trreatments at [facility].    Continue liver Tx evaluation.    UNOS Patient Status  Functional Status: 70% - Cares for self: unable to carry on normal activity or active work  Physical Capacity: No Limitations    Patient on life support: No  Diabetes: No  Any previous malignancy: No  Neoadjuvant Therapy: no  Has patient ever had a dx of HCC: no  Previous Abdominal Surgery: no  Spontaneous Bacterial Peritonitis: no  History of Portal Vein Thrombosis: no  Transjugular Intrahepatic Portosystemic Shunt: no

## 2024-09-26 NOTE — Clinical Note
Can we get hematology here re his very low platelets? Also can we get financial to speak to him about possibly changing insurance??

## 2024-09-26 NOTE — LETTER
September 27, 2024        Kina Rothman  [facility] [address]  [place] [address]  Phone: [phone]  Fax: [phone]             [facility]  [address]  [facility] [address]  Phone: [phone]   Patient: Jam Card   MR Number: [id number]   YOB: 1965   Date of Visit: 9/26/2024       Dear Dr. Kina Rothman    Thank you for referring Jam Card to me for evaluation. Attached you will find relevant portions of my assessment and plan of care.    If you have questions, please do not hesitate to call me. I look forward to following Jam Card along with you.    Sincerely,    Kris Turner MD    Enclosure    If you would like to receive this communication electronically, please contact [email or web address] or [phone] to request [facility] [employer] access.    [facility] is a tool which provides read-only access to select patient information with whom you have a relationship. Its easy to use and provides real time access to review your patients record including encounter summaries, notes, results, and demographic information.    If you feel you have received this communication in error or would no longer like to receive these types of communications, please e-mail [email or web address]

## 2024-09-29 LAB
CLINICAL BIOCHEMIST REVIEW: NORMAL
PLPETH BLD-MCNC: <10 NG/ML
POPETH BLD-MCNC: <10 NG/ML

## 2024-09-30 ENCOUNTER — TELEPHONE (OUTPATIENT)
Dept: NEUROSURGERY | Facility: CLINIC | Age: 59
End: 2024-09-30
Payer: MEDICARE

## 2024-09-30 NOTE — TELEPHONE ENCOUNTER
Returned call to pt. Informed the address to mail the disc to. Informed that Rebecca Waldron PA-C would have a look at the imaging and decide the next steps so for right now, there is no follow up appointment needed. Pt v/u.

## 2024-09-30 NOTE — TELEPHONE ENCOUNTER
----- Message from Bianka sent at 9/30/2024  2:39 PM CDT -----  Regarding: Medical Assisstance  Contact: Patient Spouse Chelita  Type:  Needs Medical Advice    Who Called: Chelita (Spouse)  Symptoms (please be specific): n/a    How long has patient had these symptoms:  n/a   Pharmacy name and phone #:  n/a   Would the patient rather a call back or a response via [facility]? N/a   Best Call Back Number:  [phone]  Additional Information: Spouse stated they have CT imaging on disc and would like to know where to mail imaging Spouse stated they also need to schedule virtual appt with physician Please Assist

## 2024-10-10 ENCOUNTER — PATIENT MESSAGE (OUTPATIENT)
Dept: TRANSPLANT | Facility: CLINIC | Age: 59
End: 2024-10-10
Payer: MEDICARE

## 2024-10-14 ENCOUNTER — DOCUMENTATION ONLY (OUTPATIENT)
Dept: TRANSPLANT | Facility: CLINIC | Age: 59
End: 2024-10-14
Payer: MEDICARE

## 2024-10-14 ENCOUNTER — DOCUMENTATION ONLY (OUTPATIENT)
Dept: NEUROSURGERY | Facility: HOSPITAL | Age: 59
End: 2024-10-14
Payer: MEDICARE

## 2024-10-14 NOTE — PROGRESS NOTES
Care Update:    CT head completed 9/27/2024 at outside facility was personally reviewed. Interval decrease in volume of R sided SDH compared to prior from 9/4/24, still with very small L SDH which appears unchanged.    Recommend repeating CT head after 6-8 weeks for continued surveillance, or sooner for any clinical concerns.    Disc to be uploaded to our system for future comparison.    Rebecca Waldron PA-C  Neurosurgery  [facility]

## 2024-10-15 ENCOUNTER — PATIENT MESSAGE (OUTPATIENT)
Dept: NEUROSURGERY | Facility: CLINIC | Age: 59
End: 2024-10-15
Payer: MEDICARE

## 2024-10-15 ENCOUNTER — TELEPHONE (OUTPATIENT)
Dept: NEUROSURGERY | Facility: CLINIC | Age: 59
End: 2024-10-15
Payer: MEDICARE

## 2024-10-15 DIAGNOSIS — I62.00 NONTRAUMATIC SUBDURAL HEMORRHAGE, UNSPECIFIED: Primary | ICD-10-CM

## 2024-10-15 NOTE — TELEPHONE ENCOUNTER
----- Message from Linda sent at 10/15/2024 10:40 AM CDT -----  Regarding: REQUESTING ORDERS  Contact: [phone]  [facility]   Patient Requesting Order    Order Needed:CT CONFIRMING ORDERS FOR CT HEAD SCAN          Communication Preference:[phone]        Additional Information:  PLEASE CALL TO DISCUSS

## 2024-10-16 ENCOUNTER — COMMITTEE REVIEW (OUTPATIENT)
Dept: TRANSPLANT | Facility: CLINIC | Age: 59
End: 2024-10-16
Payer: MEDICARE

## 2024-10-16 DIAGNOSIS — I25.10 CORONARY ARTERY DISEASE, UNSPECIFIED VESSEL OR LESION TYPE, UNSPECIFIED WHETHER ANGINA PRESENT, UNSPECIFIED WHETHER NATIVE OR TRANSPLANTED HEART: ICD-10-CM

## 2024-10-16 DIAGNOSIS — Z76.82 ORGAN TRANSPLANT CANDIDATE: Primary | ICD-10-CM

## 2024-10-16 DIAGNOSIS — B18.2 CHRONIC HEPATITIS C WITHOUT HEPATIC COMA: ICD-10-CM

## 2024-10-16 NOTE — COMMITTEE REVIEW
This encounter was created in error - please disregard.   Jam Card's case presented to selection committee.  Patient has been deferred for liver transplant pending L/RHC (h/o CABG, previous smoker, elevated PA pressures) once platelet count improves.  Patient also needs EGD.  I was present at the committee meeting and attest to the decision of the committee.    Kris Turner  10/17/2024

## 2024-10-22 ENCOUNTER — TELEPHONE (OUTPATIENT)
Dept: NEUROSURGERY | Facility: CLINIC | Age: 59
End: 2024-10-22
Payer: MEDICARE

## 2024-10-22 NOTE — TELEPHONE ENCOUNTER
----- Message from Yobani sent at 10/22/2024  9:19 AM CDT -----  Type:  Test Results    Who Called: PT  Name of Test (Lab/Mammo/Etc): DISC   Date of Test: was sent 9/30  Ordering Provider: Chivo  Where the test was performed: [facility]  Would the patient rather a call back or a response via [facility]? call  Best Call Back Number: [phone] - Mother Yossi  Additional Information:  Pt states a disc was sent over to office from [facility] and pt states they would like a call back to go over results if available. Thank you

## 2024-10-22 NOTE — TELEPHONE ENCOUNTER
Returned call to Ms. Merlos. Informed that I will forward the message to [facility]. Spoke with Ms. Merlos about the CTH that is due in 6 wks. Ms. Merlos v/u

## 2024-10-24 ENCOUNTER — TELEPHONE (OUTPATIENT)
Dept: ENDOSCOPY | Facility: HOSPITAL | Age: 59
End: 2024-10-24
Payer: MEDICARE

## 2024-10-24 DIAGNOSIS — K74.60 HEPATIC CIRRHOSIS, UNSPECIFIED HEPATIC CIRRHOSIS TYPE, UNSPECIFIED WHETHER ASCITES PRESENT: ICD-10-CM

## 2024-10-24 DIAGNOSIS — B18.2 CHRONIC HEPATITIS C WITHOUT HEPATIC COMA: ICD-10-CM

## 2024-10-24 DIAGNOSIS — Z76.82 ORGAN TRANSPLANT CANDIDATE: Primary | ICD-10-CM

## 2024-10-24 NOTE — TELEPHONE ENCOUNTER
Patient called to scheduled EGD.  EGD scheduled on 12/13/24 at 1:15 PM with Dr. Vo.  Patient having labs drawn (CBC, INR and T&S) on 12/12/24 at 9:00 AM.   (Last platelet count on on 9/26/24 was 15.    Hepatology: Please order platelets in case it is needed for procedure.  Thank you.  Ricki

## 2024-10-24 NOTE — TELEPHONE ENCOUNTER
Referral for procedure from  Workqueue referral (see Appts tab)      Spoke to patient's friend to schedule procedure(s) Upper Endoscopy (EGD)       Physician to perform procedure(s) Dr. GLENN Vo  Date of Procedure (s) 12/13/24  Arrival Time 12:15 PM  Time of Procedure(s) 1:15 PM   Location of Procedure(s) [place] [address] Floor  Type of Rx Prep sent to patient: N/A  Instructions provided to patient via [facility]    Patient was informed on the following information and verbalized understanding. Screening questionnaire reviewed with patient and complete. If procedure requires anesthesia, a responsible adult needs to be present to accompany the patient home, patient cannot drive after receiving anesthesia. Appointment details are tentative, especially check-in time. Patient will receive a prep-op call 7 days prior to confirm check-in time for procedure. If applicable the patient should contact their pharmacy to verify Rx for procedure prep is ready for pick-up. Patient was advised to call the scheduling department at [phone] if pharmacy states no Rx is available. Patient was advised to call the endoscopy scheduling department if any questions or concerns arise.      [facility] Scheduling Department

## 2024-10-28 ENCOUNTER — TELEPHONE (OUTPATIENT)
Dept: CARDIOLOGY | Facility: CLINIC | Age: 59
End: 2024-10-28
Payer: MEDICARE

## 2024-10-28 ENCOUNTER — TELEPHONE (OUTPATIENT)
Dept: NEUROSURGERY | Facility: CLINIC | Age: 59
End: 2024-10-28
Payer: MEDICARE

## 2024-10-28 DIAGNOSIS — Z76.82 ORGAN TRANSPLANT CANDIDATE: Primary | ICD-10-CM

## 2024-10-29 ENCOUNTER — EDUCATION (OUTPATIENT)
Dept: CARDIOLOGY | Facility: CLINIC | Age: 59
End: 2024-10-29
Payer: MEDICARE

## 2024-10-29 ENCOUNTER — PATIENT MESSAGE (OUTPATIENT)
Dept: CARDIOLOGY | Facility: CLINIC | Age: 59
End: 2024-10-29
Payer: MEDICARE

## 2024-10-29 ENCOUNTER — TELEPHONE (OUTPATIENT)
Dept: CARDIOLOGY | Facility: CLINIC | Age: 59
End: 2024-10-29
Payer: MEDICARE

## 2024-10-29 DIAGNOSIS — Z76.82 LIVER TRANSPLANT CANDIDATE: Primary | ICD-10-CM

## 2024-10-29 RX ORDER — DIPHENHYDRAMINE HCL 50 MG
50 CAPSULE ORAL ONCE
OUTPATIENT
Start: 2024-10-29 | End: 2024-10-29

## 2024-10-30 ENCOUNTER — TELEPHONE (OUTPATIENT)
Dept: ENDOSCOPY | Facility: HOSPITAL | Age: 59
End: 2024-10-30
Payer: MEDICARE

## 2024-11-01 ENCOUNTER — TELEPHONE (OUTPATIENT)
Dept: CARDIOLOGY | Facility: CLINIC | Age: 59
End: 2024-11-01
Payer: MEDICARE

## 2024-12-04 ENCOUNTER — PATIENT MESSAGE (OUTPATIENT)
Dept: NEUROSURGERY | Facility: CLINIC | Age: 59
End: 2024-12-04
Payer: MEDICARE

## 2024-12-06 DIAGNOSIS — B20 HUMAN IMMUNODEFICIENCY VIRUS (HIV) DISEASE: Primary | ICD-10-CM

## 2024-12-09 ENCOUNTER — TELEPHONE (OUTPATIENT)
Dept: ENDOSCOPY | Facility: HOSPITAL | Age: 59
End: 2024-12-09
Payer: MEDICARE

## 2024-12-10 ENCOUNTER — TELEPHONE (OUTPATIENT)
Dept: INFECTIOUS DISEASES | Facility: CLINIC | Age: 59
End: 2024-12-10
Payer: MEDICARE

## 2024-12-10 DIAGNOSIS — D69.6 THROMBOCYTOPENIA: Primary | ICD-10-CM

## 2024-12-10 NOTE — TELEPHONE ENCOUNTER
----- Message from Mariah sent at 12/10/2024  8:43 AM CST -----  Type:  Sooner Apoointment Request    Caller is requesting a sooner appointment.    Name of Caller:transplant   When is the first available appointment?none  Symptoms:HIV   Would the patient rather a call back or a response via [facility]? Call   Best Call Back Number:[phone] Ena   Additional Information: Ena in transplant states that Dr Wu told for to get the patient scheduled for 12/19 to see him

## 2024-12-11 ENCOUNTER — TELEPHONE (OUTPATIENT)
Dept: ENDOSCOPY | Facility: HOSPITAL | Age: 59
End: 2024-12-11
Payer: MEDICARE

## 2024-12-11 DIAGNOSIS — K74.60 HEPATIC CIRRHOSIS, UNSPECIFIED HEPATIC CIRRHOSIS TYPE, UNSPECIFIED WHETHER ASCITES PRESENT: Primary | ICD-10-CM

## 2024-12-11 NOTE — TELEPHONE ENCOUNTER
Referral for procedure from  Workqueue referral (see Appts tab)      Spoke to patient to schedule procedure(s) Upper Endoscopy (EGD)       Physician to perform procedure(s) Dr. ALICE Santiago  Date of Procedure (s) 2/6/25  Arrival Time 7:45 AM  Time of Procedure(s) 8:45 AM   Location of Procedure(s) [address]  Type of Rx Prep sent to patient: N/A  Instructions provided to patient via [facility]    Patient was informed on the following information and verbalized understanding. Screening questionnaire reviewed with patient and complete. If procedure requires anesthesia, a responsible adult needs to be present to accompany the patient home, patient cannot drive after receiving anesthesia. Appointment details are tentative, especially check-in time. Patient will receive a prep-op call 7 days prior to confirm check-in time for procedure. If applicable the patient should contact their pharmacy to verify Rx for procedure prep is ready for pick-up. Patient was advised to call the scheduling department at [phone] if pharmacy states no Rx is available. Patient was advised to call the endoscopy scheduling department if any questions or concerns arise.      [facility] Scheduling Department

## 2024-12-11 NOTE — TELEPHONE ENCOUNTER
Patient called to reschedule EGD.  EGD scheduled on 2/6/25 at 8:45 AM with Dr. Santiago.  Patient having labs drawn (CBC, INR and T&S) on 2/5/25 at 2:05 PM.   (Last platelet count on on 9/26/24 was 15.     Hepatology: Please order platelets in case it is needed for procedure.  Thank you.  Ricki

## 2024-12-12 DIAGNOSIS — Z01.818 PRE-TRANSPLANT EVALUATION FOR LIVER TRANSPLANT: Primary | ICD-10-CM

## 2024-12-19 ENCOUNTER — CLINICAL SUPPORT (OUTPATIENT)
Dept: INFECTIOUS DISEASES | Facility: CLINIC | Age: 59
End: 2024-12-19
Payer: MEDICARE

## 2024-12-19 ENCOUNTER — LAB VISIT (OUTPATIENT)
Dept: LAB | Facility: HOSPITAL | Age: 59
End: 2024-12-19
Attending: INTERNAL MEDICINE
Payer: MEDICARE

## 2024-12-19 ENCOUNTER — OFFICE VISIT (OUTPATIENT)
Dept: CARDIOLOGY | Facility: CLINIC | Age: 59
End: 2024-12-19
Payer: MEDICARE

## 2024-12-19 ENCOUNTER — DOCUMENTATION ONLY (OUTPATIENT)
Dept: CARDIOLOGY | Facility: CLINIC | Age: 59
End: 2024-12-19
Payer: MEDICARE

## 2024-12-19 ENCOUNTER — OFFICE VISIT (OUTPATIENT)
Dept: INFECTIOUS DISEASES | Facility: CLINIC | Age: 59
End: 2024-12-19
Payer: MEDICARE

## 2024-12-19 ENCOUNTER — OFFICE VISIT (OUTPATIENT)
Dept: TRANSPLANT | Facility: CLINIC | Age: 59
End: 2024-12-19
Payer: MEDICARE

## 2024-12-19 VITALS
WEIGHT: 255 LBS | HEART RATE: 65 BPM | DIASTOLIC BLOOD PRESSURE: 59 MMHG | SYSTOLIC BLOOD PRESSURE: 124 MMHG | OXYGEN SATURATION: 97 % | BODY MASS INDEX: 33.8 KG/M2 | HEIGHT: 73 IN

## 2024-12-19 VITALS
WEIGHT: 259.5 LBS | SYSTOLIC BLOOD PRESSURE: 122 MMHG | DIASTOLIC BLOOD PRESSURE: 57 MMHG | RESPIRATION RATE: 16 BRPM | HEART RATE: 64 BPM | OXYGEN SATURATION: 95 % | TEMPERATURE: 97 F | HEIGHT: 73 IN | BODY MASS INDEX: 34.39 KG/M2

## 2024-12-19 VITALS
BODY MASS INDEX: 34.07 KG/M2 | SYSTOLIC BLOOD PRESSURE: 110 MMHG | TEMPERATURE: 98 F | HEIGHT: 73 IN | HEART RATE: 64 BPM | DIASTOLIC BLOOD PRESSURE: 58 MMHG | WEIGHT: 257.06 LBS

## 2024-12-19 DIAGNOSIS — I25.10 CORONARY ARTERY DISEASE, UNSPECIFIED VESSEL OR LESION TYPE, UNSPECIFIED WHETHER ANGINA PRESENT, UNSPECIFIED WHETHER NATIVE OR TRANSPLANTED HEART: ICD-10-CM

## 2024-12-19 DIAGNOSIS — K74.60 CIRRHOSIS OF LIVER WITH ASCITES, UNSPECIFIED HEPATIC CIRRHOSIS TYPE: ICD-10-CM

## 2024-12-19 DIAGNOSIS — B20 HUMAN IMMUNODEFICIENCY VIRUS (HIV) DISEASE: ICD-10-CM

## 2024-12-19 DIAGNOSIS — Z76.82 ORGAN TRANSPLANT CANDIDATE: ICD-10-CM

## 2024-12-19 DIAGNOSIS — Z01.818 PRE-TRANSPLANT EVALUATION FOR LIVER TRANSPLANT: ICD-10-CM

## 2024-12-19 DIAGNOSIS — I25.10 CORONARY ARTERY DISEASE WITH HISTORY OF MYOCARDIAL INFARCTION WITHOUT HISTORY OF CABG: ICD-10-CM

## 2024-12-19 DIAGNOSIS — B20 HUMAN IMMUNODEFICIENCY VIRUS (HIV) DISEASE: Primary | ICD-10-CM

## 2024-12-19 DIAGNOSIS — Z00.00 HEALTH CARE MAINTENANCE: Primary | ICD-10-CM

## 2024-12-19 DIAGNOSIS — I25.2 CORONARY ARTERY DISEASE WITH HISTORY OF MYOCARDIAL INFARCTION WITHOUT HISTORY OF CABG: ICD-10-CM

## 2024-12-19 DIAGNOSIS — B18.2 CHRONIC HEPATITIS C WITHOUT HEPATIC COMA: ICD-10-CM

## 2024-12-19 DIAGNOSIS — R18.8 CIRRHOSIS OF LIVER WITH ASCITES, UNSPECIFIED HEPATIC CIRRHOSIS TYPE: ICD-10-CM

## 2024-12-19 DIAGNOSIS — Z76.82 ORGAN TRANSPLANT CANDIDATE: Primary | ICD-10-CM

## 2024-12-19 LAB
ABO + RH BLD: NORMAL
AMPHET+METHAMPHET UR QL: NEGATIVE
ANION GAP SERPL CALC-SCNC: 5 MMOL/L (ref 8–16)
BARBITURATES UR QL SCN>200 NG/ML: NEGATIVE
BENZODIAZ UR QL SCN>200 NG/ML: NEGATIVE
BLD GP AB SCN CELLS X3 SERPL QL: NORMAL
BUN SERPL-MCNC: 11 MG/DL (ref 6–20)
BZE UR QL SCN: NEGATIVE
CALCIUM SERPL-MCNC: 8.2 MG/DL (ref 8.7–10.5)
CANNABINOIDS UR QL SCN: NEGATIVE
CHLORIDE SERPL-SCNC: 109 MMOL/L (ref 95–110)
CO2 SERPL-SCNC: 21 MMOL/L (ref 23–29)
CREAT SERPL-MCNC: 1.3 MG/DL (ref 0.5–1.4)
CREAT UR-MCNC: 260 MG/DL (ref 23–375)
ERYTHROCYTE [DISTWIDTH] IN BLOOD BY AUTOMATED COUNT: 15 % (ref 11.5–14.5)
EST. GFR  (NO RACE VARIABLE): >60 ML/MIN/1.73 M^2
ETHANOL UR-MCNC: <10 MG/DL
GLUCOSE SERPL-MCNC: 109 MG/DL (ref 70–110)
HCT VFR BLD AUTO: 27.7 % (ref 40–54)
HGB BLD-MCNC: 9 G/DL (ref 14–18)
MCH RBC QN AUTO: 32.3 PG (ref 27–31)
MCHC RBC AUTO-ENTMCNC: 32.5 G/DL (ref 32–36)
MCV RBC AUTO: 99 FL (ref 82–98)
METHADONE UR QL SCN>300 NG/ML: NEGATIVE
OPIATES UR QL SCN: NEGATIVE
PCP UR QL SCN>25 NG/ML: NEGATIVE
PLATELET # BLD AUTO: 50 K/UL (ref 150–450)
PMV BLD AUTO: 11.5 FL (ref 9.2–12.9)
POTASSIUM SERPL-SCNC: 3.7 MMOL/L (ref 3.5–5.1)
RBC # BLD AUTO: 2.79 M/UL (ref 4.6–6.2)
SODIUM SERPL-SCNC: 135 MMOL/L (ref 136–145)
SPECIMEN OUTDATE: NORMAL
TOXICOLOGY INFORMATION: NORMAL
WBC # BLD AUTO: 1.7 K/UL (ref 3.9–12.7)

## 2024-12-19 PROCEDURE — 85027 COMPLETE CBC AUTOMATED: CPT | Mod: TXP | Performed by: INTERNAL MEDICINE

## 2024-12-19 PROCEDURE — 80307 DRUG TEST PRSMV CHEM ANLYZR: CPT | Mod: TXP | Performed by: INTERNAL MEDICINE

## 2024-12-19 PROCEDURE — 3074F SYST BP LT 130 MM HG: CPT | Mod: CPTII,S$GLB,TXP, | Performed by: INTERNAL MEDICINE

## 2024-12-19 PROCEDURE — 99999 PR PBB SHADOW E&M-EST. PATIENT-LVL IV: CPT | Mod: PBBFAC,TXP,, | Performed by: INTERNAL MEDICINE

## 2024-12-19 PROCEDURE — 99215 OFFICE O/P EST HI 40 MIN: CPT | Mod: S$GLB,TXP,, | Performed by: INTERNAL MEDICINE

## 2024-12-19 PROCEDURE — 3008F BODY MASS INDEX DOCD: CPT | Mod: CPTII,S$GLB,TXP, | Performed by: INTERNAL MEDICINE

## 2024-12-19 PROCEDURE — 3078F DIAST BP <80 MM HG: CPT | Mod: CPTII,S$GLB,TXP, | Performed by: INTERNAL MEDICINE

## 2024-12-19 PROCEDURE — 36415 COLL VENOUS BLD VENIPUNCTURE: CPT | Mod: TXP | Performed by: INTERNAL MEDICINE

## 2024-12-19 PROCEDURE — G2211 COMPLEX E/M VISIT ADD ON: HCPCS | Mod: S$GLB,TXP,, | Performed by: INTERNAL MEDICINE

## 2024-12-19 PROCEDURE — 80048 BASIC METABOLIC PNL TOTAL CA: CPT | Mod: TXP | Performed by: INTERNAL MEDICINE

## 2024-12-19 PROCEDURE — 4010F ACE/ARB THERAPY RXD/TAKEN: CPT | Mod: CPTII,S$GLB,TXP, | Performed by: INTERNAL MEDICINE

## 2024-12-19 PROCEDURE — 86900 BLOOD TYPING SEROLOGIC ABO: CPT | Mod: TXP | Performed by: INTERNAL MEDICINE

## 2024-12-19 NOTE — LETTER
December 31, 2024        Kina Rothman  [facility] [address]  [place] [address]  Phone: [phone]  Fax: [phone]             [facility]  [address]  [facility] [address]  Phone: [phone]   Patient: Jam Card   MR Number: [id number]   YOB: 1965   Date of Visit: 12/19/2024       Dear Dr. Kina Rothman    Thank you for referring aJm Card to me for evaluation. Attached you will find relevant portions of my assessment and plan of care.    If you have questions, please do not hesitate to call me. I look forward to following Jam Card along with you.    Sincerely,    Kris Turner MD    Enclosure    If you would like to receive this communication electronically, please contact [email or web address] or [phone] to request [employer] access.    [employer] is a tool which provides read-only access to select patient information with whom you have a relationship. Its easy to use and provides real time access to review your patients record including encounter summaries, notes, results, and demographic information.    If you feel you have received this communication in error or would no longer like to receive these types of communications, please e-mail [email or web address]

## 2024-12-19 NOTE — PROGRESS NOTES
Subjective:    Patient ID:  Jam Card is a 59 y.o. male who presents.      Referring physician: Dr. 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 His course has been complicated by ascites, jaundice, hepatic encephalopathy (HE), hypoalbuminemia, thrombocytopenia, and coagulopathy. He has had two recent hospitalizations for HE and was admitted in August 2024 with a subdural hematoma, for which he underwent MMA embolization. He is currently on Keppra prophylaxis. His last CT head on 9/4/2024 showed improvement in the SDH. He has a prior history of CABG in 2010 and possible NSTEMI. The patient quit smoking in June 2024 (1/3 pack/day for decades) and reports no alcohol use. He resides with his 85-year-old mother and is accompanied by his caregiver, Neeta. He presents today for angiography and right heart catheterization as part of his liver transplant evaluation and has no complaints.          Past Medical History:   Diagnosis Date    CAD (coronary artery disease) 03/2010    Hx of MI, CABG,  and cardiac cath    Chronic hepatitis C with cirrhosis     GERD (gastroesophageal reflux disease)     HTN (hypertension)     Human immunodeficiency virus (HIV) disease     Hyperlipidemia     Organic brain syndrome        Past Surgical History:   Procedure Laterality Date    CARDIAC CATHETERIZATION      CORONARY ARTERY BYPASS GRAFT      TONSILLECTOMY AND ADENOIDECTOMY      TRIGGER FINGER RELEASE         Current Outpatient Medications on File Prior to Visit   Medication Sig Dispense Refill    atovaquone (MEPRON) 750 mg/5 mL Susp oral liquid Take 10 mLs (1,500 mg total) by mouth once daily. 300 mL 3    baclofen (LIORESAL) 5 mg Tab tablet Take 2 tablets (10 mg total) by mouth 3 (three) times daily as needed (Muscle Spasticity).      bisacodyL (DULCOLAX) 10 mg Supp Place 1 suppository rectally  daily as needed (constipation).      cyanocobalamin (VITAMIN B-12) 1000 MCG tablet Take 1,000 mcg by mouth once daily.      ergocalciferol (ERGOCALCIFEROL) 50,000 unit Cap Take 50,000 Units by mouth every 7 days.      FLUoxetine 20 MG capsule Take 20 mg by mouth once daily.      furosemide (LASIX) 20 MG tablet Take 2 tablets (40 mg total) by mouth once daily. 30 tablet 1    GENVOYA [phone] mg Tab TAKE ONE TABLET BY MOUTH DAILY WITH FOOD.      lactulose (CHRONULAC) 10 gram/15 mL solution TAKE 30 MLS BY MOUTH TWICE DAILY. GOAL OF 3-5 SOFT STOOLS EACH  mL 6    levETIRAcetam (KEPPRA) 500 MG Tab Take 1 tablet (500 mg total) by mouth 2 (two) times daily. Take medication until follow-up with your provider. 60 tablet 3    loratadine (CLARITIN) 10 mg tablet Take 10 mg by mouth once daily.      meclizine (ANTIVERT) 12.5 mg tablet Take 6.25 mg by mouth 2 (two) times daily as needed for Dizziness.      metoprolol succinate (TOPROL-XL) 25 MG 24 hr tablet Take 25 mg by mouth once daily.      midodrine (PROAMATINE) 2.5 MG Tab Take 1 tablet (2.5 mg total) by mouth 3 (three) times daily as needed (if systolic blood pressure (top number) is less than 100).      ondansetron (ZOFRAN-ODT) 4 MG TbDL Take 1 tablet by mouth every 8 (eight) hours as needed (nausea).      pantoprazole (PROTONIX) 40 MG tablet Take 40 mg by mouth once daily.      pyridoxine, vitamin B6, (B-6) 100 MG Tab Take 100 mg by mouth once daily.      rifAXIMin (XIFAXAN) 550 mg Tab Take 550 mg by mouth 2 (two) times daily.      rosuvastatin (CRESTOR) 10 MG tablet Take 10 mg by mouth every evening.      spironolactone (ALDACTONE) 25 MG tablet Take 50 mg by mouth once daily.      tamsulosin (FLOMAX) 0.4 mg Cap Take 0.4 mg by mouth every evening.      vitamin D (VITAMIN D3) 1000 units Tab Take 1,000 Units by mouth once daily.       No current facility-administered medications on file prior to visit.       Review of patient's allergies indicates:   Allergen  "Reactions    Hydrocodone-acetaminophen Other (See Comments)     "cannot sleep when taking" Does not want to take    Lisinopril Other (See Comments)     Other reaction(s): Cough    Sulfamethoxazole-trimethoprim Rash       Social History     Tobacco Use    Smoking status: Never    Smokeless tobacco: Never       No family history on file.    Review of Systems   Cardiovascular:  Positive for leg swelling. Negative for chest pain, claudication and cyanosis.          Objective:     Vitals:    12/19/24 1247   BP: (!) 124/59   BP Location: Left arm   Patient Position: Sitting   Pulse: 65   SpO2: 97%   Weight: 115.7 kg (255 lb)   Height: 6' 1" (1.854 m)        Physical Exam  HENT:      Head: Normocephalic and atraumatic.   Eyes:      Extraocular Movements: Extraocular movements intact.      Pupils: Pupils are equal, round, and reactive to light.   Cardiovascular:      Rate and Rhythm: Normal rate.   Pulmonary:      Effort: Pulmonary effort is normal.   Abdominal:      Palpations: Abdomen is soft.   Musculoskeletal:         General: Normal range of motion.      Cervical back: Normal range of motion and neck supple.   Skin:     General: Skin is warm and dry.   Neurological:      General: No focal deficit present.      Mental Status: He is alert.           Assessmen/Plan   Pre-transplant evaluation for liver transplant  We will schedule for left and right heart catheterization via right common femoral artery and vein.  The risks, benefits, and details of the procedure were explained to the patient who voiced understanding and gave consent.    HIV +  HCV Cirrhosis         "

## 2024-12-19 NOTE — H&P (VIEW-ONLY)
Subjective:    Patient ID:  Jam Card is a 59 y.o. male who presents.      Referring physician: Dr. 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 His course has been complicated by ascites, jaundice, hepatic encephalopathy (HE), hypoalbuminemia, thrombocytopenia, and coagulopathy. He has had two recent hospitalizations for HE and was admitted in August 2024 with a subdural hematoma, for which he underwent MMA embolization. He is currently on Keppra prophylaxis. His last CT head on 9/4/2024 showed improvement in the SDH. He has a prior history of CABG in 2010 and possible NSTEMI. The patient quit smoking in June 2024 (1/3 pack/day for decades) and reports no alcohol use. He resides with his 85-year-old mother and is accompanied by his caregiver, Neeta. He presents today for angiography and right heart catheterization as part of his liver transplant evaluation and has no complaints.          Past Medical History:   Diagnosis Date    CAD (coronary artery disease) 03/2010    Hx of MI, CABG,  and cardiac cath    Chronic hepatitis C with cirrhosis     GERD (gastroesophageal reflux disease)     HTN (hypertension)     Human immunodeficiency virus (HIV) disease     Hyperlipidemia     Organic brain syndrome        Past Surgical History:   Procedure Laterality Date    CARDIAC CATHETERIZATION      CORONARY ARTERY BYPASS GRAFT      TONSILLECTOMY AND ADENOIDECTOMY      TRIGGER FINGER RELEASE         Current Outpatient Medications on File Prior to Visit   Medication Sig Dispense Refill    atovaquone (MEPRON) 750 mg/5 mL Susp oral liquid Take 10 mLs (1,500 mg total) by mouth once daily. 300 mL 3    baclofen (LIORESAL) 5 mg Tab tablet Take 2 tablets (10 mg total) by mouth 3 (three) times daily as needed (Muscle Spasticity).      bisacodyL (DULCOLAX) 10 mg Supp Place 1 suppository rectally  daily as needed (constipation).      cyanocobalamin (VITAMIN B-12) 1000 MCG tablet Take 1,000 mcg by mouth once daily.      ergocalciferol (ERGOCALCIFEROL) 50,000 unit Cap Take 50,000 Units by mouth every 7 days.      FLUoxetine 20 MG capsule Take 20 mg by mouth once daily.      furosemide (LASIX) 20 MG tablet Take 2 tablets (40 mg total) by mouth once daily. 30 tablet 1    GENVOYA [phone] mg Tab TAKE ONE TABLET BY MOUTH DAILY WITH FOOD.      lactulose (CHRONULAC) 10 gram/15 mL solution TAKE 30 MLS BY MOUTH TWICE DAILY. GOAL OF 3-5 SOFT STOOLS EACH  mL 6    levETIRAcetam (KEPPRA) 500 MG Tab Take 1 tablet (500 mg total) by mouth 2 (two) times daily. Take medication until follow-up with your provider. 60 tablet 3    loratadine (CLARITIN) 10 mg tablet Take 10 mg by mouth once daily.      meclizine (ANTIVERT) 12.5 mg tablet Take 6.25 mg by mouth 2 (two) times daily as needed for Dizziness.      metoprolol succinate (TOPROL-XL) 25 MG 24 hr tablet Take 25 mg by mouth once daily.      midodrine (PROAMATINE) 2.5 MG Tab Take 1 tablet (2.5 mg total) by mouth 3 (three) times daily as needed (if systolic blood pressure (top number) is less than 100).      ondansetron (ZOFRAN-ODT) 4 MG TbDL Take 1 tablet by mouth every 8 (eight) hours as needed (nausea).      pantoprazole (PROTONIX) 40 MG tablet Take 40 mg by mouth once daily.      pyridoxine, vitamin B6, (B-6) 100 MG Tab Take 100 mg by mouth once daily.      rifAXIMin (XIFAXAN) 550 mg Tab Take 550 mg by mouth 2 (two) times daily.      rosuvastatin (CRESTOR) 10 MG tablet Take 10 mg by mouth every evening.      spironolactone (ALDACTONE) 25 MG tablet Take 50 mg by mouth once daily.      tamsulosin (FLOMAX) 0.4 mg Cap Take 0.4 mg by mouth every evening.      vitamin D (VITAMIN D3) 1000 units Tab Take 1,000 Units by mouth once daily.       No current facility-administered medications on file prior to visit.       Review of patient's allergies indicates:   Allergen  "Reactions    Hydrocodone-acetaminophen Other (See Comments)     "cannot sleep when taking" Does not want to take    Lisinopril Other (See Comments)     Other reaction(s): Cough    Sulfamethoxazole-trimethoprim Rash       Social History     Tobacco Use    Smoking status: Never    Smokeless tobacco: Never       No family history on file.    Review of Systems   Cardiovascular:  Positive for leg swelling. Negative for chest pain, claudication and cyanosis.          Objective:     Vitals:    12/19/24 1247   BP: (!) 124/59   BP Location: Left arm   Patient Position: Sitting   Pulse: 65   SpO2: 97%   Weight: 115.7 kg (255 lb)   Height: 6' 1" (1.854 m)        Physical Exam  HENT:      Head: Normocephalic and atraumatic.   Eyes:      Extraocular Movements: Extraocular movements intact.      Pupils: Pupils are equal, round, and reactive to light.   Cardiovascular:      Rate and Rhythm: Normal rate.   Pulmonary:      Effort: Pulmonary effort is normal.   Abdominal:      Palpations: Abdomen is soft.   Musculoskeletal:         General: Normal range of motion.      Cervical back: Normal range of motion and neck supple.   Skin:     General: Skin is warm and dry.   Neurological:      General: No focal deficit present.      Mental Status: He is alert.           Assessmen/Plan   Pre-transplant evaluation for liver transplant  We will schedule for left and right heart catheterization via right common femoral artery and vein.  The risks, benefits, and details of the procedure were explained to the patient who voiced understanding and gave consent.    HIV +  HCV Cirrhosis         "

## 2024-12-19 NOTE — PROGRESS NOTES
Infectious Diseases Clinic Note    Subjective:       Patient ID: Jam Card is a 59 y.o. male.    Chief Complaint: No chief complaint on file.    HPI    Past Medical History:   Diagnosis Date    CAD (coronary artery disease) 03/2010    Hx of MI, CABG,  and cardiac cath    Chronic hepatitis C with cirrhosis     GERD (gastroesophageal reflux disease)     HTN (hypertension)     Human immunodeficiency virus (HIV) disease     Hyperlipidemia     Organic brain syndrome        Social History     Socioeconomic History    Marital status: Single   Tobacco Use    Smoking status: Never    Smokeless tobacco: Never     Social Drivers of Health     Financial Resource Strain: Medium Risk (8/11/2024)    Overall Financial Resource Strain (CARDIA)     Difficulty of Paying Living Expenses: Somewhat hard   Food Insecurity: No Food Insecurity (8/11/2024)    Hunger Vital Sign     Worried About Running Out of Food in the Last Year: Never true     Ran Out of Food in the Last Year: Never true   Transportation Needs: No Transportation Needs (8/11/2024)    TRANSPORTATION NEEDS     Transportation : No   Physical Activity: Patient Declined (8/10/2024)    Received from [facility]    Exercise Vital Sign     Days of Exercise per Week: Patient declined     Minutes of Exercise per Session: Patient declined   Recent Concern: Physical Activity - Inactive (8/9/2024)    Received from [facility]    Exercise Vital Sign     Days of Exercise per Week: 0 days     Minutes of Exercise per Session: 0 min   Stress: No Stress Concern Present (8/11/2024)    [employer] - Occupational Stress Questionnaire     Feeling of Stress : Only a little   Housing Stability: Low Risk  (8/11/2024)    Housing Stability Vital Sign     Unable to Pay for Housing in the Last Year: No     Homeless in the Last Year: No         Current Outpatient  Medications:     atovaquone (MEPRON) 750 mg/5 mL Susp oral liquid, Take 10 mLs (1,500 mg total) by mouth once daily., Disp: 300 mL, Rfl: 3    baclofen (LIORESAL) 5 mg Tab tablet, Take 2 tablets (10 mg total) by mouth 3 (three) times daily as needed (Muscle Spasticity)., Disp: , Rfl:     bisacodyL (DULCOLAX) 10 mg Supp, Place 1 suppository rectally daily as needed (constipation)., Disp: , Rfl:     cyanocobalamin (VITAMIN B-12) 1000 MCG tablet, Take 1,000 mcg by mouth once daily., Disp: , Rfl:     ergocalciferol (ERGOCALCIFEROL) 50,000 unit Cap, Take 50,000 Units by mouth every 7 days., Disp: , Rfl:     FLUoxetine 20 MG capsule, Take 20 mg by mouth once daily., Disp: , Rfl:     furosemide (LASIX) 20 MG tablet, Take 2 tablets (40 mg total) by mouth once daily., Disp: 30 tablet, Rfl: 1    GENVOYA [phone] mg Tab, TAKE ONE TABLET BY MOUTH DAILY WITH FOOD., Disp: , Rfl:     lactulose (CHRONULAC) 10 gram/15 mL solution, TAKE 30 MLS BY MOUTH TWICE DAILY. GOAL OF 3-5 SOFT STOOLS EACH DAY, Disp: 946 mL, Rfl: 6    levETIRAcetam (KEPPRA) 500 MG Tab, Take 1 tablet (500 mg total) by mouth 2 (two) times daily. Take medication until follow-up with your provider., Disp: 60 tablet, Rfl: 3    loratadine (CLARITIN) 10 mg tablet, Take 10 mg by mouth once daily., Disp: , Rfl:     meclizine (ANTIVERT) 12.5 mg tablet, Take 6.25 mg by mouth 2 (two) times daily as needed for Dizziness., Disp: , Rfl:     metoprolol succinate (TOPROL-XL) 25 MG 24 hr tablet, Take 25 mg by mouth once daily., Disp: , Rfl:     midodrine (PROAMATINE) 2.5 MG Tab, Take 1 tablet (2.5 mg total) by mouth 3 (three) times daily as needed (if systolic blood pressure (top number) is less than 100)., Disp: , Rfl:     ondansetron (ZOFRAN-ODT) 4 MG TbDL, Take 1 tablet by mouth every 8 (eight) hours as needed (nausea)., Disp: , Rfl:     pantoprazole (PROTONIX) 40 MG tablet, Take 40 mg by mouth once daily., Disp: , Rfl:     pyridoxine, vitamin B6, (B-6)  100 MG Tab, Take 100 mg by mouth once daily., Disp: , Rfl:     rifAXIMin (XIFAXAN) 550 mg Tab, Take 550 mg by mouth 2 (two) times daily., Disp: , Rfl:     rosuvastatin (CRESTOR) 10 MG tablet, Take 10 mg by mouth every evening., Disp: , Rfl:     spironolactone (ALDACTONE) 25 MG tablet, Take 50 mg by mouth once daily., Disp: , Rfl:     tamsulosin (FLOMAX) 0.4 mg Cap, Take 0.4 mg by mouth every evening., Disp: , Rfl:     vitamin D (VITAMIN D3) 1000 units Tab, Take 1,000 Units by mouth once daily., Disp: , Rfl:     Review of Systems      Past Surgical History:   Procedure Laterality Date    CARDIAC CATHETERIZATION      CORONARY ARTERY BYPASS GRAFT      TONSILLECTOMY AND ADENOIDECTOMY      TRIGGER FINGER RELEASE          Reviewed tests: ***  to monitor for toxicities of antimicrobials ***  Extremely medically complex  Patient is high risk for infectious complications given ***     Personally reviewed ***    Objective:      There were no vitals filed for this visit.  Physical Exam        Assessment/Plan:       No diagnosis found.      No orders of the defined types were placed in this encounter.     Discussed care with *** team/provider     *** minutes of total time spent on the encounter, which includes face to face time and non-face to face time preparing to see the patient (eg, review of tests), Obtaining and/or reviewing separately obtained history, Documenting clinical information in the electronic or other health record, Independently interpreting results (not separately reported) and communicating results to the patient/family/caregiver, or Care coordination (not separately reported).      This patient's visit complexity is inherent to evaluation and management associated with medical care services that are part of ongoing care related to this patient's single, serious condition or complex condition

## 2024-12-19 NOTE — ASSESSMENT & PLAN NOTE
We will schedule for left and right heart catheterization via right common femoral artery and vein.  The risks, benefits, and details of the procedure were explained to the patient who voiced understanding and gave consent.

## 2024-12-19 NOTE — PROGRESS NOTES
Notified of critical WBC result of 1.7  Dr. Gastelum informed and will proceed with planned cath on 12/20/24.

## 2024-12-19 NOTE — PROGRESS NOTES
PRE-TRANSPLANT INFECTIOUS DISEASE CONSULT    Reason for Visit:  Pre-transplant evaluation  Referring Provider: Aaareferral Self     History of Present Illness:      58 y/o M h/o CAD s/p CABG, HIV dx 1997, currently on genvoya since 2018, 12/3 labs: VL ND, CD4 90 (15%), atovaquone ppx, HCV cirrhosis (treatment naive) and splenomegaly, HbcAb+, HPV (excision of perianal condyloma 2018), chronic leukopenia/thrombocytopenia, hospitalized 8/2024 for subdural hematoma here for transplant eval     Dr. Luther Mcgee manages HIV    Infectious History:  Recent hospital admissions: No  Recent infections: No  Recent or current antibiotic use: No  History of recurrent infections *(sinus / pneumonia / UTI / SBP)*: No  History of diabetic foot wound or bone/joint infection: No  Recent dental infections, issues or procedures: No  History of chicken pox: Yes  History of shingles: Yes  History of STI: No  History of COVID infection: Yes    History of Immunosuppression:  Prior chemotherapy / immunosuppression: No  Prior transplant: No  History of splenectomy: No    Tuberculosis:  Prior screening for latent TB: Yes  Prior diagnosis of latent TB: Yes.  When/treatment? 40 years ago, took 1 year of pills  Risk factors for TB *known exposure, incarceration, homelessness*: No    Geographical exposures:  Currently lives in [place] with mother  Lived in the following states: [facility], [place], [place], [place]  Lived or travelled to the [facility] [place]: No  International travel: No  Travel-associated illness: No    Social/Environmental:  Occupation:  retired,   Pets: Yes dog  Livestock: No  Fishing / hunting: No  Hobbies: TV, reading, dog  Water: [place]  Consumption of raw/undercooked meat or seafood?  No  Tobacco: No  Alcohol: No  Recreational drug use:  No  Sexual partners: none      Past Histories:   Past Medical History:   Diagnosis Date    CAD (coronary artery disease) 03/2010    Hx of MI, CABG,  and cardiac cath    Chronic hepatitis C with  "cirrhosis     GERD (gastroesophageal reflux disease)     HTN (hypertension)     Human immunodeficiency virus (HIV) disease     Hyperlipidemia     Organic brain syndrome      Past Surgical History:   Procedure Laterality Date    CARDIAC CATHETERIZATION      CORONARY ARTERY BYPASS GRAFT      TONSILLECTOMY AND ADENOIDECTOMY      TRIGGER FINGER RELEASE       No family history on file.  Social History     Tobacco Use    Smoking status: Never    Smokeless tobacco: Never     Review of patient's allergies indicates:   Allergen Reactions    Hydrocodone-acetaminophen Other (See Comments)     "cannot sleep when taking" Does not want to take    Lisinopril Other (See Comments)     Other reaction(s): Cough    Sulfamethoxazole-trimethoprim Rash         Current antibiotics:  Antibiotics (From admission, onward)      None              Review of Systems  Review of Systems   All other systems reviewed and are negative.         Objective  Physical Exam  Constitutional:       Appearance: Normal appearance.   Neurological:      Mental Status: He is alert.         Labs:    CBC:   Lab Results   Component Value Date    WBC 1.54 (LL) 09/26/2024    HGB 8.4 (L) 09/26/2024    HCT 25.5 (L) 09/26/2024     (H) 09/26/2024    PLT 15 (LL) 09/26/2024    GRAN 46.0 09/26/2024    LYMPH 47.0 09/26/2024    MONO 3.0 (L) 09/26/2024    EOSINOPHIL 3.0 09/26/2024       Syphilis screening:   Lab Results   Component Value Date    TREPABIGMIGG Nonreactive 07/11/2024        TB screening:   Lab Results   Component Value Date    TBGOLDPLUS Negative 07/11/2024       HIV screening:   Lab Results   Component Value Date    WOD11LBGL Reactive (A) 07/11/2024       Strongyloides IgG:   Lab Results   Component Value Date    STRONGANTIGG Negative 07/11/2024       Hepatitis Serologies:   Lab Results   Component Value Date    HEPAIGG Reactive 07/11/2024    HEPBCAB Reactive (A) 07/11/2024    HEPBSAB 544.84 07/11/2024    HEPBSAB Reactive 07/11/2024    HEPCAB Reactive (A) " 07/11/2024        Varicella IgG:   Lab Results   Component Value Date    VARICELLAINT Positive 07/11/2024         Immunization History   Administered Date(s) Administered    COVID-19, MRNA, LN-S, PF (MODERNA FULL 0.5 ML DOSE) 01/27/2021, 02/24/2021, 08/19/2021        Immunization History:  Received all childhood vaccines: Yes  All household members receive annual flu vaccine: Yes  All household members are up to date on COVID vaccine: Yes    Assessment and Plan    1. Risks of Infection: Available serologies were reviewed. No unusual risks of infection or significant barriers to transplantation were identified from the infectious disease standpoint given the information available at this time.    - Acute infectious issues: None   - Pending serologies: none   -     2. Immunizations:  Based on the patient's immunization history and serologies, the following immunizations are recommended:  - Hepatitis A    Patient does have immunity to hepatitis A    Vaccination ordered today: No. Reason for not ordering: Vaccination up to date   - Hepatitis B    Patient does have immunity to hepatitis B    Vaccination ordered today: No. Reason for not ordering: Immunity   - COVID    Current CDC vaccination recommendations were discussed with the patient   - Annual high dose influenza     Vaccination ordered today: Yes   - Prevnar 20    Vaccination ordered today: Yes   - Tdap    Vaccination ordered today: No. Reason for not ordering: Vaccination up to date   - Shingrix    Vaccination ordered today: Yes    Recommended Pre-Transplant Immunization Schedule   Vaccine  0m 1m 2m 6m   Pneumococcal conjugate vaccine (Prevnar 20) X      Tetanus-diphtheria-pertussis (Tdap)* X      Hepatitis A Vaccine (Havrix)** X   X   Hepatitis B Vaccine (Heplisav)** X X     Influenza (annual) X      Zoster Recombinant Vaccine (Shingrix) X  X           *Administer booster every 10 years.       **Administer if no immunity demonstrated on serologies                Patient will receive vaccines today in ID clinic, and was provided with a prescription to receive all subsequent vaccine doses at local pharmacy.    3. Counseling:   I discussed with the patient the risk for increased susceptibility to infections following transplantation including increased risk for infection right after transplant and if rejection should occur.  The patient has been counseled on the importance of vaccinations to decrease risk of infection and severe illness. Specific guidance has been provided to the patient regarding the patient's occupation, hobbies and activities to avoid future infectious complications.     4. Transplant Candidacy: Based on available information, there are no identified significant barriers to transplantation from an infectious disease standpoint.  Final determination of transplant candidacy will be made once evaluation is complete and reviewed by the Selection Committee.      D/w Dr Mcgee CD4 cutoff of 100 optimally and med change off of PI/c if possible.  [phone]  Will get shingrix locally  Prevnar 20 today    Follow up with infectious disease as needed.       The total time for evaluation and management services performed on 12/19/2024 was greater than 50 minutes.

## 2024-12-19 NOTE — PROGRESS NOTES
Subjective:       Patient ID: Jam Card is a 59 y.o. male.    Chief Complaint: No chief complaint on file.    HPI  I saw this 59 y.o. man with HCV cirrhosis and HIV who has decompensated liver disease.    Became unwell with liver disease only in March 2024.  - recent diagnosis of HCV- March 2024  - ascites + edema  - jaundice  - HE  - hypoalbuminemia, thrombocytopenia and coagulaopathy    - first seen by Marychuy Zapata on 5/7/24.    AFP 35 on 5/7/24  AFP 65 on 7/11/24  AFP 24 on 8/12/24    Hospitalizations x 2 for HE in last couple of months    Admitted to [facility] in Aug 2024 with Subdural Hematoma and [place] [facility]  - IR performed MMA embolization on 8/16/24  - on Keppra prophylaxis    Last CT head- on 9/4/2024- decrease in SDH    MRI abdo: 6/12/2024  Cirrhotic-appearing liver with splenomegaly. Prominent portal vein and probable small gastric varices     Chest CT: 6/5/24  8.5 mm nodular density in the right lower lobe, not amenable to vertex biopsy due to position. Recommend PET/CT     MELD 3.0: 24 at 9/26/2024 10:03 AM  MELD-Na: 22 at 9/26/2024 10:03 AM  Calculated from:  Serum Creatinine: 1.4 mg/dL at 9/26/2024 10:03 AM  Serum Sodium: 139 mmol/L (Using max of 137 mmol/L) at 9/26/2024 10:03 AM  Total Bilirubin: 5 mg/dL at 9/26/2024 10:03 AM  Serum Albumin: 2.6 g/dL at 9/26/2024 10:03 AM  INR(ratio): 1.8 at 9/26/2024 10:03 AM  Age at listing (hypothetical): 58 years  Sex: Male at 9/26/2024 10:03 AM      PMH:  HIV- since 1997  Chronic Brain syndrome- 2009    Appendictomy- 2005  CABG 2010    ?NSTEMI    SH:  No alcohol  Ex smoker- June 2024-1/3 pack per day for decades    Lives with mom (85)  Caregiver Neeta here with him      FH:  CAD- father      Review of Systems   Constitutional:  Negative for activity change, appetite change, chills, fatigue, fever and unexpected weight change.   HENT:  Negative for hearing loss.    Eyes:  Negative for discharge and visual disturbance.    Respiratory:  Negative for cough, chest tightness, shortness of breath and wheezing.    Cardiovascular:  Negative for chest pain, palpitations and leg swelling.   Gastrointestinal:  Negative for abdominal distention, abdominal pain, constipation, diarrhea and nausea.   Genitourinary:  Negative for dysuria and frequency.   Musculoskeletal:  Negative for arthralgias and back pain.   Skin:  Negative for pallor and rash.   Neurological:  Negative for dizziness, tremors, speech difficulty and headaches.   Hematological:  Negative for adenopathy.   Psychiatric/Behavioral:  Negative for agitation and confusion.            Lab Results   Component Value Date    ALT 27 09/26/2024     (H) 09/26/2024    GGT 74 (H) 07/11/2024    ALKPHOS 104 09/26/2024    BILITOT 5.0 (H) 09/26/2024     Past Medical History:   Diagnosis Date    CAD (coronary artery disease) 03/2010    Hx of MI, CABG,  and cardiac cath    Chronic hepatitis C with cirrhosis     GERD (gastroesophageal reflux disease)     HTN (hypertension)     Human immunodeficiency virus (HIV) disease     Hyperlipidemia     Organic brain syndrome      Past Surgical History:   Procedure Laterality Date    CARDIAC CATHETERIZATION      CORONARY ARTERY BYPASS GRAFT      TONSILLECTOMY AND ADENOIDECTOMY      TRIGGER FINGER RELEASE       Current Outpatient Medications   Medication Sig    atovaquone (MEPRON) 750 mg/5 mL Susp oral liquid Take 10 mLs (1,500 mg total) by mouth once daily.    baclofen (LIORESAL) 5 mg Tab tablet Take 2 tablets (10 mg total) by mouth 3 (three) times daily as needed (Muscle Spasticity).    bisacodyL (DULCOLAX) 10 mg Supp Place 1 suppository rectally daily as needed (constipation).    cyanocobalamin (VITAMIN B-12) 1000 MCG tablet Take 1,000 mcg by mouth once daily.    ergocalciferol (ERGOCALCIFEROL) 50,000 unit Cap Take 50,000 Units by mouth every 7 days.    FLUoxetine 20 MG capsule Take 20 mg by mouth once daily.    furosemide (LASIX) 20 MG tablet Take 2  tablets (40 mg total) by mouth once daily.    GENVOYA [phone] mg Tab TAKE ONE TABLET BY MOUTH DAILY WITH FOOD.    lactulose (CHRONULAC) 10 gram/15 mL solution TAKE 30 MLS BY MOUTH TWICE DAILY. GOAL OF 3-5 SOFT STOOLS EACH DAY    levETIRAcetam (KEPPRA) 500 MG Tab Take 1 tablet (500 mg total) by mouth 2 (two) times daily. Take medication until follow-up with your provider.    loratadine (CLARITIN) 10 mg tablet Take 10 mg by mouth once daily.    meclizine (ANTIVERT) 12.5 mg tablet Take 6.25 mg by mouth 2 (two) times daily as needed for Dizziness.    metoprolol succinate (TOPROL-XL) 25 MG 24 hr tablet Take 25 mg by mouth once daily.    midodrine (PROAMATINE) 2.5 MG Tab Take 1 tablet (2.5 mg total) by mouth 3 (three) times daily as needed (if systolic blood pressure (top number) is less than 100).    ondansetron (ZOFRAN-ODT) 4 MG TbDL Take 1 tablet by mouth every 8 (eight) hours as needed (nausea).    pantoprazole (PROTONIX) 40 MG tablet Take 40 mg by mouth once daily.    pyridoxine, vitamin B6, (B-6) 100 MG Tab Take 100 mg by mouth once daily.    rifAXIMin (XIFAXAN) 550 mg Tab Take 550 mg by mouth 2 (two) times daily.    rosuvastatin (CRESTOR) 10 MG tablet Take 10 mg by mouth every evening.    spironolactone (ALDACTONE) 25 MG tablet Take 50 mg by mouth once daily.    tamsulosin (FLOMAX) 0.4 mg Cap Take 0.4 mg by mouth every evening.    vitamin D (VITAMIN D3) 1000 units Tab Take 1,000 Units by mouth once daily.     Current Facility-Administered Medications   Medication    (VFC) PCV20 (Prevnar 20) IM vaccine (>/= 6 wks)       Objective:      Physical Exam  HENT:      Head: Normocephalic.   Eyes:      Pupils: Pupils are equal, round, and reactive to light.   Neck:      Thyroid: No thyromegaly.   Cardiovascular:      Rate and Rhythm: Normal rate and regular rhythm.      Heart sounds: Normal heart sounds.   Pulmonary:      Effort: Pulmonary effort is normal.      Breath sounds: Normal breath sounds. No wheezing.    Abdominal:      General: There is no distension.      Palpations: Abdomen is soft. There is no mass.      Tenderness: There is no abdominal tenderness.   Lymphadenopathy:      Cervical: No cervical adenopathy.   Skin:     General: Skin is warm.      Findings: No erythema or rash.   Neurological:      Mental Status: He is alert and oriented to person, place, and time.   Psychiatric:         Behavior: Behavior normal.         Assessment:       1. Organ transplant candidate    2. Chronic hepatitis C without hepatic coma    3. Cirrhosis of liver with ascites, unspecified hepatic cirrhosis type        Plan:   This 59 year old man with HIV infection that is well controlled has decompensated HCV cirrhosis.  He had fluid retention and HE and is on diuretics    Unfortunately his insurance does not cover non-transplant care.    Today he did not have any evidence of HE- his slow speech is a result of his brain injury in 2009. His functional status and mobility are very good.    - getting left heart cath today and EGD in early Feb 2024  - overall his condition has improved but he still needs a liver Tx.  - will need to liaise closely with Dr Wu re his HIV status  - to complete liver Tx evaluation soon.      UNOS Patient Status  Functional Status: 70% - Cares for self: unable to carry on normal activity or active work  Physical Capacity: No Limitations    Patient on life support: No  Diabetes: No  Any previous malignancy: No  Neoadjuvant Therapy: no  Has patient ever had a dx of HCC: no  Previous Abdominal Surgery: no  Spontaneous Bacterial Peritonitis: no  History of Portal Vein Thrombosis: no  Transjugular Intrahepatic Portosystemic Shunt: no

## 2024-12-20 ENCOUNTER — HOSPITAL ENCOUNTER (OUTPATIENT)
Facility: HOSPITAL | Age: 59
Discharge: HOME | End: 2024-12-20
Attending: INTERNAL MEDICINE | Admitting: INTERNAL MEDICINE
Payer: MEDICARE

## 2024-12-20 VITALS
RESPIRATION RATE: 18 BRPM | WEIGHT: 255 LBS | BODY MASS INDEX: 33.8 KG/M2 | HEART RATE: 48 BPM | SYSTOLIC BLOOD PRESSURE: 126 MMHG | OXYGEN SATURATION: 96 % | DIASTOLIC BLOOD PRESSURE: 60 MMHG | TEMPERATURE: 98 F | HEIGHT: 73 IN

## 2024-12-20 DIAGNOSIS — Z01.818 PRE-OP TESTING: ICD-10-CM

## 2024-12-20 DIAGNOSIS — Z76.82 LIVER TRANSPLANT CANDIDATE: ICD-10-CM

## 2024-12-20 LAB
OHS QRS DURATION: 92 MS
OHS QTC CALCULATION: 463 MS

## 2024-12-20 PROCEDURE — 25000003 PHARM REV CODE 250: Mod: TXP | Performed by: INTERNAL MEDICINE

## 2024-12-20 PROCEDURE — C1769 GUIDE WIRE: HCPCS | Mod: TXP | Performed by: INTERNAL MEDICINE

## 2024-12-20 PROCEDURE — 25500020 PHARM REV CODE 255: Mod: TXP | Performed by: INTERNAL MEDICINE

## 2024-12-20 PROCEDURE — 99152 MOD SED SAME PHYS/QHP 5/>YRS: CPT | Mod: TXP,,, | Performed by: INTERNAL MEDICINE

## 2024-12-20 PROCEDURE — C1751 CATH, INF, PER/CENT/MIDLINE: HCPCS | Mod: TXP | Performed by: INTERNAL MEDICINE

## 2024-12-20 PROCEDURE — 93461 R&L HRT ART/VENTRICLE ANGIO: CPT | Mod: TXP | Performed by: INTERNAL MEDICINE

## 2024-12-20 PROCEDURE — 99153 MOD SED SAME PHYS/QHP EA: CPT | Mod: TXP | Performed by: INTERNAL MEDICINE

## 2024-12-20 PROCEDURE — 93010 ELECTROCARDIOGRAM REPORT: CPT | Mod: TXP,,, | Performed by: INTERNAL MEDICINE

## 2024-12-20 PROCEDURE — 93461 R&L HRT ART/VENTRICLE ANGIO: CPT | Mod: 26,TXP,, | Performed by: INTERNAL MEDICINE

## 2024-12-20 PROCEDURE — 25000003 PHARM REV CODE 250: Mod: TXP

## 2024-12-20 PROCEDURE — 93005 ELECTROCARDIOGRAM TRACING: CPT | Mod: TXP

## 2024-12-20 PROCEDURE — 99152 MOD SED SAME PHYS/QHP 5/>YRS: CPT | Mod: TXP | Performed by: INTERNAL MEDICINE

## 2024-12-20 PROCEDURE — 63600175 PHARM REV CODE 636 W HCPCS: Mod: TXP | Performed by: INTERNAL MEDICINE

## 2024-12-20 PROCEDURE — C1894 INTRO/SHEATH, NON-LASER: HCPCS | Mod: TXP | Performed by: INTERNAL MEDICINE

## 2024-12-20 RX ORDER — ONDANSETRON 8 MG/1
8 TABLET, ORALLY DISINTEGRATING ORAL EVERY 8 HOURS PRN
Status: DISCONTINUED | OUTPATIENT
Start: 2024-12-20 | End: 2024-12-20 | Stop reason: HOSPADM

## 2024-12-20 RX ORDER — DIPHENHYDRAMINE HCL 50 MG
50 CAPSULE ORAL ONCE
Status: COMPLETED | OUTPATIENT
Start: 2024-12-20 | End: 2024-12-20

## 2024-12-20 RX ORDER — MIDAZOLAM HYDROCHLORIDE 1 MG/ML
INJECTION, SOLUTION INTRAMUSCULAR; INTRAVENOUS
Status: DISCONTINUED | OUTPATIENT
Start: 2024-12-20 | End: 2024-12-20 | Stop reason: HOSPADM

## 2024-12-20 RX ORDER — SODIUM CHLORIDE 9 MG/ML
INJECTION, SOLUTION INTRAVENOUS CONTINUOUS
Status: ACTIVE | OUTPATIENT
Start: 2024-12-20 | End: 2024-12-20

## 2024-12-20 RX ORDER — LIDOCAINE HYDROCHLORIDE 20 MG/ML
INJECTION, SOLUTION EPIDURAL; INFILTRATION; INTRACAUDAL; PERINEURAL
Status: DISCONTINUED | OUTPATIENT
Start: 2024-12-20 | End: 2024-12-20 | Stop reason: HOSPADM

## 2024-12-20 RX ORDER — FENTANYL CITRATE 50 UG/ML
INJECTION, SOLUTION INTRAMUSCULAR; INTRAVENOUS
Status: DISCONTINUED | OUTPATIENT
Start: 2024-12-20 | End: 2024-12-20 | Stop reason: HOSPADM

## 2024-12-20 RX ORDER — HEPARIN SOD,PORCINE/0.9 % NACL 1000/500ML
INTRAVENOUS SOLUTION INTRAVENOUS
Status: DISCONTINUED | OUTPATIENT
Start: 2024-12-20 | End: 2024-12-20 | Stop reason: HOSPADM

## 2024-12-20 RX ADMIN — SODIUM CHLORIDE: 9 INJECTION, SOLUTION INTRAVENOUS at 10:12

## 2024-12-20 RX ADMIN — DIPHENHYDRAMINE HYDROCHLORIDE 50 MG: 50 CAPSULE ORAL at 07:12

## 2024-12-20 NOTE — Clinical Note
The catheter was removed from the ostial LIMA graft. An angiography was performed of the graft. Multiple views were taken.

## 2024-12-20 NOTE — PROGRESS NOTES
Patient ambulated around unit with use of walker and standby assist. Patient tolerated well. R groin remained CDI. No bleeding or hematoma noted.

## 2024-12-20 NOTE — Clinical Note
The catheter was inserted into the SVG-OM1 graft. An angiography was performed of the graft. Multiple views were taken.

## 2024-12-20 NOTE — Clinical Note
The PA catheter was repositioned to the main pulmonary artery. Hemodynamics were performed. O2 saturation was measured at 74%. CO: 10.8  CI: 4.53

## 2024-12-20 NOTE — BRIEF OP NOTE
Brief Operative Note:    : Yannick Gastelum III, MD     Referring Physician: Yannick Gastelum III     All Operators: Surgeon(s):  Norris Valentine MD Gaviria Valencia, Simon, MD Grant, Arthur G. III, MD     Preoperative Diagnosis: Liver transplant candidate [Z76.82]     Postop Diagnosis: Liver transplant candidate [Z76.82]    Treatments/Procedures: Procedure(s) (LRB):  ANGIOGRAM, CORONARY ARTERY (N/A)  INSERTION, CATHETER, RIGHT HEART (Right)  Bypass graft study    Access: Right CFA, Right CFV    Findings:Patient grafts. LIMA to LAD, SVG to Diagonal     See catheterization report for full details.    Intervention: None     See catheterization report for full details.    Closure device: Manual pressure       Plan:  - Post cath protocol   - IVF @ 100 cc/hr x 4 hours  - Bed rest x 4 hours   - Discharge home today with no changes on medications     Estimated Blood loss: 20 cc    Specimens removed: No    Gage Chaparro MD  Interventional Cardiology PGY-4

## 2024-12-20 NOTE — Clinical Note
The catheter was inserted into the ostial LIMA graft. An angiography was performed of the graft. Multiple views were taken.

## 2024-12-20 NOTE — PLAN OF CARE
Received report from DRE Owens. Patient s/p left and right heart cath, AAOx3. VSS, no c/o pain or discomfort at this time, resp even and unlabored. Quick clot/tegaderm dressing to R groin is CDI. No active bleeding. No hematoma noted. Post procedure protocol reviewed with patient and patient's family. Understanding verbalized. Family members at bedside. Nurse call bell within reach.

## 2024-12-20 NOTE — Clinical Note
60 ml of contrast were injected throughout the case. 140 mL of contrast was the total wasted during the case. 200 mL was the total amount used during the case.

## 2024-12-20 NOTE — Clinical Note
The catheter was repositioned into the SVG-D1 graft. An angiography was performed of the graft. Multiple views were taken.

## 2024-12-20 NOTE — INTERVAL H&P NOTE
The patient has been examined and the H&P has been reviewed:    I concur with the findings and no changes have occurred since H&P was written.    Procedure risks, benefits and alternative options discussed and understood by patient/family.    --LHC   - Access: Right CFA  - Catheters: Sadi  - Creatinine/CrCl: 1.3  - Allergies: No shellfish / Iodine allergy  - Pre-Hydration: NS  - Pre-Op Med: Bendaryl 50mg pO   - All patient's questions were answered.  -The risks, benefits and alternatives of the procedure were explained to the patient.   -The risks of coronary angiography include but are not limited to: bleeding, infection, heart rhythm abnormalities, allergic reactions, kidney injury and potential need for dialysis, stroke and death.   - Should stenting be indicated, the patient has agreed to dual anti-platelet therapy for 1-consecutive year with a drug-eluting stent and a minimum of 1-month with the use of a bare metal stent  - Additionally, pt is aware that non-compliance is likely to result in stent clotting with heart attack, heart failure, and/or death  -The risks of moderate sedation include hypotension, respiratory depression, arrhythmias, bronchospasm, and death.   - Informed consent was obtained and the  patient is agreeable to proceed with the procedure.        There are no hospital problems to display for this patient.

## 2024-12-20 NOTE — Clinical Note
Systems Review/Patient History Form    As you review the following list, please check any of those problems which have significantly affected you.    Constitutional  [] Recent weight gain amount  [] Recent weight loss amount  [] Fatigue  [] Weakness  [] Fever    Eyes  [] Pain  [] Redness  [] Loss of vision  [] Double or blurred vision  [] Dryness  [] Feels like something in eye  [] Itching eyes    Ears/Nose/Mouth/Throat  [] Ringing in ears  [] Loss of hearing  [] Nosebleeds  [] Loss of smell  [] Dryness in nose  [] Runny nose  [] Sore tongue  [] Bleeding gums  [] Sores in mouth  [] Loss of taste  [] Dryness of mouth  [] Frequent sore throats  [] Hoarseness  [] Difficulty in swallowing    Cardiovascular  [] Pain in chest  [] Irregular heart beat  [] Sudden changes in heart beat  [x] High blood pressure  [] Heart murmurs    Respiratory  [] Shortness of breath  [] Difficulty in breathing at night  [] Swollen legs or feet  [] Cough  [] Coughing of blood  [] Wheezing (asthma)    Gastrointestinal  [] Nausea  [] Vomiting of blood or coffee ground material  [] Stomach pain relieved by food or milk  [] Jaundice  [] Increasing constipation  [] Persistent diarrhea  [] Blood in stools  [] Black stools  [] Heartburn    Genitourinary  [] Difficult urination  [] Pain or burning on urination  [] Blood in urine  [] Cloudy, \"smoky\" urine  [] Pus in urine  [] Discharge from penis/vagina  [] Getting up at night to pass urine  [] Vaginal dryness  [] Rash/ulcers  [] Sexual difficulties  [] Prostate trouble    Musculoskeletal  [x] Morning stiffness - lasting how long? 30 - 60 minutes  [x] Joint pain  [] Muscle weakness  [] Muscle tenderness  [] Joint swelling - Joints affected in last 6 months:    Integumentary (skin and/or breast)  [] Easy bruising  [] Redness  [] Rash  [] Hives  [] Sun sensitive (sun allergy)  [] Tightness  [] Nodules/bumps  [] Hair loss  [] Color changes of hands or feet in the cold    Neurological System  []  The PA catheter was repositioned to the right atrium. Hemodynamics were performed. Headaches  [] Dizziness  [] Fainting  [] Muscle spasm  [] Loss of consciousness  [x] Sensitivity or pain of hands and/or feet  [] Memory loss  [] Night sweats    Psychiatric  [] Excessive worries  [] Anxiety  [] Easily losing temper  [] Depression  [] Agitation  [] Difficulty falling asleep  [] Difficulty staying asleep    Endocrine  [] Excessive thirst    Hematologic/Lymphatic  [] Swollen glands  [] Tender glands  [] Anemia  [] Bleeding tendency  [] Transfusion/when:    Allergic/Immunologic  [] Frequent sneezing  [] Increased susceptibility to infection    Because of health problems, do you have difficulty:  Using your hands to grasp small objects (buttons, toothbrush, pencil, etc.)? Usually  Walking? Sometimes  Climbing stairs? Usually  Descending stairs? Usually  Sitting down? Sometimes  Getting up from chair? Sometimes  Touching your feet while seated? Sometimes  Reaching behind your back? Sometimes  Reaching behind your head? No  Dressing yourself? Sometimes  Going to sleep? No  Staying asleep due to pain? No  Obtaining restful sleep? No  Bathing? No  Eating? No  Working? No  Getting along with family members? No  In your sexual relationship? No  Engaging in leisure time activities? No  With morning stiffness? Usually  Do you use a mobility device? No mobility device used.

## 2024-12-23 NOTE — DISCHARGE SUMMARY
[facility] - Short Stay Cardiac Unit  Discharge Note  Short Stay    Procedure(s) (LRB):  ANGIOGRAM, CORONARY ARTERY (N/A)  INSERTION, CATHETER, RIGHT HEART (Right)  Bypass graft study      OUTCOME: Patient tolerated treatment/procedure well without complication and is now ready for discharge.    DISPOSITION: Home or Self Care    FINAL DIAGNOSIS:  <principal problem not specified>    FOLLOWUP: In clinic    DISCHARGE INSTRUCTIONS:  No discharge procedures on file.

## 2024-12-24 ENCOUNTER — TELEPHONE (OUTPATIENT)
Dept: ENDOSCOPY | Facility: HOSPITAL | Age: 59
End: 2024-12-24
Payer: MEDICARE

## 2024-12-24 NOTE — TELEPHONE ENCOUNTER
Message from transplant to move pt to earlier date for egd. Lvm with pt to move to 1/21/24. Direct number provided. Awaiting call back

## 2024-12-26 ENCOUNTER — TELEPHONE (OUTPATIENT)
Dept: ENDOSCOPY | Facility: HOSPITAL | Age: 59
End: 2024-12-26
Payer: MEDICARE

## 2024-12-26 NOTE — TELEPHONE ENCOUNTER
Spoke with pt. Egd re-scheduled to 1/21/25    [facility]      Spoke to pt to schedule procedure(s) Upper Endoscopy (EGD)       Physician to perform procedure(s) Dr. ALICE Santiago  Date of Procedure (s) 1/21/24  Arrival Time 8:45 AM  Time of Procedure(s) 9:45 AM   Location of Procedure(s) [address]  Type of Rx Prep sent to patient: Other  Instructions provided to patient via [facility]    Patient was informed on the following information and verbalized understanding. Screening questionnaire reviewed with patient and complete. If procedure requires anesthesia, a responsible adult needs to be present to accompany the patient home, patient cannot drive after receiving anesthesia. Appointment details are tentative, especially check-in time. Patient will receive a prep-op call 7 days prior to confirm check-in time for procedure. If applicable the patient should contact their pharmacy to verify Rx for procedure prep is ready for pick-up. Patient was advised to call the scheduling department at [phone] if pharmacy states no Rx is available. Patient was advised to call the endoscopy scheduling department if any questions or concerns arise.       Endoscopy Scheduling Department    Request for cardiac clearance sent to anticoag basket

## 2024-12-27 ENCOUNTER — TELEPHONE (OUTPATIENT)
Dept: ENDOSCOPY | Facility: HOSPITAL | Age: 59
End: 2024-12-27
Payer: MEDICARE

## 2024-12-27 NOTE — TELEPHONE ENCOUNTER
----- Message from DRE Winn sent at 2024  2:45 PM CST -----  Regardin21 cc  The patient is currently under an internal cardiologist, nilam tesfaye. Is patient medically optimized by Cardiology for their upcoming scheduled Upper Endoscopy (EGD) on 25.     Additional request(s) required:  internal cardiologistcarlos  medically optimized by Cardiology          Notes: pre transplant-

## 2024-12-27 NOTE — TELEPHONE ENCOUNTER
Dear Dr Schulz,    Patient: Jam Card      : [birth year]    Patient has a scheduled procedure Upper Endoscopy (EGD) 25 and in order to ensure patient safety, we would like to confirm if he/she is medically optimized for the procedure.      Please fax clearance letter to: [phone].    Thank you for your prompt reply.    [facility] Endoscopy Scheduling

## 2024-12-27 NOTE — TELEPHONE ENCOUNTER
Dear Dr Gastelum,    Patient has a scheduled procedure Upper Endoscopy (EGD) 1/21/25 and in order to ensure patient safety, we would like to confirm if he/she is medically optimized for the procedure.      Thank you for your prompt reply.    [facility] Endoscopy Scheduling

## 2025-01-01 ENCOUNTER — RESULTS FOLLOW-UP (OUTPATIENT)
Dept: TRANSPLANT | Facility: CLINIC | Age: 60
End: 2025-01-01

## 2025-01-01 ENCOUNTER — OFFICE VISIT (OUTPATIENT)
Dept: TRANSPLANT | Facility: CLINIC | Age: 60
End: 2025-01-01
Payer: MEDICARE

## 2025-01-01 ENCOUNTER — HOSPITAL ENCOUNTER (INPATIENT)
Facility: HOSPITAL | Age: 60
LOS: 10 days | DRG: 432 | End: 2025-04-01
Attending: INTERNAL MEDICINE | Admitting: INTERNAL MEDICINE
Payer: MEDICARE

## 2025-01-01 VITALS
TEMPERATURE: 98 F | WEIGHT: 237.19 LBS | HEIGHT: 73 IN | BODY MASS INDEX: 31.44 KG/M2 | RESPIRATION RATE: 15 BRPM | HEART RATE: 64 BPM | OXYGEN SATURATION: 95 % | DIASTOLIC BLOOD PRESSURE: 33 MMHG | SYSTOLIC BLOOD PRESSURE: 71 MMHG

## 2025-01-01 VITALS
HEIGHT: 73 IN | WEIGHT: 250.88 LBS | OXYGEN SATURATION: 98 % | SYSTOLIC BLOOD PRESSURE: 123 MMHG | DIASTOLIC BLOOD PRESSURE: 59 MMHG | HEART RATE: 98 BPM | BODY MASS INDEX: 33.25 KG/M2

## 2025-01-01 DIAGNOSIS — K72.90 LIVER FAILURE: Primary | ICD-10-CM

## 2025-01-01 DIAGNOSIS — S06.5XAA BILATERAL SUBDURAL HEMATOMAS: ICD-10-CM

## 2025-01-01 DIAGNOSIS — R18.8 CIRRHOSIS OF LIVER WITH ASCITES, UNSPECIFIED HEPATIC CIRRHOSIS TYPE: ICD-10-CM

## 2025-01-01 DIAGNOSIS — K74.60 CIRRHOSIS OF LIVER WITH ASCITES, UNSPECIFIED HEPATIC CIRRHOSIS TYPE: ICD-10-CM

## 2025-01-01 DIAGNOSIS — Z01.818 PRE-TRANSPLANT EVALUATION FOR LIVER TRANSPLANT: ICD-10-CM

## 2025-01-01 DIAGNOSIS — I27.20 PULMONARY HYPERTENSION: Primary | ICD-10-CM

## 2025-01-01 DIAGNOSIS — K76.82 HEPATIC ENCEPHALOPATHY: ICD-10-CM

## 2025-01-01 DIAGNOSIS — D62 ACUTE BLOOD LOSS ANEMIA: ICD-10-CM

## 2025-01-01 DIAGNOSIS — B20 HUMAN IMMUNODEFICIENCY VIRUS (HIV) DISEASE: ICD-10-CM

## 2025-01-01 DIAGNOSIS — I25.10 CORONARY ARTERY DISEASE WITH HISTORY OF MYOCARDIAL INFARCTION WITHOUT HISTORY OF CABG: ICD-10-CM

## 2025-01-01 DIAGNOSIS — E78.5 HYPERLIPIDEMIA, UNSPECIFIED HYPERLIPIDEMIA TYPE: ICD-10-CM

## 2025-01-01 DIAGNOSIS — F32.A DEPRESSION, UNSPECIFIED DEPRESSION TYPE: ICD-10-CM

## 2025-01-01 DIAGNOSIS — E87.1 HYPONATREMIA: ICD-10-CM

## 2025-01-01 DIAGNOSIS — I25.2 CORONARY ARTERY DISEASE WITH HISTORY OF MYOCARDIAL INFARCTION WITHOUT HISTORY OF CABG: ICD-10-CM

## 2025-01-01 LAB
ABO + RH BLD: NORMAL
ABO + RH BLD: NORMAL
ABSOLUTE EOSINOPHIL (OHS): 0.02 K/UL
ABSOLUTE EOSINOPHIL (OHS): 0.02 K/UL
ABSOLUTE EOSINOPHIL (OHS): 0.04 K/UL
ABSOLUTE EOSINOPHIL (OHS): 0.04 K/UL
ABSOLUTE MONOCYTE (OHS): 0.13 K/UL (ref 0.3–1)
ABSOLUTE MONOCYTE (OHS): 0.18 K/UL (ref 0.3–1)
ABSOLUTE MONOCYTE (OHS): 0.27 K/UL (ref 0.3–1)
ABSOLUTE MONOCYTE (OHS): 0.28 K/UL (ref 0.3–1)
ABSOLUTE NEUTROPHIL COUNT (OHS): 1.44 K/UL (ref 1.8–7.7)
ABSOLUTE NEUTROPHIL COUNT (OHS): 1.45 K/UL (ref 1.8–7.7)
ABSOLUTE NEUTROPHIL COUNT (OHS): 1.9 K/UL (ref 1.8–7.7)
ABSOLUTE NEUTROPHIL COUNT (OHS): 2.09 K/UL (ref 1.8–7.7)
ABSOLUTE NEUTROPHIL MANUAL (OHS): 1.5 K/UL
ALBUMIN SERPL BCP-MCNC: 1.5 G/DL (ref 3.5–5.2)
ALBUMIN SERPL BCP-MCNC: 2 G/DL (ref 3.5–5.2)
ALBUMIN SERPL BCP-MCNC: 2.6 G/DL (ref 3.5–5.2)
ALP SERPL-CCNC: 125 UNIT/L (ref 40–150)
ALP SERPL-CCNC: 78 UNIT/L (ref 40–150)
ALP SERPL-CCNC: 93 UNIT/L (ref 40–150)
ALT SERPL W/O P-5'-P-CCNC: 23 UNIT/L (ref 10–44)
ALT SERPL W/O P-5'-P-CCNC: 31 UNIT/L (ref 10–44)
ALT SERPL W/O P-5'-P-CCNC: 35 UNIT/L (ref 10–44)
ANION GAP (OHS): 6 MMOL/L (ref 8–16)
ANION GAP (OHS): 7 MMOL/L (ref 8–16)
ANION GAP (OHS): 7 MMOL/L (ref 8–16)
ANISOCYTOSIS BLD QL SMEAR: SLIGHT
AST SERPL-CCNC: 112 UNIT/L (ref 11–45)
AST SERPL-CCNC: 120 UNIT/L (ref 11–45)
AST SERPL-CCNC: 125 UNIT/L (ref 11–45)
BACTERIA #/AREA URNS AUTO: ABNORMAL /HPF
BACTERIA BLD CULT: NORMAL
BACTERIA BLD CULT: NORMAL
BACTERIA UR CULT: ABNORMAL
BASOPHILS # BLD AUTO: 0 K/UL
BASOPHILS # BLD AUTO: 0.01 K/UL
BASOPHILS NFR BLD AUTO: 0 %
BASOPHILS NFR BLD AUTO: 0.3 %
BASOPHILS NFR BLD AUTO: 0.4 %
BASOPHILS NFR BLD AUTO: 0.5 %
BILIRUB DIRECT SERPL-MCNC: 4.5 MG/DL (ref 0.1–0.3)
BILIRUB SERPL-MCNC: 10 MG/DL (ref 0.1–1)
BILIRUB SERPL-MCNC: 8.1 MG/DL (ref 0.1–1)
BILIRUB SERPL-MCNC: 9.2 MG/DL (ref 0.1–1)
BILIRUB UR QL STRIP.AUTO: ABNORMAL
BLD PROD TYP BPU: NORMAL
BLD PROD TYP BPU: NORMAL
BLOOD UNIT EXPIRATION DATE: NORMAL
BLOOD UNIT EXPIRATION DATE: NORMAL
BLOOD UNIT TYPE CODE: 5100
BLOOD UNIT TYPE CODE: 5100
BUN SERPL-MCNC: 18 MG/DL (ref 6–20)
BUN SERPL-MCNC: 24 MG/DL (ref 6–20)
BUN SERPL-MCNC: 25 MG/DL (ref 6–20)
CALCIUM SERPL-MCNC: 8.6 MG/DL (ref 8.7–10.5)
CALCIUM SERPL-MCNC: 8.9 MG/DL (ref 8.7–10.5)
CALCIUM SERPL-MCNC: 9.1 MG/DL (ref 8.7–10.5)
CD3+CD4+ CELLS # SPEC: 56 CELLS/UL (ref 300–1400)
CD3+CD4+ CELLS NFR BLD: 9.55 % (ref 28–57)
CHLORIDE SERPL-SCNC: 100 MMOL/L (ref 95–110)
CHLORIDE SERPL-SCNC: 98 MMOL/L (ref 95–110)
CHLORIDE SERPL-SCNC: 98 MMOL/L (ref 95–110)
CLARITY UR: ABNORMAL
CO2 SERPL-SCNC: 17 MMOL/L (ref 23–29)
CO2 SERPL-SCNC: 18 MMOL/L (ref 23–29)
CO2 SERPL-SCNC: 19 MMOL/L (ref 23–29)
COLOR UR AUTO: YELLOW
CREAT SERPL-MCNC: 1.2 MG/DL (ref 0.5–1.4)
CREAT SERPL-MCNC: 1.4 MG/DL (ref 0.5–1.4)
CREAT SERPL-MCNC: 1.5 MG/DL (ref 0.5–1.4)
CROSSMATCH INTERPRETATION: NORMAL
CROSSMATCH INTERPRETATION: NORMAL
DISPENSE STATUS: NORMAL
DISPENSE STATUS: NORMAL
ERYTHROCYTE [DISTWIDTH] IN BLOOD BY AUTOMATED COUNT: 15.9 % (ref 11.5–14.5)
ERYTHROCYTE [DISTWIDTH] IN BLOOD BY AUTOMATED COUNT: 16 % (ref 11.5–14.5)
ERYTHROCYTE [DISTWIDTH] IN BLOOD BY AUTOMATED COUNT: 16.1 % (ref 11.5–14.5)
ERYTHROCYTE [DISTWIDTH] IN BLOOD BY AUTOMATED COUNT: 17.1 % (ref 11.5–14.5)
ERYTHROCYTE [DISTWIDTH] IN BLOOD BY AUTOMATED COUNT: 18.1 % (ref 11.5–14.5)
ERYTHROCYTE [DISTWIDTH] IN BLOOD BY AUTOMATED COUNT: 18.8 % (ref 11.5–14.5)
FERRITIN SERPL-MCNC: 683 NG/ML (ref 20–300)
FOLATE SERPL-MCNC: 5.2 NG/ML (ref 4–24)
GFR SERPLBLD CREATININE-BSD FMLA CKD-EPI: 54 ML/MIN/1.73/M2
GFR SERPLBLD CREATININE-BSD FMLA CKD-EPI: 59 ML/MIN/1.73/M2
GFR SERPLBLD CREATININE-BSD FMLA CKD-EPI: >60 ML/MIN/1.73/M2
GLUCOSE SERPL-MCNC: 81 MG/DL (ref 70–110)
GLUCOSE SERPL-MCNC: 94 MG/DL (ref 70–110)
GLUCOSE SERPL-MCNC: 99 MG/DL (ref 70–110)
GLUCOSE UR QL STRIP: NEGATIVE
HCT VFR BLD AUTO: 19.2 % (ref 40–54)
HCT VFR BLD AUTO: 22.9 % (ref 40–54)
HCT VFR BLD AUTO: 24.3 % (ref 40–54)
HCT VFR BLD AUTO: 25.5 % (ref 40–54)
HCT VFR BLD AUTO: 26.6 % (ref 40–54)
HCT VFR BLD AUTO: 31.3 % (ref 40–54)
HGB BLD-MCNC: 10.2 GM/DL (ref 14–18)
HGB BLD-MCNC: 6.3 GM/DL (ref 14–18)
HGB BLD-MCNC: 7.4 GM/DL (ref 14–18)
HGB BLD-MCNC: 7.8 GM/DL (ref 14–18)
HGB BLD-MCNC: 8.5 GM/DL (ref 14–18)
HGB BLD-MCNC: 8.7 GM/DL (ref 14–18)
HGB UR QL STRIP: ABNORMAL
HOLD SPECIMEN: NORMAL
HYALINE CASTS UR QL AUTO: 18 /LPF (ref 0–1)
IMM GRANULOCYTES # BLD AUTO: 0.01 K/UL (ref 0–0.04)
IMM GRANULOCYTES # BLD AUTO: 0.04 K/UL (ref 0–0.04)
IMM GRANULOCYTES NFR BLD AUTO: 0.3 % (ref 0–0.5)
IMM GRANULOCYTES NFR BLD AUTO: 0.4 % (ref 0–0.5)
IMM GRANULOCYTES NFR BLD AUTO: 0.5 % (ref 0–0.5)
IMM GRANULOCYTES NFR BLD AUTO: 2 % (ref 0–0.5)
INDIRECT COOMBS: NORMAL
INDIRECT COOMBS: NORMAL
INR PPP: 2.4 (ref 0.8–1.2)
INR PPP: 3.1 (ref 0.8–1.2)
INR PPP: 3.2 (ref 0.8–1.2)
INR PPP: 3.2 (ref 0.8–1.2)
INR PPP: 4.2 (ref 0.8–1.2)
IRON SATN MFR SERPL: 137 % (ref 20–50)
IRON SERPL-MCNC: 118 UG/DL (ref 45–160)
KETONES UR QL STRIP: ABNORMAL
LABORATORY COMMENT REPORT: ABNORMAL
LDH SERPL-CCNC: 304 U/L (ref 110–260)
LEUKOCYTE ESTERASE UR QL STRIP: ABNORMAL
LYMPHOCYTES # BLD AUTO: 0.32 K/UL (ref 1–4.8)
LYMPHOCYTES # BLD AUTO: 0.45 K/UL (ref 1–4.8)
LYMPHOCYTES # BLD AUTO: 0.56 K/UL (ref 1–4.8)
LYMPHOCYTES # BLD AUTO: 0.6 K/UL (ref 1–4.8)
LYMPHOCYTES NFR BLD MANUAL: 12 % (ref 18–48)
MAGNESIUM SERPL-MCNC: 2 MG/DL (ref 1.6–2.6)
MAGNESIUM SERPL-MCNC: 2.1 MG/DL (ref 1.6–2.6)
MAGNESIUM SERPL-MCNC: 2.3 MG/DL (ref 1.6–2.6)
MCH RBC QN AUTO: 32.2 PG (ref 27–50)
MCH RBC QN AUTO: 32.6 PG (ref 27–50)
MCH RBC QN AUTO: 32.9 PG (ref 27–50)
MCH RBC QN AUTO: 33.1 PG (ref 27–50)
MCH RBC QN AUTO: 33.3 PG (ref 27–50)
MCH RBC QN AUTO: 33.3 PG (ref 27–50)
MCHC RBC AUTO-ENTMCNC: 32.1 G/DL (ref 32–36)
MCHC RBC AUTO-ENTMCNC: 32.3 G/DL (ref 32–36)
MCHC RBC AUTO-ENTMCNC: 32.6 G/DL (ref 32–36)
MCHC RBC AUTO-ENTMCNC: 32.7 G/DL (ref 32–36)
MCHC RBC AUTO-ENTMCNC: 32.8 G/DL (ref 32–36)
MCHC RBC AUTO-ENTMCNC: 33.3 G/DL (ref 32–36)
MCV RBC AUTO: 100 FL (ref 82–98)
MCV RBC AUTO: 100 FL (ref 82–98)
MCV RBC AUTO: 102 FL (ref 82–98)
MCV RBC AUTO: 102 FL (ref 82–98)
MCV RBC AUTO: 103 FL (ref 82–98)
MCV RBC AUTO: 99 FL (ref 82–98)
METAMYELOCYTES NFR BLD MANUAL: 1 %
MICROSCOPIC COMMENT: ABNORMAL
MONOCYTES NFR BLD MANUAL: 3 % (ref 4–15)
MYELOCYTES NFR BLD MANUAL: 1 %
NEUTROPHILS NFR BLD MANUAL: 83 % (ref 38–73)
NITRITE UR QL STRIP: NEGATIVE
NUCLEATED RBC (/100WBC) (OHS): 0 /100 WBC
NUCLEATED RBC (/100WBC) (OHS): 1 /100 WBC
OSMOLALITY SERPL: 280 MOSM/KG (ref 280–300)
OSMOLALITY UR: 649 MOSM/KG (ref 50–1200)
PH UR STRIP: 6 [PH]
PHOSPHATE SERPL-MCNC: 3 MG/DL (ref 2.7–4.5)
PLATELET # BLD AUTO: 39 K/UL (ref 150–450)
PLATELET # BLD AUTO: 41 K/UL (ref 150–450)
PLATELET # BLD AUTO: 41 K/UL (ref 150–450)
PLATELET # BLD AUTO: 46 K/UL (ref 150–450)
PLATELET # BLD AUTO: 49 K/UL (ref 150–450)
PLATELET # BLD AUTO: 52 K/UL (ref 150–450)
PLATELET BLD QL SMEAR: ABNORMAL
PMV BLD AUTO: 10.9 FL (ref 9.2–12.9)
PMV BLD AUTO: 10.9 FL (ref 9.2–12.9)
PMV BLD AUTO: 11 FL (ref 9.2–12.9)
PMV BLD AUTO: 11.1 FL (ref 9.2–12.9)
PMV BLD AUTO: 11.8 FL (ref 9.2–12.9)
PMV BLD AUTO: 12.1 FL (ref 9.2–12.9)
POCT GLUCOSE: 122 MG/DL (ref 70–110)
POTASSIUM SERPL-SCNC: 4.1 MMOL/L (ref 3.5–5.1)
POTASSIUM SERPL-SCNC: 4.3 MMOL/L (ref 3.5–5.1)
POTASSIUM SERPL-SCNC: 4.4 MMOL/L (ref 3.5–5.1)
POTASSIUM UR-SCNC: 29 MMOL/L (ref 15–95)
PROT SERPL-MCNC: 5.2 GM/DL (ref 6–8.4)
PROT SERPL-MCNC: 5.5 GM/DL (ref 6–8.4)
PROT SERPL-MCNC: 5.5 GM/DL (ref 6–8.4)
PROT UR QL STRIP: ABNORMAL
PROTHROMBIN TIME: 25.1 SECONDS (ref 9–12.5)
PROTHROMBIN TIME: 31.8 SECONDS (ref 9–12.5)
PROTHROMBIN TIME: 32.4 SECONDS (ref 9–12.5)
PROTHROMBIN TIME: 32.5 SECONDS (ref 9–12.5)
PROTHROMBIN TIME: 41.9 SECONDS (ref 9–12.5)
RBC # BLD AUTO: 1.89 M/UL (ref 4.6–6.2)
RBC # BLD AUTO: 2.22 M/UL (ref 4.6–6.2)
RBC # BLD AUTO: 2.42 M/UL (ref 4.6–6.2)
RBC # BLD AUTO: 2.58 M/UL (ref 4.6–6.2)
RBC # BLD AUTO: 2.67 M/UL (ref 4.6–6.2)
RBC # BLD AUTO: 3.08 M/UL (ref 4.6–6.2)
RBC #/AREA URNS AUTO: >100 /HPF (ref 0–4)
RELATIVE EOSINOPHIL (OHS): 0.9 %
RELATIVE EOSINOPHIL (OHS): 1 %
RELATIVE EOSINOPHIL (OHS): 1.3 %
RELATIVE EOSINOPHIL (OHS): 1.4 %
RELATIVE LYMPHOCYTE (OHS): 16.3 % (ref 18–48)
RELATIVE LYMPHOCYTE (OHS): 18.8 % (ref 18–48)
RELATIVE LYMPHOCYTE (OHS): 21.1 % (ref 18–48)
RELATIVE LYMPHOCYTE (OHS): 21.3 % (ref 18–48)
RELATIVE MONOCYTE (OHS): 6.6 % (ref 4–15)
RELATIVE MONOCYTE (OHS): 8.5 % (ref 4–15)
RELATIVE MONOCYTE (OHS): 9.1 % (ref 4–15)
RELATIVE MONOCYTE (OHS): 9.9 % (ref 4–15)
RELATIVE NEUTROPHIL (OHS): 66.8 % (ref 38–73)
RELATIVE NEUTROPHIL (OHS): 68.8 % (ref 38–73)
RELATIVE NEUTROPHIL (OHS): 70.2 % (ref 38–73)
RELATIVE NEUTROPHIL (OHS): 73.6 % (ref 38–73)
RETICS/RBC NFR AUTO: 5.4 % (ref 0.4–2)
RH BLD: NORMAL
RH BLD: NORMAL
SODIUM SERPL-SCNC: 123 MMOL/L (ref 136–145)
SODIUM SERPL-SCNC: 123 MMOL/L (ref 136–145)
SODIUM SERPL-SCNC: 124 MMOL/L (ref 136–145)
SODIUM UR-SCNC: 23 MMOL/L (ref 20–250)
SP GR UR STRIP: 1.03
SPECIMEN OUTDATE: NORMAL
SPECIMEN OUTDATE: NORMAL
SQUAMOUS #/AREA URNS AUTO: 0 /HPF
TIBC SERPL-MCNC: 86 UG/DL (ref 250–450)
TRANSFERRIN SERPL-MCNC: 58 MG/DL (ref 200–375)
UNIT NUMBER: NORMAL
UNIT NUMBER: NORMAL
URATE SERPL-MCNC: 5.3 MG/DL (ref 3.4–7)
UROBILINOGEN UR STRIP-ACNC: NEGATIVE EU/DL
VIT B12 SERPL-MCNC: >2000 PG/ML (ref 210–950)
WBC # BLD AUTO: 1.8 K/UL (ref 3.9–12.7)
WBC # BLD AUTO: 1.96 K/UL (ref 3.9–12.7)
WBC # BLD AUTO: 2.11 K/UL (ref 3.9–12.7)
WBC # BLD AUTO: 2.84 K/UL (ref 3.9–12.7)
WBC # BLD AUTO: 2.98 K/UL (ref 3.9–12.7)
WBC # BLD AUTO: 9.62 K/UL (ref 3.9–12.7)
WBC #/AREA URNS AUTO: 76 /HPF (ref 0–5)
YEAST UR QL AUTO: ABNORMAL /HPF

## 2025-01-01 PROCEDURE — 27000207 HC ISOLATION: Mod: NTX

## 2025-01-01 PROCEDURE — 87040 BLOOD CULTURE FOR BACTERIA: CPT | Mod: NTX | Performed by: STUDENT IN AN ORGANIZED HEALTH CARE EDUCATION/TRAINING PROGRAM

## 2025-01-01 PROCEDURE — 87106 FUNGI IDENTIFICATION YEAST: CPT | Mod: NTX | Performed by: STUDENT IN AN ORGANIZED HEALTH CARE EDUCATION/TRAINING PROGRAM

## 2025-01-01 PROCEDURE — 63600175 PHARM REV CODE 636 W HCPCS: Mod: NTX | Performed by: STUDENT IN AN ORGANIZED HEALTH CARE EDUCATION/TRAINING PROGRAM

## 2025-01-01 PROCEDURE — 63600175 PHARM REV CODE 636 W HCPCS: Mod: JZ,TB,NTX | Performed by: STUDENT IN AN ORGANIZED HEALTH CARE EDUCATION/TRAINING PROGRAM

## 2025-01-01 PROCEDURE — 99999 PR PBB SHADOW E&M-EST. PATIENT-LVL IV: CPT | Mod: PBBFAC,,, | Performed by: INTERNAL MEDICINE

## 2025-01-01 PROCEDURE — P9047 ALBUMIN (HUMAN), 25%, 50ML: HCPCS | Mod: NTX | Performed by: STUDENT IN AN ORGANIZED HEALTH CARE EDUCATION/TRAINING PROGRAM

## 2025-01-01 PROCEDURE — 86361 T CELL ABSOLUTE COUNT: CPT | Mod: NTX | Performed by: STUDENT IN AN ORGANIZED HEALTH CARE EDUCATION/TRAINING PROGRAM

## 2025-01-01 PROCEDURE — 99497 ADVNCD CARE PLAN 30 MIN: CPT | Mod: 25,NTX,, | Performed by: STUDENT IN AN ORGANIZED HEALTH CARE EDUCATION/TRAINING PROGRAM

## 2025-01-01 PROCEDURE — 25000003 PHARM REV CODE 250: Mod: NTX | Performed by: STUDENT IN AN ORGANIZED HEALTH CARE EDUCATION/TRAINING PROGRAM

## 2025-01-01 PROCEDURE — 80053 COMPREHEN METABOLIC PANEL: CPT | Mod: NTX | Performed by: FAMILY MEDICINE

## 2025-01-01 PROCEDURE — 36415 COLL VENOUS BLD VENIPUNCTURE: CPT | Mod: NTX | Performed by: STUDENT IN AN ORGANIZED HEALTH CARE EDUCATION/TRAINING PROGRAM

## 2025-01-01 PROCEDURE — 25000242 PHARM REV CODE 250 ALT 637 W/ HCPCS: Mod: NTX | Performed by: STUDENT IN AN ORGANIZED HEALTH CARE EDUCATION/TRAINING PROGRAM

## 2025-01-01 PROCEDURE — 83935 ASSAY OF URINE OSMOLALITY: CPT | Mod: NTX

## 2025-01-01 PROCEDURE — 86920 COMPATIBILITY TEST SPIN: CPT | Mod: NTX | Performed by: STUDENT IN AN ORGANIZED HEALTH CARE EDUCATION/TRAINING PROGRAM

## 2025-01-01 PROCEDURE — 25000003 PHARM REV CODE 250: Mod: NTX

## 2025-01-01 PROCEDURE — 97161 PT EVAL LOW COMPLEX 20 MIN: CPT | Mod: NTX

## 2025-01-01 PROCEDURE — P9017 PLASMA 1 DONOR FRZ W/IN 8 HR: HCPCS | Mod: NTX | Performed by: STUDENT IN AN ORGANIZED HEALTH CARE EDUCATION/TRAINING PROGRAM

## 2025-01-01 PROCEDURE — 20600001 HC STEP DOWN PRIVATE ROOM: Mod: NTX

## 2025-01-01 PROCEDURE — 80053 COMPREHEN METABOLIC PANEL: CPT | Mod: NTX

## 2025-01-01 PROCEDURE — 99233 SBSQ HOSP IP/OBS HIGH 50: CPT | Mod: NTX,,, | Performed by: STUDENT IN AN ORGANIZED HEALTH CARE EDUCATION/TRAINING PROGRAM

## 2025-01-01 PROCEDURE — 82728 ASSAY OF FERRITIN: CPT | Mod: NTX | Performed by: STUDENT IN AN ORGANIZED HEALTH CARE EDUCATION/TRAINING PROGRAM

## 2025-01-01 PROCEDURE — 94640 AIRWAY INHALATION TREATMENT: CPT | Mod: NTX

## 2025-01-01 PROCEDURE — 85610 PROTHROMBIN TIME: CPT | Mod: NTX | Performed by: STUDENT IN AN ORGANIZED HEALTH CARE EDUCATION/TRAINING PROGRAM

## 2025-01-01 PROCEDURE — 84300 ASSAY OF URINE SODIUM: CPT | Mod: NTX

## 2025-01-01 PROCEDURE — 63600175 PHARM REV CODE 636 W HCPCS: Mod: NTX

## 2025-01-01 PROCEDURE — 36415 COLL VENOUS BLD VENIPUNCTURE: CPT | Mod: NTX

## 2025-01-01 PROCEDURE — 82746 ASSAY OF FOLIC ACID SERUM: CPT | Mod: NTX | Performed by: STUDENT IN AN ORGANIZED HEALTH CARE EDUCATION/TRAINING PROGRAM

## 2025-01-01 PROCEDURE — 25000003 PHARM REV CODE 250: Mod: NTX | Performed by: FAMILY MEDICINE

## 2025-01-01 PROCEDURE — 85025 COMPLETE CBC W/AUTO DIFF WBC: CPT | Mod: NTX | Performed by: STUDENT IN AN ORGANIZED HEALTH CARE EDUCATION/TRAINING PROGRAM

## 2025-01-01 PROCEDURE — 85025 COMPLETE CBC W/AUTO DIFF WBC: CPT | Mod: NTX

## 2025-01-01 PROCEDURE — 94760 N-INVAS EAR/PLS OXIMETRY 1: CPT | Mod: NTX

## 2025-01-01 PROCEDURE — 36415 COLL VENOUS BLD VENIPUNCTURE: CPT | Mod: NTX | Performed by: FAMILY MEDICINE

## 2025-01-01 PROCEDURE — 83735 ASSAY OF MAGNESIUM: CPT | Mod: NTX | Performed by: STUDENT IN AN ORGANIZED HEALTH CARE EDUCATION/TRAINING PROGRAM

## 2025-01-01 PROCEDURE — 99497 ADVNCD CARE PLAN 30 MIN: CPT | Mod: NTX,,, | Performed by: STUDENT IN AN ORGANIZED HEALTH CARE EDUCATION/TRAINING PROGRAM

## 2025-01-01 PROCEDURE — 30233K1 TRANSFUSION OF NONAUTOLOGOUS FROZEN PLASMA INTO PERIPHERAL VEIN, PERCUTANEOUS APPROACH: ICD-10-PCS | Performed by: STUDENT IN AN ORGANIZED HEALTH CARE EDUCATION/TRAINING PROGRAM

## 2025-01-01 PROCEDURE — 99223 1ST HOSP IP/OBS HIGH 75: CPT | Mod: 25,NTX,, | Performed by: STUDENT IN AN ORGANIZED HEALTH CARE EDUCATION/TRAINING PROGRAM

## 2025-01-01 PROCEDURE — 99232 SBSQ HOSP IP/OBS MODERATE 35: CPT | Mod: GC,NTX,, | Performed by: INTERNAL MEDICINE

## 2025-01-01 PROCEDURE — 99498 ADVNCD CARE PLAN ADDL 30 MIN: CPT | Mod: NTX,,, | Performed by: STUDENT IN AN ORGANIZED HEALTH CARE EDUCATION/TRAINING PROGRAM

## 2025-01-01 PROCEDURE — 83735 ASSAY OF MAGNESIUM: CPT | Mod: NTX

## 2025-01-01 PROCEDURE — 99233 SBSQ HOSP IP/OBS HIGH 50: CPT | Mod: GT,NTX,, | Performed by: STUDENT IN AN ORGANIZED HEALTH CARE EDUCATION/TRAINING PROGRAM

## 2025-01-01 PROCEDURE — 11000001 HC ACUTE MED/SURG PRIVATE ROOM: Mod: NTX

## 2025-01-01 PROCEDURE — 83930 ASSAY OF BLOOD OSMOLALITY: CPT | Mod: NTX

## 2025-01-01 PROCEDURE — 99223 1ST HOSP IP/OBS HIGH 75: CPT | Mod: NTX,,, | Performed by: FAMILY MEDICINE

## 2025-01-01 PROCEDURE — 97110 THERAPEUTIC EXERCISES: CPT | Mod: NTX

## 2025-01-01 PROCEDURE — 84100 ASSAY OF PHOSPHORUS: CPT | Mod: NTX | Performed by: FAMILY MEDICINE

## 2025-01-01 PROCEDURE — 30233P1 TRANSFUSION OF NONAUTOLOGOUS FROZEN RED CELLS INTO PERIPHERAL VEIN, PERCUTANEOUS APPROACH: ICD-10-PCS | Performed by: STUDENT IN AN ORGANIZED HEALTH CARE EDUCATION/TRAINING PROGRAM

## 2025-01-01 PROCEDURE — 0W9G3ZZ DRAINAGE OF PERITONEAL CAVITY, PERCUTANEOUS APPROACH: ICD-10-PCS | Performed by: STUDENT IN AN ORGANIZED HEALTH CARE EDUCATION/TRAINING PROGRAM

## 2025-01-01 PROCEDURE — 97530 THERAPEUTIC ACTIVITIES: CPT | Mod: NTX

## 2025-01-01 PROCEDURE — 25000003 PHARM REV CODE 250: Mod: NTX | Performed by: INTERNAL MEDICINE

## 2025-01-01 PROCEDURE — 85027 COMPLETE CBC AUTOMATED: CPT | Mod: NTX | Performed by: STUDENT IN AN ORGANIZED HEALTH CARE EDUCATION/TRAINING PROGRAM

## 2025-01-01 PROCEDURE — 84133 ASSAY OF URINE POTASSIUM: CPT | Mod: NTX

## 2025-01-01 PROCEDURE — P9016 RBC LEUKOCYTES REDUCED: HCPCS | Mod: NTX | Performed by: STUDENT IN AN ORGANIZED HEALTH CARE EDUCATION/TRAINING PROGRAM

## 2025-01-01 PROCEDURE — 82607 VITAMIN B-12: CPT | Mod: NTX | Performed by: STUDENT IN AN ORGANIZED HEALTH CARE EDUCATION/TRAINING PROGRAM

## 2025-01-01 PROCEDURE — 99223 1ST HOSP IP/OBS HIGH 75: CPT | Mod: GC,NTX,, | Performed by: INTERNAL MEDICINE

## 2025-01-01 PROCEDURE — A4216 STERILE WATER/SALINE, 10 ML: HCPCS | Mod: NTX

## 2025-01-01 PROCEDURE — 97116 GAIT TRAINING THERAPY: CPT | Mod: NTX

## 2025-01-01 PROCEDURE — 81003 URINALYSIS AUTO W/O SCOPE: CPT | Mod: NTX | Performed by: STUDENT IN AN ORGANIZED HEALTH CARE EDUCATION/TRAINING PROGRAM

## 2025-01-01 PROCEDURE — 84550 ASSAY OF BLOOD/URIC ACID: CPT | Mod: NTX

## 2025-01-01 PROCEDURE — 94761 N-INVAS EAR/PLS OXIMETRY MLT: CPT | Mod: NTX

## 2025-01-01 PROCEDURE — 86900 BLOOD TYPING SEROLOGIC ABO: CPT | Mod: NTX

## 2025-01-01 PROCEDURE — 83735 ASSAY OF MAGNESIUM: CPT | Mod: NTX | Performed by: FAMILY MEDICINE

## 2025-01-01 PROCEDURE — 85025 COMPLETE CBC W/AUTO DIFF WBC: CPT | Mod: NTX | Performed by: FAMILY MEDICINE

## 2025-01-01 PROCEDURE — 82248 BILIRUBIN DIRECT: CPT | Mod: NTX | Performed by: STUDENT IN AN ORGANIZED HEALTH CARE EDUCATION/TRAINING PROGRAM

## 2025-01-01 PROCEDURE — 99223 1ST HOSP IP/OBS HIGH 75: CPT | Mod: NTX,,,

## 2025-01-01 PROCEDURE — 99233 SBSQ HOSP IP/OBS HIGH 50: CPT | Mod: GC,NTX,, | Performed by: INTERNAL MEDICINE

## 2025-01-01 PROCEDURE — 97165 OT EVAL LOW COMPLEX 30 MIN: CPT | Mod: NTX

## 2025-01-01 PROCEDURE — 36430 TRANSFUSION BLD/BLD COMPNT: CPT | Mod: NTX

## 2025-01-01 PROCEDURE — 83540 ASSAY OF IRON: CPT | Mod: NTX | Performed by: STUDENT IN AN ORGANIZED HEALTH CARE EDUCATION/TRAINING PROGRAM

## 2025-01-01 PROCEDURE — 85610 PROTHROMBIN TIME: CPT | Mod: NTX

## 2025-01-01 PROCEDURE — 86850 RBC ANTIBODY SCREEN: CPT | Mod: NTX | Performed by: STUDENT IN AN ORGANIZED HEALTH CARE EDUCATION/TRAINING PROGRAM

## 2025-01-01 PROCEDURE — 84075 ASSAY ALKALINE PHOSPHATASE: CPT | Mod: NTX | Performed by: STUDENT IN AN ORGANIZED HEALTH CARE EDUCATION/TRAINING PROGRAM

## 2025-01-01 PROCEDURE — 83615 LACTATE (LD) (LDH) ENZYME: CPT | Mod: NTX | Performed by: STUDENT IN AN ORGANIZED HEALTH CARE EDUCATION/TRAINING PROGRAM

## 2025-01-01 PROCEDURE — 85045 AUTOMATED RETICULOCYTE COUNT: CPT | Mod: NTX | Performed by: STUDENT IN AN ORGANIZED HEALTH CARE EDUCATION/TRAINING PROGRAM

## 2025-01-01 RX ORDER — IPRATROPIUM BROMIDE AND ALBUTEROL SULFATE 2.5; .5 MG/3ML; MG/3ML
3 SOLUTION RESPIRATORY (INHALATION) EVERY 6 HOURS PRN
Status: DISCONTINUED | OUTPATIENT
Start: 2025-01-01 | End: 2025-01-01 | Stop reason: HOSPADM

## 2025-01-01 RX ORDER — ATORVASTATIN CALCIUM 40 MG/1
40 TABLET, FILM COATED ORAL DAILY
Status: DISCONTINUED | OUTPATIENT
Start: 2025-01-01 | End: 2025-01-01

## 2025-01-01 RX ORDER — METOPROLOL SUCCINATE 25 MG/1
25 TABLET, EXTENDED RELEASE ORAL DAILY
Status: DISCONTINUED | OUTPATIENT
Start: 2025-01-01 | End: 2025-01-01

## 2025-01-01 RX ORDER — ATOVAQUONE 750 MG/5ML
1500 SUSPENSION ORAL DAILY
Status: DISCONTINUED | OUTPATIENT
Start: 2025-01-01 | End: 2025-01-01

## 2025-01-01 RX ORDER — HEPARIN SODIUM 5000 [USP'U]/ML
5000 INJECTION, SOLUTION INTRAVENOUS; SUBCUTANEOUS EVERY 8 HOURS
Status: DISCONTINUED | OUTPATIENT
Start: 2025-01-01 | End: 2025-01-01

## 2025-01-01 RX ORDER — ACETAMINOPHEN 325 MG/1
650 TABLET ORAL EVERY 4 HOURS PRN
Status: DISCONTINUED | OUTPATIENT
Start: 2025-01-01 | End: 2025-01-01

## 2025-01-01 RX ORDER — HYDROCODONE BITARTRATE AND ACETAMINOPHEN 500; 5 MG/1; MG/1
TABLET ORAL
Status: DISCONTINUED | OUTPATIENT
Start: 2025-01-01 | End: 2025-01-01

## 2025-01-01 RX ORDER — DIPHENHYDRAMINE HCL 25 MG
25 CAPSULE ORAL NIGHTLY PRN
Status: DISCONTINUED | OUTPATIENT
Start: 2025-01-01 | End: 2025-01-01

## 2025-01-01 RX ORDER — CEFTRIAXONE 1 G/1
1 INJECTION, POWDER, FOR SOLUTION INTRAMUSCULAR; INTRAVENOUS
Status: DISCONTINUED | OUTPATIENT
Start: 2025-01-01 | End: 2025-01-01

## 2025-01-01 RX ORDER — CHOLECALCIFEROL (VITAMIN D3) 25 MCG
1000 TABLET ORAL DAILY
Status: DISCONTINUED | OUTPATIENT
Start: 2025-01-01 | End: 2025-01-01

## 2025-01-01 RX ORDER — METOPROLOL SUCCINATE 25 MG/1
25 TABLET, EXTENDED RELEASE ORAL NIGHTLY
Status: DISCONTINUED | OUTPATIENT
Start: 2025-01-01 | End: 2025-01-01

## 2025-01-01 RX ORDER — HALOPERIDOL LACTATE 5 MG/ML
2 INJECTION, SOLUTION INTRAMUSCULAR EVERY 4 HOURS PRN
Status: DISCONTINUED | OUTPATIENT
Start: 2025-01-01 | End: 2025-01-01 | Stop reason: HOSPADM

## 2025-01-01 RX ORDER — GLYCOPYRROLATE 0.2 MG/ML
0.4 INJECTION INTRAMUSCULAR; INTRAVENOUS EVERY 4 HOURS PRN
Status: DISCONTINUED | OUTPATIENT
Start: 2025-01-01 | End: 2025-01-01 | Stop reason: HOSPADM

## 2025-01-01 RX ORDER — PANTOPRAZOLE SODIUM 40 MG/1
40 TABLET, DELAYED RELEASE ORAL DAILY
Status: DISCONTINUED | OUTPATIENT
Start: 2025-01-01 | End: 2025-01-01

## 2025-01-01 RX ORDER — HYDROMORPHONE HYDROCHLORIDE 2 MG/ML
0.5 INJECTION INTRAMUSCULAR; INTRAVENOUS; SUBCUTANEOUS EVERY 30 MIN PRN
Refills: 0 | Status: DISCONTINUED | OUTPATIENT
Start: 2025-01-01 | End: 2025-01-01

## 2025-01-01 RX ORDER — PROCHLORPERAZINE EDISYLATE 5 MG/ML
5 INJECTION INTRAMUSCULAR; INTRAVENOUS EVERY 6 HOURS PRN
Status: DISCONTINUED | OUTPATIENT
Start: 2025-01-01 | End: 2025-01-01 | Stop reason: HOSPADM

## 2025-01-01 RX ORDER — HYDROMORPHONE HYDROCHLORIDE 1 MG/ML
0.5 INJECTION, SOLUTION INTRAMUSCULAR; INTRAVENOUS; SUBCUTANEOUS EVERY 30 MIN PRN
Status: DISCONTINUED | OUTPATIENT
Start: 2025-01-01 | End: 2025-01-01

## 2025-01-01 RX ORDER — SODIUM CHLORIDE 0.9 % (FLUSH) 0.9 %
10 SYRINGE (ML) INJECTION
Status: DISCONTINUED | OUTPATIENT
Start: 2025-01-01 | End: 2025-01-01 | Stop reason: HOSPADM

## 2025-01-01 RX ORDER — BISACODYL 10 MG/1
10 SUPPOSITORY RECTAL DAILY PRN
Status: DISCONTINUED | OUTPATIENT
Start: 2025-01-01 | End: 2025-01-01 | Stop reason: HOSPADM

## 2025-01-01 RX ORDER — LEVETIRACETAM 500 MG/1
500 TABLET ORAL 2 TIMES DAILY
Status: DISCONTINUED | OUTPATIENT
Start: 2025-01-01 | End: 2025-01-01

## 2025-01-01 RX ORDER — LACTULOSE 10 G/15ML
200 SOLUTION ORAL; RECTAL ONCE
Status: COMPLETED | OUTPATIENT
Start: 2025-01-01 | End: 2025-01-01

## 2025-01-01 RX ORDER — TALC
6 POWDER (GRAM) TOPICAL NIGHTLY PRN
Status: DISCONTINUED | OUTPATIENT
Start: 2025-01-01 | End: 2025-01-01

## 2025-01-01 RX ORDER — ONDANSETRON 8 MG/1
8 TABLET, ORALLY DISINTEGRATING ORAL EVERY 6 HOURS PRN
Status: DISCONTINUED | OUTPATIENT
Start: 2025-01-01 | End: 2025-01-01

## 2025-01-01 RX ORDER — FLUCONAZOLE 200 MG/1
200 TABLET ORAL DAILY
Status: DISCONTINUED | OUTPATIENT
Start: 2025-01-01 | End: 2025-01-01

## 2025-01-01 RX ORDER — DIPHENHYDRAMINE HYDROCHLORIDE 50 MG/ML
12.5 INJECTION, SOLUTION INTRAMUSCULAR; INTRAVENOUS EVERY 4 HOURS PRN
Status: DISCONTINUED | OUTPATIENT
Start: 2025-01-01 | End: 2025-01-01 | Stop reason: HOSPADM

## 2025-01-01 RX ORDER — ELTROMBOPAG OLAMINE 25 MG/1
TABLET, FILM COATED ORAL
COMMUNITY
Start: 2024-01-01

## 2025-01-01 RX ORDER — FUROSEMIDE 40 MG/1
40 TABLET ORAL 2 TIMES DAILY
Status: DISCONTINUED | OUTPATIENT
Start: 2025-01-01 | End: 2025-01-01

## 2025-01-01 RX ORDER — ONDANSETRON HYDROCHLORIDE 2 MG/ML
4 INJECTION, SOLUTION INTRAVENOUS EVERY 6 HOURS PRN
Status: DISCONTINUED | OUTPATIENT
Start: 2025-01-01 | End: 2025-01-01 | Stop reason: HOSPADM

## 2025-01-01 RX ORDER — CALCIUM CARBONATE 200(500)MG
1000 TABLET,CHEWABLE ORAL 3 TIMES DAILY PRN
Status: DISCONTINUED | OUTPATIENT
Start: 2025-01-01 | End: 2025-01-01

## 2025-01-01 RX ORDER — LANOLIN ALCOHOL/MO/W.PET/CERES
100 CREAM (GRAM) TOPICAL DAILY
Status: DISCONTINUED | OUTPATIENT
Start: 2025-01-01 | End: 2025-01-01

## 2025-01-01 RX ORDER — HYDROMORPHONE HYDROCHLORIDE 2 MG/ML
0.5 INJECTION, SOLUTION INTRAMUSCULAR; INTRAVENOUS; SUBCUTANEOUS EVERY 30 MIN PRN
Refills: 0 | Status: DISCONTINUED | OUTPATIENT
Start: 2025-01-01 | End: 2025-01-01 | Stop reason: HOSPADM

## 2025-01-01 RX ORDER — ALBUMIN HUMAN 250 G/1000ML
25 SOLUTION INTRAVENOUS 2 TIMES DAILY
Status: DISCONTINUED | OUTPATIENT
Start: 2025-01-01 | End: 2025-01-01

## 2025-01-01 RX ORDER — MUPIROCIN 20 MG/G
OINTMENT TOPICAL 2 TIMES DAILY
Status: DISCONTINUED | OUTPATIENT
Start: 2025-01-01 | End: 2025-01-01

## 2025-01-01 RX ORDER — FLUOXETINE HYDROCHLORIDE 20 MG/1
20 CAPSULE ORAL DAILY
Status: DISCONTINUED | OUTPATIENT
Start: 2025-01-01 | End: 2025-01-01

## 2025-01-01 RX ORDER — LANOLIN ALCOHOL/MO/W.PET/CERES
1000 CREAM (GRAM) TOPICAL DAILY
Status: DISCONTINUED | OUTPATIENT
Start: 2025-01-01 | End: 2025-01-01

## 2025-01-01 RX ORDER — LORAZEPAM 2 MG/ML
1 INJECTION INTRAMUSCULAR EVERY 30 MIN PRN
Status: DISCONTINUED | OUTPATIENT
Start: 2025-01-01 | End: 2025-01-01 | Stop reason: HOSPADM

## 2025-01-01 RX ORDER — ALBUMIN HUMAN 250 G/1000ML
25 SOLUTION INTRAVENOUS 3 TIMES DAILY
Status: DISCONTINUED | OUTPATIENT
Start: 2025-01-01 | End: 2025-01-01

## 2025-01-01 RX ORDER — BACLOFEN 10 MG/1
10 TABLET ORAL 3 TIMES DAILY PRN
Status: DISCONTINUED | OUTPATIENT
Start: 2025-01-01 | End: 2025-01-01

## 2025-01-01 RX ADMIN — HYDROMORPHONE HYDROCHLORIDE 0.5 MG: 2 INJECTION, SOLUTION INTRAMUSCULAR; INTRAVENOUS; SUBCUTANEOUS at 05:03

## 2025-01-01 RX ADMIN — LEVETIRACETAM 500 MG: 500 TABLET, FILM COATED ORAL at 08:03

## 2025-01-01 RX ADMIN — FLUOXETINE HYDROCHLORIDE 20 MG: 20 CAPSULE ORAL at 08:03

## 2025-01-01 RX ADMIN — CEFTRIAXONE 1 G: 1 INJECTION, POWDER, FOR SOLUTION INTRAMUSCULAR; INTRAVENOUS at 11:03

## 2025-01-01 RX ADMIN — PHYTONADIONE 10 MG: 10 INJECTION, EMULSION INTRAMUSCULAR; INTRAVENOUS; SUBCUTANEOUS at 09:03

## 2025-01-01 RX ADMIN — LIDOCAINE HYDROCHLORIDE 5 ML: 20 SOLUTION ORAL; TOPICAL at 12:03

## 2025-01-01 RX ADMIN — LACTULOSE 30 G: 20 SOLUTION ORAL at 08:03

## 2025-01-01 RX ADMIN — ATORVASTATIN CALCIUM 40 MG: 40 TABLET, FILM COATED ORAL at 07:03

## 2025-01-01 RX ADMIN — PANTOPRAZOLE SODIUM 40 MG: 40 TABLET, DELAYED RELEASE ORAL at 08:03

## 2025-01-01 RX ADMIN — ATOVAQUONE 1500 MG: 750 SUSPENSION ORAL at 08:03

## 2025-01-01 RX ADMIN — MUPIROCIN: 20 OINTMENT TOPICAL at 09:03

## 2025-01-01 RX ADMIN — CHOLECALCIFEROL TAB 25 MCG (1000 UNIT) 1000 UNITS: 25 TAB at 08:03

## 2025-01-01 RX ADMIN — ONDANSETRON 8 MG: 8 TABLET, ORALLY DISINTEGRATING ORAL at 11:03

## 2025-01-01 RX ADMIN — RIFAXIMIN 550 MG: 550 TABLET ORAL at 08:03

## 2025-01-01 RX ADMIN — MUPIROCIN: 20 OINTMENT TOPICAL at 08:03

## 2025-01-01 RX ADMIN — CYANOCOBALAMIN TAB 1000 MCG 1000 MCG: 1000 TAB at 08:03

## 2025-01-01 RX ADMIN — LACTULOSE 30 G: 20 SOLUTION ORAL at 09:03

## 2025-01-01 RX ADMIN — ALBUMIN (HUMAN) 25 G: 12.5 SOLUTION INTRAVENOUS at 08:03

## 2025-01-01 RX ADMIN — ATOVAQUONE 1500 MG: 750 SUSPENSION ORAL at 07:03

## 2025-01-01 RX ADMIN — ATOVAQUONE 1500 MG: 750 SUSPENSION ORAL at 12:03

## 2025-01-01 RX ADMIN — HYDROMORPHONE HYDROCHLORIDE 0.5 MG: 2 INJECTION, SOLUTION INTRAMUSCULAR; INTRAVENOUS; SUBCUTANEOUS at 12:03

## 2025-01-01 RX ADMIN — CALCIUM CARBONATE (ANTACID) CHEW TAB 500 MG 1000 MG: 500 CHEW TAB at 10:03

## 2025-01-01 RX ADMIN — METOPROLOL SUCCINATE 25 MG: 25 TABLET, EXTENDED RELEASE ORAL at 09:03

## 2025-01-01 RX ADMIN — HYDROMORPHONE HYDROCHLORIDE 0.5 MG: 2 INJECTION, SOLUTION INTRAMUSCULAR; INTRAVENOUS; SUBCUTANEOUS at 09:03

## 2025-01-01 RX ADMIN — CHOLECALCIFEROL TAB 25 MCG (1000 UNIT) 1000 UNITS: 25 TAB at 07:03

## 2025-01-01 RX ADMIN — LEVETIRACETAM 500 MG: 500 TABLET, FILM COATED ORAL at 12:03

## 2025-01-01 RX ADMIN — ELVITEGRAVIR, COBICISTAT, EMTRICITABINE, AND TENOFOVIR ALAFENAMIDE 1 TABLET: 150; 150; 200; 10 TABLET ORAL at 12:03

## 2025-01-01 RX ADMIN — LIDOCAINE HYDROCHLORIDE 5 ML: 20 SOLUTION ORAL; TOPICAL at 07:03

## 2025-01-01 RX ADMIN — PYRIDOXINE HCL TAB 50 MG 100 MG: 50 TAB at 08:03

## 2025-01-01 RX ADMIN — ONDANSETRON 8 MG: 8 TABLET, ORALLY DISINTEGRATING ORAL at 05:03

## 2025-01-01 RX ADMIN — LEVETIRACETAM 500 MG: 500 TABLET, FILM COATED ORAL at 09:03

## 2025-01-01 RX ADMIN — ATORVASTATIN CALCIUM 40 MG: 40 TABLET, FILM COATED ORAL at 08:03

## 2025-01-01 RX ADMIN — FLUOXETINE HYDROCHLORIDE 20 MG: 20 CAPSULE ORAL at 12:03

## 2025-01-01 RX ADMIN — LIDOCAINE HYDROCHLORIDE 5 ML: 20 SOLUTION ORAL; TOPICAL at 02:03

## 2025-01-01 RX ADMIN — RIFAXIMIN 550 MG: 550 TABLET ORAL at 09:03

## 2025-01-01 RX ADMIN — ELVITEGRAVIR, COBICISTAT, EMTRICITABINE, AND TENOFOVIR ALAFENAMIDE 1 TABLET: 150; 150; 200; 10 TABLET ORAL at 03:03

## 2025-01-01 RX ADMIN — ONDANSETRON 8 MG: 8 TABLET, ORALLY DISINTEGRATING ORAL at 08:03

## 2025-01-01 RX ADMIN — LIDOCAINE HYDROCHLORIDE 5 ML: 20 SOLUTION ORAL; TOPICAL at 10:03

## 2025-01-01 RX ADMIN — HYDROMORPHONE HYDROCHLORIDE 0.5 MG: 2 INJECTION, SOLUTION INTRAMUSCULAR; INTRAVENOUS; SUBCUTANEOUS at 08:03

## 2025-01-01 RX ADMIN — HEPARIN SODIUM 5000 UNITS: 5000 INJECTION INTRAVENOUS; SUBCUTANEOUS at 05:03

## 2025-01-01 RX ADMIN — PYRIDOXINE HCL TAB 50 MG 100 MG: 50 TAB at 07:03

## 2025-01-01 RX ADMIN — FLUCONAZOLE 200 MG: 200 TABLET ORAL at 08:03

## 2025-01-01 RX ADMIN — HEPARIN SODIUM 5000 UNITS: 5000 INJECTION INTRAVENOUS; SUBCUTANEOUS at 08:03

## 2025-01-01 RX ADMIN — HEPARIN SODIUM 5000 UNITS: 5000 INJECTION INTRAVENOUS; SUBCUTANEOUS at 02:03

## 2025-01-01 RX ADMIN — DIPHENHYDRAMINE HYDROCHLORIDE 25 MG: 25 CAPSULE ORAL at 10:03

## 2025-01-01 RX ADMIN — HYDROMORPHONE HYDROCHLORIDE 0.5 MG: 2 INJECTION, SOLUTION INTRAMUSCULAR; INTRAVENOUS; SUBCUTANEOUS at 11:03

## 2025-01-01 RX ADMIN — RIFAXIMIN 550 MG: 550 TABLET ORAL at 12:03

## 2025-01-01 RX ADMIN — ALBUMIN (HUMAN) 25 G: 12.5 SOLUTION INTRAVENOUS at 03:03

## 2025-01-01 RX ADMIN — LACTULOSE 30 G: 20 SOLUTION ORAL at 07:03

## 2025-01-01 RX ADMIN — HEPARIN SODIUM 5000 UNITS: 5000 INJECTION INTRAVENOUS; SUBCUTANEOUS at 04:03

## 2025-01-01 RX ADMIN — PHYTONADIONE 10 MG: 10 INJECTION, EMULSION INTRAMUSCULAR; INTRAVENOUS; SUBCUTANEOUS at 04:03

## 2025-01-01 RX ADMIN — LORAZEPAM 1 MG: 2 INJECTION INTRAMUSCULAR; INTRAVENOUS at 11:03

## 2025-01-01 RX ADMIN — CYANOCOBALAMIN TAB 1000 MCG 1000 MCG: 1000 TAB at 07:03

## 2025-01-01 RX ADMIN — IPRATROPIUM BROMIDE AND ALBUTEROL SULFATE 3 ML: 2.5; .5 SOLUTION RESPIRATORY (INHALATION) at 04:03

## 2025-01-01 RX ADMIN — ELVITEGRAVIR, COBICISTAT, EMTRICITABINE, AND TENOFOVIR ALAFENAMIDE 1 TABLET: 150; 150; 200; 10 TABLET ORAL at 08:03

## 2025-01-01 RX ADMIN — LEVETIRACETAM 500 MG: 500 TABLET, FILM COATED ORAL at 07:03

## 2025-01-01 RX ADMIN — LORAZEPAM 1 MG: 2 INJECTION INTRAMUSCULAR; INTRAVENOUS at 04:03

## 2025-01-01 RX ADMIN — LORAZEPAM 1 MG: 2 INJECTION INTRAMUSCULAR; INTRAVENOUS at 02:03

## 2025-01-01 RX ADMIN — ALBUMIN (HUMAN) 25 G: 12.5 SOLUTION INTRAVENOUS at 02:03

## 2025-01-01 RX ADMIN — LACTULOSE 200 G: 10 SOLUTION ORAL at 09:03

## 2025-01-01 RX ADMIN — HYDROMORPHONE HYDROCHLORIDE 0.5 MG: 2 INJECTION, SOLUTION INTRAMUSCULAR; INTRAVENOUS; SUBCUTANEOUS at 03:03

## 2025-01-01 RX ADMIN — PANTOPRAZOLE SODIUM 40 MG: 40 TABLET, DELAYED RELEASE ORAL at 07:03

## 2025-01-01 RX ADMIN — PHYTONADIONE 10 MG: 10 INJECTION, EMULSION INTRAMUSCULAR; INTRAVENOUS; SUBCUTANEOUS at 08:03

## 2025-01-01 RX ADMIN — ALBUMIN (HUMAN) 25 G: 12.5 SOLUTION INTRAVENOUS at 09:03

## 2025-01-01 RX ADMIN — ELVITEGRAVIR, COBICISTAT, EMTRICITABINE, AND TENOFOVIR ALAFENAMIDE 1 TABLET: 150; 150; 200; 10 TABLET ORAL at 09:03

## 2025-01-01 RX ADMIN — LIDOCAINE HYDROCHLORIDE 5 ML: 20 SOLUTION ORAL; TOPICAL at 08:03

## 2025-01-01 RX ADMIN — FUROSEMIDE 40 MG: 40 TABLET ORAL at 08:03

## 2025-01-01 RX ADMIN — FUROSEMIDE 40 MG: 40 TABLET ORAL at 06:03

## 2025-01-01 RX ADMIN — RIFAXIMIN 550 MG: 550 TABLET ORAL at 07:03

## 2025-01-01 RX ADMIN — HEPARIN SODIUM 5000 UNITS: 5000 INJECTION INTRAVENOUS; SUBCUTANEOUS at 06:03

## 2025-01-01 RX ADMIN — CEFTRIAXONE 1 G: 1 INJECTION, POWDER, FOR SOLUTION INTRAMUSCULAR; INTRAVENOUS at 12:03

## 2025-01-01 RX ADMIN — LIDOCAINE HYDROCHLORIDE 5 ML: 20 SOLUTION ORAL; TOPICAL at 01:03

## 2025-01-01 RX ADMIN — SODIUM CHLORIDE 10 ML: 9 INJECTION, SOLUTION INTRAMUSCULAR; INTRAVENOUS; SUBCUTANEOUS at 03:03

## 2025-01-01 RX ADMIN — HYDROMORPHONE HYDROCHLORIDE 0.5 MG: 2 INJECTION, SOLUTION INTRAMUSCULAR; INTRAVENOUS; SUBCUTANEOUS at 04:03

## 2025-01-01 RX ADMIN — SODIUM CHLORIDE, POTASSIUM CHLORIDE, SODIUM LACTATE AND CALCIUM CHLORIDE 500 ML: 600; 310; 30; 20 INJECTION, SOLUTION INTRAVENOUS at 05:03

## 2025-01-01 RX ADMIN — BACLOFEN 10 MG: 5 TABLET ORAL at 05:03

## 2025-01-01 RX ADMIN — FLUOXETINE HYDROCHLORIDE 20 MG: 20 CAPSULE ORAL at 07:03

## 2025-01-13 ENCOUNTER — TELEPHONE (OUTPATIENT)
Dept: TRANSPLANT | Facility: CLINIC | Age: 60
End: 2025-01-13
Payer: MEDICARE

## 2025-01-13 DIAGNOSIS — B20 HUMAN IMMUNODEFICIENCY VIRUS (HIV) DISEASE: Primary | ICD-10-CM

## 2025-01-13 RX ORDER — ATOVAQUONE 750 MG/5ML
1500 SUSPENSION ORAL DAILY
Qty: 300 ML | Refills: 3 | Status: SHIPPED | OUTPATIENT
Start: 2025-01-13

## 2025-01-13 NOTE — TELEPHONE ENCOUNTER
"Patient's phone call returned.  Patient requesting refill of mepron.  Will send refill request to ID provider for authorization.  Patient also states that he is scheduled to have labs and EGD done next week.  Informed patient that Transplant team is aware and has added liver labs to previously scheduled labs.  Patient voiced understanding of plan to present his case in the liver meeting the week after above done.    During call, patient also reports that he had covid, shingles, and flu vaccines done locally.    ================================================================    ----- Message from Terrance sent at 1/13/2025 11:54 AM CST -----  Refill Request    Name Of Caller: Self    Contact Preference?: [phone]     Provider: Jack    What is the nature of the call?: Requesting refill for atovaquone (MEPRON) 750 mg/5 mL Susp oral liquid    [id number] - [place], [place] - [address] AT [facility] [address]  [address]  [place] [address]  Phone: [phone] Fax: [phone]      Additional Notes: Stating he's been out of this medication for 2-3 days; Also requesting to speak w/ Sara about "other general things"; Pt refused to provide any further info    "Thank you for all that you do for our patients"  "

## 2025-01-19 ENCOUNTER — TELEPHONE (OUTPATIENT)
Dept: ENDOSCOPY | Facility: HOSPITAL | Age: 60
End: 2025-01-19
Payer: MEDICARE
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
## 2025-01-23 ENCOUNTER — TELEPHONE (OUTPATIENT)
Dept: ENDOSCOPY | Facility: HOSPITAL | Age: 60
End: 2025-01-23
Payer: MEDICARE

## 2025-01-23 DIAGNOSIS — K74.60 HEPATIC CIRRHOSIS, UNSPECIFIED HEPATIC CIRRHOSIS TYPE, UNSPECIFIED WHETHER ASCITES PRESENT: Primary | ICD-10-CM

## 2025-01-23 NOTE — TELEPHONE ENCOUNTER
Referral for procedure from telephone call -r/s-refendo      Spoke to patient to reschedule procedure(s) Upper Endoscopy (EGD)       Physician to perform procedure(s) Dr. GLENN Vo  Date of Procedure (s) 2/7/25  Arrival Time 12:00 PM  Time of Procedure(s) 1:30 PM   Location of Procedure(s) [place] 2nd Floor  Type of Rx Prep sent to patient: N/A  Instructions provided to patient via [facility]    Patient was informed on the following information and verbalized understanding. Screening questionnaire reviewed with patient and complete. If procedure requires anesthesia, a responsible adult needs to be present to accompany the patient home, patient cannot drive after receiving anesthesia. Appointment details are tentative, especially check-in time. Patient will receive a prep-op call 7 days prior to confirm check-in time for procedure. If applicable the patient should contact their pharmacy to verify Rx for procedure prep is ready for pick-up. Patient was advised to call the scheduling department at [phone] if pharmacy states no Rx is available. Patient was advised to call the endoscopy scheduling department if any questions or concerns arise.      [facility] Endoscopy Scheduling Department
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
## 2025-02-03 DIAGNOSIS — R18.8 CIRRHOSIS OF LIVER WITH ASCITES, UNSPECIFIED HEPATIC CIRRHOSIS TYPE: ICD-10-CM

## 2025-02-03 DIAGNOSIS — K74.60 CIRRHOSIS OF LIVER WITH ASCITES, UNSPECIFIED HEPATIC CIRRHOSIS TYPE: ICD-10-CM

## 2025-02-03 DIAGNOSIS — K76.82 HEPATIC ENCEPHALOPATHY: Primary | ICD-10-CM

## 2025-02-05 ENCOUNTER — PATIENT MESSAGE (OUTPATIENT)
Dept: TRANSPLANT | Facility: CLINIC | Age: 60
End: 2025-02-05
Payer: MEDICARE

## 2025-02-06 ENCOUNTER — LAB VISIT (OUTPATIENT)
Dept: LAB | Facility: HOSPITAL | Age: 60
End: 2025-02-06
Attending: INTERNAL MEDICINE
Payer: MEDICARE

## 2025-02-06 DIAGNOSIS — R18.8 CIRRHOSIS OF LIVER WITH ASCITES, UNSPECIFIED HEPATIC CIRRHOSIS TYPE: ICD-10-CM

## 2025-02-06 DIAGNOSIS — K74.60 HEPATIC CIRRHOSIS, UNSPECIFIED HEPATIC CIRRHOSIS TYPE, UNSPECIFIED WHETHER ASCITES PRESENT: ICD-10-CM

## 2025-02-06 DIAGNOSIS — Z76.82 ORGAN TRANSPLANT CANDIDATE: ICD-10-CM

## 2025-02-06 DIAGNOSIS — B18.2 CHRONIC HEPATITIS C WITHOUT HEPATIC COMA: ICD-10-CM

## 2025-02-06 DIAGNOSIS — K76.82 HEPATIC ENCEPHALOPATHY: ICD-10-CM

## 2025-02-06 DIAGNOSIS — K74.60 CIRRHOSIS OF LIVER WITH ASCITES, UNSPECIFIED HEPATIC CIRRHOSIS TYPE: ICD-10-CM

## 2025-02-06 LAB
ABO + RH BLD: NORMAL
ALBUMIN SERPL BCP-MCNC: 1.9 G/DL (ref 3.5–5.2)
ALP SERPL-CCNC: 102 U/L (ref 40–150)
ALT SERPL W/O P-5'-P-CCNC: 24 U/L (ref 10–44)
AMMONIA PLAS-SCNC: 44 UMOL/L (ref 10–50)
ANION GAP SERPL CALC-SCNC: 8 MMOL/L (ref 8–16)
AST SERPL-CCNC: 88 U/L (ref 10–40)
BILIRUB SERPL-MCNC: 5.8 MG/DL (ref 0.1–1)
BLD GP AB SCN CELLS X3 SERPL QL: NORMAL
BUN SERPL-MCNC: 13 MG/DL (ref 6–20)
CALCIUM SERPL-MCNC: 7.9 MG/DL (ref 8.7–10.5)
CHLORIDE SERPL-SCNC: 104 MMOL/L (ref 95–110)
CO2 SERPL-SCNC: 19 MMOL/L (ref 23–29)
CREAT SERPL-MCNC: 1.6 MG/DL (ref 0.5–1.4)
EST. GFR  (NO RACE VARIABLE): 49.9 ML/MIN/1.73 M^2
GLUCOSE SERPL-MCNC: 127 MG/DL (ref 70–110)
INR PPP: 2 (ref 0.8–1.2)
POTASSIUM SERPL-SCNC: 3.4 MMOL/L (ref 3.5–5.1)
PROT SERPL-MCNC: 5.8 G/DL (ref 6–8.4)
PROTHROMBIN TIME: 20.4 SEC (ref 9–12.5)
SODIUM SERPL-SCNC: 131 MMOL/L (ref 136–145)
SPECIMEN OUTDATE: NORMAL

## 2025-02-06 PROCEDURE — 82140 ASSAY OF AMMONIA: CPT | Mod: TXP | Performed by: INTERNAL MEDICINE

## 2025-02-06 PROCEDURE — 86900 BLOOD TYPING SEROLOGIC ABO: CPT | Mod: TXP | Performed by: INTERNAL MEDICINE

## 2025-02-06 PROCEDURE — 80053 COMPREHEN METABOLIC PANEL: CPT | Mod: TXP | Performed by: INTERNAL MEDICINE

## 2025-02-06 PROCEDURE — 85610 PROTHROMBIN TIME: CPT | Mod: TXP | Performed by: INTERNAL MEDICINE

## 2025-02-06 PROCEDURE — 36415 COLL VENOUS BLD VENIPUNCTURE: CPT | Mod: TXP | Performed by: INTERNAL MEDICINE

## 2025-02-07 ENCOUNTER — ANESTHESIA EVENT (OUTPATIENT)
Dept: ENDOSCOPY | Facility: HOSPITAL | Age: 60
End: 2025-02-07
Payer: MEDICARE

## 2025-02-07 ENCOUNTER — DOCUMENTATION ONLY (OUTPATIENT)
Dept: ENDOSCOPY | Facility: HOSPITAL | Age: 60
End: 2025-02-07
Payer: MEDICARE

## 2025-02-07 ENCOUNTER — HOSPITAL ENCOUNTER (OUTPATIENT)
Facility: HOSPITAL | Age: 60
Discharge: HOME | End: 2025-02-07
Attending: INTERNAL MEDICINE | Admitting: INTERNAL MEDICINE
Payer: MEDICARE

## 2025-02-07 ENCOUNTER — ANESTHESIA (OUTPATIENT)
Dept: ENDOSCOPY | Facility: HOSPITAL | Age: 60
End: 2025-02-07
Payer: MEDICARE

## 2025-02-07 ENCOUNTER — DOCUMENTATION ONLY (OUTPATIENT)
Dept: TRANSPLANT | Facility: CLINIC | Age: 60
End: 2025-02-07
Payer: MEDICARE

## 2025-02-07 VITALS
RESPIRATION RATE: 17 BRPM | SYSTOLIC BLOOD PRESSURE: 117 MMHG | HEART RATE: 71 BPM | DIASTOLIC BLOOD PRESSURE: 68 MMHG | BODY MASS INDEX: 33.13 KG/M2 | OXYGEN SATURATION: 95 % | TEMPERATURE: 98 F | HEIGHT: 73 IN | WEIGHT: 250 LBS

## 2025-02-07 DIAGNOSIS — Z76.82 LIVER TRANSPLANT CANDIDATE: ICD-10-CM

## 2025-02-07 PROCEDURE — 37000009 HC ANESTHESIA EA ADD 15 MINS: Mod: TXP | Performed by: INTERNAL MEDICINE

## 2025-02-07 PROCEDURE — 37000008 HC ANESTHESIA 1ST 15 MINUTES: Mod: TXP | Performed by: INTERNAL MEDICINE

## 2025-02-07 PROCEDURE — 25000003 PHARM REV CODE 250: Mod: TXP | Performed by: NURSE ANESTHETIST, CERTIFIED REGISTERED

## 2025-02-07 PROCEDURE — D9220A PRA ANESTHESIA: Mod: TXP,,, | Performed by: ANESTHESIOLOGY

## 2025-02-07 PROCEDURE — 43235 EGD DIAGNOSTIC BRUSH WASH: CPT | Mod: TXP | Performed by: INTERNAL MEDICINE

## 2025-02-07 PROCEDURE — 63600175 PHARM REV CODE 636 W HCPCS: Mod: TXP | Performed by: NURSE ANESTHETIST, CERTIFIED REGISTERED

## 2025-02-07 PROCEDURE — 43235 EGD DIAGNOSTIC BRUSH WASH: CPT | Mod: TXP,,, | Performed by: INTERNAL MEDICINE

## 2025-02-07 RX ORDER — LIDOCAINE HYDROCHLORIDE 20 MG/ML
INJECTION INTRAVENOUS
Status: DISCONTINUED | OUTPATIENT
Start: 2025-02-07 | End: 2025-02-07

## 2025-02-07 RX ORDER — ONDANSETRON HYDROCHLORIDE 2 MG/ML
INJECTION, SOLUTION INTRAVENOUS
Status: DISCONTINUED | OUTPATIENT
Start: 2025-02-07 | End: 2025-02-07

## 2025-02-07 RX ORDER — DEXAMETHASONE SODIUM PHOSPHATE 4 MG/ML
INJECTION, SOLUTION INTRA-ARTICULAR; INTRALESIONAL; INTRAMUSCULAR; INTRAVENOUS; SOFT TISSUE
Status: DISCONTINUED | OUTPATIENT
Start: 2025-02-07 | End: 2025-02-07

## 2025-02-07 RX ORDER — PROPOFOL 10 MG/ML
INJECTION, EMULSION INTRAVENOUS
Status: DISCONTINUED | OUTPATIENT
Start: 2025-02-07 | End: 2025-02-07

## 2025-02-07 RX ORDER — GLUCAGON 1 MG
1 KIT INJECTION
Status: DISCONTINUED | OUTPATIENT
Start: 2025-02-07 | End: 2025-02-07 | Stop reason: HOSPADM

## 2025-02-07 RX ORDER — ONDANSETRON HYDROCHLORIDE 2 MG/ML
4 INJECTION, SOLUTION INTRAVENOUS DAILY PRN
Status: DISCONTINUED | OUTPATIENT
Start: 2025-02-07 | End: 2025-02-07 | Stop reason: HOSPADM

## 2025-02-07 RX ORDER — SUCCINYLCHOLINE CHLORIDE 20 MG/ML
INJECTION INTRAMUSCULAR; INTRAVENOUS
Status: DISCONTINUED | OUTPATIENT
Start: 2025-02-07 | End: 2025-02-07

## 2025-02-07 RX ADMIN — DEXAMETHASONE SODIUM PHOSPHATE 4 MG: 4 INJECTION, SOLUTION INTRAMUSCULAR; INTRAVENOUS at 02:02

## 2025-02-07 RX ADMIN — SUCCINYLCHOLINE CHLORIDE 160 MG: 20 INJECTION, SOLUTION INTRAMUSCULAR; INTRAVENOUS at 02:02

## 2025-02-07 RX ADMIN — ONDANSETRON 4 MG: 2 INJECTION INTRAMUSCULAR; INTRAVENOUS at 02:02

## 2025-02-07 RX ADMIN — PROPOFOL 150 MG: 10 INJECTION, EMULSION INTRAVENOUS at 02:02

## 2025-02-07 RX ADMIN — LIDOCAINE HYDROCHLORIDE 100 MG: 20 INJECTION INTRAVENOUS at 02:02

## 2025-02-07 RX ADMIN — SODIUM CHLORIDE: 0.9 INJECTION, SOLUTION INTRAVENOUS at 02:02

## 2025-02-07 NOTE — PROVATION PATIENT INSTRUCTIONS
Discharge Summary/Instructions after an Endoscopic Procedure  Patient Name: Jam Card  Patient MRN: [id number]  Patient YOB: 1965  Friday, February 7, 2025  Jerald Vo MD  Dear patient,  As a result of recent federal legislation (The Federal Cures Act), you may   receive lab or pathology results from your procedure in your [facility]   account before your physician is able to contact you. Your physician or   their representative will relay the results to you with their   recommendations at their soonest availability.  Thank you,  RESTRICTIONS:  During your procedure today, you received medications for sedation.  These   medications may affect your judgment, balance and coordination.  Therefore,   for 24 hours, you have the following restrictions:   - DO NOT drive a car, operate machinery, make legal/financial decisions,   sign important papers or drink alcohol.    ACTIVITY:  Today: no heavy lifting, straining or running due to procedural   sedation/anesthesia.  The following day: return to full activity including work.  DIET:  Eat and drink normally unless instructed otherwise.     TREATMENT FOR COMMON SIDE EFFECTS:  - Mild abdominal pain, nausea, belching, bloating or excessive gas:  rest,   eat lightly and use a heating pad.  - Sore Throat: treat with throat lozenges and/or gargle with warm salt   water.  - Because air was used during the procedure, expelling large amounts of air   from your rectum or belching is normal.  - If a bowel prep was taken, you may not have a bowel movement for 1-3 days.    This is normal.  SYMPTOMS TO WATCH FOR AND REPORT TO YOUR PHYSICIAN:  1. Abdominal pain or bloating, other than gas cramps.  2. Chest pain.  3. Back pain.  4. Signs of infection such as: chills or fever occurring within 24 hours   after the procedure.  5. Rectal bleeding, which would show as bright red, maroon, or black stools.   (A tablespoon of blood from the rectum is not serious, especially  if   hemorrhoids are present.)  6. Vomiting.  7. Weakness or dizziness.  GO DIRECTLY TO THE NEAREST EMERGENCY ROOM IF YOU HAVE ANY OF THE FOLLOWING:      Difficulty breathing              Chills and/or fever over 101 F   Persistent vomiting and/or vomiting blood   Severe abdominal pain   Severe chest pain   Black, tarry stools   Bleeding- more than one tablespoon   Any other symptom or condition that you feel may need urgent attention  Your doctor recommends these additional instructions:  If any biopsies were taken, your doctors clinic will contact you in 1 to 2   weeks with any results.  - Discharge patient to home.   - Telephone referring physician for study results.   - Return to referring physician.   - The findings and recommendations were discussed with the patient.   - The findings and recommendations were discussed with the designated   responsible adult.  For questions, problems or results please call your physician - Jerald Vo MD at Work:  [phone].  [facility], EMERGENCY ROOM PHONE NUMBER: [phone]  IF A COMPLICATION OR EMERGENCY SITUATION ARISES AND YOU ARE UNABLE TO REACH   YOUR PHYSICIAN - GO DIRECTLY TO THE EMERGENCY ROOM.  Jerald Vo MD  2/7/2025 2:28:00 PM  This report has been verified and signed electronically.  Dear patient,  As a result of recent federal legislation (The Federal Cures Act), you may   receive lab or pathology results from your procedure in your [facility]   account before your physician is able to contact you. Your physician or   their representative will relay the results to you with their   recommendations at their soonest availability.  Thank you,  PROVATION

## 2025-02-07 NOTE — ANESTHESIA PROCEDURE NOTES
Intubation    Date/Time: 2/7/2025 2:07 PM    Performed by: Tahmina Ibrahim CRNA  Authorized by: Cassie Stroud MD    Intubation:     Induction:  Intravenous    Intubated:  Postinduction    Mask Ventilation:  N/a    Attempts:  1    Attempted By:  CRNA    Method of Intubation:  Video laryngoscopy    Blade:  [place] 4    Laryngeal View Grade: Grade I - full view of cords      Difficult Airway Encountered?: No      Complications:  None    Airway Device:  Oral endotracheal tube    Airway Device Size:  7.5    Style/Cuff Inflation:  Cuffed (inflated to minimal occlusive pressure)    Tube secured:  22    Secured at:  The lips    Placement Verified By:  Capnometry    Complicating Factors:  None    Findings Post-Intubation:  BS equal bilateral and atraumatic/condition of teeth unchanged

## 2025-02-07 NOTE — ANESTHESIA POSTPROCEDURE EVALUATION
Anesthesia Post Evaluation    Patient: Jam Card    Procedure(s) Performed: Procedure(s) (LRB):  EGD (ESOPHAGOGASTRODUODENOSCOPY) (N/A)    Final Anesthesia Type: general      Patient location during evaluation: PACU  Patient participation: Yes- Able to Participate  Level of consciousness: awake and alert  Post-procedure vital signs: reviewed and stable  Pain management: adequate  Airway patency: patent    PONV status at discharge: No PONV  Anesthetic complications: no      Cardiovascular status: blood pressure returned to baseline  Respiratory status: unassisted, room air and spontaneous ventilation  Hydration status: euvolemic  Follow-up not needed.              Vitals Value Taken Time   /68 02/07/25 1516   Temp 36.6 °C (97.8 °F) 02/07/25 1438   Pulse 71 02/07/25 1520   Resp 17 02/07/25 1520   SpO2 95 % 02/07/25 1520         No case tracking events are documented in the log.      Pain/Myrna Score: Myrna Score: 10 (2/7/2025  3:20 PM)

## 2025-02-07 NOTE — H&P
[facility] - Endoscopy  History & Physical    Subjective:      Chief Complaint/Reason for Admission:     Direct access EGD for Diagnoses  Organ transplant candidate [Z76.82]  Chronic hepatitis C without hepatic coma [B18.2]  Hepatic cirrhosis, unspecified hepatic cirrhosis type, unspecified whether ascites present [K74.60]     Jam Card is a 59 y.o. male.    Past Medical History:   Diagnosis Date    CAD (coronary artery disease) 03/2010    Hx of MI, CABG,  and cardiac cath    Chronic hepatitis C with cirrhosis     GERD (gastroesophageal reflux disease)     HTN (hypertension)     Human immunodeficiency virus (HIV) disease     Hyperlipidemia     Organic brain syndrome      Past Surgical History:   Procedure Laterality Date    CARDIAC CATHETERIZATION      CORONARY ANGIOGRAPHY N/A 12/20/2024    Procedure: ANGIOGRAM, CORONARY ARTERY;  Surgeon: Yannick Gastelum III, MD;  Location: [facility] CATH LAB;  Service: Cardiology;  Laterality: N/A;    CORONARY ARTERY BYPASS GRAFT      CORONARY BYPASS GRAFT ANGIOGRAPHY  12/20/2024    Procedure: Bypass graft study;  Surgeon: Yannick Gastelum III, MD;  Location: [facility] CATH LAB;  Service: Cardiology;;    RIGHT HEART CATHETERIZATION Right 12/20/2024    Procedure: INSERTION, CATHETER, RIGHT HEART;  Surgeon: Yannick Gastelum III, MD;  Location: [facility] CATH LAB;  Service: Cardiology;  Laterality: Right;    TONSILLECTOMY AND ADENOIDECTOMY      TRIGGER FINGER RELEASE       Social History     Tobacco Use    Smoking status: Former     Types: Cigarettes    Smokeless tobacco: Never   Substance Use Topics    Alcohol use: Not Currently    Drug use: Not Currently       PTA Medications   Medication Sig    atovaquone (MEPRON) 750 mg/5 mL Susp oral liquid Take 10 mLs (1,500 mg total) by mouth once daily.    baclofen (LIORESAL) 5 mg Tab tablet Take 2 tablets (10 mg total) by mouth 3 (three) times daily as needed (Muscle Spasticity).    cyanocobalamin (VITAMIN B-12) 1000 MCG tablet Take 1,000 mcg by  "mouth once daily.    ergocalciferol (ERGOCALCIFEROL) 50,000 unit Cap Take 50,000 Units by mouth every 7 days.    FLUoxetine 20 MG capsule Take 20 mg by mouth once daily.    furosemide (LASIX) 20 MG tablet Take 2 tablets (40 mg total) by mouth once daily.    GENVOYA [phone] mg Tab TAKE ONE TABLET BY MOUTH DAILY WITH FOOD.    lactulose (CHRONULAC) 10 gram/15 mL solution TAKE 30 MLS BY MOUTH TWICE DAILY. GOAL OF 3-5 SOFT STOOLS EACH DAY    levETIRAcetam (KEPPRA) 500 MG Tab Take 1 tablet (500 mg total) by mouth 2 (two) times daily. Take medication until follow-up with your provider.    loratadine (CLARITIN) 10 mg tablet Take 10 mg by mouth once daily.    pantoprazole (PROTONIX) 40 MG tablet Take 40 mg by mouth once daily.    pyridoxine, vitamin B6, (B-6) 100 MG Tab Take 100 mg by mouth once daily.    rifAXIMin (XIFAXAN) 550 mg Tab Take 550 mg by mouth 2 (two) times daily.    rosuvastatin (CRESTOR) 10 MG tablet Take 10 mg by mouth every evening.    spironolactone (ALDACTONE) 25 MG tablet Take 50 mg by mouth once daily.    vitamin D (VITAMIN D3) 1000 units Tab Take 1,000 Units by mouth once daily.    bisacodyL (DULCOLAX) 10 mg Supp Place 1 suppository rectally daily as needed (constipation).    meclizine (ANTIVERT) 12.5 mg tablet Take 6.25 mg by mouth 2 (two) times daily as needed for Dizziness.    metoprolol succinate (TOPROL-XL) 25 MG 24 hr tablet Take 25 mg by mouth once daily.    midodrine (PROAMATINE) 2.5 MG Tab Take 1 tablet (2.5 mg total) by mouth 3 (three) times daily as needed (if systolic blood pressure (top number) is less than 100).    ondansetron (ZOFRAN-ODT) 4 MG TbDL Take 1 tablet by mouth every 8 (eight) hours as needed (nausea).    tamsulosin (FLOMAX) 0.4 mg Cap Take 0.4 mg by mouth every evening.     Review of patient's allergies indicates:   Allergen Reactions    Hydrocodone-acetaminophen Other (See Comments)     "cannot sleep when taking" Does not want to take    Lisinopril Other (See Comments) "     Other reaction(s): Cough    Sulfamethoxazole-trimethoprim Rash        Review of Systems   Constitutional:  Negative for fever.       Objective:      Vital Signs (Most Recent)  Temp: 97.6 °F (36.4 °C) (02/07/25 1303)  Pulse: 60 (02/07/25 1303)  Resp: 16 (02/07/25 1303)  BP: (!) 130/59 (02/07/25 1303)  SpO2: 95 % (02/07/25 1303)    Vital Signs Range (Last 24H):  Temp:  [97.6 °F (36.4 °C)]   Pulse:  [60]   Resp:  [16]   BP: (130)/(59)   SpO2:  [95 %]     Physical Exam  Cardiovascular:      Rate and Rhythm: Bradycardia present.   Pulmonary:      Effort: Pulmonary effort is normal.   Neurological:      Mental Status: He is alert and oriented to person, place, and time.             Assessment:      Direct access EGD for Diagnoses  Organ transplant candidate [Z76.82]  Chronic hepatitis C without hepatic coma [B18.2]  Hepatic cirrhosis, unspecified hepatic cirrhosis type, unspecified whether ascites present [K74.60]       Plan:      Direct access EGD for Diagnoses  Organ transplant candidate [Z76.82]  Chronic hepatitis C without hepatic coma [B18.2]  Hepatic cirrhosis, unspecified hepatic cirrhosis type, unspecified whether ascites present [K74.60]

## 2025-02-07 NOTE — PROGRESS NOTES
MELD 3.0: 29 at 2/6/2025  3:54 PM  MELD-Na: 28 at 2/6/2025  3:54 PM  Calculated from:  Serum Creatinine: 1.6 mg/dL at 2/6/2025  3:54 PM  Serum Sodium: 131 mmol/L at 2/6/2025  3:54 PM  Total Bilirubin: 5.8 mg/dL at 2/6/2025  3:54 PM  Serum Albumin: 1.9 g/dL at 2/6/2025  3:54 PM  INR(ratio): 1.9 at 2/6/2025  3:54 PM  Age at listing (hypothetical): 59 years  Sex: Male at 2/6/2025  3:54 PM

## 2025-02-07 NOTE — ANESTHESIA PREPROCEDURE EVALUATION
02/07/2025  Jam Card is a 59 y.o., male here for EGD as part of evaluation for liver transplant.    Past Medical History:   Diagnosis Date    CAD (coronary artery disease) 03/2010    Hx of MI, CABG,  and cardiac cath    Chronic hepatitis C with cirrhosis     GERD (gastroesophageal reflux disease)     HTN (hypertension)     Human immunodeficiency virus (HIV) disease     Hyperlipidemia     Organic brain syndrome        Past Surgical History:   Procedure Laterality Date    CARDIAC CATHETERIZATION      CORONARY ANGIOGRAPHY N/A 12/20/2024    Procedure: ANGIOGRAM, CORONARY ARTERY;  Surgeon: Yannick Gastelum III, MD;  Location: [facility] CATH LAB;  Service: Cardiology;  Laterality: N/A;    CORONARY ARTERY BYPASS GRAFT      CORONARY BYPASS GRAFT ANGIOGRAPHY  12/20/2024    Procedure: Bypass graft study;  Surgeon: Yannick Gastelum III, MD;  Location: [facility] CATH LAB;  Service: Cardiology;;    RIGHT HEART CATHETERIZATION Right 12/20/2024    Procedure: INSERTION, CATHETER, RIGHT HEART;  Surgeon: Yannick Gastelum III, MD;  Location: [facility] CATH LAB;  Service: Cardiology;  Laterality: Right;    TONSILLECTOMY AND ADENOIDECTOMY      TRIGGER FINGER RELEASE             Pre-op Assessment          Review of Systems  Anesthesia Hx:  No problems with previous Anesthesia   History of prior surgery of interest to airway management or planning:          Denies Family Hx of Anesthesia complications.    Denies Personal Hx of Anesthesia complications.                    Cardiovascular:     Hypertension   CAD   CABG/stent                                       Pulmonary:  Pulmonary Normal    Denies Asthma.    Denies Recent URI.                 Hepatic/GI:      Denies GERD.   +Ascites, fluctuates, not bad currently                 Physical Exam  General: Alert, Oriented and Well nourished  Slow, slightly dysarthric  speech  Airway:  Mallampati: II   Mouth Opening: Normal  TM Distance: Normal  Neck ROM: Normal ROM    Dental:    Chest/Lungs:  Normal Respiratory Rate    Heart:  Rate: Normal  Rhythm: Regular Rhythm        Anesthesia Plan  Type of Anesthesia, risks & benefits discussed:    Anesthesia Type: Gen ETT, Gen Natural Airway  Intra-op Monitoring Plan: Standard ASA Monitors  Post Op Pain Control Plan: multimodal analgesia and IV/PO Opioids PRN  Induction:  IV  Informed Consent: Informed consent signed with the Patient and all parties understand the risks and agree with anesthesia plan.  All questions answered.   ASA Score: 4  Day of Surgery Review of History & Physical: H&P Update referred to the surgeon/provider.  Anesthesia Plan Notes: GI physician recommends intubation due to risk of varices in setting of low platelets.     Ready For Surgery From Anesthesia Perspective.     .

## 2025-02-07 NOTE — TRANSFER OF CARE
Anesthesia Transfer of Care Note    Patient: Jam Card    Procedure(s) Performed: Procedure(s) (LRB):  EGD (ESOPHAGOGASTRODUODENOSCOPY) (N/A)    Patient location: [facility]    Anesthesia Type: general    Transport from OR: Transported from OR on 6-10 L/min O2 by face mask with adequate spontaneous ventilation    Post pain: adequate analgesia    Post assessment: no apparent anesthetic complications    Post vital signs: stable    Level of consciousness: responds to stimulation and awake    Nausea/Vomiting: no nausea/vomiting    Complications: none    Transfer of care protocol was followed      Last vitals: Visit Vitals  /76   Pulse 89   Temp 36   Resp 17   Ht    Wt    SpO2 95%   BMI

## 2025-02-11 ENCOUNTER — TELEPHONE (OUTPATIENT)
Dept: TRANSPLANT | Facility: CLINIC | Age: 60
End: 2025-02-11
Payer: MEDICARE

## 2025-02-11 NOTE — TELEPHONE ENCOUNTER
"Phone call returned.  Patient seen in ED on yesterday for feeling "wobbly".  Ammonia level 80s.  Additional dose of lactulose given and patient dc'd home and instructed to continue lactulose at home and repeat ammonia level today.  Ammonia level today pending.  Neeta reports that patient is concerned that above will affect his transplant candidacy.  Reassured her that above will not prevent transplant.  Informed Neeta if patient falls, hits head, fracture bones d/t increased ammonia, and ambulation/ functional capacity is affected, then transplant candidacy can be affected.  Understanding voiced.    ======================================================================    ----- Message from [employer] sent at 2/11/2025  9:29 AM CST -----  Regarding: Speak to coordinator  Contact: Neeta  Consult/Advisory     Name Of Caller: Neeta        Contact Preference: [phone], requesting a call back.      Nature of call: Pt caregiver is calling to speak with coordinator. No other info is given.  "

## 2025-02-12 ENCOUNTER — PATIENT MESSAGE (OUTPATIENT)
Dept: TRANSPLANT | Facility: CLINIC | Age: 60
End: 2025-02-12
Payer: MEDICARE

## 2025-02-12 ENCOUNTER — COMMITTEE REVIEW (OUTPATIENT)
Dept: TRANSPLANT | Facility: CLINIC | Age: 60
End: 2025-02-12
Payer: MEDICARE

## 2025-02-12 NOTE — COMMITTEE REVIEW
Jam Frazierck's case presented to selection committee.  Patient has been deferred for liver transplant pending Heart Failure consult for severe Pulmonary hypertension.  I was present and agree with the committee's conclusions.

## 2025-02-13 DIAGNOSIS — I27.20 PULMONARY HYPERTENSION: Primary | ICD-10-CM

## 2025-02-24 ENCOUNTER — TELEPHONE (OUTPATIENT)
Dept: TRANSPLANT | Facility: CLINIC | Age: 60
End: 2025-02-24
Payer: MEDICARE

## 2025-02-24 DIAGNOSIS — I27.20 PULMONARY HYPERTENSION: Primary | ICD-10-CM

## 2025-02-24 DIAGNOSIS — I50.20 HEART FAILURE WITH REDUCED EJECTION FRACTION: ICD-10-CM

## 2025-02-24 NOTE — TELEPHONE ENCOUNTER
Spoke with patient regarding referral to pulmonary hypertension clinic. Patient scheduled and agreed to appointment at 1:30 on 3/7 with Dr. Dia. Patient to get labs, echo and 6MWT. Orders entered.

## 2025-02-28 ENCOUNTER — TELEPHONE (OUTPATIENT)
Dept: NEUROSURGERY | Facility: CLINIC | Age: 60
End: 2025-02-28
Payer: MEDICARE

## 2025-02-28 ENCOUNTER — PATIENT MESSAGE (OUTPATIENT)
Dept: TRANSPLANT | Facility: CLINIC | Age: 60
End: 2025-02-28
Payer: MEDICARE

## 2025-03-07 ENCOUNTER — LAB VISIT (OUTPATIENT)
Dept: LAB | Facility: HOSPITAL | Age: 60
End: 2025-03-07
Attending: INTERNAL MEDICINE
Payer: MEDICARE

## 2025-03-07 ENCOUNTER — PATIENT MESSAGE (OUTPATIENT)
Dept: TRANSPLANT | Facility: CLINIC | Age: 60
End: 2025-03-07
Payer: MEDICARE

## 2025-03-07 ENCOUNTER — HOSPITAL ENCOUNTER (OUTPATIENT)
Dept: PULMONOLOGY | Facility: CLINIC | Age: 60
Discharge: HOME | End: 2025-03-07
Payer: MEDICARE

## 2025-03-07 VITALS — BODY MASS INDEX: 19.88 KG/M2 | HEIGHT: 73 IN | WEIGHT: 150 LBS

## 2025-03-07 DIAGNOSIS — I27.20 PULMONARY HYPERTENSION: ICD-10-CM

## 2025-03-07 DIAGNOSIS — I50.20 HEART FAILURE WITH REDUCED EJECTION FRACTION: ICD-10-CM

## 2025-03-07 LAB
ALBUMIN SERPL BCP-MCNC: 1.7 G/DL (ref 3.5–5.2)
ALP SERPL-CCNC: 125 U/L (ref 40–150)
ALT SERPL W/O P-5'-P-CCNC: 30 U/L (ref 10–44)
ANION GAP SERPL CALC-SCNC: 5 MMOL/L (ref 8–16)
AST SERPL-CCNC: 114 U/L (ref 10–40)
BASOPHILS # BLD AUTO: 0.04 K/UL (ref 0–0.2)
BASOPHILS NFR BLD: 1.4 % (ref 0–1.9)
BILIRUB SERPL-MCNC: 7.1 MG/DL (ref 0.1–1)
BNP SERPL-MCNC: 56 PG/ML (ref 0–99)
BUN SERPL-MCNC: 14 MG/DL (ref 6–20)
CALCIUM SERPL-MCNC: 8.6 MG/DL (ref 8.7–10.5)
CHLORIDE SERPL-SCNC: 103 MMOL/L (ref 95–110)
CO2 SERPL-SCNC: 21 MMOL/L (ref 23–29)
CREAT SERPL-MCNC: 1.5 MG/DL (ref 0.5–1.4)
DIFFERENTIAL METHOD BLD: ABNORMAL
EOSINOPHIL # BLD AUTO: 0.1 K/UL (ref 0–0.5)
EOSINOPHIL NFR BLD: 2.8 % (ref 0–8)
ERYTHROCYTE [DISTWIDTH] IN BLOOD BY AUTOMATED COUNT: 18.2 % (ref 11.5–14.5)
EST. GFR  (NO RACE VARIABLE): 54 ML/MIN/1.73 M^2
GLUCOSE SERPL-MCNC: 75 MG/DL (ref 70–110)
HCT VFR BLD AUTO: 27.5 % (ref 40–54)
HGB BLD-MCNC: 8.7 G/DL (ref 14–18)
IMM GRANULOCYTES # BLD AUTO: 0.02 K/UL (ref 0–0.04)
IMM GRANULOCYTES NFR BLD AUTO: 0.7 % (ref 0–0.5)
LYMPHOCYTES # BLD AUTO: 0.9 K/UL (ref 1–4.8)
LYMPHOCYTES NFR BLD: 31.5 % (ref 18–48)
MAGNESIUM SERPL-MCNC: 2.1 MG/DL (ref 1.6–2.6)
MCH RBC QN AUTO: 32.8 PG (ref 27–31)
MCHC RBC AUTO-ENTMCNC: 31.6 G/DL (ref 32–36)
MCV RBC AUTO: 104 FL (ref 82–98)
MONOCYTES # BLD AUTO: 0.3 K/UL (ref 0.3–1)
MONOCYTES NFR BLD: 10.8 % (ref 4–15)
NEUTROPHILS # BLD AUTO: 1.5 K/UL (ref 1.8–7.7)
NEUTROPHILS NFR BLD: 52.8 % (ref 38–73)
NRBC BLD-RTO: 0 /100 WBC
PLATELET # BLD AUTO: 81 K/UL (ref 150–450)
PMV BLD AUTO: 10.7 FL (ref 9.2–12.9)
POTASSIUM SERPL-SCNC: 4.1 MMOL/L (ref 3.5–5.1)
PROT SERPL-MCNC: 5.9 G/DL (ref 6–8.4)
RBC # BLD AUTO: 2.65 M/UL (ref 4.6–6.2)
SODIUM SERPL-SCNC: 129 MMOL/L (ref 136–145)
WBC # BLD AUTO: 2.86 K/UL (ref 3.9–12.7)

## 2025-03-07 PROCEDURE — 80053 COMPREHEN METABOLIC PANEL: CPT | Mod: TXP | Performed by: INTERNAL MEDICINE

## 2025-03-07 PROCEDURE — 36415 COLL VENOUS BLD VENIPUNCTURE: CPT | Mod: TXP | Performed by: INTERNAL MEDICINE

## 2025-03-07 PROCEDURE — 85025 COMPLETE CBC W/AUTO DIFF WBC: CPT | Mod: TXP | Performed by: INTERNAL MEDICINE

## 2025-03-07 PROCEDURE — 83880 ASSAY OF NATRIURETIC PEPTIDE: CPT | Mod: TXP | Performed by: INTERNAL MEDICINE

## 2025-03-07 PROCEDURE — 83735 ASSAY OF MAGNESIUM: CPT | Mod: TXP | Performed by: INTERNAL MEDICINE

## 2025-03-07 PROCEDURE — 94618 PULMONARY STRESS TESTING: CPT | Mod: S$GLB,TXP,, | Performed by: INTERNAL MEDICINE

## 2025-03-07 NOTE — PROCEDURES
Jam Card is a 59 y.o.   male patient, who presents for a 6 minute walk test ordered by MD South.  The diagnosis is Shortness of Breath; Pulmonary Hypertension.  The patient's BMI is 19.8 kg/m2.  Predicted distance (lower limit of normal) is 480.34 meters.      Test Results:    The test was completed with stops.  The patient stopped 1 time for a total of 92 seconds.  The total time walked was 268 seconds.  During walking, the patient reported:  Dyspnea, Leg/hip pain.  The patient used a walker during testing.     03/07/2025---------Distance: 152.4 meters (500 feet)     O2 Sat % Supplemental Oxygen Heart Rate Blood Pressure Nuno Scale   Pre-exercise  (Resting) 96 % Room Air 86 bpm 115/62 mmHg 3   During Exercise 96 % Room Air 75 bpm 131/65 mmHg 4   Post-exercise  (Recovery) 98 % Room Air  67 bpm       Recovery Time: 100 seconds    Performing nurse/tech: Abdullahi ABURTO      PREVIOUS STUDY:   The patient has not had a previous study.      CLINICAL INTERPRETATION:  Six minute walk distance is 152.4 meters (500 feet) with somewhat heavy dyspnea.  During exercise, there was no desaturation while breathing room air.  Both blood pressure and heart rate remained stable with walking.  The patient reported non-pulmonary symptoms during exercise.  Significant exercise impairment is likely due to subjective symptoms.  The patient did complete the study, walking 268 seconds of the 360 second test.  No previous study performed.  Based upon age and body mass index, exercise capacity is less than predicted.

## 2025-03-12 ENCOUNTER — OFFICE VISIT (OUTPATIENT)
Dept: TRANSPLANT | Facility: CLINIC | Age: 60
End: 2025-03-12
Payer: MEDICARE

## 2025-03-12 ENCOUNTER — TELEPHONE (OUTPATIENT)
Dept: INTERVENTIONAL RADIOLOGY/VASCULAR | Facility: CLINIC | Age: 60
End: 2025-03-12
Payer: MEDICARE

## 2025-03-12 ENCOUNTER — PATIENT MESSAGE (OUTPATIENT)
Dept: TRANSPLANT | Facility: CLINIC | Age: 60
End: 2025-03-12

## 2025-03-12 ENCOUNTER — HOSPITAL ENCOUNTER (OUTPATIENT)
Dept: CARDIOLOGY | Facility: HOSPITAL | Age: 60
Discharge: HOME | End: 2025-03-12
Attending: INTERNAL MEDICINE
Payer: MEDICARE

## 2025-03-12 ENCOUNTER — HOSPITAL ENCOUNTER (OUTPATIENT)
Dept: RADIOLOGY | Facility: HOSPITAL | Age: 60
Discharge: HOME | End: 2025-03-12
Attending: INTERNAL MEDICINE
Payer: MEDICARE

## 2025-03-12 VITALS
BODY MASS INDEX: 33.78 KG/M2 | WEIGHT: 254.88 LBS | RESPIRATION RATE: 16 BRPM | OXYGEN SATURATION: 98 % | SYSTOLIC BLOOD PRESSURE: 111 MMHG | DIASTOLIC BLOOD PRESSURE: 56 MMHG | HEART RATE: 65 BPM | HEIGHT: 73 IN | TEMPERATURE: 98 F

## 2025-03-12 VITALS
HEIGHT: 73 IN | HEART RATE: 60 BPM | BODY MASS INDEX: 33.13 KG/M2 | WEIGHT: 250 LBS | SYSTOLIC BLOOD PRESSURE: 123 MMHG | DIASTOLIC BLOOD PRESSURE: 60 MMHG

## 2025-03-12 DIAGNOSIS — B20 HUMAN IMMUNODEFICIENCY VIRUS (HIV) DISEASE: ICD-10-CM

## 2025-03-12 DIAGNOSIS — K74.60 CIRRHOSIS OF LIVER WITH ASCITES, UNSPECIFIED HEPATIC CIRRHOSIS TYPE: ICD-10-CM

## 2025-03-12 DIAGNOSIS — Z76.82 ORGAN TRANSPLANT CANDIDATE: ICD-10-CM

## 2025-03-12 DIAGNOSIS — I27.20 PULMONARY HYPERTENSION: ICD-10-CM

## 2025-03-12 DIAGNOSIS — B18.2 CHRONIC HEPATITIS C WITHOUT HEPATIC COMA: ICD-10-CM

## 2025-03-12 DIAGNOSIS — R18.8 CIRRHOSIS OF LIVER WITH ASCITES, UNSPECIFIED HEPATIC CIRRHOSIS TYPE: ICD-10-CM

## 2025-03-12 DIAGNOSIS — I25.10 CORONARY ARTERY DISEASE, UNSPECIFIED VESSEL OR LESION TYPE, UNSPECIFIED WHETHER ANGINA PRESENT, UNSPECIFIED WHETHER NATIVE OR TRANSPLANTED HEART: ICD-10-CM

## 2025-03-12 DIAGNOSIS — R18.8 OTHER ASCITES: Primary | ICD-10-CM

## 2025-03-12 LAB
ASCENDING AORTA: 4.33 CM
AV AREA BY CONTINUOUS VTI: 3.6 CM2
AV INDEX (PROSTH): 0.73
AV LVOT MEAN GRADIENT: 5 MMHG
AV LVOT PEAK GRADIENT: 8 MMHG
AV MEAN GRADIENT: 9 MMHG
AV PEAK GRADIENT: 16 MMHG
AV VALVE AREA BY VELOCITY RATIO: 3.2 CM²
AV VALVE AREA: 3.3 CM²
AV VELOCITY RATIO: 0.7
BSA FOR ECHO PROCEDURE: 2.42 M2
CV ECHO LV RWT: 0.44 CM
DOP CALC AO PEAK VEL: 2 M/S
DOP CALC AO VTI: 45.1 CM
DOP CALC LVOT AREA: 4.5 CM2
DOP CALC LVOT DIAMETER: 2.4 CM
DOP CALC LVOT PEAK VEL: 1.4 M/S
DOP CALC LVOT STROKE VOLUME: 149.2 CM3
DOP CALCLVOT PEAK VEL VTI: 33 CM
E WAVE DECELERATION TIME: 263 MS
E/A RATIO: 1.86
E/E' RATIO: 15 M/S
ECHO EF ESTIMATED: 65 %
ECHO LV POSTERIOR WALL: 1.1 CM (ref 0.6–1.1)
FRACTIONAL SHORTENING: 34 % (ref 28–44)
INTERVENTRICULAR SEPTUM: 0.9 CM (ref 0.6–1.1)
IVRT: 80 MS
LA MAJOR: 6.3 CM
LA MINOR: 6.7 CM
LA WIDTH: 5.1 CM
LEFT ATRIUM SIZE: 5 CM
LEFT ATRIUM VOLUME INDEX MOD: 57 ML/M2
LEFT ATRIUM VOLUME INDEX: 59 ML/M2
LEFT ATRIUM VOLUME MOD: 136 ML
LEFT ATRIUM VOLUME: 141 CM3
LEFT INTERNAL DIMENSION IN SYSTOLE: 3.3 CM (ref 2.1–4)
LEFT VENTRICLE DIASTOLIC VOLUME INDEX: 50.63 ML/M2
LEFT VENTRICLE DIASTOLIC VOLUME: 120 ML
LEFT VENTRICLE MASS INDEX: 76.8 G/M2
LEFT VENTRICLE SYSTOLIC VOLUME INDEX: 17.7 ML/M2
LEFT VENTRICLE SYSTOLIC VOLUME: 42 ML
LEFT VENTRICULAR INTERNAL DIMENSION IN DIASTOLE: 5 CM (ref 3.5–6)
LEFT VENTRICULAR MASS: 182 G
LV LATERAL E/E' RATIO: 13.8 M/S
LV SEPTAL E/E' RATIO: 15.7 M/S
MV A" WAVE DURATION": 199.81 MS
MV PEAK A VEL: 0.59 M/S
MV PEAK E VEL: 1.1 M/S
OHS CV RV/LV RATIO: 0.94 CM
PISA TR MAX VEL: 3.4 M/S
PULM VEIN A" WAVE DURATION": 199.81 MS
PULM VEIN S/D RATIO: 0.76
PULMONIC VEIN PEAK A VELOCITY: 0.2 M/S
PV PEAK D VEL: 0.68 M/S
PV PEAK S VEL: 0.52 M/S
RA MAJOR: 6.22 CM
RA PRESSURE ESTIMATED: 3 MMHG
RA WIDTH: 4.41 CM
RIGHT ATRIAL AREA: 24.4 CM2
RIGHT VENTRICLE DIASTOLIC BASEL DIMENSION: 4.7 CM
RV TB RVSP: 6 MMHG
RV TISSUE DOPPLER FREE WALL SYSTOLIC VELOCITY 1 (APICAL 4 CHAMBER VIEW): 15.2 CM/S
SINUS: 4.73 CM
STJ: 4.17 CM
TDI LATERAL: 0.08 M/S
TDI SEPTAL: 0.07 M/S
TDI: 0.08 M/S
TRICUSPID ANNULAR PLANE SYSTOLIC EXCURSION: 1.6 CM
TV PEAK GRADIENT: 47 MMHG
TV REST PULMONARY ARTERY PRESSURE: 49 MMHG
Z-SCORE OF LEFT VENTRICULAR DIMENSION IN END DIASTOLE: -7.17
Z-SCORE OF LEFT VENTRICULAR DIMENSION IN END SYSTOLE: -4.92

## 2025-03-12 PROCEDURE — 3008F BODY MASS INDEX DOCD: CPT | Mod: CPTII,S$GLB,TXP, | Performed by: INTERNAL MEDICINE

## 2025-03-12 PROCEDURE — 93306 TTE W/DOPPLER COMPLETE: CPT | Mod: TXP

## 2025-03-12 PROCEDURE — 3078F DIAST BP <80 MM HG: CPT | Mod: CPTII,S$GLB,TXP, | Performed by: INTERNAL MEDICINE

## 2025-03-12 PROCEDURE — 93975 VASCULAR STUDY: CPT | Mod: TC,TXP

## 2025-03-12 PROCEDURE — 1159F MED LIST DOCD IN RCRD: CPT | Mod: CPTII,S$GLB,TXP, | Performed by: INTERNAL MEDICINE

## 2025-03-12 PROCEDURE — G2211 COMPLEX E/M VISIT ADD ON: HCPCS | Mod: S$GLB,TXP,, | Performed by: INTERNAL MEDICINE

## 2025-03-12 PROCEDURE — 99999 PR PBB SHADOW E&M-EST. PATIENT-LVL IV: CPT | Mod: PBBFAC,TXP,, | Performed by: INTERNAL MEDICINE

## 2025-03-12 PROCEDURE — 3074F SYST BP LT 130 MM HG: CPT | Mod: CPTII,S$GLB,TXP, | Performed by: INTERNAL MEDICINE

## 2025-03-12 PROCEDURE — 99215 OFFICE O/P EST HI 40 MIN: CPT | Mod: S$GLB,TXP,, | Performed by: INTERNAL MEDICINE

## 2025-03-12 NOTE — LETTER
March 19, 2025        Kina Rothman  [address] [place] [address]  [place] [address]  Phone: [phone]  Fax: [phone]             [facility]  [address]  [facility] [address]  Phone: [phone]   Patient: Jam Card   MR Number: [id number]   YOB: 1965   Date of Visit: 3/12/2025       Dear Dr. Kina Rothman    Thank you for referring Jam Card to me for evaluation. Attached you will find relevant portions of my assessment and plan of care.    If you have questions, please do not hesitate to call me. I look forward to following Jam Card along with you.    Sincerely,    Kris Turner MD    Enclosure    If you would like to receive this communication electronically, please contact [email or web address] or [phone] to request [employer] Link access.    [employer] Link is a tool which provides read-only access to select patient information with whom you have a relationship. Its easy to use and provides real time access to review your patients record including encounter summaries, notes, results, and demographic information.    If you feel you have received this communication in error or would no longer like to receive these types of communications, please e-mail [email or web address]

## 2025-03-12 NOTE — PROGRESS NOTES
Subjective:       Patient ID: Jam Card is a 59 y.o. male.    Chief Complaint: No chief complaint on file.    HPI  I saw this 59 y.o. man with HCV cirrhosis and HIV who has decompensated liver disease.    Became unwell with liver disease only in March 2024.  - recent diagnosis of HCV- March 2024  - ascites + edema  - jaundice  - HE  - hypoalbuminemia, thrombocytopenia and coagulaopathy    - first seen by Marychuy Zapata on 5/7/24.    AFP 35 on 5/7/24  AFP 65 on 7/11/24  AFP 24 on 8/12/24    Hospitalizations x 2 for HE in last couple of months    Admitted to [facility] in Aug 2024 with Subdural Hematoma and subsequently transferred to [facility].  - IR performed MMA embolization on 8/16/24  - on Keppra prophylaxis    Last CT head- on 9/4/2024- decrease in SDH    MRI abdo: 6/12/2024  Cirrhotic-appearing liver with splenomegaly. Prominent portal vein and probable small gastric varices     Chest CT: 6/5/24  8.5 mm nodular density in the right lower lobe, not amenable to vertex biopsy due to position. Recommend PET/CT     MELD 3.0: 29 at 2/6/2025  3:54 PM  MELD-Na: 28 at 2/6/2025  3:54 PM  Calculated from:  Serum Creatinine: 1.6 mg/dL at 2/6/2025  3:54 PM  Serum Sodium: 131 mmol/L at 2/6/2025  3:54 PM  Total Bilirubin: 5.8 mg/dL at 2/6/2025  3:54 PM  Serum Albumin: 1.9 g/dL at 2/6/2025  3:54 PM  INR(ratio): 1.9 at 2/6/2025  3:54 PM  Age at listing (hypothetical): 59 years  Sex: Male at 2/6/2025  3:54 PM      PMH:  HIV- since 1997  Chronic Brain syndrome- 2009    Appendictomy- 2005  CABG 2010    ?NSTEMI    SH:  No alcohol  Ex smoker- June 2024-1/3 pack per day for decades    Lives with mom (85)  Caregiver Neeta here with him      FH:  CAD- father      Review of Systems   Constitutional:  Negative for activity change, appetite change, chills, fatigue, fever and unexpected weight change.   HENT:  Negative for hearing loss.    Eyes:  Negative for discharge and visual disturbance.   Respiratory:  Negative for cough,  chest tightness, shortness of breath and wheezing.    Cardiovascular:  Negative for chest pain, palpitations and leg swelling.   Gastrointestinal:  Negative for abdominal distention, abdominal pain, constipation, diarrhea and nausea.   Genitourinary:  Negative for dysuria and frequency.   Musculoskeletal:  Negative for arthralgias and back pain.   Skin:  Negative for pallor and rash.   Neurological:  Negative for dizziness, tremors, speech difficulty and headaches.   Hematological:  Negative for adenopathy.   Psychiatric/Behavioral:  Negative for agitation and confusion.            Lab Results   Component Value Date    ALT 30 03/07/2025     (H) 03/07/2025    GGT 74 (H) 07/11/2024    ALKPHOS 125 03/07/2025    BILITOT 7.1 (H) 03/07/2025     Past Medical History:   Diagnosis Date    CAD (coronary artery disease) 03/2010    Hx of MI, CABG,  and cardiac cath    Chronic hepatitis C with cirrhosis     GERD (gastroesophageal reflux disease)     HTN (hypertension)     Human immunodeficiency virus (HIV) disease     Hyperlipidemia     Organic brain syndrome      Past Surgical History:   Procedure Laterality Date    CARDIAC CATHETERIZATION      CORONARY ANGIOGRAPHY N/A 12/20/2024    Procedure: ANGIOGRAM, CORONARY ARTERY;  Surgeon: Yannick Gastelum III, MD;  Location: [facility] CATH LAB;  Service: Cardiology;  Laterality: N/A;    CORONARY ARTERY BYPASS GRAFT      CORONARY BYPASS GRAFT ANGIOGRAPHY  12/20/2024    Procedure: Bypass graft study;  Surgeon: Yannick Gastelum III, MD;  Location: [facility] CATH LAB;  Service: Cardiology;;    ESOPHAGOGASTRODUODENOSCOPY N/A 2/7/2025    Procedure: EGD (ESOPHAGOGASTRODUODENOSCOPY);  Surgeon: Jerald Vo MD;  [facility]  Service: Endoscopy;  Laterality: N/A;  2nd floor - recent brain bleed 10/24 ref [facility], portal, neurosur clear-st  per neuro E Chivo MITTAL-Patient is cleared for endoscopy-GT  12/12-tb-mess re-sent to [facility] to order plat  12/11 r/s, portal, card cl  pend-st  12/26-r/s to earlier date-ca    RIGHT HEART CATHETERIZATION Right 12/20/2024    Procedure: INSERTION, CATHETER, RIGHT HEART;  Surgeon: Yannick Gastelum III, MD;  Location: [facility] CATH LAB;  Service: Cardiology;  Laterality: Right;    TONSILLECTOMY AND ADENOIDECTOMY      TRIGGER FINGER RELEASE       Current Outpatient Medications   Medication Sig    atovaquone (MEPRON) 750 mg/5 mL Susp oral liquid Take 10 mLs (1,500 mg total) by mouth once daily.    baclofen (LIORESAL) 5 mg Tab tablet Take 2 tablets (10 mg total) by mouth 3 (three) times daily as needed (Muscle Spasticity).    bisacodyL (DULCOLAX) 10 mg Supp Place 1 suppository rectally daily as needed (constipation).    cyanocobalamin (VITAMIN B-12) 1000 MCG tablet Take 1,000 mcg by mouth once daily.    ergocalciferol (ERGOCALCIFEROL) 50,000 unit Cap Take 50,000 Units by mouth every 7 days.    FLUoxetine 20 MG capsule Take 20 mg by mouth once daily.    furosemide (LASIX) 20 MG tablet Take 2 tablets (40 mg total) by mouth once daily.    GENVOYA [id number] mg Tab TAKE ONE TABLET BY MOUTH DAILY WITH FOOD.    lactulose (CHRONULAC) 10 gram/15 mL solution TAKE 30 MLS BY MOUTH TWICE DAILY. GOAL OF 3-5 SOFT STOOLS EACH DAY    levETIRAcetam (KEPPRA) 500 MG Tab Take 1 tablet (500 mg total) by mouth 2 (two) times daily. Take medication until follow-up with your provider.    loratadine (CLARITIN) 10 mg tablet Take 10 mg by mouth once daily.    meclizine (ANTIVERT) 12.5 mg tablet Take 6.25 mg by mouth 2 (two) times daily as needed for Dizziness.    metoprolol succinate (TOPROL-XL) 25 MG 24 hr tablet Take 25 mg by mouth once daily.    midodrine (PROAMATINE) 2.5 MG Tab Take 1 tablet (2.5 mg total) by mouth 3 (three) times daily as needed (if systolic blood pressure (top number) is less than 100).    pantoprazole (PROTONIX) 40 MG tablet Take 40 mg by mouth once daily.    PROMACTA 25 mg Tab     pyridoxine, vitamin B6, (B-6) 100 MG Tab Take 100 mg by mouth once daily.     rifAXIMin (XIFAXAN) 550 mg Tab Take 550 mg by mouth 2 (two) times daily.    rosuvastatin (CRESTOR) 10 MG tablet Take 10 mg by mouth every evening.    spironolactone (ALDACTONE) 25 MG tablet Take 50 mg by mouth once daily.    vitamin D (VITAMIN D3) 1000 units Tab Take 1,000 Units by mouth once daily.     No current facility-administered medications for this visit.       Objective:      Physical Exam  HENT:      Head: Normocephalic.   Eyes:      Pupils: Pupils are equal, round, and reactive to light.   Neck:      Thyroid: No thyromegaly.   Cardiovascular:      Rate and Rhythm: Normal rate and regular rhythm.      Heart sounds: Normal heart sounds.   Pulmonary:      Effort: Pulmonary effort is normal.      Breath sounds: Normal breath sounds. No wheezing.   Abdominal:      General: There is distension.      Palpations: Abdomen is soft. There is no mass.      Tenderness: There is no abdominal tenderness.   Musculoskeletal:      Right lower leg: Edema present.      Left lower leg: Edema present.   Lymphadenopathy:      Cervical: No cervical adenopathy.   Skin:     General: Skin is warm.      Findings: No erythema or rash.   Neurological:      Mental Status: He is alert and oriented to person, place, and time.   Psychiatric:         Behavior: Behavior normal.         Assessment:       1. Organ transplant candidate    2. Chronic hepatitis C without hepatic coma    3. Cirrhosis of liver with ascites, unspecified hepatic cirrhosis type        Plan:   This 59 year old man with HIV infection that is well controlled has decompensated HCV cirrhosis.  He had fluid retention and HE and is on diuretics.  - diuretics increase late last week but he now needs paracentesis.  - also has pulmonary hypertension on echo and would need a RHC  once we get better control of his fluid retention.    Unfortunately his insurance does not cover non-transplant care.    Today he did not have any evidence of HE- his slow speech is a result of his  brain injury in 2009. His functional status and mobility are very good.    - will need to liaise closely with Dr Wu re his HIV status  - to complete liver Tx evaluation soon.    - will get paracentesis here this week and will hopefully be able to assess his pulmonary pressure soon.      UNOS Patient Status  Functional Status: 70% - Cares for self: unable to carry on normal activity or active work  Physical Capacity: No Limitations    Patient on life support: No  Diabetes: No  Any previous malignancy: No  Neoadjuvant Therapy: no  Has patient ever had a dx of HCC: no  Previous Abdominal Surgery: no  Spontaneous Bacterial Peritonitis: no  History of Portal Vein Thrombosis: no  Transjugular Intrahepatic Portosystemic Shunt: no    I spent a total of 40 minutes on the day of the visit.  This includes face to face time and non-face to face time preparing to see the patient (eg, review of tests), obtaining and/or reviewing separately obtained history, documenting clinical information in the electronic or other health record, independently interpreting results and communicating results to the patient/family/caregiver, or care coordinator.

## 2025-03-13 ENCOUNTER — PATIENT MESSAGE (OUTPATIENT)
Dept: TRANSPLANT | Facility: CLINIC | Age: 60
End: 2025-03-13
Payer: MEDICARE

## 2025-03-14 ENCOUNTER — TELEPHONE (OUTPATIENT)
Dept: TRANSPLANT | Facility: CLINIC | Age: 60
End: 2025-03-14
Payer: MEDICARE

## 2025-03-14 NOTE — TELEPHONE ENCOUNTER
Called patient inform him of u/s results (after receiving message via [facility] portal).  Patient voiced understanding of results.  Paracentesis scheduled 3/18.  Patient aware.  Patient asking rather any appts for pulmonary htn will be scheduled on 3/18.  Instructed patient to contact Dr. Dia's office to discuss further. Understanding expressed.

## 2025-03-18 ENCOUNTER — HOSPITAL ENCOUNTER (OUTPATIENT)
Dept: INTERVENTIONAL RADIOLOGY/VASCULAR | Facility: HOSPITAL | Age: 60
Discharge: HOME | End: 2025-03-18
Attending: INTERNAL MEDICINE
Payer: MEDICARE

## 2025-03-18 VITALS
SYSTOLIC BLOOD PRESSURE: 108 MMHG | RESPIRATION RATE: 17 BRPM | HEART RATE: 71 BPM | OXYGEN SATURATION: 100 % | DIASTOLIC BLOOD PRESSURE: 55 MMHG

## 2025-03-18 DIAGNOSIS — R18.8 OTHER ASCITES: ICD-10-CM

## 2025-03-18 LAB
APPEARANCE FLD: NORMAL
BODY FLD TYPE: NORMAL
COLOR FLD: NORMAL
LYMPHOCYTES NFR FLD MANUAL: 43 %
MESOTHL CELL NFR FLD MANUAL: 6 %
MONOS+MACROS NFR FLD MANUAL: 37 %
NEUTROPHILS NFR FLD MANUAL: 14 %
WBC # FLD: 94 /CU MM

## 2025-03-18 PROCEDURE — 87070 CULTURE OTHR SPECIMN AEROBIC: CPT | Mod: TXP | Performed by: INTERNAL MEDICINE

## 2025-03-18 PROCEDURE — 89051 BODY FLUID CELL COUNT: CPT | Mod: TXP | Performed by: INTERNAL MEDICINE

## 2025-03-18 PROCEDURE — 87075 CULTR BACTERIA EXCEPT BLOOD: CPT | Mod: TXP | Performed by: INTERNAL MEDICINE

## 2025-03-18 PROCEDURE — 63600175 PHARM REV CODE 636 W HCPCS: Mod: TXP

## 2025-03-18 PROCEDURE — 49083 ABD PARACENTESIS W/IMAGING: CPT | Mod: TXP,,,

## 2025-03-18 RX ORDER — LIDOCAINE HYDROCHLORIDE 10 MG/ML
INJECTION, SOLUTION EPIDURAL; INFILTRATION; INTRACAUDAL; PERINEURAL
Status: COMPLETED | OUTPATIENT
Start: 2025-03-18 | End: 2025-03-18

## 2025-03-18 RX ADMIN — LIDOCAINE HYDROCHLORIDE 5 ML: 10 SOLUTION INTRAVENOUS at 09:03

## 2025-03-18 NOTE — PLAN OF CARE
Pt arrived to MPU 1 for paracentesis.  Name verified using two identifiers.  Allergies verified. VSS.  Will continue to monitor.

## 2025-03-18 NOTE — H&P
Radiology History & Physical      SUBJECTIVE:     Chief Complaint: Abdominal Distension    History of Present Illness:  Jam Card is a 59 y.o. male who presents for US guided paracentesis.     Past Medical History:   Diagnosis Date    CAD (coronary artery disease) 03/2010    Hx of MI, CABG,  and cardiac cath    Chronic hepatitis C with cirrhosis     GERD (gastroesophageal reflux disease)     HTN (hypertension)     Human immunodeficiency virus (HIV) disease     Hyperlipidemia     Organic brain syndrome      Past Surgical History:   Procedure Laterality Date    CARDIAC CATHETERIZATION      CORONARY ANGIOGRAPHY N/A 12/20/2024    Procedure: ANGIOGRAM, CORONARY ARTERY;  Surgeon: Yannick Gastelum III, MD;  Location: [facility] CATH LAB;  Service: Cardiology;  Laterality: N/A;    CORONARY ARTERY BYPASS GRAFT      CORONARY BYPASS GRAFT ANGIOGRAPHY  12/20/2024    Procedure: Bypass graft study;  Surgeon: Yannick Gastelum III, MD;  Location: [facility] CATH LAB;  Service: Cardiology;;    ESOPHAGOGASTRODUODENOSCOPY N/A 2/7/2025    Procedure: EGD (ESOPHAGOGASTRODUODENOSCOPY);  Surgeon: Jerald Vo MD;  Location: [facility] (2ND FLR);  Service: Endoscopy;  Laterality: N/A;  2nd floor - recent brain bleed 10/24 ref [facility], portal, neurosur clear-st  per neuro E Chivo MITTAL-Patient is cleared for endoscopy-GT  12/12-tb-mess re-sent to [facility] to order plat  12/11 r/s, portal, card cl pend-st  12/26-r/s to earlier date-ca    RIGHT HEART CATHETERIZATION Right 12/20/2024    Procedure: INSERTION, CATHETER, RIGHT HEART;  Surgeon: Yannick Gastelum III, MD;  Location: [facility] CATH LAB;  Service: Cardiology;  Laterality: Right;    TONSILLECTOMY AND ADENOIDECTOMY      TRIGGER FINGER RELEASE         Home Meds:   Prior to Admission medications    Medication Sig Start Date End Date Taking? Authorizing Provider   atovaquone (MEPRON) 750 mg/5 mL Susp oral liquid Take 10 mLs (1,500 mg total) by mouth once daily. 1/13/25   Jazmin, Carmelo MARISCAL MD    baclofen (LIORESAL) 5 mg Tab tablet Take 2 tablets (10 mg total) by mouth 3 (three) times daily as needed (Muscle Spasticity). 8/22/24   Onur Spain DO   bisacodyL (DULCOLAX) 10 mg Supp Place 1 suppository rectally daily as needed (constipation). 8/6/24   Provider, Historical   cyanocobalamin (VITAMIN B-12) 1000 MCG tablet Take 1,000 mcg by mouth once daily.    Provider, Historical   ergocalciferol (ERGOCALCIFEROL) 50,000 unit Cap Take 50,000 Units by mouth every 7 days. 3/26/24   Provider, Historical   FLUoxetine 20 MG capsule Take 20 mg by mouth once daily. 10/20/23   Provider, Historical   furosemide (LASIX) 20 MG tablet Take 2 tablets (40 mg total) by mouth once daily. 5/7/24   Scheuermann, Jennifer B., PA   GENVOYA [id number] mg Tab TAKE ONE TABLET BY MOUTH DAILY WITH FOOD. 3/28/24   Provider, Historical   lactulose (CHRONULAC) 10 gram/15 mL solution TAKE 30 MLS BY MOUTH TWICE DAILY. GOAL OF 3-5 SOFT STOOLS EACH DAY 9/12/24   TherapKris killian MD   levETIRAcetam (KEPPRA) 500 MG Tab Take 1 tablet (500 mg total) by mouth 2 (two) times daily. Take medication until follow-up with your provider. 8/22/24   Onur Spain DO   loratadine (CLARITIN) 10 mg tablet Take 10 mg by mouth once daily.    Provider, Historical   meclizine (ANTIVERT) 12.5 mg tablet Take 6.25 mg by mouth 2 (two) times daily as needed for Dizziness. 7/16/24   Provider, Historical   metoprolol succinate (TOPROL-XL) 25 MG 24 hr tablet Take 25 mg by mouth once daily. 1/12/24   Provider, Historical   midodrine (PROAMATINE) 2.5 MG Tab Take 1 tablet (2.5 mg total) by mouth 3 (three) times daily as needed (if systolic blood pressure (top number) is less than 100). 8/22/24 8/22/25  Onur Spain DO   pantoprazole (PROTONIX) 40 MG tablet Take 40 mg by mouth once daily. 1/15/24   Provider, Historical   PROMACTA 25 mg Tab  12/7/24   Provider, Historical   pyridoxine, vitamin B6, (B-6) 100 MG Tab Take 100 mg by mouth once daily.     "Provider, Historical   rifAXIMin (XIFAXAN) 550 mg Tab Take 550 mg by mouth 2 (two) times daily. 6/20/24   Provider, Historical   rosuvastatin (CRESTOR) 10 MG tablet Take 10 mg by mouth every evening. 4/15/24   Provider, Historical   spironolactone (ALDACTONE) 25 MG tablet Take 50 mg by mouth once daily. 8/1/24 8/1/25  Provider, Historical   vitamin D (VITAMIN D3) 1000 units Tab Take 1,000 Units by mouth once daily.    Provider, Historical     Anticoagulants/Antiplatelets: no anticoagulation    Allergies:   Review of patient's allergies indicates:   Allergen Reactions    Hydrocodone-acetaminophen Other (See Comments)     "cannot sleep when taking" Does not want to take    Lisinopril Other (See Comments)     Other reaction(s): Cough    Sulfamethoxazole-trimethoprim Rash     Sedation History:  no adverse reactions    Review of Systems:   Hematological: no known coagulopathies  Respiratory: no shortness of breath  Cardiovascular: no chest pain  Gastrointestinal: no abdominal pain, change in bowel habits, or black or bloody stools  no abdominal pain  Genito-Urinary: no dysuria  Musculoskeletal: negative  Neurological: no TIA or stroke symptoms         OBJECTIVE:     Vital Signs (Most Recent)  Pulse: 67 (03/18/25 0938)  Resp: 15 (03/18/25 0938)  BP: (!) 114/55 (03/18/25 0938)  SpO2: 98 % (03/18/25 0938)    Physical Exam:  ASA: 2  Mallampati: n/a    General: no acute distress  Mental Status: alert and oriented to person, place and time (Slow speech chronic due brain injury in 2009. At Baseline)   HEENT: normocephalic, atraumatic  Chest: unlabored breathing  Heart: regular heart rate  Abdomen: nondistended  Extremity: moves all extremities    ASSESSMENT/PLAN:     Sedation Plan: Local  Patient will undergo US guided paracentesis.    Barbara Blackwell PA-C  Interventional Radiology  [phone]  "

## 2025-03-18 NOTE — PROCEDURES
Radiology Post-Procedure Note    Pre Op Diagnosis: Ascites  Post Op Diagnosis: Same    Procedure: Ultrasound Guided Paracentesis    Procedure performed by: Barbara Blackwell PA-C    Written Informed Consent Obtained: Yes  Specimen Removed: YES (yellow, clear)  Estimated Blood Loss: Minimal    Findings:   Successful paracentesis from the right.  Albumin administered PRN per protocol.    Patient tolerated procedure well.    Barbara Blackwell PA-C  Interventional Radiology  [phone]

## 2025-03-18 NOTE — PLAN OF CARE
Paracentesis done. Removed 4900 ml. Albumin not indicated. Patient AAOx3, no distress noted, respirations even and unlabored. AVS reviewed with pt and friend. Pt discharged home with friend.   Vitals:    03/18/25 1035   BP: (!) 108/55   Pulse: 71   Resp: 17

## 2025-03-18 NOTE — DISCHARGE INSTRUCTIONS
Please call with any questions or concerns.      Monday through Friday 8:00 am - 5:00 pm    Interventional Radiology Clinic  [phone]    After Hours    Ask the  for the Radiology Resident on call  [phone]        You may remove the dressing over your procedure site 24 hours after your procedure. You may shower 24 hours after your procedure. Keep the procedure site clean, dry, and open to air. Do not submerge in water (bath, pool, hot tub, ect.) until the site is completely healed.

## 2025-03-20 ENCOUNTER — PATIENT MESSAGE (OUTPATIENT)
Dept: TRANSPLANT | Facility: CLINIC | Age: 60
End: 2025-03-20
Payer: MEDICARE

## 2025-03-20 LAB — BACTERIA SPEC ANAEROBE CULT: NORMAL

## 2025-03-21 LAB — BACTERIA SPEC AEROBE CULT: NO GROWTH

## 2025-03-23 NOTE — PLAN OF CARE
[address] [facility] [place]. [place], [address]  Tel.  [phone]  Fax. 100 [facility] 60  [place], [address] [facility]  Tel.  [phone]  Fax. [phone] [address]  Tel.  [phone]  Fax. [phone]     Sleep Apnea: After Your Visit  Your Care Instructions  Sleep apnea occurs when you frequently stop breathing for 10 seconds or longer during sleep. It can be mild to severe, based on the number of times per hour that you stop breathing or have slowed breathing. Blocked or narrowed airways in your nose, mouth, or throat can cause sleep apnea. Your airway can become blocked when your throat muscles and tongue relax during sleep. Sleep apnea is common, occurring in 1 out of 20 individuals. Individuals having any of the following characteristics should be evaluated and treated right away due to high risk and detrimental consequences from untreated sleep apnea:  Obesity  Congestive Heart failure  Atrial Fibrillation  Uncontrolled Hypertension  Type II Diabetes  Night-time Arrhythmias  Stroke  Pulmonary Hypertension  High-risk Driving Populations (pilots, truck drivers, etc.)  Patients Considering Weight-loss Surgery    How do you know you have sleep apnea? You probably have sleep apnea if you answer 'yes' to 3 or more of the following questions:  S - Have you been told that you Snore? T - Are you often Tired during the day? O - Has anyone Observed you stop breathing while sleeping? P- Do you have (or are being treated for) high blood Pressure? B - Are you obese (Body Mass Index > 35)? A - Is your Age 48years old or older? N - Is your Neck size greater than 16 inches? G - Are you male Gender? A sleep physician can prescribe a breathing device that prevents tissues in the throat from blocking your airway. Or your doctor may recommend using a dental device (oral breathing device) to help keep your airway open.  In some cases, surgery may Pt AAOx4, VSS on RA. Able to follow commands, but does have delayed response. Up to BSC x2 assist. UA sent, see results for details. Urine and blood cultures in process. Worked with PT/OT, tolerated well. Unable to give pt HIV med d/t pt not having it and [facility] not carrying med   [employer]erve[employer]. [employer]. Click on the First Time User? Click Here link in the Sign In box. You will see the New Member Sign Up page. Enter your [employer] Access Code exactly as it appears below. You will not need to use this code after youve completed the sign-up process. If you do not sign up before the expiration date, you must request a new code.     [employer] Access Code: [email or web address]  Expires: 6/3/

## 2025-03-23 NOTE — PLAN OF CARE
Problem: Occupational Therapy  Goal: Occupational Therapy Goal  Description: Goals to be met by: 3/30/25    Patient will increase functional independence with ADLs by performing:    UE Dressing with Set-up Assistance.  LE Dressing with Minimal Assistance.  Grooming while standing at sink with Stand-by Assistance.  Toileting from toilet with Minimal Assistance for hygiene and clothing management.   Supine to sit with Supervision.  Step transfer with Stand-by Assistance  Toilet transfer to toilet with Stand-by Assistance.  Upper extremity exercise program per handout, with independence.    Outcome: Progressing

## 2025-03-23 NOTE — NURSING
Called [facility] to clarify if patient fell in the hospital. Per night shift RN patient fell at home and that is what brought him to the ED.

## 2025-03-23 NOTE — PT/OT/SLP PROGRESS
Occupational Therapy   Treatment    Name: Jam Card  MRN: [id number]  Admitting Diagnosis:  Cirrhosis of liver with ascites       Recommendations:     Discharge Recommendations: Moderate Intensity Therapy  Discharge Equipment Recommendations:  bedside commode  Barriers to discharge:  Decreased caregiver support    Assessment:     Jam Card is a 59 y.o. male with a medical diagnosis of Cirrhosis of liver with ascites. Pt. currently demonstrates decreased (I) with ADLs, functional mobility & t/fs decreased overall strength, ROM, endurance and balance. Pt would benefit from skilled OT services to address these deficits and to facilitate improving (I) with daily tasks. Performance deficits affecting function are weakness, gait instability, impaired endurance, impaired balance, decreased lower extremity function, decreased safety awareness, impaired self care skills, impaired functional mobility, decreased coordination.     Rehab Prognosis:  Good; patient would benefit from acute skilled OT services to address these deficits and reach maximum level of function.       Plan:     Patient to be seen 4 x/week to address the above listed problems via self-care/home management, therapeutic exercises, therapeutic activities  Plan of Care Expires: 04/22/25  Plan of Care Reviewed with: patient    Subjective     Pain/Comfort:  Pain Rating 1: 0/10    Objective:     Communicated with: rn prior to session.  Patient found supine with peripheral IV, barcenas catheter upon OT entry to room.    General Precautions: Standard, fall    Orthopedic Precautions:   Braces:    Respiratory Status: Room air     Occupational Performance:     Bed Mobility:    Patient completed Scooting/Bridging with contact guard assistance  Patient completed Supine to Sit with minimum assistance  Patient completed Sit to Supine with stand by assistance     Functional Mobility/Transfers:  Patient completed Sit <> Stand Transfer with minimum assistance   with  rolling walker   Functional Mobility: Pt took a few sidesteps Min A with a RW. Declined OOB to chair.    Activities of Daily Living:  Lower Body Dressing: total assistance     AMPAC 6 Click ADL: 17    Treatment & Education:  UE ROM/MMT  Bed mobility training / assessment  Functional mobility assessment  Sit/standing balance assessment  Educated on importance of sitting OOB in bedside chair to promote increased strength, endurance & breathing.  Discussed OT POC / Post-acute plan    Patient left supine with all lines intact and call button in reach    GOALS:   Multidisciplinary Problems       Occupational Therapy Goals          Problem: Occupational Therapy    Goal Priority Disciplines Outcome Interventions   Occupational Therapy Goal     OT, PT/OT Progressing    Description: Goals to be met by: 3/30/25    Patient will increase functional independence with ADLs by performing:    UE Dressing with Set-up Assistance.  LE Dressing with Minimal Assistance.  Grooming while standing at sink with Stand-by Assistance.  Toileting from toilet with Minimal Assistance for hygiene and clothing management.   Supine to sit with Supervision.  Step transfer with Stand-by Assistance  Toilet transfer to toilet with Stand-by Assistance.  Upper extremity exercise program per handout, with independence.                         DME Justifications:   Jam requires a commode for home use because he is confined to a single room.    Time Tracking:     OT Date of Treatment: 03/23/25  OT Start Time: 0913  OT Stop Time: 0927  OT Total Time (min): 14 min    Billable Minutes:Evaluation 14    OT/ANJALI: OT          3/23/2025

## 2025-03-23 NOTE — SUBJECTIVE & OBJECTIVE
"Past Medical History:   Diagnosis Date    CAD (coronary artery disease) 03/2010    Hx of MI, CABG,  and cardiac cath    Chronic hepatitis C with cirrhosis     GERD (gastroesophageal reflux disease)     HTN (hypertension)     Human immunodeficiency virus (HIV) disease     Hyperlipidemia     Organic brain syndrome        Past Surgical History:   Procedure Laterality Date    CARDIAC CATHETERIZATION      CORONARY ANGIOGRAPHY N/A 12/20/2024    Procedure: ANGIOGRAM, CORONARY ARTERY;  Surgeon: Yannick Gastelum III, MD;  Location: [facility] CATH LAB;  Service: Cardiology;  Laterality: N/A;    CORONARY ARTERY BYPASS GRAFT      CORONARY BYPASS GRAFT ANGIOGRAPHY  12/20/2024    Procedure: Bypass graft study;  Surgeon: Yannick Gastelum III, MD;  Location: [facility] CATH LAB;  Service: Cardiology;;    ESOPHAGOGASTRODUODENOSCOPY N/A 2/7/2025    Procedure: EGD (ESOPHAGOGASTRODUODENOSCOPY);  Surgeon: Jerald Vo MD;  [facility] ([facility]);  Service: Endoscopy;  Laterality: N/A;  2nd floor - recent brain bleed 10/24 ref [facility], portal, neurosur clear-st  per neuro E Chivo PA-Patient is cleared for endoscopy-GT  12/12-tb-mess re-sent to [facility] to order plat  12/11 r/s, portal, card cl pend-st  12/26-r/s to earlier date-ca    RIGHT HEART CATHETERIZATION Right 12/20/2024    Procedure: INSERTION, CATHETER, RIGHT HEART;  Surgeon: Yannick Gastelum III, MD;  Location: [facility] CATH LAB;  Service: Cardiology;  Laterality: Right;    TONSILLECTOMY AND ADENOIDECTOMY      TRIGGER FINGER RELEASE         Review of patient's allergies indicates:   Allergen Reactions    Hydrocodone-acetaminophen Other (See Comments)     "cannot sleep when taking" Does not want to take    Lisinopril Other (See Comments)     Other reaction(s): Cough    Sulfamethoxazole-trimethoprim Rash     Current Facility-Administered Medications   Medication Frequency    (Magic mouthwash) 1:1:1 diphenhydrAMINE(Benadryl) 12.5mg/5ml liq, aluminum & magnesium " hydroxide-simethicone (Maalox), LIDOcaine viscous 2% Q4H PRN    acetaminophen tablet 650 mg Q4H PRN    albumin human 25% bottle 60 g BID    atorvastatin tablet 40 mg Daily    atovaquone 750 mg/5 mL oral liquid 1,500 mg Daily    baclofen tablet 10 mg TID PRN    bisacodyL suppository 10 mg Daily PRN    cefTRIAXone injection 1 g Q24H    cyanocobalamin tablet 1,000 mcg Daily    elviteg-cob-emtri-tenof ALAFEN 246-445-843-10 mg Tab Daily    FLUoxetine capsule 20 mg Daily    heparin (porcine) injection 5,000 Units Q8H    lactulose 20 gram/30 mL solution Soln 30 g BID    levETIRAcetam tablet 500 mg BID    melatonin tablet 6 mg Nightly PRN    metoprolol succinate (TOPROL-XL) 24 hr tablet 25 mg Daily    mupirocin 2 % ointment BID    ondansetron disintegrating tablet 8 mg Q6H PRN    pantoprazole EC tablet 40 mg Daily    prochlorperazine injection Soln 5 mg Q6H PRN    pyridoxine (vitamin B6) tablet 100 mg Daily    rifAXIMin tablet 550 mg BID    sodium chloride 0.9% flush 10 mL PRN    vitamin D 1000 units tablet 1,000 Units Daily     Family History    None       Tobacco Use    Smoking status: Former     Types: Cigarettes    Smokeless tobacco: Never   Substance and Sexual Activity    Alcohol use: Not Currently    Drug use: Not Currently    Sexual activity: Not on file     Review of Systems  Objective:     Vital Signs (Most Recent):  Temp: 97.9 °F (36.6 °C) (03/23/25 0745)  Pulse: 75 (03/23/25 0445)  Resp: 18 (03/23/25 0745)  BP: 125/60 (03/23/25 0745)  SpO2: (!) 92 % (03/23/25 0745) Vital Signs (24h Range):  Temp:  [97 °F (36.1 °C)-98.6 °F (37 °C)] 97.9 °F (36.6 °C)  Pulse:  [68-81] 75  Resp:  [16-18] 18  SpO2:  [92 %-96 %] 92 %  BP: (100-134)/(50-71) 125/60        There is no height or weight on file to calculate BMI.  There is no height or weight on file to calculate BSA.    No intake/output data recorded.     Physical Exam  HENT:      Head: Normocephalic.      Mouth/Throat:      Mouth: Mucous membranes are moist.  "  Cardiovascular:      Rate and Rhythm: Normal rate.      Pulses: Normal pulses.   Pulmonary:      Effort: Pulmonary effort is normal.   Musculoskeletal:      Cervical back: Normal range of motion.   Skin:     General: Skin is warm.   Neurological:      General: No focal deficit present.      Mental Status: He is alert.   Psychiatric:         Mood and Affect: Mood normal.          Significant Labs:  ABGs: No results for input(s): "PH", "PCO2", "HCO3", "POCSATURATED", "BE" in the last 168 hours.  BMP:   Recent Labs   Lab 03/23/25 0614   *   K 4.3      CO2 17*   BUN 18   CREATININE 1.2   CALCIUM 8.6*   MG 2.1     Cardiac Markers: No results for input(s): "CKMB", "TROPONINT", "MYOGLOBIN" in the last 168 hours.  CBC:   Recent Labs   Lab 03/23/25 0614   WBC 2.98*   RBC 2.67*   HGB 8.7*   HCT 26.6*   PLT 49*   *   MCH 32.6   MCHC 32.7     CMP:   Recent Labs   Lab 03/23/25 0614   CALCIUM 8.6*   ALBUMIN 1.5*   *   K 4.3   CO2 17*      BUN 18   CREATININE 1.2   ALKPHOS 125   ALT 35   *   BILITOT 9.2*     Coagulation:   Recent Labs   Lab 03/23/25 0614   INR 2.4*     LFTs:   Recent Labs   Lab 03/23/25 0614   ALT 35   *   ALKPHOS 125   BILITOT 9.2*   ALBUMIN 1.5*     Microbiology Results (last 7 days)       Procedure Component Value Units Date/Time    Blood culture [[id number]]     Order Status: Sent Specimen: Blood     Blood culture [[id number]]     Order Status: Sent Specimen: Blood           Specimen (24h ago, onward)      None          PTH: No results for input(s): "PTH" in the last 168 hours.  TSH: No results for input(s): "TSH" in the last 168 hours.  No results for input(s): "COLORU", "CLARITYU", "SPECGRAV", "PHUR", "PROTEINUA", "GLUCOSEU", "BILIRUBINCON", "BLOODU", "WBCU", "RBCU", "BACTERIA", "MUCUS", "NITRITE", "LEUKOCYTESUR", "UROBILINOGEN", "HYALINECASTS" in the last 168 hours.      "

## 2025-03-23 NOTE — CONSULTS
[facility] - Transplant Stepdown  Nephrology  Consult Note    Patient Name: Jam Card  MRN: [id number]  Admission Date: 3/22/2025  Hospital Length of Stay: 1 days  Attending Provider: Kerry Lopez MD   Primary Care Physician: Ulysses Almaraz MD  Principal Problem:Cirrhosis of liver with ascites    Inpatient consult to Nephrology  Consult performed by: Holly Argueta MD  Consult ordered by: Kerry Lopez MD  Reason for consult: hyponatremia        Subjective:     HPI: Mr. Card is a 59 y.o. male with PMH of HCV cirrhosis and HIV. Liver disease c/b ascites/edema, HE. 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 Previously evaluated for liver transplant but deffered due to pulmonary hypertension. He presented to OSH ED with generalized weakness, frequent falls, gait instability. Hit his lip, wound noted. Denies hitting his head or LOC. Denies any vomiting, diarrhea, blood in his stool, fever. W/u in ED: CTH neg for bleed/acute abnormality, CXR without infection, UA neg for infection. Hyponatremia to 125, albumin 1.7, LFTs elevated (expected), Cr at BL. OSH d/w Dr. Cloud, patient [place] [facility] [place] decompensation including overt hepatic encephalopathy (confusion), worsening hyponatremia, coagulopathy and jaundice. Dey placed for retention. Patient admitted under Dr. Walden, will transfer to IML service in AM.   Nephrology consulted for hyponatremia    Past Medical History:   Diagnosis Date    CAD (coronary artery disease) 03/2010    Hx of MI, CABG,  and cardiac cath    Chronic hepatitis C with cirrhosis     GERD (gastroesophageal reflux disease)     HTN (hypertension)     Human immunodeficiency virus (HIV) disease     Hyperlipidemia     Organic brain syndrome        Past Surgical History:   Procedure Laterality Date    CARDIAC CATHETERIZATION      CORONARY ANGIOGRAPHY N/A 12/20/2024    Procedure: ANGIOGRAM, CORONARY ARTERY;  Surgeon: Yannick Gastelum III, MD;   "Location: [facility] CATH LAB;  Service: Cardiology;  Laterality: N/A;    CORONARY ARTERY BYPASS GRAFT      CORONARY BYPASS GRAFT ANGIOGRAPHY  12/20/2024    Procedure: Bypass graft study;  Surgeon: Yannick Gastelum III, MD;  Location: [facility] CATH LAB;  Service: Cardiology;;    ESOPHAGOGASTRODUODENOSCOPY N/A 2/7/2025    Procedure: EGD (ESOPHAGOGASTRODUODENOSCOPY);  Surgeon: Jerald Vo MD;  Location: [facility] (2ND FLR);  Service: Endoscopy;  Laterality: N/A;  2nd floor - recent brain bleed 10/24 ref [facility], portal, neurosur clear-st  per neuro E Chivo PA-Patient is cleared for endoscopy-GT  12/12-tb-mess re-sent to [facility] to order plat  12/11 r/s, portal, card cl pend-st  12/26-r/s to earlier date-ca    RIGHT HEART CATHETERIZATION Right 12/20/2024    Procedure: INSERTION, CATHETER, RIGHT HEART;  Surgeon: Yannick Gastelum III, MD;  Location: [facility] CATH LAB;  Service: Cardiology;  Laterality: Right;    TONSILLECTOMY AND ADENOIDECTOMY      TRIGGER FINGER RELEASE         Review of patient's allergies indicates:   Allergen Reactions    Hydrocodone-acetaminophen Other (See Comments)     "cannot sleep when taking" Does not want to take    Lisinopril Other (See Comments)     Other reaction(s): Cough    Sulfamethoxazole-trimethoprim Rash     Current Facility-Administered Medications   Medication Frequency    (Magic mouthwash) 1:1:1 diphenhydrAMINE(Benadryl) 12.5mg/5ml liq, aluminum & magnesium hydroxide-simethicone (Maalox), LIDOcaine viscous 2% Q4H PRN    acetaminophen tablet 650 mg Q4H PRN    albumin human 25% bottle 60 g BID    atorvastatin tablet 40 mg Daily    atovaquone 750 mg/5 mL oral liquid 1,500 mg Daily    baclofen tablet 10 mg TID PRN    bisacodyL suppository 10 mg Daily PRN    cefTRIAXone injection 1 g Q24H    cyanocobalamin tablet 1,000 mcg Daily    elviteg-cob-emtri-tenof ALAFEN 635-580-623-10 mg Tab Daily    FLUoxetine capsule 20 mg Daily    heparin (porcine) injection 5,000 Units Q8H    lactulose 20 " "gram/30 mL solution Soln 30 g BID    levETIRAcetam tablet 500 mg BID    melatonin tablet 6 mg Nightly PRN    metoprolol succinate (TOPROL-XL) 24 hr tablet 25 mg Daily    mupirocin 2 % ointment BID    ondansetron disintegrating tablet 8 mg Q6H PRN    pantoprazole EC tablet 40 mg Daily    prochlorperazine injection Soln 5 mg Q6H PRN    pyridoxine (vitamin B6) tablet 100 mg Daily    rifAXIMin tablet 550 mg BID    sodium chloride 0.9% flush 10 mL PRN    vitamin D 1000 units tablet 1,000 Units Daily     Family History    None       Tobacco Use    Smoking status: Former     Types: Cigarettes    Smokeless tobacco: Never   Substance and Sexual Activity    Alcohol use: Not Currently    Drug use: Not Currently    Sexual activity: Not on file     Review of Systems  Objective:     Vital Signs (Most Recent):  Temp: 97.9 °F (36.6 °C) (03/23/25 0745)  Pulse: 75 (03/23/25 0445)  Resp: 18 (03/23/25 0745)  BP: 125/60 (03/23/25 0745)  SpO2: (!) 92 % (03/23/25 0745) Vital Signs (24h Range):  Temp:  [97 °F (36.1 °C)-98.6 °F (37 °C)] 97.9 °F (36.6 °C)  Pulse:  [68-81] 75  Resp:  [16-18] 18  SpO2:  [92 %-96 %] 92 %  BP: (100-134)/(50-71) 125/60        There is no height or weight on file to calculate BMI.  There is no height or weight on file to calculate BSA.    No intake/output data recorded.     Physical Exam  HENT:      Head: Normocephalic.      Mouth/Throat:      Mouth: Mucous membranes are moist.   Cardiovascular:      Rate and Rhythm: Normal rate.      Pulses: Normal pulses.   Pulmonary:      Effort: Pulmonary effort is normal.   Musculoskeletal:      Cervical back: Normal range of motion.   Skin:     General: Skin is warm.   Neurological:      General: No focal deficit present.      Mental Status: He is alert.   Psychiatric:         Mood and Affect: Mood normal.          Significant Labs:  ABGs: No results for input(s): "PH", "PCO2", "HCO3", "POCSATURATED", "BE" in the last 168 hours.  BMP:   Recent Labs   Lab 03/23/25  0614   NA " "123*   K 4.3      CO2 17*   BUN 18   CREATININE 1.2   CALCIUM 8.6*   MG 2.1     Cardiac Markers: No results for input(s): "CKMB", "TROPONINT", "MYOGLOBIN" in the last 168 hours.  CBC:   Recent Labs   Lab 03/23/25 0614   WBC 2.98*   RBC 2.67*   HGB 8.7*   HCT 26.6*   PLT 49*   *   MCH 32.6   MCHC 32.7     CMP:   Recent Labs   Lab 03/23/25 0614   CALCIUM 8.6*   ALBUMIN 1.5*   *   K 4.3   CO2 17*      BUN 18   CREATININE 1.2   ALKPHOS 125   ALT 35   *   BILITOT 9.2*     Coagulation:   Recent Labs   Lab 03/23/25 0614   INR 2.4*     LFTs:   Recent Labs   Lab 03/23/25 0614   ALT 35   *   ALKPHOS 125   BILITOT 9.2*   ALBUMIN 1.5*     Microbiology Results (last 7 days)       Procedure Component Value Units Date/Time    Blood culture [[id number]]     Order Status: Sent Specimen: Blood     Blood culture [[id number]]     Order Status: Sent Specimen: Blood           Specimen (24h ago, onward)      None          PTH: No results for input(s): "PTH" in the last 168 hours.  TSH: No results for input(s): "TSH" in the last 168 hours.  No results for input(s): "COLORU", "CLARITYU", "SPECGRAV", "PHUR", "PROTEINUA", "GLUCOSEU", "BILIRUBINCON", "BLOODU", "WBCU", "RBCU", "BACTERIA", "MUCUS", "NITRITE", "LEUKOCYTESUR", "UROBILINOGEN", "HYALINECASTS" in the last 168 hours.      Assessment/Plan:     Endocrine  Hyponatremia  Sodium drop to 123. On 12 was 131 on exam look euvolemic  Cause of hyponatremia unknown at this time given incomplete labs    Scr stable at baseline 1.2--GFR>60      Recommendation  Recommend to obtain urine sodium , urine osm , and serum osm (order placed)  Fluid restriction <1L  Avoid D5 infusion  Sodium correction goal is 6-8 meq/24 hr      GI  * Cirrhosis of liver with ascites  Per primary        Thank you for your consult. I will follow-up with patient. Please contact us if you have any additional questions.    Holly Argueta MD  Nephrology  [facility] - Transplant Stepdown  "

## 2025-03-23 NOTE — HPI
Mr. Card is a 59 y.o. male with PMH of HCV cirrhosis and HIV. Liver disease c/b ascites/edema, HE. 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 Previously evaluated for liver transplant but deffered due to pulmonary hypertension. He presented to OSH ED with generalized weakness, frequent falls, gait instability. Hit his lip, wound noted. Denies hitting his head or LOC. Denies any vomiting, diarrhea, blood in his stool, fever. W/u in ED: CTH neg for bleed/acute abnormality, CXR without infection, UA neg for infection. Hyponatremia to 125, albumin 1.7, LFTs elevated (expected), Cr at BL. OSH d/w Dr. Cloud, patient admitted to [facility] [place] for worsening decompensation including overt hepatic encephalopathy (confusion), worsening hyponatremia, coagulopathy and jaundice. Dey placed for retention. Patient admitted under Dr. Walden, will transfer to IML service in AM.

## 2025-03-23 NOTE — ASSESSMENT & PLAN NOTE
- received 1 unit PRBCs at OSH   - likely chronic 2/2 liver disease   - transfuse for hgb <7   - monitor with cbc

## 2025-03-23 NOTE — PT/OT/SLP EVAL
"Physical Therapy Co-Evaluation    Patient Name:  Jam Card   MRN:  [id number]    Recommendations:     Discharge Recommendations: Moderate Intensity Therapy   Discharge Equipment Recommendations: bedside commode   Barriers to discharge: Decreased caregiver support    Co-eval performed to appropriately and safely assess patient's strength and endurance while facilitating functional tasks in addition to accommodating for patient's activity/pain tolerance.    Assessment:     Jam Card is a 59 y.o. male admitted with a medical diagnosis of Cirrhosis of liver with ascites.  He presents with the following impairments/functional limitations: weakness, impaired endurance, impaired cognition, impaired self care skills, impaired functional mobility, gait instability, decreased safety awareness, impaired balance. Pt required light assist for transfers and side stepping. Pt resistant to attempting ambulation or transfer to chair, stating "I can't walk". Patient currently demonstrates a need for moderate intensity therapy on a daily basis post acute secondary to a decline in functional status due to illness      Rehab Prognosis: Good; patient would benefit from acute skilled PT services to address these deficits and reach maximum level of function.    Recent Surgery: * No surgery found *      Plan:     During this hospitalization, patient to be seen 4 x/week to address the identified rehab impairments via gait training, therapeutic activities, therapeutic exercises, neuromuscular re-education and progress toward the following goals:    Plan of Care Expires:  04/20/25    Subjective     Patient/Family Comments/goals: "I can't walk"   Pain/Comfort:  Pain Rating 1: 0/10  Pain Rating Post-Intervention 1: 0/10    Patients cultural, spiritual, [employer] conflicts given the current situation: no    Living Environment:  Pt lives with mother in [facility] with no NAHUM.   Prior to admission, patients level of function was Mod(I) " using RW.  Equipment used at home: bath bench, walker, rolling.  DME owned (not currently used): none.  Upon discharge, patient will have assistance from unknown.    Objective:     Communicated with RN prior to session.  Patient found supine with peripheral IV, barcenas catheter  upon PT entry to room.    General Precautions: Standard, fall  Orthopedic Precautions:N/A   Braces: N/A  Respiratory Status: Room air    Exams:  Cognitive Exam:  Patient is oriented to Person, Place, and Time  Sensation:    -       Intact  RLE ROM: WFL  RLE Strength: grossly 3/5  LLE ROM: WFL  LLE Strength: grossly 3/5    Functional Mobility:  Bed Mobility:     Supine to Sit: minimum assistance  Sit to Supine: stand by assistance  Transfers:     Sit to Stand:  minimum assistance with rolling walker  Gait: ~4 side steps using RW with Min A  Required cueing for RW management   Pt declined OOB to chair    AM-PAC 6 CLICK MOBILITY  Total Score:16       Treatment & Education:  Patient educated on role of therapy, goals of session, and benefits of mobilizing.   Discussed PT plan of care during hospitalization.   Patient educated on calling for assistance.   All questions answered within PT scope of practice.      Patient left supine with all lines intact, call button in reach, bed alarm on, and RN notified.    GOALS:   Multidisciplinary Problems       Physical Therapy Goals          Problem: Physical Therapy    Goal Priority Disciplines Outcome Interventions   Physical Therapy Goal     PT, PT/OT Progressing    Description: Goals to be met by: 2025     Patient will increase functional independence with mobility by performin. Supine to sit with Modified [place]  2. Sit to stand transfer with Supervision using LRAD  3. Bed to chair transfer with Supervision using LRAD  4. Gait  x 50 feet with Stand-by Assistance using LRAD.                          DME Justifications:   Jam requires a commode for home use because he is confined to a  single room.    History:     Past Medical History:   Diagnosis Date    CAD (coronary artery disease) 03/2010    Hx of MI, CABG,  and cardiac cath    Chronic hepatitis C with cirrhosis     GERD (gastroesophageal reflux disease)     HTN (hypertension)     Human immunodeficiency virus (HIV) disease     Hyperlipidemia     Organic brain syndrome        Past Surgical History:   Procedure Laterality Date    CARDIAC CATHETERIZATION      CORONARY ANGIOGRAPHY N/A 12/20/2024    Procedure: ANGIOGRAM, CORONARY ARTERY;  Surgeon: Yannick Gastelum III, MD;  Location: [facility] CATH LAB;  Service: Cardiology;  Laterality: N/A;    CORONARY ARTERY BYPASS GRAFT      CORONARY BYPASS GRAFT ANGIOGRAPHY  12/20/2024    Procedure: Bypass graft study;  Surgeon: Yannick Gastelum III, MD;  Location: [facility] CATH LAB;  Service: Cardiology;;    ESOPHAGOGASTRODUODENOSCOPY N/A 2/7/2025    Procedure: EGD (ESOPHAGOGASTRODUODENOSCOPY);  Surgeon: Jerald Vo MD;  Location: [facility] ([address] [place] [address]);  Service: Endoscopy;  Laterality: N/A;  2nd floor - recent brain bleed 10/24 ref [facility], portal, neurosur clear-st  per neuro E Chivo PA-Patient is cleared for endoscopy-GT  12/12-tb-mess re-sent to [facility] to order plat  12/11 r/s, portal, card cl pend-st  12/26-r/s to earlier date-ca    RIGHT HEART CATHETERIZATION Right 12/20/2024    Procedure: INSERTION, CATHETER, RIGHT HEART;  Surgeon: Yannick Gastelum III, MD;  Location: [facility] CATH LAB;  Service: Cardiology;  Laterality: Right;    TONSILLECTOMY AND ADENOIDECTOMY      TRIGGER FINGER RELEASE         Time Tracking:     PT Received On: 03/23/25  PT Start Time: 0912     PT Stop Time: 0928  PT Total Time (min): 16 min     Billable Minutes: Evaluation 8 and Gait Training 8      03/23/2025

## 2025-03-23 NOTE — PLAN OF CARE
Pt arrived to [id number] via EMS. Alert and oriented with slow responses. Dey in place draining demar urine. Pt belongings accounted for. Skin jaundice. Will be with LTS team.

## 2025-03-23 NOTE — PLAN OF CARE
Eval completed; POC established     Problem: Physical Therapy  Goal: Physical Therapy Goal  Description: Goals to be met by: 2025     Patient will increase functional independence with mobility by performin. Supine to sit with Modified [place]  2. Sit to stand transfer with Supervision using LRAD  3. Bed to chair transfer with Supervision using LRAD  4. Gait  x 50 feet with Stand-by Assistance using LRAD.     Outcome: Progressing

## 2025-03-23 NOTE — HPI
Mr. Card is a 59 y.o. male with PMH of HCV cirrhosis and HIV. Liver disease c/b ascites/edema, HE. 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 Previously evaluated for liver transplant but deffered due to pulmonary hypertension. He presented to OSH ED with generalized weakness, frequent falls, gait instability. Hit his lip, wound noted. Denies hitting his head or LOC. Denies any vomiting, diarrhea, blood in his stool, fever. W/u in ED: CTH neg for bleed/acute abnormality, CXR without infection, UA neg for infection. Hyponatremia to 125, albumin 1.7, LFTs elevated (expected), Cr at BL. OSH d/w Dr. Cloud, patient admitted to [facility] for worsening decompensation including overt hepatic encephalopathy (confusion), worsening hyponatremia, coagulopathy and jaundice. Dey placed for retention. Patient admitted under Dr. Walden, will transfer to IML service in AM.   Nephrology consulted for hyponatremia

## 2025-03-23 NOTE — SUBJECTIVE & OBJECTIVE
"Past Medical History:   Diagnosis Date    CAD (coronary artery disease) 03/2010    Hx of MI, CABG,  and cardiac cath    Chronic hepatitis C with cirrhosis     GERD (gastroesophageal reflux disease)     HTN (hypertension)     Human immunodeficiency virus (HIV) disease     Hyperlipidemia     Organic brain syndrome        Past Surgical History:   Procedure Laterality Date    CARDIAC CATHETERIZATION      CORONARY ANGIOGRAPHY N/A 12/20/2024    Procedure: ANGIOGRAM, CORONARY ARTERY;  Surgeon: Yannick Gastelum III, MD;  Location: [facility] CATH LAB;  Service: Cardiology;  Laterality: N/A;    CORONARY ARTERY BYPASS GRAFT      CORONARY BYPASS GRAFT ANGIOGRAPHY  12/20/2024    Procedure: Bypass graft study;  Surgeon: Yannick Gastelum III, MD;  Location: [facility] CATH LAB;  Service: Cardiology;;    ESOPHAGOGASTRODUODENOSCOPY N/A 2/7/2025    Procedure: EGD (ESOPHAGOGASTRODUODENOSCOPY);  Surgeon: Jerald Vo MD;  [facility]  Service: Endoscopy;  Laterality: N/A;  2nd floor - recent brain bleed 10/24 ref [facility], portal, neurosur clear-st  per neuro E Chivo PA-Patient is cleared for endoscopy-GT  12/12-tb-mess re-sent to [facility] to order plat  12/11 r/s, portal, card cl pend-st  12/26-r/s to earlier date-ca    RIGHT HEART CATHETERIZATION Right 12/20/2024    Procedure: INSERTION, CATHETER, RIGHT HEART;  Surgeon: Yannick Gastelum III, MD;  Location: [facility] CATH LAB;  Service: Cardiology;  Laterality: Right;    TONSILLECTOMY AND ADENOIDECTOMY      TRIGGER FINGER RELEASE         Review of patient's allergies indicates:   Allergen Reactions    Hydrocodone-acetaminophen Other (See Comments)     "cannot sleep when taking" Does not want to take    Lisinopril Other (See Comments)     Other reaction(s): Cough    Sulfamethoxazole-trimethoprim Rash       Family History    None       Tobacco Use    Smoking status: Former     Types: Cigarettes    Smokeless tobacco: Never   Substance and Sexual Activity    Alcohol use: Not " Currently    Drug use: Not Currently    Sexual activity: Not on file       PTA Medications   Medication Sig    atovaquone (MEPRON) 750 mg/5 mL Susp oral liquid Take 10 mLs (1,500 mg total) by mouth once daily.    baclofen (LIORESAL) 5 mg Tab tablet Take 2 tablets (10 mg total) by mouth 3 (three) times daily as needed (Muscle Spasticity).    bisacodyL (DULCOLAX) 10 mg Supp Place 1 suppository rectally daily as needed (constipation).    cyanocobalamin (VITAMIN B-12) 1000 MCG tablet Take 1,000 mcg by mouth once daily.    ergocalciferol (ERGOCALCIFEROL) 50,000 unit Cap Take 50,000 Units by mouth every 7 days.    FLUoxetine 20 MG capsule Take 20 mg by mouth once daily.    furosemide (LASIX) 20 MG tablet Take 2 tablets (40 mg total) by mouth once daily.    GENVOYA [phone] mg Tab TAKE ONE TABLET BY MOUTH DAILY WITH FOOD.    lactulose (CHRONULAC) 10 gram/15 mL solution TAKE 30 MLS BY MOUTH TWICE DAILY. GOAL OF 3-5 SOFT STOOLS EACH DAY    levETIRAcetam (KEPPRA) 500 MG Tab Take 1 tablet (500 mg total) by mouth 2 (two) times daily. Take medication until follow-up with your provider.    loratadine (CLARITIN) 10 mg tablet Take 10 mg by mouth once daily.    meclizine (ANTIVERT) 12.5 mg tablet Take 6.25 mg by mouth 2 (two) times daily as needed for Dizziness.    metoprolol succinate (TOPROL-XL) 25 MG 24 hr tablet Take 25 mg by mouth once daily.    midodrine (PROAMATINE) 2.5 MG Tab Take 1 tablet (2.5 mg total) by mouth 3 (three) times daily as needed (if systolic blood pressure (top number) is less than 100).    pantoprazole (PROTONIX) 40 MG tablet Take 40 mg by mouth once daily.    PROMACTA 25 mg Tab     pyridoxine, vitamin B6, (B-6) 100 MG Tab Take 100 mg by mouth once daily.    rifAXIMin (XIFAXAN) 550 mg Tab Take 550 mg by mouth 2 (two) times daily.    rosuvastatin (CRESTOR) 10 MG tablet Take 10 mg by mouth every evening.    spironolactone (ALDACTONE) 25 MG tablet Take 50 mg by mouth once daily.    vitamin D (VITAMIN D3)  1000 units Tab Take 1,000 Units by mouth once daily.       Review of Systems   Constitutional:  Positive for activity change. Negative for fever.   HENT:  Negative for trouble swallowing.    Respiratory:  Negative for cough and shortness of breath.    Cardiovascular:  Positive for leg swelling. Negative for chest pain.   Gastrointestinal:  Positive for abdominal distention. Negative for abdominal pain, constipation, diarrhea, nausea and vomiting.   Genitourinary:  Positive for difficulty urinating. Negative for decreased urine volume.        +barcenas   Musculoskeletal:  Negative for arthralgias and back pain.   Skin:  Negative for wound.   Neurological:  Negative for dizziness and weakness.   Psychiatric/Behavioral:  Negative for confusion.    All other systems reviewed and are negative.    Objective:     Vital Signs (Most Recent):  Temp: 98.2 °F (36.8 °C) (03/22/25 2309)  Pulse: 68 (03/22/25 2309)  Resp: 16 (03/22/25 2309)  BP: 119/67 (03/22/25 2309)  SpO2: (!) 94 % (03/22/25 2309) Vital Signs (24h Range):  Temp:  [97 °F (36.1 °C)-98.2 °F (36.8 °C)] 98.2 °F (36.8 °C)  Pulse:  [68-81] 68  Resp:  [16-18] 16  SpO2:  [94 %-96 %] 94 %  BP: (100-123)/(50-71) 119/67        There is no height or weight on file to calculate BMI.    No intake or output data in the 24 hours ending 03/23/25 0108     Physical Exam  Vitals and nursing note reviewed.   Constitutional:       General: He is awake. He is not in acute distress.     Appearance: He is not ill-appearing.   HENT:      Head: Normocephalic.      Mouth/Throat:      Mouth: Mucous membranes are moist.      Comments: +lip wound  Eyes:      General: Scleral icterus present.   Cardiovascular:      Rate and Rhythm: Normal rate and regular rhythm.      Pulses: Normal pulses.      Heart sounds: No murmur heard.  Pulmonary:      Effort: Pulmonary effort is normal. No respiratory distress.      Breath sounds: No wheezing or rales.   Abdominal:      General: Bowel sounds are normal.  There is distension.      Palpations: Abdomen is soft.      Tenderness: There is no abdominal tenderness. There is no guarding or rebound.   Musculoskeletal:         General: Normal range of motion.      Cervical back: Normal range of motion.      Right lower leg: Edema present.      Left lower leg: Edema present.   Skin:     General: Skin is warm and dry.   Neurological:      Mental Status: He is alert and oriented to person, place, and time.      Motor: Weakness present.      Comments: Answers questions appropriately though speaks slowly with minor slurring. Likely baseline.   Psychiatric:         Mood and Affect: Mood normal.         Behavior: Behavior normal.         Thought Content: Thought content normal.         Judgment: Judgment normal.          Laboratory:  Labs within the past 24 hours have been reviewed.    Diagnostic Results:  I have personally reviewed all pertinent imaging studies.

## 2025-03-23 NOTE — H&P
[facility] - Transplant Stepdown  Liver Transplant  History & Physical    Patient Name: Jam Card  MRN: [id number]  Admission Date: 3/22/2025  Code Status: Full Code  Primary Care Provider: Ulysses Almaraz MD      Subjective:     History of Present Illness:  Mr. Card is a 59 y.o. male with PMH of HCV cirrhosis and HIV. Liver disease c/b ascites/edema, HE. 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 Previously evaluated for liver transplant but deffered due to pulmonary hypertension. He presented to OSH ED with generalized weakness, frequent falls, gait instability. Hit his lip, wound noted. Denies hitting his head or LOC. Denies any vomiting, diarrhea, blood in his stool, fever. W/u in ED: CTH neg for bleed/acute abnormality, CXR without infection, UA neg for infection. Hyponatremia to 125, albumin 1.7, LFTs elevated (expected), Cr at BL. OSH d/w Dr. Cloud, patient admitted to [facility] [place] for worsening decompensation including overt hepatic encephalopathy (confusion), worsening hyponatremia, coagulopathy and jaundice. Dey placed for retention. Patient admitted under Dr. Walden, will transfer to IML service in AM.    Past Medical History:   Diagnosis Date    CAD (coronary artery disease) 03/2010    Hx of MI, CABG,  and cardiac cath    Chronic hepatitis C with cirrhosis     GERD (gastroesophageal reflux disease)     HTN (hypertension)     Human immunodeficiency virus (HIV) disease     Hyperlipidemia     Organic brain syndrome        Past Surgical History:   Procedure Laterality Date    CARDIAC CATHETERIZATION      CORONARY ANGIOGRAPHY N/A 12/20/2024    Procedure: ANGIOGRAM, CORONARY ARTERY;  Surgeon: Yannick Gastelum III, MD;  Location: [facility] CATH LAB;  Service: Cardiology;  Laterality: N/A;    CORONARY ARTERY BYPASS GRAFT      CORONARY BYPASS GRAFT ANGIOGRAPHY  12/20/2024    Procedure: Bypass graft study;  Surgeon: Yannick Gastelum III, MD;  Location: [facility] CATH LAB;  Service: Cardiology;;     "ESOPHAGOGASTRODUODENOSCOPY N/A 2/7/2025    Procedure: EGD (ESOPHAGOGASTRODUODENOSCOPY);  Surgeon: Jerald Vo MD;  Location: [facility] ENDO ([address] [place] [address]);  Service: Endoscopy;  Laterality: N/A;  2nd floor - recent brain bleed 10/24 ref [facility], portal, neurosur clear-st  per neuro E Chivo PA-Patient is cleared for endoscopy-GT  12/12-tb-mess re-sent to [facility] to order plat  12/11 r/s, portal, card cl pend-st  12/26-r/s to earlier date-ca    RIGHT HEART CATHETERIZATION Right 12/20/2024    Procedure: INSERTION, CATHETER, RIGHT HEART;  Surgeon: Yannick Gastelum III, MD;  Location: [facility] CATH LAB;  Service: Cardiology;  Laterality: Right;    TONSILLECTOMY AND ADENOIDECTOMY      TRIGGER FINGER RELEASE         Review of patient's allergies indicates:   Allergen Reactions    Hydrocodone-acetaminophen Other (See Comments)     "cannot sleep when taking" Does not want to take    Lisinopril Other (See Comments)     Other reaction(s): Cough    Sulfamethoxazole-trimethoprim Rash       Family History    None       Tobacco Use    Smoking status: Former     Types: Cigarettes    Smokeless tobacco: Never   Substance and Sexual Activity    Alcohol use: Not Currently    Drug use: Not Currently    Sexual activity: Not on file       PTA Medications   Medication Sig    atovaquone (MEPRON) 750 mg/5 mL Susp oral liquid Take 10 mLs (1,500 mg total) by mouth once daily.    baclofen (LIORESAL) 5 mg Tab tablet Take 2 tablets (10 mg total) by mouth 3 (three) times daily as needed (Muscle Spasticity).    bisacodyL (DULCOLAX) 10 mg Supp Place 1 suppository rectally daily as needed (constipation).    cyanocobalamin (VITAMIN B-12) 1000 MCG tablet Take 1,000 mcg by mouth once daily.    ergocalciferol (ERGOCALCIFEROL) 50,000 unit Cap Take 50,000 Units by mouth every 7 days.    FLUoxetine 20 MG capsule Take 20 mg by mouth once daily.    furosemide (LASIX) 20 MG tablet Take 2 tablets (40 mg total) by mouth once daily.    GENVOYA [phone] mg Tab " TAKE ONE TABLET BY MOUTH DAILY WITH FOOD.    lactulose (CHRONULAC) 10 gram/15 mL solution TAKE 30 MLS BY MOUTH TWICE DAILY. GOAL OF 3-5 SOFT STOOLS EACH DAY    levETIRAcetam (KEPPRA) 500 MG Tab Take 1 tablet (500 mg total) by mouth 2 (two) times daily. Take medication until follow-up with your provider.    loratadine (CLARITIN) 10 mg tablet Take 10 mg by mouth once daily.    meclizine (ANTIVERT) 12.5 mg tablet Take 6.25 mg by mouth 2 (two) times daily as needed for Dizziness.    metoprolol succinate (TOPROL-XL) 25 MG 24 hr tablet Take 25 mg by mouth once daily.    midodrine (PROAMATINE) 2.5 MG Tab Take 1 tablet (2.5 mg total) by mouth 3 (three) times daily as needed (if systolic blood pressure (top number) is less than 100).    pantoprazole (PROTONIX) 40 MG tablet Take 40 mg by mouth once daily.    PROMACTA 25 mg Tab     pyridoxine, vitamin B6, (B-6) 100 MG Tab Take 100 mg by mouth once daily.    rifAXIMin (XIFAXAN) 550 mg Tab Take 550 mg by mouth 2 (two) times daily.    rosuvastatin (CRESTOR) 10 MG tablet Take 10 mg by mouth every evening.    spironolactone (ALDACTONE) 25 MG tablet Take 50 mg by mouth once daily.    vitamin D (VITAMIN D3) 1000 units Tab Take 1,000 Units by mouth once daily.       Review of Systems   Constitutional:  Positive for activity change. Negative for fever.   HENT:  Negative for trouble swallowing.    Respiratory:  Negative for cough and shortness of breath.    Cardiovascular:  Positive for leg swelling. Negative for chest pain.   Gastrointestinal:  Positive for abdominal distention. Negative for abdominal pain, constipation, diarrhea, nausea and vomiting.   Genitourinary:  Positive for difficulty urinating. Negative for decreased urine volume.        +barcenas   Musculoskeletal:  Negative for arthralgias and back pain.   Skin:  Negative for wound.   Neurological:  Negative for dizziness and weakness.   Psychiatric/Behavioral:  Negative for confusion.    All other systems reviewed and are  negative.    Objective:     Vital Signs (Most Recent):  Temp: 98.2 °F (36.8 °C) (03/22/25 2309)  Pulse: 68 (03/22/25 2309)  Resp: 16 (03/22/25 2309)  BP: 119/67 (03/22/25 2309)  SpO2: (!) 94 % (03/22/25 2309) Vital Signs (24h Range):  Temp:  [97 °F (36.1 °C)-98.2 °F (36.8 °C)] 98.2 °F (36.8 °C)  Pulse:  [68-81] 68  Resp:  [16-18] 16  SpO2:  [94 %-96 %] 94 %  BP: (100-123)/(50-71) 119/67        There is no height or weight on file to calculate BMI.    No intake or output data in the 24 hours ending 03/23/25 0108     Physical Exam  Vitals and nursing note reviewed.   Constitutional:       General: He is awake. He is not in acute distress.     Appearance: He is not ill-appearing.   HENT:      Head: Normocephalic.      Mouth/Throat:      Mouth: Mucous membranes are moist.      Comments: +lip wound  Eyes:      General: Scleral icterus present.   Cardiovascular:      Rate and Rhythm: Normal rate and regular rhythm.      Pulses: Normal pulses.      Heart sounds: No murmur heard.  Pulmonary:      Effort: Pulmonary effort is normal. No respiratory distress.      Breath sounds: No wheezing or rales.   Abdominal:      General: Bowel sounds are normal. There is distension.      Palpations: Abdomen is soft.      Tenderness: There is no abdominal tenderness. There is no guarding or rebound.   Musculoskeletal:         General: Normal range of motion.      Cervical back: Normal range of motion.      Right lower leg: Edema present.      Left lower leg: Edema present.   Skin:     General: Skin is warm and dry.   Neurological:      Mental Status: He is alert and oriented to person, place, and time.      Motor: Weakness present.      Comments: Answers questions appropriately though speaks slowly with minor slurring. Likely baseline.   Psychiatric:         Mood and Affect: Mood normal.         Behavior: Behavior normal.         Thought Content: Thought content normal.         Judgment: Judgment normal.          Laboratory:  Labs within  the past 24 hours have been reviewed.    Diagnostic Results:  I have personally reviewed all pertinent imaging studies.  Assessment/Plan:     * Cirrhosis of liver with ascites  - acute decompensation   - monitor labs  - manage disease complications like HE   - last para 3/18, no SBP     Hyponatremia  - monitor         Acute blood loss anemia  - received 1 unit PRBCs at OSH   - likely chronic 2/2 liver disease   - transfuse for hgb <7   - monitor with cbc    Hepatic encephalopathy  - chronic, on lactulose and xifaximin       Depression  - cont home meds       HLD (hyperlipidemia)  - cont statin       Bilateral subdural hematomas  - CTH 3/20 w/o evidence of new bleed   - SDH resolved, cont kepppra ppx      Human immunodeficiency virus (HIV) disease  - cont retroviral therapy              MELD 3.0: 31 at 3/12/2025  9:47 AM  MELD-Na: 30 at 3/12/2025  9:47 AM  Calculated from:  Serum Creatinine: 1.3 mg/dL at 3/12/2025  9:47 AM  Serum Sodium: 131 mmol/L at 3/12/2025  9:47 AM  Total Bilirubin: 7.8 mg/dL at 3/12/2025  9:47 AM  Serum Albumin: 1.8 g/dL at 3/12/2025  9:47 AM  INR(ratio): 2.4 at 3/12/2025  9:47 AM  Age at listing (hypothetical): 59 years  Sex: Male at 3/12/2025  9:47 AM           Medical decision making for this encounter includes review of pertinent labs and diagnostic studies, assessment and planning, discussions with consulting providers, discussion with patient/family, and participation in multidisciplinary rounds. Time spent caring for patient: 60 minutes    Mera Avalos PA-C  Liver Transplant  [facility] - Transplant Stepdown

## 2025-03-23 NOTE — ASSESSMENT & PLAN NOTE
Sodium drop to 123. On 12 was 131 on exam look euvolemic  Cause of hyponatremia unknown at this time given incomplete labs      Recommendation  Recommend to obtain urine sodium , urine osm , and serum osm (order placed)  Fluid restriction <1L  Avoid D5 infusion  Sodium correction goal is 6-8 meq/24 hr

## 2025-03-23 NOTE — CONSULTS
[facility]  Hepatology  Consult Note    Patient Name: Jam Card  MRN: [id number]  Admission Date: 3/22/2025  Hospital Length of Stay: 1 days  Code Status: Full Code   Attending Provider: Kerry Lopez MD   Consulting Provider: Morena Nation DO  Primary Care Physician: Ulysses Almaraz MD  Principal Problem:Cirrhosis of liver with ascites    Inpatient consult to Hepatology  Consult performed by: Morena Nation DO  Consult ordered by: Kerry Lopez MD        Subjective:     HPI: Jam Card is a 59 y.o. male with decompensated HCV cirrhosis c/b ascites and HE (on lactulose and rifaximin), PHG, HIV on HAART (CD4 count most recently 75, lower by virtue of cirrhosis and splenomegaly), history of SDH s/p MMA embolization in 8/2024 and on Keppra, CAD s/p CABG (2010) and pulmonary HTN that [place] [facility]. Notably with worsening hepatic encephalopathy, worsening hyponatremia, coagulopathy and jaundice. Hepatology consulted for assistance with management. Patient currently lives with his brother. He has managed his volume status previously with lasix and aldactone. He had never required an LVP until recently on 3/18. 4.9 L's removed at that time. Negative for SBP. No prior history of SBP. No reported fevers or chills. No history of varices or GI bleeding. Does have a history of PHG- most recent EGD in 2/2025; notably no varices. Has had issues with encephalopathy; takes lactulose and rifaximin at home. Has not been able to walk well for some time. He spends most of his day laying down. Walks with a walker, and despite this has been following frequently. He states he feels very weak in his legs.     Past Medical History:   Diagnosis Date    CAD (coronary artery disease) 03/2010    Hx of MI, CABG,  and cardiac cath    Chronic hepatitis C with cirrhosis     GERD (gastroesophageal reflux disease)     HTN (hypertension)  "    Human immunodeficiency virus (HIV) disease     Hyperlipidemia     Organic brain syndrome        Past Surgical History:   Procedure Laterality Date    CARDIAC CATHETERIZATION      CORONARY ANGIOGRAPHY N/A 12/20/2024    Procedure: ANGIOGRAM, CORONARY ARTERY;  Surgeon: Yannick Gastelum III, MD;  Location: [facility] CATH LAB;  Service: Cardiology;  Laterality: N/A;    CORONARY ARTERY BYPASS GRAFT      CORONARY BYPASS GRAFT ANGIOGRAPHY  12/20/2024    Procedure: Bypass graft study;  Surgeon: Yannick Gastelum III, MD;  Location: [facility] CATH LAB;  Service: Cardiology;;    ESOPHAGOGASTRODUODENOSCOPY N/A 2/7/2025    Procedure: EGD (ESOPHAGOGASTRODUODENOSCOPY);  Surgeon: Jerald Vo MD;  Location: [facility] ENDO (2ND FLR);  Service: Endoscopy;  Laterality: N/A;  2nd floor - recent brain bleed 10/24 ref [facility], portal, neurosur clear-st  per neuro E Chivo PA-Patient is cleared for endoscopy-GT  12/12-tb-mess re-sent to [facility] to order plat  12/11 r/s, portal, card cl pend-st  12/26-r/s to earlier date-ca    RIGHT HEART CATHETERIZATION Right 12/20/2024    Procedure: INSERTION, CATHETER, RIGHT HEART;  Surgeon: Yannick Gastelum III, MD;  Location: [facility] CATH LAB;  Service: Cardiology;  Laterality: Right;    TONSILLECTOMY AND ADENOIDECTOMY      TRIGGER FINGER RELEASE         No family history on file.    Social History[1]    Medications Ordered Prior to Encounter[2]    Review of patient's allergies indicates:   Allergen Reactions    Hydrocodone-acetaminophen Other (See Comments)     "cannot sleep when taking" Does not want to take    Lisinopril Other (See Comments)     Other reaction(s): Cough    Sulfamethoxazole-trimethoprim Rash       Review of Systems   Constitutional:  Positive for malaise/fatigue. Negative for chills and fever.   Gastrointestinal:  Negative for abdominal pain, nausea and vomiting.   Musculoskeletal:  Positive for falls.   Neurological:  Positive for weakness.      Objective:     Vitals:    03/23/25 1128 "   BP: (!) 117/59   Pulse: 80   Resp: 18   Temp: 98 °F (36.7 °C)     Constitutional:  not in acute distress  HENT: Head: Normal, normocephalic, atraumatic.  Eyes: conjunctiva clear and sclera icteric  Cardiovascular: regular rate and rhythm  Respiratory: normal chest expansion & respiratory effort   and no accessory muscle use  GI: soft, non-tender, without masses or organomegaly  Musculoskeletal: no muscular tenderness noted  Skin: normal color and jaundice present  Neurological: alert, asterixis present   Psychiatric: mood and affect are within normal limits    Significant Labs:  Recent Labs   Lab 03/21/25  2117 03/22/25  0853 03/23/25  0614   HGB 8.5* 8.9* 8.7*       Lab Results   Component Value Date    WBC 2.98 (L) 03/23/2025    HGB 8.7 (L) 03/23/2025    HCT 26.6 (L) 03/23/2025     (H) 03/23/2025    PLT 49 (L) 03/23/2025       Lab Results   Component Value Date     (L) 03/23/2025    K 4.3 03/23/2025     03/23/2025    CO2 17 (L) 03/23/2025    BUN 18 03/23/2025    CREATININE 1.2 03/23/2025    CALCIUM 8.6 (L) 03/23/2025    ANIONGAP 6 (L) 03/23/2025       Lab Results   Component Value Date    ALT 35 03/23/2025     (H) 03/23/2025    GGT 74 (H) 07/11/2024    ALKPHOS 125 03/23/2025    BILITOT 9.2 (H) 03/23/2025       Lab Results   Component Value Date    INR 2.4 (H) 03/23/2025    INR 2.4 (H) 03/12/2025    INR 1.9 (H) 02/06/2025    INR 2.0 (H) 02/06/2025    INR 1.9 (H) 02/06/2025    INR 1.9 (H) 02/06/2025       Significant Imaging:  Reviewed pertinent radiology findings.       Assessment/Plan:     Jam Card is a 59 y.o. male with decompensated HCV cirrhosis c/b ascites and HE (on lactulose and rifaximin), PHG, HIV on HAART (CD4 count most recently 75, lower by virtue of cirrhosis and splenomegaly), history of SDH s/p MMA embolization in 8/2024 and on Keppra, CAD s/p CABG (2010) and pulmonary HTN that presents to [facility] [place] [place] for possible inpatient liver transplant evaluation. Notably with  worsening hepatic encephalopathy, worsening hyponatremia, coagulopathy and jaundice. Hepatology consulted for assistance with management.     Problem List:  Decompensated HCV Cirrhosis with ascites and HE   HIV - CD4 count 75 in 2/2025   Hyponatremia   PHG  Pulmonary Hypertension   Debility/History of multiple falls     Recommendations:  - Last CD4 count was 75 in 2/2025; re-check on this admission. Per transplant ID; he is not a candidate for liver transplant unless CCD4 count >100   - Agree with nephrology consult for hyponatremia; appreciate their assistance  - Recommend albumin challenge today   - Would start empiric antimicrobials; CTX 1g QD   - PETH 3/12 negative   - Obtain infectious workup: blood cultures x2 and UA. Diagnostic para completed on 3/18 and negative for SBP  - PASP 49 on most recent echo; would likely need RHC as part of eval if pursuing liver transplant eval   - Please obtain daily CMP and INR   - Consult PT/OT   - If patient is not a transplant candidate, may need to discuss timing of hep c treatment   - If CD4 count remains <100 on repeat check, would recommend palliative care consult tomorrow     Thank you for involving us in the care of Jam Card. Please call with any additional questions, concerns or changes in the patient's clinical status.    Morena Nation DO  Gastroenterology Fellow PGY- V    [facility]       [1]   Social History  Socioeconomic History    Marital status: Single   Tobacco Use    Smoking status: Former     Types: Cigarettes    Smokeless tobacco: Never   Substance and Sexual Activity    Alcohol use: Not Currently    Drug use: Not Currently     Social Drivers of Health     Financial Resource Strain: Medium Risk (2/28/2025)    Overall Financial Resource Strain (CARDIA)     Difficulty of Paying Living Expenses: Somewhat hard   Food Insecurity: No Food Insecurity (2/28/2025)    Hunger Vital Sign     Worried About Running Out of Food in the Last  Year: Never true     Ran Out of Food in the Last Year: Never true   Transportation Needs: No Transportation Needs (2/28/2025)    PRAPARE - Transportation     Lack of Transportation (Medical): No     Lack of Transportation (Non-Medical): No   Physical Activity: Inactive (2/28/2025)    Exercise Vital Sign     Days of Exercise per Week: 0 days     Minutes of Exercise per Session: 10 min   Stress: No Stress Concern Present (3/20/2025)    Received from [facility] Occupational Health - Occupational Stress Questionnaire     Feeling of Stress : Not at all   Housing Stability: Low Risk  (2/28/2025)    Housing Stability Vital Sign     Unable to Pay for Housing in the Last Year: No     Number of Times Moved in the Last Year: 0     Homeless in the Last Year: No   [2]   Current Facility-Administered Medications on File Prior to Encounter   Medication Dose Route Frequency Provider Last Rate Last Admin    [DISCONTINUED] 0.9% NaCl infusion  25 mL/hr Intravenous  Provider, Generic External Data        [DISCONTINUED] 0.9% NaCl injection  10 mL Intravenous  Provider, Generic External Data        [DISCONTINUED] atovaquone 750 mg/5 mL oral liquid  1,500 mg Oral  Provider, Generic External Data        [DISCONTINUED] eltrombopag olamine tablet  25 mg Oral  Provider, Generic External Data        [DISCONTINUED] elviteg-cob-emtri-tenof ALAFEN [id number] mg Tab  1 tablet Oral  Provider, Generic External Data        [DISCONTINUED] furosemide tablet  20 mg Oral  Provider, Generic External Data        [DISCONTINUED] lactulose 10 gram/15 mL solution  20 g Oral  Provider, Generic External Data        [DISCONTINUED] levETIRAcetam tablet  500 mg Oral  Provider, Generic External Data        [DISCONTINUED] metoprolol succinate (TOPROL-XL) 24 hr tablet  25 mg Oral  Provider, Generic External Data        [DISCONTINUED] ondansetron disintegrating tablet  4 mg Oral  Provider, Generic  External Data        [DISCONTINUED] rifAXIMin tablet  550 mg Oral  Provider, Generic External Data        [DISCONTINUED] rosuvastatin tablet  10 mg Oral  Provider, Generic External Data        [DISCONTINUED] spironolactone tablet  50 mg Oral  Provider, Generic External Data         Current Outpatient Medications on File Prior to Encounter   Medication Sig Dispense Refill    atovaquone (MEPRON) 750 mg/5 mL Susp oral liquid Take 10 mLs (1,500 mg total) by mouth once daily. 300 mL 3    baclofen (LIORESAL) 5 mg Tab tablet Take 2 tablets (10 mg total) by mouth 3 (three) times daily as needed (Muscle Spasticity).      bisacodyL (DULCOLAX) 10 mg Supp Place 1 suppository rectally daily as needed (constipation).      cyanocobalamin (VITAMIN B-12) 1000 MCG tablet Take 1,000 mcg by mouth once daily.      ergocalciferol (ERGOCALCIFEROL) 50,000 unit Cap Take 50,000 Units by mouth every 7 days.      FLUoxetine 20 MG capsule Take 20 mg by mouth once daily.      furosemide (LASIX) 20 MG tablet Take 2 tablets (40 mg total) by mouth once daily. 30 tablet 1    GENVOYA [id number] mg Tab TAKE ONE TABLET BY MOUTH DAILY WITH FOOD.      lactulose (CHRONULAC) 10 gram/15 mL solution TAKE 30 MLS BY MOUTH TWICE DAILY. GOAL OF 3-5 SOFT STOOLS EACH  mL 6    levETIRAcetam (KEPPRA) 500 MG Tab Take 1 tablet (500 mg total) by mouth 2 (two) times daily. Take medication until follow-up with your provider. 60 tablet 3    loratadine (CLARITIN) 10 mg tablet Take 10 mg by mouth once daily.      meclizine (ANTIVERT) 12.5 mg tablet Take 6.25 mg by mouth 2 (two) times daily as needed for Dizziness.      metoprolol succinate (TOPROL-XL) 25 MG 24 hr tablet Take 25 mg by mouth once daily.      midodrine (PROAMATINE) 2.5 MG Tab Take 1 tablet (2.5 mg total) by mouth 3 (three) times daily as needed (if systolic blood pressure (top number) is less than 100).      pantoprazole (PROTONIX) 40 MG tablet Take 40 mg by mouth once daily.      PROMACTA 25 mg Tab        pyridoxine, vitamin B6, (B-6) 100 MG Tab Take 100 mg by mouth once daily.      rifAXIMin (XIFAXAN) 550 mg Tab Take 550 mg by mouth 2 (two) times daily.      rosuvastatin (CRESTOR) 10 MG tablet Take 10 mg by mouth every evening.      spironolactone (ALDACTONE) 25 MG tablet Take 50 mg by mouth once daily.      vitamin D (VITAMIN D3) 1000 units Tab Take 1,000 Units by mouth once daily.

## 2025-03-23 NOTE — ASSESSMENT & PLAN NOTE
- acute decompensation   - monitor labs  - manage disease complications like HE   - last para 3/18, no SBP

## 2025-03-24 PROBLEM — N39.0 UTI (URINARY TRACT INFECTION): Status: ACTIVE | Noted: 2025-03-24

## 2025-03-24 NOTE — HOSPITAL COURSE
Admitted with hepatic encephalopathy, started on lactulose and rifaximin.  Hyponatremic with a JASON, Nephrology consulted.  Continuing on albumin challenge.  Diuretics have been held.  Started on IV Rocephin given UTI.  Hepatology on board for decompensated cirrhosis.  Needs CD4 recheck. Will reportedly not be a transplant candidate if CD4 count is <100.  Would additionally provide insight regarding medication compliance. CD4 count returned at 56 which is decreased from before. Discussed w transplant ID, has poor immune recovery which is further lowered by cirrhosis.  Therefore, may not necessarily be medication noncompliance or failure, but rather a lowered immune response 2/2 cirrhosis. Regardless, will not be a transplant candidate. Continue intermittent paracentesis.      3/25: new drop in Hb which preceeded paracentesis this afternoon. Obtain workup. 1 unit prbc transfusion. Hb stable. Palliative care involved, family meeting was had today. Will transition to hospice. Comfort care orders were placed by palliative team. Consult was placed to IR for palliative pleurX to be placed for ascites. Willing to perform however INR should be <3. Vitamin K was started yesterday, will repeat INR tmrw and contact IR    3/28- PleurX drain could not be placed as INR remains above 3.  Discussed with IR and recommendation to give FFP 30 minutes prior and during PleurX placement on Monday potentially.    3/30- Nurse reports patient is more lethargic, not responding to questions as well as yesterday. Patient treated with Lactulose enema. He had 2 large BMs soon after this. Nurse reports he is improving alertness.     3/31- Patient's INR elevated to 4.2. Discussed with IR.  They recommend transfusion FFP and recheck INR. Issue with hypotension this morning BP 90/47.  Patient with increased drowsiness. Nurse thinks likely not safe to eat. Ordered another Lactulose enema. Stool this morning is bright red blood pres rectum. 3 BMs  before noon.  Discussed with palliative care team. Patient is relevantly clinically declining. Life expectancy is now likely on the order of days.  Think his needs would be best met in inpatient hospice unit. Patient will transfer to [facility]

## 2025-03-24 NOTE — ASSESSMENT & PLAN NOTE
Sodium drop to 123. On 12 was 131 on exam look euvolemic  Cause of hyponatremia unknown at this time given incomplete labs      Recommendation  Increase albumin to TID dosing  Hold lasix.   Fluid restriction <1L  Avoid D5 infusion  Sodium correction goal is 6-8 meq/24 hr

## 2025-03-24 NOTE — ASSESSMENT & PLAN NOTE
Anemia is likely due to chronic blood loss and chronic disease due to Chronic liver disease. Most recent hemoglobin and hematocrit are listed below.  Recent Labs     03/22/25  0853 03/23/25  0614 03/24/25  0249   HGB 8.9* 8.7* 7.4*   HCT 26.6* 26.6* 22.9*     Plan  - Monitor serial CBC: Daily  - Transfuse PRBC if patient becomes hemodynamically unstable, symptomatic or H/H drops below 7/21.  - Patient has received 1 units of PRBCs on at OSH  - Patient's anemia is currently stable

## 2025-03-24 NOTE — SUBJECTIVE & OBJECTIVE
"Interval History; No acute overnight events. Creatinine increased .      Objective:     Vital Signs (Most Recent):  Temp: 97.8 °F (36.6 °C) (03/24/25 0800)  Pulse: 90 (03/24/25 0800)  Resp: 20 (03/24/25 0800)  BP: 122/60 (03/24/25 0800)  SpO2: 96 % (03/24/25 0800) Vital Signs (24h Range):  Temp:  [97.8 °F (36.6 °C)-98.5 °F (36.9 °C)] 97.8 °F (36.6 °C)  Pulse:  [80-93] 90  Resp:  [18-20] 20  SpO2:  [90 %-96 %] 96 %  BP: (102-122)/(55-61) 122/60        There is no height or weight on file to calculate BMI.  There is no height or weight on file to calculate BSA.    I/O last 3 completed shifts:  In: 208.3 [I.V.:208.3]  Out: 700 [Urine:700]     Physical Exam  HENT:      Head: Normocephalic.      Mouth/Throat:      Mouth: Mucous membranes are moist.   Cardiovascular:      Rate and Rhythm: Normal rate.      Pulses: Normal pulses.   Pulmonary:      Effort: Pulmonary effort is normal.   Abdominal:      General: There is distension.   Musculoskeletal:      Cervical back: Normal range of motion.   Skin:     General: Skin is warm.   Neurological:      General: No focal deficit present.      Mental Status: He is alert.   Psychiatric:         Mood and Affect: Mood normal.          Significant Labs:  ABGs: No results for input(s): "PH", "PCO2", "HCO3", "POCSATURATED", "BE" in the last 168 hours.  BMP:   Recent Labs   Lab 03/24/25  0249   *   K 4.4   CL 98   CO2 18*   BUN 24*   CREATININE 1.5*   CALCIUM 9.1   MG 2.3     Cardiac Markers: No results for input(s): "CKMB", "TROPONINT", "MYOGLOBIN" in the last 168 hours.  CBC:   Recent Labs   Lab 03/24/25  0249   WBC 2.84*   RBC 2.22*   HGB 7.4*   HCT 22.9*   PLT 52*   *   MCH 33.3   MCHC 32.3     CMP:   Recent Labs   Lab 03/24/25  0249   CALCIUM 9.1   ALBUMIN 2.0*   *   K 4.4   CO2 18*   CL 98   BUN 24*   CREATININE 1.5*   ALKPHOS 93   ALT 31   *   BILITOT 10.0*     Coagulation:   Recent Labs   Lab 03/23/25  0614   INR 2.4*     LFTs:   Recent Labs   Lab " "03/24/25  0249   ALT 31   *   ALKPHOS 93   BILITOT 10.0*   ALBUMIN 2.0*     Microbiology Results (last 7 days)       Procedure Component Value Units Date/Time    Blood culture [[id number]]  (Normal) Collected: 03/23/25 1347    Order Status: Completed Specimen: Blood from Peripheral, Hand, Left Updated: 03/23/25 2101     Blood Culture No Growth After 6 Hours    Blood culture [[id number]]  (Normal) Collected: 03/23/25 1341    Order Status: Completed Specimen: Blood from Peripheral, Forearm, Left Updated: 03/23/25 2101     Blood Culture No Growth After 6 Hours    Urine culture [[id number]] Collected: 03/23/25 1119    Order Status: Sent Specimen: Urine Updated: 03/23/25 1151          Specimen (24h ago, onward)      None          PTH: No results for input(s): "PTH" in the last 168 hours.  TSH: No results for input(s): "TSH" in the last 168 hours.  Recent Labs   Lab 03/23/25  1119   COLORU Yellow   SPECGRAV 1.030   PHUR 6.0   PROTEINUA 1+*   BACTERIA Rare   NITRITE Negative   LEUKOCYTESUR 3+*   UROBILINOGEN Negative   HYALINECASTS 18*         "

## 2025-03-24 NOTE — PLAN OF CARE
Patient is AAOx3, with delayed responses. Patient is receiving albumin TID. PT/OT are working with the patient. Patient sat up in the chair for half the day. Dey catheter is CDI with demar urine. Reminded the patient to call for assistance.

## 2025-03-24 NOTE — ASSESSMENT & PLAN NOTE
JASON developed 3/24, blood pressures do fit with HRS.     -Albumin TID dosing  -Hold lasix today  -Recommend paracentesis  -Avoid nephrotoxic agents (IV contrast, NSAID's, gadolinium contrast, PPI's) if able.   -Maintain MAP above 65 mmHg.   -Strict I's and O's  -Daily renal function panel  -Renally dose all medications

## 2025-03-24 NOTE — PLAN OF CARE
Oriented x4 with delayed responses. Vitals stable. Repeat sodium same at 123. Pt had X1 BM overnight. Dey output in flowsheet. Tums given for indigestion. Caregiver at bedside. Bed locked and in lowest setting. Call bell in reach.

## 2025-03-24 NOTE — PLAN OF CARE
Pt is a 59 y.o. male admitted with cirrhosis with ascites. He has a PMH of HCV Cirrhosis and HIV. He lives with his parents in a single story house. He is active with [facility]. He will have transportation home. [facility] Discharge Packet given to patient and/or family with understanding verbalized.   name and number and estimated discharge date written on white board in patient's room with request to call for any questions or concerns.  Will continue to follow for needs.  Discharge Plan A and Plan B have been determined by review of patient's clinical status, future medical and therapeutic needs, and coverage/benefits for post-acute care in coordination with multidisciplinary team members.  Juan Ibarra RN,BSN

## 2025-03-24 NOTE — PT/OT/SLP PROGRESS
Occupational Therapy   Treatment    Name: Jam Card  MRN: [id number]  Admitting Diagnosis:  Cirrhosis of liver with ascites       Recommendations:     Discharge Recommendations: Moderate Intensity Therapy  Discharge Equipment Recommendations:  bedside commode  Barriers to discharge:  None    Assessment:     Jam aCrd is a 59 y.o. male with a medical diagnosis of Cirrhosis of liver with ascites. Performance deficits affecting function are weakness, impaired endurance, decreased coordination, impaired self care skills, impaired functional mobility, gait instability, decreased lower extremity function, decreased upper extremity function, decreased safety awareness, impaired balance.     Rehab Prognosis:  Good; patient would benefit from acute skilled OT services to address these deficits and reach maximum level of function.       Plan:     Patient to be seen 4 x/week to address the above listed problems via self-care/home management, therapeutic activities, therapeutic exercises  Plan of Care Expires: 04/22/25  Plan of Care Reviewed with: patient, caregiver    Subjective     Pain/Comfort:  Pain Rating 1: 0/10    Objective:     Communicated with: RN prior to session.  Patient found supine with barcenas catheter, peripheral IV upon OT entry to room.    General Precautions: Standard, fall    Orthopedic Precautions:   Braces:    Respiratory Status: Room air     Occupational Performance:     Bed Mobility:    Patient completed Scooting/Bridging with stand by assistance  Patient completed Supine to Sit with contact guard assistance     Functional Mobility/Transfers:  Patient completed Sit <> Stand Transfer with contact guard assistance  with  rolling walker   Patient completed Bed <> Chair Transfer using Step Transfer technique with contact guard assistance with rolling walker  Functional Mobility: pT WALKED ~ 3' CGA with a RW then wanted to sit in the chair.    Activities of Daily Living:  Lower Body Dressing:  total assistance     [facility] 6 Click ADL: 17    Treatment & Education:  From chair level, pt completed BUE therex (x 10-15 reps each) including:  - lat rows  - bicep curls  - straight arm raises    **discussed OT POC.    Patient left up in chair with all lines intact and call button in reach    GOALS:   Multidisciplinary Problems       Occupational Therapy Goals          Problem: Occupational Therapy    Goal Priority Disciplines Outcome Interventions   Occupational Therapy Goal     OT, PT/OT Progressing    Description: Goals to be met by: 3/30/25    Patient will increase functional independence with ADLs by performing:    UE Dressing with Set-up Assistance.  LE Dressing with Minimal Assistance.  Grooming while standing at sink with Stand-by Assistance.  Toileting from toilet with Minimal Assistance for hygiene and clothing management.   Supine to sit with Supervision.  Step transfer with Stand-by Assistance  Toilet transfer to toilet with Stand-by Assistance.  Upper extremity exercise program per handout, with independence.                         DME Justifications:  No DME recommended requiring DME justifications    Time Tracking:     OT Date of Treatment: 03/24/25  OT Start Time: 0913  OT Stop Time: 0930  OT Total Time (min): 17 min    Billable Minutes:Therapeutic Exercise 17    OT/ANJALI: OT          3/24/2025

## 2025-03-24 NOTE — PLAN OF CARE
CM confirmed pt is active with [facility] agency via TC.      03/24/25 1144   Post-Acute Status   Post-Acute Authorization Home Health   Home Health Status Referrals Sent   Discharge Plan   Discharge Plan A Home Health   Discharge Plan B Home Health     Discharge Plan A and Plan B have been determined by review of patient's clinical status, future medical and therapeutic needs, and coverage/benefits for post-acute care in coordination with multidisciplinary team members.  Juan Ibarra RN,BSN

## 2025-03-24 NOTE — SUBJECTIVE & OBJECTIVE
Interval History: Seen this AM at bedside.  No acute events reported overnight. Working with PT. No complaints other than abdominal distension.    Review of Systems  Objective:     Vital Signs (Most Recent):  Temp: 97.5 °F (36.4 °C) (03/24/25 1121)  Pulse: 81 (03/24/25 1121)  Resp: 18 (03/24/25 1121)  BP: (!) 116/56 (03/24/25 1121)  SpO2: (!) 93 % (03/24/25 1121) Vital Signs (24h Range):  Temp:  [97.5 °F (36.4 °C)-98.5 °F (36.9 °C)] 97.5 °F (36.4 °C)  Pulse:  [81-93] 81  Resp:  [18-20] 18  SpO2:  [90 %-96 %] 93 %  BP: (102-122)/(55-61) 116/56     Weight: 71.7 kg (158 lb 1.1 oz)  Body mass index is 20.86 kg/m².    Intake/Output Summary (Last 24 hours) at 3/24/2025 1537  Last data filed at 3/24/2025 1447  Gross per 24 hour   Intake 1128.28 ml   Output 1100 ml   Net 28.28 ml         Physical Exam  Vitals and nursing note reviewed.   Constitutional:       General: He is awake. He is not in acute distress.     Appearance: He is not ill-appearing.   HENT:      Head: Normocephalic.      Mouth/Throat:      Mouth: Mucous membranes are moist.   Eyes:      General: Scleral icterus present.   Cardiovascular:      Rate and Rhythm: Normal rate and regular rhythm.      Pulses: Normal pulses.      Heart sounds: No murmur heard.  Pulmonary:      Effort: Pulmonary effort is normal. No respiratory distress.      Breath sounds: No wheezing or rales.   Abdominal:      General: Bowel sounds are normal. There is distension.      Palpations: Abdomen is soft.      Tenderness: There is no abdominal tenderness. There is no guarding or rebound.   Musculoskeletal:         General: Normal range of motion.      Cervical back: Normal range of motion.      Right lower leg: Edema present.      Left lower leg: Edema present.   Skin:     General: Skin is warm and dry.   Neurological:      General: No focal deficit present.      Mental Status: He is alert and oriented to person, place, and time.      Motor: Weakness present.   Psychiatric:         Mood  and Affect: Mood normal.         Behavior: Behavior normal.         Thought Content: Thought content normal.         Judgment: Judgment normal.               Significant Labs: All pertinent labs within the past 24 hours have been reviewed.    Significant Imaging: I have reviewed all pertinent imaging results/findings within the past 24 hours.

## 2025-03-24 NOTE — ASSESSMENT & PLAN NOTE
JASON is likely due to HR S. Baseline creatinine is 1.5. Most recent creatinine and eGFR are listed below.  Recent Labs     03/23/25  0614 03/24/25  0249   CREATININE 1.2 1.5*   EGFRNORACEVR >60 54*      Plan  - JASON is improving  - Avoid nephrotoxins and renally dose meds for GFR listed above  - Monitor urine output, serial BMP, and adjust therapy as needed  - nephrology on board  - continue albumin challenge increased to t.i.d.  - continue to hold Lasix

## 2025-03-24 NOTE — HPI
Mr. Card is a 59 y.o. male with PMH of HCV cirrhosis and HIV. Liver disease c/b ascites/edema, HE. 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 Previously evaluated for liver transplant but deffered due to pulmonary hypertension. He presented to OSH ED with generalized weakness, frequent falls, gait instability. Hit his lip, wound noted. Denies hitting his head or LOC. Denies any vomiting, diarrhea, blood in his stool, fever. W/u in ED: CTH neg for bleed/acute abnormality, CXR without infection, UA neg for infection. Hyponatremia to 125, albumin 1.7, LFTs elevated (expected), Cr at BL. OSH d/w Dr. Cloud, patient admitted to [facility] for worsening decompensation including overt hepatic encephalopathy (confusion), worsening hyponatremia, coagulopathy and jaundice. Dey placed for retention. Patient admitted under Dr. Walden, will transfer to IML service in AM.

## 2025-03-24 NOTE — PROGRESS NOTES
[facility] - Transplant Stepdown  Nephrology  Progress Note    Patient Name: Jam Card  MRN: [id number]  Admission Date: 3/22/2025  Hospital Length of Stay: 2 days  Attending Provider: Frank Pennington DO   Primary Care Physician: Ulysses Almaraz MD  Principal Problem:Cirrhosis of liver with ascites    Subjective:     HPI: Mr. Card is a 59 y.o. male with PMH of HCV cirrhosis and HIV. Liver disease c/b ascites/edema, HE. 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 Previously evaluated for liver transplant but deffered due to pulmonary hypertension. He presented to OSH ED with generalized weakness, frequent falls, gait instability. Hit his lip, wound noted. Denies hitting his head or LOC. Denies any vomiting, diarrhea, blood in his stool, fever. W/u in ED: CTH neg for bleed/acute abnormality, CXR without infection, UA neg for infection. Hyponatremia to 125, albumin 1.7, LFTs elevated (expected), Cr at BL. OSH d/w Dr. Cloud, patient admitted to [facility] for worsening decompensation including overt hepatic encephalopathy (confusion), worsening hyponatremia, coagulopathy and jaundice. Dey placed for retention. Patient admitted under Dr. Walden, will transfer to IML service in AM.   Nephrology consulted for hyponatremia    Interval History; No acute overnight events. Creatinine increased .      Objective:     Vital Signs (Most Recent):  Temp: 97.8 °F (36.6 °C) (03/24/25 0800)  Pulse: 90 (03/24/25 0800)  Resp: 20 (03/24/25 0800)  BP: 122/60 (03/24/25 0800)  SpO2: 96 % (03/24/25 0800) Vital Signs (24h Range):  Temp:  [97.8 °F (36.6 °C)-98.5 °F (36.9 °C)] 97.8 °F (36.6 °C)  Pulse:  [80-93] 90  Resp:  [18-20] 20  SpO2:  [90 %-96 %] 96 %  BP: (102-122)/(55-61) 122/60        There is no height or weight on file to calculate BMI.  There is no height or weight on file to calculate BSA.    I/O last 3 completed shifts:  In: 208.3 [I.V.:208.3]  Out: 700 [Urine:700]     Physical Exam  HENT:      Head:  "Normocephalic.      Mouth/Throat:      Mouth: Mucous membranes are moist.   Cardiovascular:      Rate and Rhythm: Normal rate.      Pulses: Normal pulses.   Pulmonary:      Effort: Pulmonary effort is normal.   Abdominal:      General: There is distension.   Musculoskeletal:      Cervical back: Normal range of motion.   Skin:     General: Skin is warm.   Neurological:      General: No focal deficit present.      Mental Status: He is alert.   Psychiatric:         Mood and Affect: Mood normal.          Significant Labs:  ABGs: No results for input(s): "PH", "PCO2", "HCO3", "POCSATURATED", "BE" in the last 168 hours.  BMP:   Recent Labs   Lab 03/24/25  0249   *   K 4.4   CL 98   CO2 18*   BUN 24*   CREATININE 1.5*   CALCIUM 9.1   MG 2.3     Cardiac Markers: No results for input(s): "CKMB", "TROPONINT", "MYOGLOBIN" in the last 168 hours.  CBC:   Recent Labs   Lab 03/24/25  0249   WBC 2.84*   RBC 2.22*   HGB 7.4*   HCT 22.9*   PLT 52*   *   MCH 33.3   MCHC 32.3     CMP:   Recent Labs   Lab 03/24/25  0249   CALCIUM 9.1   ALBUMIN 2.0*   *   K 4.4   CO2 18*   CL 98   BUN 24*   CREATININE 1.5*   ALKPHOS 93   ALT 31   *   BILITOT 10.0*     Coagulation:   Recent Labs   Lab 03/23/25  0614   INR 2.4*     LFTs:   Recent Labs   Lab 03/24/25  0249   ALT 31   *   ALKPHOS 93   BILITOT 10.0*   ALBUMIN 2.0*     Microbiology Results (last 7 days)       Procedure Component Value Units Date/Time    Blood culture [[id number]]  (Normal) Collected: 03/23/25 1347    Order Status: Completed Specimen: Blood from Peripheral, Hand, Left Updated: 03/23/25 2101     Blood Culture No Growth After 6 Hours    Blood culture [[id number]]  (Normal) Collected: 03/23/25 1341    Order Status: Completed Specimen: Blood from Peripheral, Forearm, Left Updated: 03/23/25 2101     Blood Culture No Growth After 6 Hours    Urine culture [[id number]] Collected: 03/23/25 1119    Order Status: Sent Specimen: Urine Updated: 03/23/25 1151 " "         Specimen (24h ago, onward)      None          PTH: No results for input(s): "PTH" in the last 168 hours.  TSH: No results for input(s): "TSH" in the last 168 hours.  Recent Labs   Lab 03/23/25  1119   COLORU Yellow   SPECGRAV 1.030   PHUR 6.0   PROTEINUA 1+*   BACTERIA Rare   NITRITE Negative   LEUKOCYTESUR 3+*   UROBILINOGEN Negative   HYALINECASTS 18*         Assessment/Plan:     Renal/  JASON (acute kidney injury)  JASON developed 3/24, blood pressures do fit with HRS.     -Albumin TID dosing  -Hold lasix today  -Recommend paracentesis  -Avoid nephrotoxic agents (IV contrast, NSAID's, gadolinium contrast, PPI's) if able.   -Maintain MAP above 65 mmHg.   -Strict I's and O's  -Daily renal function panel  -Renally dose all medications    Endocrine  Hyponatremia  Sodium drop to 123. On 12 was 131 on exam look euvolemic  Cause of hyponatremia unknown at this time given incomplete labs      Recommendation  Increase albumin to TID dosing  Hold lasix.   Fluid restriction <1L  Avoid D5 infusion  Sodium correction goal is 6-8 meq/24 hr          Thank you for your consult. I will follow-up with patient. Please contact us if you have any additional questions.    Yoav Landry MD  Nephrology  [facility] - Transplant Stepdown  "

## 2025-03-24 NOTE — PT/OT/SLP PROGRESS
Physical Therapy Co-Treatment    Patient Name:  Jam Card   MRN:  [id number]    Co-treatment performed for this visit due to suspected patient need for two skilled therapists to ensure patient and staff safety and to accommodate for patient activity tolerance/pain management     Recommendations:     Discharge Recommendations: Moderate Intensity Therapy  Discharge Equipment Recommendations: bedside commode  Barriers to discharge: Decreased caregiver support    Assessment:     Jam Card is a 59 y.o. male admitted with a medical diagnosis of Cirrhosis of liver with ascites.  He presents with the following impairments/functional limitations: weakness, impaired endurance, impaired self care skills, impaired functional mobility, gait instability, impaired balance, impaired cognition, decreased safety awareness. Pt received supine in bed and was agreeable to therapy this date. Pt required SBA to roll to the R and Chas to come to sitting from supine position. Pt required CGA for STS and was able to ambulate ~6 steps with RW and CGA before he requested to sit due to fatigue. Pt declined to try to walk any further but agreed to complete BLE therex - 10 each of seated marches, LAQ, and heel/toe raise. Pt tolerated session well.    Rehab Prognosis: Good; patient would benefit from acute skilled PT services to address these deficits and reach maximum level of function.    Recent Surgery: * No surgery found *      Plan:     During this hospitalization, patient to be seen 4 x/week to address the identified rehab impairments via gait training, therapeutic activities, therapeutic exercises, neuromuscular re-education and progress toward the following goals:    Plan of Care Expires:  04/20/25    Subjective     Chief Complaint: fatigue with activity   Patient/Family Comments/goals: Pt agreeable to therapy this date  Pain/Comfort:  Pain Rating 1: 0/10      Objective:     Communicated with nursing prior to session.   Patient found supine with barcenas catheter, peripheral IV upon PT entry to room.     General Precautions: Standard, fall  Orthopedic Precautions: N/A  Braces: N/A  Respiratory Status: Room air     Functional Mobility:  Bed Mobility:     Rolling Right: stand by assistance  Supine to Sit: minimum assistance  Transfers:     Sit to Stand:  contact guard assistance with rolling walker  Gait: ~6 steps with RW and CGA before pt requested to sit due to fatigue. Pt demonstrated slow gait speed with decreased step length and beverly. No LOB.  Balance: sitting - fair-, standing fair      AM-PAC 6 CLICK MOBILITY  Turning over in bed (including adjusting bedclothes, sheets and blankets)?: 3  Sitting down on and standing up from a chair with arms (e.g., wheelchair, bedside commode, etc.): 3  Moving from lying on back to sitting on the side of the bed?: 3  Moving to and from a bed to a chair (including a wheelchair)?: 3  Need to walk in hospital room?: 3  Climbing 3-5 steps with a railing?: 2  Basic Mobility Total Score: 17       Treatment & Education:  Discussed therapy rationale, goals, and POC.     Patient left up in chair with all lines intact and call button in reach..    GOALS:   Multidisciplinary Problems       Physical Therapy Goals          Problem: Physical Therapy    Goal Priority Disciplines Outcome Interventions   Physical Therapy Goal     PT, PT/OT Progressing    Description: Goals to be met by: 2025     Patient will increase functional independence with mobility by performin. Supine to sit with Modified [place]  2. Sit to stand transfer with Supervision using LRAD  3. Bed to chair transfer with Supervision using LRAD  4. Gait  x 50 feet with Stand-by Assistance using LRAD.                          DME Justifications:   Jam requires a commode for home use because he is confined to a single room.    Time Tracking:     PT Received On: 25  PT Start Time: 913     PT Stop Time: 928  PT Total Time  (min): 15 min     Billable Minutes: Therapeutic Exercise 15    Treatment Type: Treatment  PT/PTA: PT     Number of PTA visits since last PT visit: 0     03/24/2025

## 2025-03-24 NOTE — ASSESSMENT & PLAN NOTE
Patient with known Cirrhosis with Co-morbidities are present and inclusive of ascites, hepatic encephalopathy, anticoagulation, and jaundice.  MELD-Na score calculated; MELD 3.0: 34 at 3/24/2025  2:49 AM  MELD-Na: 33 at 3/24/2025  2:49 AM  Calculated from:  Serum Creatinine: 1.5 mg/dL at 3/24/2025  2:49 AM  Serum Sodium: 123 mmol/L (Using min of 125 mmol/L) at 3/24/2025  2:49 AM  Total Bilirubin: 10 mg/dL at 3/24/2025  2:49 AM  Serum Albumin: 2 g/dL at 3/24/2025  2:49 AM  INR(ratio): 2.4 at 3/23/2025  6:14 AM  Age at listing (hypothetical): 59 years  Sex: Male at 3/24/2025  2:49 AM      Continue chronic meds. Etiology likely Hepatitis. Will avoid any hepatotoxic meds, and monitor CBC/CMP/INR for synthetic function.     -para 3/18 neg for SBP  -Hepatology on board, not a candidate for transplant unless CD4 count is greater than 100.  Would also provide insight regarding medication compliance  -Palliative care consult if counts not in acceptable range  -Cont IV rocephin  -repeat para ordered  -PT/OT: moderate intensity

## 2025-03-24 NOTE — PROGRESS NOTES
[facility] Medicine  Progress Note    Patient Name: Jam Card  MRN: [id number]  Patient Class: IP- Inpatient   Admission Date: 3/22/2025  Length of Stay: 2 days  Attending Physician: Frank Pennington DO  Primary Care Provider: Ulysses Almaraz MD        Subjective     Principal Problem:Cirrhosis of liver with ascites        HPI:  Mr. Card is a 59 y.o. male with PMH of HCV cirrhosis and HIV. Liver disease c/b ascites/edema, HE. 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 Previously evaluated for liver transplant but deffered due to pulmonary hypertension. He presented to OSH ED with generalized weakness, frequent falls, gait instability. Hit his lip, wound noted. Denies hitting his head or LOC. Denies any vomiting, diarrhea, blood in his stool, fever. W/u in ED: CTH neg for bleed/acute abnormality, CXR without infection, UA neg for infection. Hyponatremia to 125, albumin 1.7, LFTs elevated (expected), Cr at BL. OSH d/w Dr. Cloud, patient admitted to [facility] for worsening decompensation including overt hepatic encephalopathy (confusion), worsening hyponatremia, coagulopathy and jaundice. Dey placed for retention. Patient admitted under Dr. Walden, will transfer to IML service in AM.    Overview/Hospital Course:  Admitted with hepatic encephalopathy, started on lactulose and rifaximin.  Hyponatremic with a JASON, Nephrology consulted.  Continuing on albumin challenge.  Diuretics have been held.  Started on IV Rocephin given UTI.  Hepatology on board for decompensated cirrhosis.  Needs CD4 recheck. Will reportedly not be a transplant candidate if CD4 count is <100.  Would additionally provide insight regarding medication compliance.     Interval History: Seen this AM at bedside.  No acute events reported overnight. Working with PT. No complaints other than abdominal distension.    Review of Systems  Objective:     Vital Signs (Most Recent):  Temp: 97.5 °F (36.4 °C)  (03/24/25 1121)  Pulse: 81 (03/24/25 1121)  Resp: 18 (03/24/25 1121)  BP: (!) 116/56 (03/24/25 1121)  SpO2: (!) 93 % (03/24/25 1121) Vital Signs (24h Range):  Temp:  [97.5 °F (36.4 °C)-98.5 °F (36.9 °C)] 97.5 °F (36.4 °C)  Pulse:  [81-93] 81  Resp:  [18-20] 18  SpO2:  [90 %-96 %] 93 %  BP: (102-122)/(55-61) 116/56     Weight: 71.7 kg (158 lb 1.1 oz)  Body mass index is 20.86 kg/m².    Intake/Output Summary (Last 24 hours) at 3/24/2025 1537  Last data filed at 3/24/2025 1447  Gross per 24 hour   Intake 1128.28 ml   Output 1100 ml   Net 28.28 ml         Physical Exam  Vitals and nursing note reviewed.   Constitutional:       General: He is awake. He is not in acute distress.     Appearance: He is not ill-appearing.   HENT:      Head: Normocephalic.      Mouth/Throat:      Mouth: Mucous membranes are moist.   Eyes:      General: Scleral icterus present.   Cardiovascular:      Rate and Rhythm: Normal rate and regular rhythm.      Pulses: Normal pulses.      Heart sounds: No murmur heard.  Pulmonary:      Effort: Pulmonary effort is normal. No respiratory distress.      Breath sounds: No wheezing or rales.   Abdominal:      General: Bowel sounds are normal. There is distension.      Palpations: Abdomen is soft.      Tenderness: There is no abdominal tenderness. There is no guarding or rebound.   Musculoskeletal:         General: Normal range of motion.      Cervical back: Normal range of motion.      Right lower leg: Edema present.      Left lower leg: Edema present.   Skin:     General: Skin is warm and dry.   Neurological:      General: No focal deficit present.      Mental Status: He is alert and oriented to person, place, and time.      Motor: Weakness present.   Psychiatric:         Mood and Affect: Mood normal.         Behavior: Behavior normal.         Thought Content: Thought content normal.         Judgment: Judgment normal.               Significant Labs: All pertinent labs within the past 24 hours have been  reviewed.    Significant Imaging: I have reviewed all pertinent imaging results/findings within the past 24 hours.      Assessment & Plan  Cirrhosis of liver with ascites  Patient with known Cirrhosis with Co-morbidities are present and inclusive of ascites, hepatic encephalopathy, anticoagulation, and jaundice .  MELD-Na score calculated; MELD 3.0: 34 at 3/24/2025  2:49 AM  MELD-Na: 33 at 3/24/2025  2:49 AM  Calculated from:  Serum Creatinine: 1.5 mg/dL at 3/24/2025  2:49 AM  Serum Sodium: 123 mmol/L (Using min of 125 mmol/L) at 3/24/2025  2:49 AM  Total Bilirubin: 10 mg/dL at 3/24/2025  2:49 AM  Serum Albumin: 2 g/dL at 3/24/2025  2:49 AM  INR(ratio): 2.4 at 3/23/2025  6:14 AM  Age at listing (hypothetical): 59 years  Sex: Male at 3/24/2025  2:49 AM      Continue chronic meds. Etiology likely Hepatitis. Will avoid any hepatotoxic meds, and monitor CBC/CMP/INR for synthetic function.     -para 3/18 neg for SBP  -Hepatology on board, not a candidate for transplant unless CD4 count is greater than 100.  Would also provide insight regarding medication compliance  -Palliative care consult if counts not in acceptable range  -Cont IV rocephin  -repeat para ordered  -PT/OT: moderate intensity  JASON (acute kidney injury)  Hyponatremia  JASON is likely due to  HR S . Baseline creatinine is  1.5 . Most recent creatinine and eGFR are listed below.  Recent Labs     03/23/25  0614 03/24/25  0249   CREATININE 1.2 1.5*   EGFRNORACEVR >60 54*      Plan  - JASON is improving  - Avoid nephrotoxins and renally dose meds for GFR listed above  - Monitor urine output, serial BMP, and adjust therapy as needed  - nephrology on board  - continue albumin challenge increased to t.i.d.  - continue to hold Lasix  Human immunodeficiency virus (HIV) disease  -CD4 pending    Bilateral subdural hematomas    HLD (hyperlipidemia)      Depression  Continue Prozac      Hepatic encephalopathy      Acute blood loss anemia  Anemia is likely due to chronic  blood loss and chronic disease due to Chronic liver disease. Most recent hemoglobin and hematocrit are listed below.  Recent Labs     03/22/25  0853 03/23/25  0614 03/24/25  0249   HGB 8.9* 8.7* 7.4*   HCT 26.6* 26.6* 22.9*     Plan  - Monitor serial CBC: Daily  - Transfuse PRBC if patient becomes hemodynamically unstable, symptomatic or H/H drops below 7/21.  - Patient has received 1 units of PRBCs on at OSH  - Patient's anemia is currently stable    UTI (urinary tract infection)  Continue IV Rocephin   Follow cultures    VTE Risk Mitigation (From admission, onward)           Ordered     heparin (porcine) injection 5,000 Units  Every 8 hours         03/23/25 0041     Place RADHA hose  Until discontinued         03/23/25 0041     IP VTE LOW RISK PATIENT  Once         03/23/25 0041                    Discharge Planning   CODI: 3/26/2025     Code Status: Full Code   Medical Readiness for Discharge Date:   Discharge Plan A: Home Health                Please place Justification for DUKE Pennington DO  Department of Hospital Medicine   [facility] - Transplant Stepdown

## 2025-03-25 NOTE — ASSESSMENT & PLAN NOTE
Patient with known Cirrhosis with Co-morbidities are present and inclusive of ascites, hepatic encephalopathy, anticoagulation, and jaundice.  MELD-Na score calculated; MELD 3.0: 34 at 3/25/2025 10:56 AM  MELD-Na: 34 at 3/25/2025 10:56 AM  Calculated from:  Serum Creatinine: 1.4 mg/dL at 3/25/2025 10:56 AM  Serum Sodium: 124 mmol/L (Using min of 125 mmol/L) at 3/25/2025 10:56 AM  Total Bilirubin: 8.1 mg/dL at 3/25/2025 10:56 AM  Serum Albumin: 2.6 g/dL at 3/25/2025 10:56 AM  INR(ratio): 3.1 at 3/25/2025 10:56 AM  Age at listing (hypothetical): 59 years  Sex: Male at 3/25/2025 10:56 AM      Continue chronic meds. Etiology likely Hepatitis. Will avoid any hepatotoxic meds, and monitor CBC/CMP/INR for synthetic function.     -para 3/18 neg for SBP  -Hepatology on board, not a candidate for transplant unless CD4 count is greater than 100.  Would also provide insight regarding medication compliance   -came back at 56, likely due to lack of immune response 2/2 cirrhosis rather than medication noncompliance or medication failure  Plan:  -Palliative care consult given lack of transplant candidacy  -Cont IV rocephin  -repeat para today  -PT/OT: moderate intensity

## 2025-03-25 NOTE — PLAN OF CARE
Patient is AAOx3, with delayed responses. Patient is receiving albumin TID. PT/OT are working with the patient. Patient to receive 1 unit of PRBCs with repeat CBC 1 hr after the infusion. Patient had a para earlier with 2L removed, patient removed the needle for the para was complete.palliative care consulted. Patient to receive Vit k and LR 500cc bolus after the blood transfusion.  Reminded the patient to call for assistance.

## 2025-03-25 NOTE — SUBJECTIVE & OBJECTIVE
Interval History: Seen this AM at bedside with palliative care. PleurEx drain could not be placed today as INR still > 3. Will reassess tomorrow. Wife present at bedside.     Review of Systems  Objective:     Vital Signs (Most Recent):  Temp: 98.2 °F (36.8 °C) (03/25/25 1330)  Pulse: 91 (03/25/25 1330)  Resp: 17 (03/25/25 1330)  BP: (!) 108/58 (03/25/25 1330)  SpO2: 98 % (03/25/25 1330) Vital Signs (24h Range):  Temp:  [97.9 °F (36.6 °C)-98.8 °F (37.1 °C)] 98.2 °F (36.8 °C)  Pulse:  [84-98] 91  Resp:  [17-95] 17  SpO2:  [88 %-98 %] 98 %  BP: ()/(54-60) 108/58     Weight: 71.7 kg (158 lb 1.1 oz)  Body mass index is 20.86 kg/m².    Intake/Output Summary (Last 24 hours) at 3/25/2025 1506  Last data filed at 3/25/2025 1211  Gross per 24 hour   Intake 680 ml   Output 3150 ml   Net -2470 ml         Physical Exam  Vitals and nursing note reviewed.   Constitutional:       General: He is awake. He is not in acute distress.     Appearance: He is ill-appearing.   HENT:      Head: Normocephalic.      Mouth/Throat:      Mouth: Mucous membranes are moist.   Eyes:      General: Scleral icterus present.   Cardiovascular:      Rate and Rhythm: Normal rate and regular rhythm.      Pulses: Normal pulses.      Heart sounds: No murmur heard.  Pulmonary:      Effort: Pulmonary effort is normal. No respiratory distress.      Breath sounds: No wheezing.   Abdominal:      General: Bowel sounds are normal. There is distension.      Palpations: Abdomen is soft.      Tenderness: There is no abdominal tenderness. There is no guarding.   Musculoskeletal:         General: Normal range of motion.      Cervical back: Normal range of motion.      Right lower leg: Edema present.      Left lower leg: Edema present.   Skin:     General: Skin is warm and dry.   Neurological:      General: No focal deficit present.      Mental Status: He is alert and oriented to person, place, and time.      Motor: Weakness present.   Psychiatric:         Mood and  Affect: Mood normal.         Behavior: Behavior normal.               Significant Labs: All pertinent labs within the past 24 hours have been reviewed.    Significant Imaging: I have reviewed all pertinent imaging results/findings within the past 24 hours.

## 2025-03-25 NOTE — PT/OT/SLP PROGRESS
Physical Therapy Treatment    Patient Name:  Jam Card   MRN:  [id number]    Recommendations:     Discharge Recommendations: Moderate Intensity Therapy  Discharge Equipment Recommendations: bedside commode  Barriers to discharge: Decreased caregiver support    Assessment:     Jam Card is a 59 y.o. male admitted with a medical diagnosis of Cirrhosis of liver with ascites.  He presents with the following impairments/functional limitations: weakness, impaired endurance, impaired self care skills, impaired functional mobility, gait instability, impaired balance, decreased upper extremity function, decreased lower extremity function, decreased safety awareness. Pt found supine in bed and resistant to therapy at first. Pt stated he did not was to get up and walk. Pt agreeable to sitting edge of bed and doing some BLE exercises. Upon attempt to sit up, pt found to be sitting in bowel movement which PT helped to clean up. Following this, pt was SBA to roll and required Chas to some to sitting from supine position. Pt needed Chas for STS with RW. Upon standing, pt stated he needed to have a bowel movement, so PT brought bedside commode over to pt. Pt completed ~4 steps to commode with cueing to turn and get closer to commode prior to sitting. Pt completed toileting and stood with RW while PT helped to clean pt. Pt returned to bed and completed 10 reps of bilateral marches, LAQs, and heel/toe raises.    Rehab Prognosis: Good; patient would benefit from acute skilled PT services to address these deficits and reach maximum level of function.    Recent Surgery: * No surgery found *      Plan:     During this hospitalization, patient to be seen 4 x/week to address the identified rehab impairments via gait training, therapeutic activities, therapeutic exercises, neuromuscular re-education and progress toward the following goals:    Plan of Care Expires:  04/20/25    Subjective     Chief Complaint: bowel  movement  Patient/Family Comments/goals: Pt agreeable to edge of bed activities today  Pain/Comfort:  Pain Rating 1:  (pt did not rate)      Objective:     Communicated with nursing prior to session.  Patient found supine with barcenas catheter, oxygen, peripheral IV upon PT entry to room.     General Precautions: Standard, fall  Orthopedic Precautions: N/A  Braces: N/A  Respiratory Status: Nasal cannula, flow 4 L/min     Functional Mobility:  Bed Mobility:     Rolling Right: stand by assistance  Scooting: stand by assistance  Bridging: stand by assistance  Supine to Sit: minimum assistance  Sit to Supine: stand by assistance  Transfers:     Sit to Stand:  minimum assistance with rolling walker  Toilet Transfer: minimum assistance with  rolling walker  using  Step Transfer  Gait: ~4 steps to and from bedside commode. No LOB but pt required cueing to turn and get close to commode before sitting.  Balance: fair      AM-PAC 6 CLICK MOBILITY  Turning over in bed (including adjusting bedclothes, sheets and blankets)?: 3  Sitting down on and standing up from a chair with arms (e.g., wheelchair, bedside commode, etc.): 3  Moving from lying on back to sitting on the side of the bed?: 3  Moving to and from a bed to a chair (including a wheelchair)?: 3  Need to walk in hospital room?: 3  Climbing 3-5 steps with a railing?: 2  Basic Mobility Total Score: 17       Treatment & Education:  Discussed therapy rationale, POC, and goals as well as importance of continued participation in therapy.    Patient left supine with all lines intact and call button in reach..    GOALS:   Multidisciplinary Problems       Physical Therapy Goals          Problem: Physical Therapy    Goal Priority Disciplines Outcome Interventions   Physical Therapy Goal     PT, PT/OT Progressing    Description: Goals to be met by: 2025     Patient will increase functional independence with mobility by performin. Supine to sit with Modified  [place]  2. Sit to stand transfer with Supervision using LRAD  3. Bed to chair transfer with Supervision using LRAD  4. Gait  x 50 feet with Stand-by Assistance using LRAD.                          DME Justifications:   Jam requires a commode for home use because he is confined to a single room.    Time Tracking:     PT Received On: 03/25/25  PT Start Time: 0940     PT Stop Time: 0959  PT Total Time (min): 19 min     Billable Minutes: Therapeutic Activity 19    Treatment Type: Treatment  PT/PTA: PT     Number of PTA visits since last PT visit: 0     03/25/2025

## 2025-03-25 NOTE — PROGRESS NOTES
[facility]  Hepatology  Progress note    Patient Name: Jam Card  MRN: [id number]  Admission Date: 3/22/2025  Hospital Length of Stay: 3 days  Code Status: Full Code   Attending Provider: Frank Pennington DO   Consulting Provider: Tyesha Quintero MD  Primary Care Physician: Ulysses Almaraz MD  Principal Problem:Cirrhosis of liver with ascites    Subjective:     HPI: Jam Card is a 59 y.o. male with history of decompensated HCV cirrhosis c/b ascites and HE (on lactulose and rifaximin), PHG, HIV on HAART (CD4 count most recently 75, lower by virtue of cirrhosis and splenomegaly), history of SDH s/p MMA embolization in 8/2024 and on Keppra, CAD s/p CABG (2010) and pulmonary HTN that presents to [facility] for possible inpatient liver transplant evaluation. Notably with worsening hepatic encephalopathy, worsening hyponatremia, coagulopathy and jaundice. Hepatology consulted for assistance with management.       Interval history:  CD4 56 today. Patients functional status as well as CD4 preclude him from transplant eval at this time. Patient understands. He is interested in his paracentesis today but says he feels otherwise at baseline.     Past Medical History:   Diagnosis Date    CAD (coronary artery disease) 03/2010    Hx of MI, CABG,  and cardiac cath    Chronic hepatitis C with cirrhosis     GERD (gastroesophageal reflux disease)     HTN (hypertension)     Human immunodeficiency virus (HIV) disease     Hyperlipidemia     Organic brain syndrome        Past Surgical History:   Procedure Laterality Date    CARDIAC CATHETERIZATION      CORONARY ANGIOGRAPHY N/A 12/20/2024    Procedure: ANGIOGRAM, CORONARY ARTERY;  Surgeon: Yannick Gastelum III, MD;  Location: [facility] CATH LAB;  Service: Cardiology;  Laterality: N/A;    CORONARY ARTERY BYPASS GRAFT      CORONARY BYPASS GRAFT ANGIOGRAPHY  12/20/2024    Procedure: Bypass graft study;  Surgeon: Yannick Gastelum III, MD;  Location: [facility] CATH  "LAB;  Service: Cardiology;;    ESOPHAGOGASTRODUODENOSCOPY N/A 2/7/2025    Procedure: EGD (ESOPHAGOGASTRODUODENOSCOPY);  Surgeon: Jerald Vo MD;  Location: [facility] ([address] [place] [address]);  Service: Endoscopy;  Laterality: N/A;  2nd floor - recent brain bleed 10/24 ref [facility], portal, neurosur clear-st  per neuro E Chivo PA-Patient is cleared for endoscopy-GT  12/12-tb-mess re-sent to [facility] to order plat  12/11 r/s, portal, card cl pend-st  12/26-r/s to earlier date-ca    RIGHT HEART CATHETERIZATION Right 12/20/2024    Procedure: INSERTION, CATHETER, RIGHT HEART;  Surgeon: Yannick Gastelum III, MD;  Location: [facility] CATH LAB;  Service: Cardiology;  Laterality: Right;    TONSILLECTOMY AND ADENOIDECTOMY      TRIGGER FINGER RELEASE         No family history on file.    Social History[1]    Medications Ordered Prior to Encounter[2]    Review of patient's allergies indicates:   Allergen Reactions    Hydrocodone-acetaminophen Other (See Comments)     "cannot sleep when taking" Does not want to take    Lisinopril Other (See Comments)     Other reaction(s): Cough    Sulfamethoxazole-trimethoprim Rash         Objective:     Vitals:    03/25/25 0759   BP: 120/60   Pulse: 96   Resp: 18   Temp: 97.9 °F (36.6 °C)         Constitutional:  not in acute distress and ill appearing, but non-toxic  HENT: Head: Normal, normocephalic, atraumatic.  Eyes: conjunctiva clear and sclera icteric  Respiratory: normal chest expansion & respiratory effort   and no accessory muscle use  GI: soft and distended  Skin: jaundice present  Neurological: alert, oriented x3      Significant Labs:  Recent Labs   Lab 03/22/25  1632 03/23/25  0614 03/24/25  0249   HGB 8.3* 8.7* 7.4*       Lab Results   Component Value Date    WBC 2.84 (L) 03/24/2025    HGB 7.4 (L) 03/24/2025    HCT 22.9 (L) 03/24/2025     (H) 03/24/2025    PLT 52 (L) 03/24/2025       Lab Results   Component Value Date     (L) 03/24/2025    K 4.4 03/24/2025    CL 98 03/24/2025 "    CO2 18 (L) 03/24/2025    BUN 24 (H) 03/24/2025    CREATININE 1.5 (H) 03/24/2025    CALCIUM 9.1 03/24/2025    ANIONGAP 7 (L) 03/24/2025       Lab Results   Component Value Date    ALT 31 03/24/2025     (H) 03/24/2025    GGT 74 (H) 07/11/2024    ALKPHOS 93 03/24/2025    BILITOT 10.0 (H) 03/24/2025       Lab Results   Component Value Date    INR 2.4 (H) 03/23/2025    INR 2.4 (H) 03/12/2025    INR 1.9 (H) 02/06/2025    INR 2.0 (H) 02/06/2025    INR 1.9 (H) 02/06/2025    INR 1.9 (H) 02/06/2025           Significant Imaging:  Reviewed pertinent radiology findings.       Assessment/Plan:       MELD 3.0: 34 at 3/24/2025  2:49 AM  MELD-Na: 33 at 3/24/2025  2:49 AM  Calculated from:  Serum Creatinine: 1.5 mg/dL at 3/24/2025  2:49 AM  Serum Sodium: 123 mmol/L (Using min of 125 mmol/L) at 3/24/2025  2:49 AM  Total Bilirubin: 10 mg/dL at 3/24/2025  2:49 AM  Serum Albumin: 2 g/dL at 3/24/2025  2:49 AM  INR(ratio): 2.4 at 3/23/2025  6:14 AM  Age at listing (hypothetical): 59 years  Sex: Male at 3/24/2025  2:49 AM        Assessment:  Jam Card is a 59 y.o. male with decompensated HCV cirrhosis c/b ascites and HE (on lactulose and rifaximin), PHG, HIV on HAART (CD4 count most recently 75, lower by virtue of cirrhosis and splenomegaly), history of SDH s/p MMA embolization in 8/2024 and on Keppra, CAD s/p CABG (2010) and pulmonary HTN that [place] [facility] [place] transplant evaluation. Notably with worsening hepatic encephalopathy, worsening hyponatremia, coagulopathy and jaundice. Hepatology consulted for assistance with management.          Problem List:  Decompensated HCV Cirrhosis with ascites and HE   HIV - CD4 count 75 in 2/2025   Hyponatremia   PHG  Pulmonary Hypertension   Debility/History of multiple falls     Plan:  - discussed with Dr. Wu (ID) and patients antiretroviral meds are not an issue, he simply has comorbid cirrhosis preventing him from mounting enough CD4 cells   - not a  transplant candidate 2/2 functional status as well as CD4 count    Please obtain daily CBC, BMP, LFT, INR  Thank you for involving us in the care of Jam Card. Please call with any additional questions, concerns or changes in the patient's clinical status.    Tyesha Thacker MD  Gastroenterology and Hepatology Fellow, PGY-4           [1]   Social History  Socioeconomic History    Marital status: Single   Tobacco Use    Smoking status: Former     Types: Cigarettes    Smokeless tobacco: Never   Substance and Sexual Activity    Alcohol use: Not Currently    Drug use: Not Currently     Social Drivers of Health     Financial Resource Strain: Patient Declined (3/24/2025)    Overall Financial Resource Strain (CARDIA)     Difficulty of Paying Living Expenses: Patient declined   Recent Concern: Financial Resource Strain - Medium Risk (2/28/2025)    Overall Financial Resource Strain (CARDIA)     Difficulty of Paying Living Expenses: Somewhat hard   Food Insecurity: Patient Declined (3/24/2025)    Hunger Vital Sign     Worried About Running Out of Food in the Last Year: Patient declined     Ran Out of Food in the Last Year: Patient declined   Transportation Needs: Patient Declined (3/24/2025)    PRAPARE - Transportation     Lack of Transportation (Medical): Patient declined     Lack of Transportation (Non-Medical): Patient declined   Physical Activity: Inactive (2/28/2025)    Exercise Vital Sign     Days of Exercise per Week: 0 days     Minutes of Exercise per Session: 10 min   Stress: Patient Declined (3/24/2025)    [employer] - Occupational Stress Questionnaire     Feeling of Stress : Patient declined   Housing Stability: Patient Declined (3/24/2025)    Housing Stability Vital Sign     Unable to Pay for Housing in the Last Year: Patient declined     Number of Times Moved in the Last Year: 0     Homeless in the Last Year: Patient declined   [2]   Current Facility-Administered  Medications on File Prior to Encounter   Medication Dose Route Frequency Provider Last Rate Last Admin    [DISCONTINUED] GENERIC EXTERNAL MEDICATION  3.375 g Intravenous  Provider, Generic External Data         Current Outpatient Medications on File Prior to Encounter   Medication Sig Dispense Refill    atovaquone (MEPRON) 750 mg/5 mL Susp oral liquid Take 10 mLs (1,500 mg total) by mouth once daily. 300 mL 3    baclofen (LIORESAL) 5 mg Tab tablet Take 2 tablets (10 mg total) by mouth 3 (three) times daily as needed (Muscle Spasticity).      bisacodyL (DULCOLAX) 10 mg Supp Place 1 suppository rectally daily as needed (constipation).      cyanocobalamin (VITAMIN B-12) 1000 MCG tablet Take 1,000 mcg by mouth once daily.      ergocalciferol (ERGOCALCIFEROL) 50,000 unit Cap Take 50,000 Units by mouth every 7 days.      FLUoxetine 20 MG capsule Take 20 mg by mouth once daily.      furosemide (LASIX) 20 MG tablet Take 2 tablets (40 mg total) by mouth once daily. 30 tablet 1    GENVOYA [phone] mg Tab TAKE ONE TABLET BY MOUTH DAILY WITH FOOD.      lactulose (CHRONULAC) 10 gram/15 mL solution TAKE 30 MLS BY MOUTH TWICE DAILY. GOAL OF 3-5 SOFT STOOLS EACH  mL 6    levETIRAcetam (KEPPRA) 500 MG Tab Take 1 tablet (500 mg total) by mouth 2 (two) times daily. Take medication until follow-up with your provider. 60 tablet 3    loratadine (CLARITIN) 10 mg tablet Take 10 mg by mouth once daily.      meclizine (ANTIVERT) 12.5 mg tablet Take 6.25 mg by mouth 2 (two) times daily as needed for Dizziness.      metoprolol succinate (TOPROL-XL) 25 MG 24 hr tablet Take 25 mg by mouth once daily.      midodrine (PROAMATINE) 2.5 MG Tab Take 1 tablet (2.5 mg total) by mouth 3 (three) times daily as needed (if systolic blood pressure (top number) is less than 100).      pantoprazole (PROTONIX) 40 MG tablet Take 40 mg by mouth once daily.      PROMACTA 25 mg Tab       pyridoxine, vitamin B6, (B-6) 100 MG Tab Take 100 mg by mouth  once daily.      rifAXIMin (XIFAXAN) 550 mg Tab Take 550 mg by mouth 2 (two) times daily.      rosuvastatin (CRESTOR) 10 MG tablet Take 10 mg by mouth every evening.      spironolactone (ALDACTONE) 25 MG tablet Take 50 mg by mouth once daily.      vitamin D (VITAMIN D3) 1000 units Tab Take 1,000 Units by mouth once daily.

## 2025-03-25 NOTE — ASSESSMENT & PLAN NOTE
-CD4 done, lower at 54  -Would benefit from palliative care consult given not only lack of candidacy for transplant, but also high propensity for opportunistic infections

## 2025-03-25 NOTE — ASSESSMENT & PLAN NOTE
Recent Labs     03/23/25  0614 03/24/25  0249 03/25/25  1056   HGB 8.7* 7.4* 6.3*   HCT 26.6* 22.9* 19.2*   PLT 49* 52* 41*   INR 2.4*  --  3.1*

## 2025-03-25 NOTE — PROCEDURES
Radiology Post-Procedure Note    Pre Op Diagnosis: Ascites  Post Op Diagnosis: Same    Procedure: Ultrasound Guided Paracentesis    Procedure performed by: Barbara Blackwell PA-C    Written Informed Consent Obtained: Yes  Specimen Removed: YES (yellow, clear)  Estimated Blood Loss: Minimal    Findings:   Successful paracentesis form the right.  Albumin administered PRN per protocol.    Patient tolerated procedure well.    Barbara Blackwell PA-C  Interventional Radiology  [phone]

## 2025-03-25 NOTE — PROGRESS NOTES
[facility] Medicine  Progress Note    Patient Name: Jam Card  MRN: [id number]  Patient Class: IP- Inpatient   Admission Date: 3/22/2025  Length of Stay: 3 days  Attending Physician: Frank Pennington DO  Primary Care Provider: Ulysses Almaraz MD        Subjective     Principal Problem:Cirrhosis of liver with ascites        HPI:  Mr. Card is a 59 y.o. male with PMH of HCV cirrhosis and HIV. Liver disease c/b ascites/edema, HE. 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 Previously evaluated for liver transplant but deffered due to pulmonary hypertension. He presented to OSH ED with generalized weakness, frequent falls, gait instability. Hit his lip, wound noted. Denies hitting his head or LOC. Denies any vomiting, diarrhea, blood in his stool, fever. W/u in ED: CTH neg for bleed/acute abnormality, CXR without infection, UA neg for infection. Hyponatremia to 125, albumin 1.7, LFTs elevated (expected), Cr at BL. OSH d/w Dr. Cloud, patient admitted to [facility] for worsening decompensation including overt hepatic encephalopathy (confusion), worsening hyponatremia, coagulopathy and jaundice. Dey placed for retention. Patient admitted under Dr. Walden, will transfer to IML service in AM.    Overview/Hospital Course:  Admitted with hepatic encephalopathy, started on lactulose and rifaximin.  Hyponatremic with a JASON, Nephrology consulted.  Continuing on albumin challenge.  Diuretics have been held.  Started on IV Rocephin given UTI.  Hepatology on board for decompensated cirrhosis.  Needs CD4 recheck. Will reportedly not be a transplant candidate if CD4 count is <100.  Would additionally provide insight regarding medication compliance. CD4 count returned at 56 which is decreased from before. Discussed w transplant ID, has poor immune recovery which is further lowered by cirrhosis.  Therefore, may not necessarily be medication noncompliance or failure, but rather a  lowered immune response 2/2 cirrhosis. Regardless, will not be a transplant candidate. Continue intermittent paracentesis. Will likely involve palliative care    Today 3/25, new drop in Hb which preceeded paracentesis this afternoon. Obtain workup. 1 unit prbc transfusion    Interval History: Seen this AM at bedside.  No acute events reported overnight. Working with PT. No complaints other than abdominal distension. Hb drop this AM, no note of melanotic stool/hematochezia. Pt without reports of bleeding    Caretaker updated at bedside    Review of Systems  Objective:     Vital Signs (Most Recent):  Temp: 98.2 °F (36.8 °C) (03/25/25 1330)  Pulse: 91 (03/25/25 1330)  Resp: 17 (03/25/25 1330)  BP: (!) 108/58 (03/25/25 1330)  SpO2: 98 % (03/25/25 1330) Vital Signs (24h Range):  Temp:  [97.9 °F (36.6 °C)-98.8 °F (37.1 °C)] 98.2 °F (36.8 °C)  Pulse:  [84-98] 91  Resp:  [17-95] 17  SpO2:  [88 %-98 %] 98 %  BP: ()/(54-60) 108/58     Weight: 71.7 kg (158 lb 1.1 oz)  Body mass index is 20.86 kg/m².    Intake/Output Summary (Last 24 hours) at 3/25/2025 1506  Last data filed at 3/25/2025 1211  Gross per 24 hour   Intake 680 ml   Output 3150 ml   Net -2470 ml         Physical Exam  Vitals and nursing note reviewed.   Constitutional:       General: He is awake. He is not in acute distress.     Appearance: He is not ill-appearing.   HENT:      Head: Normocephalic.      Mouth/Throat:      Mouth: Mucous membranes are moist.   Eyes:      General: Scleral icterus present.   Cardiovascular:      Rate and Rhythm: Normal rate and regular rhythm.      Pulses: Normal pulses.      Heart sounds: No murmur heard.  Pulmonary:      Effort: Pulmonary effort is normal. No respiratory distress.      Breath sounds: No wheezing or rales.   Abdominal:      General: Bowel sounds are normal. There is distension.      Palpations: Abdomen is soft.      Tenderness: There is no abdominal tenderness. There is no guarding or rebound.   Musculoskeletal:          General: Normal range of motion.      Cervical back: Normal range of motion.      Right lower leg: Edema present.      Left lower leg: Edema present.   Skin:     General: Skin is warm and dry.   Neurological:      General: No focal deficit present.      Mental Status: He is alert and oriented to person, place, and time.      Motor: Weakness present.   Psychiatric:         Mood and Affect: Mood normal.         Behavior: Behavior normal.         Thought Content: Thought content normal.         Judgment: Judgment normal.               Significant Labs: All pertinent labs within the past 24 hours have been reviewed.    Significant Imaging: I have reviewed all pertinent imaging results/findings within the past 24 hours.      Assessment & Plan  Cirrhosis of liver with ascites  Patient with known Cirrhosis with Co-morbidities are present and inclusive of ascites, hepatic encephalopathy, anticoagulation, and jaundice .  MELD-Na score calculated; MELD 3.0: 34 at 3/25/2025 10:56 AM  MELD-Na: 34 at 3/25/2025 10:56 AM  Calculated from:  Serum Creatinine: 1.4 mg/dL at 3/25/2025 10:56 AM  Serum Sodium: 124 mmol/L (Using min of 125 mmol/L) at 3/25/2025 10:56 AM  Total Bilirubin: 8.1 mg/dL at 3/25/2025 10:56 AM  Serum Albumin: 2.6 g/dL at 3/25/2025 10:56 AM  INR(ratio): 3.1 at 3/25/2025 10:56 AM  Age at listing (hypothetical): 59 years  Sex: Male at 3/25/2025 10:56 AM      Continue chronic meds. Etiology likely Hepatitis. Will avoid any hepatotoxic meds, and monitor CBC/CMP/INR for synthetic function.     -para 3/18 neg for SBP  -Hepatology on board, not a candidate for transplant unless CD4 count is greater than 100.  Would also provide insight regarding medication compliance   -came back at 54, likely due to lack of immune response 2/2 cirrhosis rather than medication noncompliance or medication failure  Plan:  -Palliative care consult given lack of transplant candidacy  -Cont IV rocephin  -repeat para today  -PT/OT:  moderate intensity  Acute blood loss anemia  Anemia is likely due to chronic blood loss and chronic disease due to Chronic liver disease. Most recent hemoglobin and hematocrit are listed below.  Recent Labs     03/23/25  0614 03/24/25  0249 03/25/25  1056   HGB 8.7* 7.4* 6.3*   HCT 26.6* 22.9* 19.2*     Plan  - Monitor serial CBC: Daily  - Transfuse PRBC if patient becomes hemodynamically unstable, symptomatic or H/H drops below 7/21.  - Patient has received 1 units of PRBCs on at OSH  and 1 unit at [facility] (3/25  - Patient's anemia is currently downtrending  - anemia w/u  - repeat CBC today    JASON (acute kidney injury)  Hyponatremia  JASON is likely due to  HR S . Baseline creatinine is  1.5 . Most recent creatinine and eGFR are listed below.  Recent Labs     03/23/25  0614 03/24/25  0249 03/25/25  1056   CREATININE 1.2 1.5* 1.4   EGFRNORACEVR >60 54* 59*      Plan  - JASON is improving  - Avoid nephrotoxins and renally dose meds for GFR listed above  - Monitor urine output, serial BMP, and adjust therapy as needed  - nephrology on board  - continue albumin challenge increased to t.i.d.  - continue to hold Lasix  Human immunodeficiency virus (HIV) disease  -CD4 done, lower at 54  -Would benefit from palliative care consult given not only lack of candidacy for transplant, but also high propensity for opportunistic infections     UTI (urinary tract infection)  Continue IV Rocephin, added fluconazole due to candida 2 week course      Bilateral subdural hematomas    Recent Labs     03/23/25  0614 03/24/25  0249 03/25/25  1056   HGB 8.7* 7.4* 6.3*   HCT 26.6* 22.9* 19.2*   PLT 49* 52* 41*   INR 2.4*  --  3.1*        HLD (hyperlipidemia)      Depression  Continue Prozac      Hepatic encephalopathy        VTE Risk Mitigation (From admission, onward)           Ordered     Place RADHA hose  Until discontinued         03/23/25 0041     IP VTE LOW RISK PATIENT  Once         03/23/25 0041                    Discharge Planning   CODI:  3/26/2025     Code Status: Full Code   Medical Readiness for Discharge Date:   Discharge Plan A: Home Health                Please place Justification for DME        Frank Pennington DO  Department of Hospital Medicine   [facility] - Transplant Stepdown

## 2025-03-25 NOTE — ASSESSMENT & PLAN NOTE
JASON is likely due to HR S. Baseline creatinine is 1.5. Most recent creatinine and eGFR are listed below.  Recent Labs     03/23/25  0614 03/24/25  0249 03/25/25  1056   CREATININE 1.2 1.5* 1.4   EGFRNORACEVR >60 54* 59*      Plan  - JASON is improving  - Avoid nephrotoxins and renally dose meds for GFR listed above  - Monitor urine output, serial BMP, and adjust therapy as needed  - nephrology on board  - continue albumin challenge increased to t.i.d.  - continue to hold Lasix

## 2025-03-25 NOTE — ASSESSMENT & PLAN NOTE
Sodium drop to 123. On 12 was 131 on exam look euvolemic  Cause of hyponatremia unknown at this time given incomplete labs      Recommendation  Continue albumin to TID dosing  -Can give 500 mL of IV fluids. Urine studies showed a urine sodium of 23 and osmolality of 649 suggesting some degree of low effective circulating volume.   Hold lasix.   Avoid D5 infusion  Sodium correction goal is 6-8 meq/24 hr

## 2025-03-25 NOTE — H&P
Inpatient Radiology Pre-procedure Note    History of Present Illness:  Jam Card is a 59 y.o. male who presents for US guided paracentesis.     Admission H&P reviewed.  Past Medical History:   Diagnosis Date    CAD (coronary artery disease) 03/2010    Hx of MI, CABG,  and cardiac cath    Chronic hepatitis C with cirrhosis     GERD (gastroesophageal reflux disease)     HTN (hypertension)     Human immunodeficiency virus (HIV) disease     Hyperlipidemia     Organic brain syndrome      Past Surgical History:   Procedure Laterality Date    CARDIAC CATHETERIZATION      CORONARY ANGIOGRAPHY N/A 12/20/2024    Procedure: ANGIOGRAM, CORONARY ARTERY;  Surgeon: Yannick Gastelum III, MD;  Location: [facility] CATH LAB;  Service: Cardiology;  Laterality: N/A;    CORONARY ARTERY BYPASS GRAFT      CORONARY BYPASS GRAFT ANGIOGRAPHY  12/20/2024    Procedure: Bypass graft study;  Surgeon: Yannick Gastelum III, MD;  Location: [facility] CATH LAB;  Service: Cardiology;;    ESOPHAGOGASTRODUODENOSCOPY N/A 2/7/2025    Procedure: EGD (ESOPHAGOGASTRODUODENOSCOPY);  Surgeon: Jerald Vo MD;  Location: [facility] (2ND FLR);  Service: Endoscopy;  Laterality: N/A;  2nd floor - recent brain bleed 10/24 ref [facility], portal, neurosur clear-st  per neuro E [facility] MITTAL-Patient is cleared for endoscopy-GT  12/12-tb-mess re-sent to [facility] to order plat  12/11 r/s, portal, card cl pend-st  12/26-r/s to earlier date-ca    RIGHT HEART CATHETERIZATION Right 12/20/2024    Procedure: INSERTION, CATHETER, RIGHT HEART;  Surgeon: Yannick Gastelum III, MD;  Location: [facility] CATH LAB;  Service: Cardiology;  Laterality: Right;    TONSILLECTOMY AND ADENOIDECTOMY      TRIGGER FINGER RELEASE         Review of Systems:   As documented in primary team H&P    Home Meds:   Prior to Admission medications    Medication Sig Start Date End Date Taking? Authorizing Provider   atovaquone (MEPRON) 750 mg/5 mL Susp oral liquid Take 10 mLs (1,500 mg total) by mouth once  daily. 1/13/25   Carmelo Wu MD   baclofen (LIORESAL) 5 mg Tab tablet Take 2 tablets (10 mg total) by mouth 3 (three) times daily as needed (Muscle Spasticity). 8/22/24   Onur Spain DO   bisacodyL (DULCOLAX) 10 mg Supp Place 1 suppository rectally daily as needed (constipation). 8/6/24   Provider, Historical   cyanocobalamin (VITAMIN B-12) 1000 MCG tablet Take 1,000 mcg by mouth once daily.    Provider, Historical   ergocalciferol (ERGOCALCIFEROL) 50,000 unit Cap Take 50,000 Units by mouth every 7 days. 3/26/24   Provider, Historical   FLUoxetine 20 MG capsule Take 20 mg by mouth once daily. 10/20/23   Provider, Historical   furosemide (LASIX) 20 MG tablet Take 2 tablets (40 mg total) by mouth once daily. 5/7/24   Scheuermann, Jennifer B., PA   GENVOYA [phone] mg Tab TAKE ONE TABLET BY MOUTH DAILY WITH FOOD. 3/28/24   Provider, Historical   lactulose (CHRONULAC) 10 gram/15 mL solution TAKE 30 MLS BY MOUTH TWICE DAILY. GOAL OF 3-5 SOFT STOOLS EACH DAY 9/12/24   TherapondKris álvarez MD   levETIRAcetam (KEPPRA) 500 MG Tab Take 1 tablet (500 mg total) by mouth 2 (two) times daily. Take medication until follow-up with your provider. 8/22/24   Onur Spain DO   loratadine (CLARITIN) 10 mg tablet Take 10 mg by mouth once daily.    Provider, Historical   meclizine (ANTIVERT) 12.5 mg tablet Take 6.25 mg by mouth 2 (two) times daily as needed for Dizziness. 7/16/24   Provider, Historical   metoprolol succinate (TOPROL-XL) 25 MG 24 hr tablet Take 25 mg by mouth once daily. 1/12/24   Provider, Historical   midodrine (PROAMATINE) 2.5 MG Tab Take 1 tablet (2.5 mg total) by mouth 3 (three) times daily as needed (if systolic blood pressure (top number) is less than 100). 8/22/24 8/22/25  Onur Spain DO   pantoprazole (PROTONIX) 40 MG tablet Take 40 mg by mouth once daily. 1/15/24   Provider, Historical   PROMACTA 25 mg Tab  12/7/24   Provider, Historical   pyridoxine, vitamin B6, (B-6) 100 MG Tab  "Take 100 mg by mouth once daily.    Provider, Historical   rifAXIMin (XIFAXAN) 550 mg Tab Take 550 mg by mouth 2 (two) times daily. 6/20/24   Provider, Historical   rosuvastatin (CRESTOR) 10 MG tablet Take 10 mg by mouth every evening. 4/15/24   Provider, Historical   spironolactone (ALDACTONE) 25 MG tablet Take 50 mg by mouth once daily. 8/1/24 8/1/25  Provider, Historical   vitamin D (VITAMIN D3) 1000 units Tab Take 1,000 Units by mouth once daily.    Provider, Historical     Scheduled Meds:    albumin human 25%  25 g Intravenous TID    atorvastatin  40 mg Oral Daily    atovaquone  1,500 mg Oral Daily    cefTRIAXone (Rocephin) IV (PEDS and ADULTS)  1 g Intravenous Q24H    cyanocobalamin  1,000 mcg Oral Daily    elviteg-cob-emtri-tenof ALAFEN   Oral Daily    FLUoxetine  20 mg Oral Daily    heparin (porcine)  5,000 Units Subcutaneous Q8H    lactulose  30 g Oral BID    levETIRAcetam  500 mg Oral BID    metoprolol succinate  25 mg Oral Nightly    mupirocin   Nasal BID    pantoprazole  40 mg Oral Daily    pyridoxine (vitamin B6)  100 mg Oral Daily    rifAXIMin  550 mg Oral BID    vitamin D  1,000 Units Oral Daily     Continuous Infusions:   PRN Meds:  Current Facility-Administered Medications:     (Magic mouthwash) 1:1:1 diphenhydrAMINE(Benadryl) 12.5mg/5ml liq, aluminum & magnesium hydroxide-simethicone (Maalox), LIDOcaine viscous 2%, 5 mL, Swish & Spit, Q4H PRN    acetaminophen, 650 mg, Oral, Q4H PRN    albuterol-ipratropium, 3 mL, Nebulization, Q6H PRN    baclofen, 10 mg, Oral, TID PRN    bisacodyL, 10 mg, Rectal, Daily PRN    calcium carbonate, 1,000 mg, Oral, TID PRN    melatonin, 6 mg, Oral, Nightly PRN    ondansetron, 8 mg, Oral, Q6H PRN    prochlorperazine, 5 mg, Intravenous, Q6H PRN    sodium chloride 0.9%, 10 mL, Intravenous, PRN  Anticoagulants/Antiplatelets: Heparin    Allergies:   Review of patient's allergies indicates:   Allergen Reactions    Hydrocodone-acetaminophen Other (See Comments)     "cannot " "sleep when taking" Does not want to take    Lisinopril Other (See Comments)     Other reaction(s): Cough    Sulfamethoxazole-trimethoprim Rash     Sedation Hx: have not been any systemic reactions    Vitals:  Temp: 97.9 °F (36.6 °C) (03/25/25 0759)  Pulse: 89 (03/25/25 1131)  Resp: 18 (03/25/25 1131)  BP: 127/60 (03/25/25 1131)  SpO2: 96 % (03/25/25 1131)     Physical Exam:  ASA: 3  Mallampati: n/a    General: no acute distress  Mental Status: alert and oriented to person, place and time  HEENT: normocephalic, atraumatic  Chest: unlabored breathing  Heart: regular heart rate  Abdomen: distended  Extremity: moves all extremities    Plan: US guided paracentesis.   Sedation Plan: local    Barbara Blackwell PA-C  Interventional Radiology  [phone]  "

## 2025-03-25 NOTE — ASSESSMENT & PLAN NOTE
Anemia is likely due to chronic blood loss and chronic disease due to Chronic liver disease. Most recent hemoglobin and hematocrit are listed below.  Recent Labs     03/23/25  0614 03/24/25  0249 03/25/25  1056   HGB 8.7* 7.4* 6.3*   HCT 26.6* 22.9* 19.2*     Plan  - Monitor serial CBC: Daily  - Transfuse PRBC if patient becomes hemodynamically unstable, symptomatic or H/H drops below 7/21.  - Patient has received 1 units of PRBCs on at OSH and 1 unit at [facility] (3/25  - Patient's anemia is currently downtrending  - anemia w/u  - repeat CBC today

## 2025-03-25 NOTE — PLAN OF CARE
Procedure completed after patient inadvertently pulled catheter.  2150 mL of fluid drained. Patient tolerated well; VSS. RLQ Site CDI. Patient to be transported back to inpatient Rm [id number]; report called to bedside nurseTyesha.

## 2025-03-25 NOTE — PLAN OF CARE
No falls or injuries reported this shift. Dey draining to gravity.  Family remains at bedside this shift.

## 2025-03-25 NOTE — SUBJECTIVE & OBJECTIVE
"Interval History; Paracentesis today with 2150 mL of fluid drained, creatinine improved after albumin yesterday.       Objective:     Vital Signs (Most Recent):  Temp: 98.2 °F (36.8 °C) (03/25/25 1330)  Pulse: 91 (03/25/25 1330)  Resp: 17 (03/25/25 1330)  BP: (!) 108/58 (03/25/25 1330)  SpO2: 98 % (03/25/25 1330) Vital Signs (24h Range):  Temp:  [97.9 °F (36.6 °C)-98.8 °F (37.1 °C)] 98.2 °F (36.8 °C)  Pulse:  [84-98] 91  Resp:  [17-95] 17  SpO2:  [88 %-98 %] 98 %  BP: ()/(54-60) 108/58     Weight: 71.7 kg (158 lb 1.1 oz) (03/24/25 1152)  Body mass index is 20.86 kg/m².  Body surface area is 1.92 meters squared.    I/O last 3 completed shifts:  In: 1228.3 [P.O.:720; I.V.:508.3]  Out: 2100 [Urine:2100]     Physical Exam  HENT:      Head: Normocephalic.      Mouth/Throat:      Mouth: Mucous membranes are moist.   Cardiovascular:      Rate and Rhythm: Normal rate.      Pulses: Normal pulses.   Pulmonary:      Effort: Pulmonary effort is normal.   Abdominal:      General: There is distension.   Musculoskeletal:      Cervical back: Normal range of motion.   Skin:     General: Skin is warm.   Neurological:      General: No focal deficit present.      Mental Status: He is alert.   Psychiatric:         Mood and Affect: Mood normal.          Significant Labs:  ABGs: No results for input(s): "PH", "PCO2", "HCO3", "POCSATURATED", "BE" in the last 168 hours.  BMP:   Recent Labs   Lab 03/25/25  1056   *   K 4.1   CL 98   CO2 19*   BUN 25*   CREATININE 1.4   CALCIUM 8.9   MG 2.0     Cardiac Markers: No results for input(s): "CKMB", "TROPONINT", "MYOGLOBIN" in the last 168 hours.  CBC:   Recent Labs   Lab 03/25/25  1056   WBC 2.11*   RBC 1.89*   HGB 6.3*   HCT 19.2*   PLT 41*   *   MCH 33.3   MCHC 32.8     CMP:   Recent Labs   Lab 03/25/25  1056   CALCIUM 8.9   ALBUMIN 2.6*   *   K 4.1   CO2 19*   CL 98   BUN 25*   CREATININE 1.4   ALKPHOS 78   ALT 23   *   BILITOT 8.1*     Coagulation:   Recent Labs " "  Lab 03/25/25  1056   INR 3.1*     LFTs:   Recent Labs   Lab 03/25/25  1056   ALT 23   *   ALKPHOS 78   BILITOT 8.1*   ALBUMIN 2.6*     Microbiology Results (last 7 days)       Procedure Component Value Units Date/Time    Blood culture [[id number]]  (Normal) Collected: 03/23/25 1347    Order Status: Completed Specimen: Blood from Peripheral, Hand, Left Updated: 03/25/25 0302     Blood Culture No Growth After 36 Hours    Blood culture [[id number]]  (Normal) Collected: 03/23/25 1341    Order Status: Completed Specimen: Blood from Peripheral, Forearm, Left Updated: 03/25/25 0302     Blood Culture No Growth After 36 Hours    Urine culture [[id number]]  (Abnormal) Collected: 03/23/25 1119    Order Status: Completed Specimen: Urine Updated: 03/24/25 2248     Urine Culture 10,000 - 49,999 cfu/ml Candida albicans     Comment: Treatment of asymptomatic candiduria is not recommended (except for specific populations). Candida isolated in the urine typically represents colonization. If an indwelling urinary catheter is presentit should be removed or replaced.       Narrative:      No other significant isolate          Specimen (24h ago, onward)      None          PTH: No results for input(s): "PTH" in the last 168 hours.  TSH: No results for input(s): "TSH" in the last 168 hours.  Recent Labs   Lab 03/23/25  1119   COLORU Yellow   SPECGRAV 1.030   PHUR 6.0   PROTEINUA 1+*   BACTERIA Rare   NITRITE Negative   LEUKOCYTESUR 3+*   UROBILINOGEN Negative   HYALINECASTS 18*         "

## 2025-03-25 NOTE — PROGRESS NOTES
[facility] - Transplant Stepdown  Nephrology  Progress Note    Patient Name: Jam Card  MRN: [id number]  Admission Date: 3/22/2025  Hospital Length of Stay: 3 days  Attending Provider: Frank Pennington DO   Primary Care Physician: Ulysses Almaraz MD  Principal Problem:Cirrhosis of liver with ascites    Subjective:     HPI: Mr. Card is a 59 y.o. male with PMH of HCV cirrhosis and HIV. Liver disease c/b ascites/edema, HE. 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 Previously evaluated for liver transplant but deffered due to pulmonary hypertension. He presented to OSH ED with generalized weakness, frequent falls, gait instability. Hit his lip, wound noted. Denies hitting his head or LOC. Denies any vomiting, diarrhea, blood in his stool, fever. W/u in ED: CTH neg for bleed/acute abnormality, CXR without infection, UA neg for infection. Hyponatremia to 125, albumin 1.7, LFTs elevated (expected), Cr at BL. OSH d/w Dr. Cloud, patient admitted to [facility] for worsening decompensation including overt hepatic encephalopathy (confusion), worsening hyponatremia, coagulopathy and jaundice. Dey placed for retention. Patient admitted under Dr. Walden, will transfer to IML service in AM.   Nephrology consulted for hyponatremia    Interval History; Paracentesis today with 2150 mL of fluid drained, creatinine improved after albumin yesterday.       Objective:     Vital Signs (Most Recent):  Temp: 98.2 °F (36.8 °C) (03/25/25 1330)  Pulse: 91 (03/25/25 1330)  Resp: 17 (03/25/25 1330)  BP: (!) 108/58 (03/25/25 1330)  SpO2: 98 % (03/25/25 1330) Vital Signs (24h Range):  Temp:  [97.9 °F (36.6 °C)-98.8 °F (37.1 °C)] 98.2 °F (36.8 °C)  Pulse:  [84-98] 91  Resp:  [17-95] 17  SpO2:  [88 %-98 %] 98 %  BP: ()/(54-60) 108/58     Weight: 71.7 kg (158 lb 1.1 oz) (03/24/25 1152)  Body mass index is 20.86 kg/m².  Body surface area is 1.92 meters squared.    I/O last 3 completed shifts:  In: 1228.3 [P.O.:720;  "I.V.:508.3]  Out: 2100 [Urine:2100]     Physical Exam  HENT:      Head: Normocephalic.      Mouth/Throat:      Mouth: Mucous membranes are moist.   Cardiovascular:      Rate and Rhythm: Normal rate.      Pulses: Normal pulses.   Pulmonary:      Effort: Pulmonary effort is normal.   Abdominal:      General: There is distension.   Musculoskeletal:      Cervical back: Normal range of motion.   Skin:     General: Skin is warm.   Neurological:      General: No focal deficit present.      Mental Status: He is alert.   Psychiatric:         Mood and Affect: Mood normal.          Significant Labs:  ABGs: No results for input(s): "PH", "PCO2", "HCO3", "POCSATURATED", "BE" in the last 168 hours.  BMP:   Recent Labs   Lab 03/25/25  1056   *   K 4.1   CL 98   CO2 19*   BUN 25*   CREATININE 1.4   CALCIUM 8.9   MG 2.0     Cardiac Markers: No results for input(s): "CKMB", "TROPONINT", "MYOGLOBIN" in the last 168 hours.  CBC:   Recent Labs   Lab 03/25/25  1056   WBC 2.11*   RBC 1.89*   HGB 6.3*   HCT 19.2*   PLT 41*   *   MCH 33.3   MCHC 32.8     CMP:   Recent Labs   Lab 03/25/25  1056   CALCIUM 8.9   ALBUMIN 2.6*   *   K 4.1   CO2 19*   CL 98   BUN 25*   CREATININE 1.4   ALKPHOS 78   ALT 23   *   BILITOT 8.1*     Coagulation:   Recent Labs   Lab 03/25/25  1056   INR 3.1*     LFTs:   Recent Labs   Lab 03/25/25  1056   ALT 23   *   ALKPHOS 78   BILITOT 8.1*   ALBUMIN 2.6*     Microbiology Results (last 7 days)       Procedure Component Value Units Date/Time    Blood culture [[id number]]  (Normal) Collected: 03/23/25 1347    Order Status: Completed Specimen: Blood from Peripheral, Hand, Left Updated: 03/25/25 0302     Blood Culture No Growth After 36 Hours    Blood culture [[id number]]  (Normal) Collected: 03/23/25 1341    Order Status: Completed Specimen: Blood from Peripheral, Forearm, Left Updated: 03/25/25 0302     Blood Culture No Growth After 36 Hours    Urine culture [[id number]]  (Abnormal) " "Collected: 03/23/25 1119    Order Status: Completed Specimen: Urine Updated: 03/24/25 2242     Urine Culture 10,000 - 49,999 cfu/ml Candida albicans     Comment: Treatment of asymptomatic candiduria is not recommended (except for specific populations). Candida isolated in the urine typically represents colonization. If an indwelling urinary catheter is presentit should be removed or replaced.       Narrative:      No other significant isolate          Specimen (24h ago, onward)      None          PTH: No results for input(s): "PTH" in the last 168 hours.  TSH: No results for input(s): "TSH" in the last 168 hours.  Recent Labs   Lab 03/23/25  1119   COLORU Yellow   SPECGRAV 1.030   PHUR 6.0   PROTEINUA 1+*   BACTERIA Rare   NITRITE Negative   LEUKOCYTESUR 3+*   UROBILINOGEN Negative   HYALINECASTS 18*         Assessment/Plan:     Renal/  JASON (acute kidney injury)  JASON developed 3/24, blood pressures do fit with HRS.     -Albumin TID dosing  -Hold lasix today  -Recommend paracentesis  -Avoid nephrotoxic agents (IV contrast, NSAID's, gadolinium contrast, PPI's) if able.   -Maintain MAP above 65 mmHg.   -Strict I's and O's  -Daily renal function panel  -Renally dose all medications    Endocrine  Hyponatremia  Sodium drop to 123. On 12 was 131 on exam look euvolemic  Cause of hyponatremia unknown at this time given incomplete labs      Recommendation  Continue albumin to TID dosing  -Can give 500 mL of IV fluids. Urine studies showed a urine sodium of 23 and osmolality of 649 suggesting some degree of low effective circulating volume.   Hold lasix.   Avoid D5 infusion  Sodium correction goal is 6-8 meq/24 hr          Thank you for your consult. I will follow-up with patient. Please contact us if you have any additional questions.    Yoav Landry MD  Nephrology  [facility] - Transplant Stepdown  "

## 2025-03-26 PROBLEM — Z51.5 COMFORT MEASURES ONLY STATUS: Status: ACTIVE | Noted: 2025-03-26

## 2025-03-26 NOTE — PT/OT/SLP PROGRESS
Occupational Therapy      Patient Name:  Jam Card   MRN:  [id number]    Patient not seen today secondary to patient transitioned to Hospice and MD discharged OT orders.    3/26/2025

## 2025-03-26 NOTE — ASSESSMENT & PLAN NOTE
JASON is likely due to HR S. Baseline creatinine is 1.5. Most recent creatinine and eGFR are listed below.  Recent Labs     03/24/25  0249 03/25/25  1056   CREATININE 1.5* 1.4   EGFRNORACEVR 54* 59*      Plan  - JASON is improving  - Avoid nephrotoxins and renally dose meds for GFR listed above  - Monitor urine output, serial BMP, and adjust therapy as needed  - nephrology on board  - continue albumin challenge increased to t.i.d.  - continue to hold Lasix

## 2025-03-26 NOTE — ASSESSMENT & PLAN NOTE
Anemia is likely due to chronic blood loss and chronic disease due to Chronic liver disease. Most recent hemoglobin and hematocrit are listed below.  Recent Labs     03/24/25  0249 03/25/25  1056 03/25/25  1950   HGB 7.4* 6.3* 7.8*   HCT 22.9* 19.2* 24.3*     Plan  - Monitor serial CBC: Daily  - Transfuse PRBC if patient becomes hemodynamically unstable, symptomatic or H/H drops below 7/21.  - Patient has received 1 units of PRBCs on at OSH and 1 unit at [facility] (3/25  - Patient's anemia is currently stable

## 2025-03-26 NOTE — PLAN OF CARE
AAOx4 - delayed/slowed responses noted. VSS. Patient up OOB to BSC w/ 2x assist. IV vitamin K continued daily. IV Albumin continued TID. Palliative care consulted - see note. Patient made a DNR today and started on comfort measures. SW consulted to assist w/ hospice placement. PRN PO zofran given x1 this shift for c/o nausea. PRN IVP dilaudid given x1 this shift for c/o pain. Dey to gravity w/ demar urine noted - see flowsheet for exact UOP amount. IR consulted for PleurX drain placement tomorrow 3/27 - patient to be NPO at midnight tonight. Non-skid socks on. Bed in low position. Call light within reach. Family at bedside.

## 2025-03-26 NOTE — ASSESSMENT & PLAN NOTE
Recent Labs     03/24/25  0249 03/25/25  1056 03/25/25  1950   HGB 7.4* 6.3* 7.8*   HCT 22.9* 19.2* 24.3*   PLT 52* 41* 41*   INR  --  3.1*  --

## 2025-03-26 NOTE — CHAPLAIN
Patient: Jam Card  MRN: [id number]  : 1965  Age: 59 y.o.  Legal sex: male   Hospital Length of Stay: 4 days  Code Status: DNR   Attending Provider: Frank Pennington DO  Principal Problem: Cirrhosis of liver with ascites  Patient's Yarsani: [employer]  Length of my visit: 5 min  Purpose of visit:   PallMed      attempted to visit- patient and family asleep.         Rev. Michael German, [id number]  board certified , donna (Yi+4)     support is available and on-site . Please call the on-call  for any emergent spiritual care needs, [phone].

## 2025-03-26 NOTE — CONSULTS
Interventional Radiology  Consult/History & Physical Note    Consult Requested By: Frank Pennington DO  Reason for Consult: pleurX placement    SUBJECTIVE:     Chief Complaint:  ascites, going to hospice    History of Present Illness:  Jam Card is a 59 y.o. male with a PMHx of HCV cirrhosis and HIV. Liver disease c/b ascites/edema, HE, SDH s/p MMA embolization (8/2024) who was admitted on 3/22/2025 for weakness, falls, and gait instability. Hospital course notable for hepatic encephalopathy, JASON, and refractory ascites. Interventional Radiology has been consulted for image guided abdominal pleur-x catheter placement for management of refractory ascites . Pt underwent abdominal US on 3/12/2025 which revealed ascites. The pt undergoes paracentesis with drainage of between 2-5L ascitic fluid on average. Per primary team, plan for pt to go home on hospice. The pt is not in respiratory distress. WBC is 1.80 from 3/25/2025. Pt is afebrile and hemodynamically stable. He admits to a history of ALLYSSA but has resolved and no longer requires CPAP. He denies difficulty breathing when lying flat. He does not take any anticoagulants.     Review of Systems   Constitutional:  Negative for chills and fever.   Respiratory:  Negative for cough and shortness of breath.    Cardiovascular:  Negative for chest pain and leg swelling.   Gastrointestinal:  Negative for abdominal pain and vomiting.       Scheduled Meds:   albumin human 25%  25 g Intravenous TID    atovaquone  1,500 mg Oral Daily    cyanocobalamin  1,000 mcg Oral Daily    elviteg-cob-emtri-tenof ALAFEN  1 tablet Oral Daily    fluconazole  200 mg Oral Daily    FLUoxetine  20 mg Oral Daily    lactulose  30 g Oral BID    levETIRAcetam  500 mg Oral BID    metoprolol succinate  25 mg Oral Nightly    mupirocin   Nasal BID    pantoprazole  40 mg Oral Daily    phytonadione vitamin k (AQUA-MEPHYTON) 10 mg in D5W 50 mL IVPB  10 mg Intravenous Daily    pyridoxine (vitamin B6)  100  "mg Oral Daily    rifAXIMin  550 mg Oral BID     Continuous Infusions:  PRN Meds:  Current Facility-Administered Medications:     (Magic mouthwash) 1:1:1 diphenhydrAMINE(Benadryl) 12.5mg/5ml liq, aluminum & magnesium hydroxide-simethicone (Maalox), LIDOcaine viscous 2%, 5 mL, Swish & Spit, Q4H PRN    0.9%  NaCl infusion (for blood administration), , Intravenous, Q24H PRN    acetaminophen, 650 mg, Oral, Q4H PRN    albuterol-ipratropium, 3 mL, Nebulization, Q6H PRN    baclofen, 10 mg, Oral, TID PRN    bisacodyL, 10 mg, Rectal, Daily PRN    calcium carbonate, 1,000 mg, Oral, TID PRN    diphenhydrAMINE, 25 mg, Oral, Nightly PRN    HYDROmorphone (PF), 0.5 mg, Intravenous, Q30 Min PRN    lorazepam, 1 mg, Intravenous, Q30 Min PRN    melatonin, 6 mg, Oral, Nightly PRN    ondansetron, 8 mg, Oral, Q6H PRN    prochlorperazine, 5 mg, Intravenous, Q6H PRN    sodium chloride 0.9%, 10 mL, Intravenous, PRN    Review of patient's allergies indicates:   Allergen Reactions    Hydrocodone-acetaminophen Other (See Comments)     "cannot sleep when taking" Does not want to take    Lisinopril Other (See Comments)     Other reaction(s): Cough    Sulfamethoxazole-trimethoprim Rash       Past Medical History:   Diagnosis Date    CAD (coronary artery disease) 03/2010    Hx of MI, CABG,  and cardiac cath    Chronic hepatitis C with cirrhosis     GERD (gastroesophageal reflux disease)     HTN (hypertension)     Human immunodeficiency virus (HIV) disease     Hyperlipidemia     Organic brain syndrome      Past Surgical History:   Procedure Laterality Date    CARDIAC CATHETERIZATION      CORONARY ANGIOGRAPHY N/A 12/20/2024    Procedure: ANGIOGRAM, CORONARY ARTERY;  Surgeon: Yannick Gastelum III, MD;  Location: [facility] CATH LAB;  Service: Cardiology;  Laterality: N/A;    CORONARY ARTERY BYPASS GRAFT      CORONARY BYPASS GRAFT ANGIOGRAPHY  12/20/2024    Procedure: Bypass graft study;  Surgeon: Yannick Gastelum III, MD;  Location: [facility] CATH LAB;  Service: " Cardiology;;    ESOPHAGOGASTRODUODENOSCOPY N/A 2/7/2025    Procedure: EGD (ESOPHAGOGASTRODUODENOSCOPY);  Surgeon: Jerald Vo MD;  [facility]  Service: Endoscopy;  Laterality: N/A;  2nd floor - recent brain bleed 10/24 ref [facility], portal, neurosur clear-st  per neuro E Chivo PA-Patient is cleared for endoscopy-GT  12/12-tb-[facility]  12/11 r/s, portal, card cl pend-st  12/26-r/s to earlier date-ca    RIGHT HEART CATHETERIZATION Right 12/20/2024    Procedure: INSERTION, CATHETER, RIGHT HEART;  Surgeon: Yannick Gastelum III, MD;  Location: [facility] CATH LAB;  Service: Cardiology;  Laterality: Right;    TONSILLECTOMY AND ADENOIDECTOMY      TRIGGER FINGER RELEASE       No family history on file.  Social History[1]    OBJECTIVE:     Vital Signs (Most Recent)  Temp: 97.6 °F (36.4 °C) (03/26/25 1153)  Pulse: (!) 118 (03/26/25 1153)  Resp: 18 (03/26/25 1153)  BP: 110/69 (03/26/25 1153)  SpO2: (!) 94 % (03/26/25 1153)    Physical Exam:  Physical Exam  Constitutional:       General: He is not in acute distress.     Comments: Patient just received pain medication   HENT:      Head: Normocephalic and atraumatic.   Cardiovascular:      Rate and Rhythm: Regular rhythm. Tachycardia present.      Heart sounds: No murmur heard.  Pulmonary:      Effort: Pulmonary effort is normal. No respiratory distress.      Breath sounds: Normal breath sounds.   Abdominal:      General: Bowel sounds are normal. There is distension.      Palpations: Abdomen is soft.      Tenderness: There is no abdominal tenderness.   Musculoskeletal:         General: No swelling.   Skin:     General: Skin is warm and dry.   Neurological:      Mental Status: Mental status is at baseline. He is lethargic.   Psychiatric:         Mood and Affect: Mood normal.         Laboratory  I have reviewed all pertinent lab results within the past 24 hours.    ASA/Mallampati  ASA: 3  Mallampati: 1    Imaging:  Recent imaging  studies including  ultrasound  on 3/12/2025.     ASSESSMENT/PLAN:     Assessment:  59 y.o. male with a PMHx of HCV cirrhosis and HIV. Liver disease c/b ascites/edema, HE, SDH s/p MMA embolization (8/2024) who has been referred to IR for image guided abdominal pleur-x catheter placement for management of refractory ascites . The procedure was discussed in great detail with the patient including thorough explanations of the potential risks and benefits of abdominal pleur-x catheter placement. Risks include but are not limited to infection, hemorrhage, damage to surrounding structures such as the lung, catheter malfunction, inability to remove catheter, pneumothorax, catheter dislodgment and need for additional procedures. The patient is a candidate for image guided abdominal pleur-x catheter placement under moderate sedation. Plan discussed with ordering physician and pt who verbalized understanding of the plan and would like to proceed.    Plan:  Will proceed with image guided abdominal pleur-x catheter placementunder moderate sedation on 3/27/2025, pending improvement in INR (SIR guidelines recommend INR less than or equal to 3).   Please keep pt NPO starting at 0000 on 3/27/2025.   Anticoagulation history reviewed.   Coagulation labs reviewed. INR 3.1 and plt count 41 on 3/25/2025.  Thank you for the consult. Please contact with questions via [employer] secure chat    Time spent during patient care today was 75 minutes. This includes time spent before the visit reviewing the chart, discussing case with staff physician and ordering provider, time spent during the face to face patient visit, and time spent after the visit on documentation. Time excludes procedure time.     Ena Gomez, SIM, FNP  Interventional Radiology         [1]   Social History  Tobacco Use    Smoking status: Former     Types: Cigarettes    Smokeless tobacco: Never   Substance Use Topics    Alcohol use: Not Currently    Drug use: Not Currently

## 2025-03-26 NOTE — ASSESSMENT & PLAN NOTE
Patient with known Cirrhosis with Co-morbidities are present and inclusive of ascites, hepatic encephalopathy, anticoagulation, and jaundice.  MELD-Na score calculated; MELD 3.0: 34 at 3/25/2025 10:56 AM  MELD-Na: 34 at 3/25/2025 10:56 AM  Calculated from:  Serum Creatinine: 1.4 mg/dL at 3/25/2025 10:56 AM  Serum Sodium: 124 mmol/L (Using min of 125 mmol/L) at 3/25/2025 10:56 AM  Total Bilirubin: 8.1 mg/dL at 3/25/2025 10:56 AM  Serum Albumin: 2.6 g/dL at 3/25/2025 10:56 AM  INR(ratio): 3.1 at 3/25/2025 10:56 AM  Age at listing (hypothetical): 59 years  Sex: Male at 3/25/2025 10:56 AM      Continue chronic meds. Etiology likely Hepatitis. Will avoid any hepatotoxic meds, and monitor CBC/CMP/INR for synthetic function.     -para 3/18 neg for SBP  -Hepatology on board, not a candidate for transplant unless CD4 count is greater than 100.  Would also provide insight regarding medication compliance   -came back at 54, likely due to lack of immune response 2/2 cirrhosis rather than medication noncompliance or medication failure  Plan:  -Palliative care consult given lack of transplant candidacy: transition to comfort care  -Cont IV rocephin  -last para done yesterday 3/26   -IR willing to place palliative pleurX to assist w ascites however INR needs to be < 3. Continue Vit K and repeat INR tmrw  -PT/OT: moderate intensity

## 2025-03-26 NOTE — PROGRESS NOTES
[facility] Medicine  Progress Note    Patient Name: Jam Card  MRN: [id number]  Patient Class: IP- Inpatient   Admission Date: 3/22/2025  Length of Stay: 4 days  Attending Physician: Frank Pennington DO  Primary Care Provider: Ulysses Almaraz MD        Subjective     Principal Problem:Cirrhosis of liver with ascites        HPI:  Mr. Card is a 59 y.o. male with PMH of HCV cirrhosis and HIV. Liver disease c/b ascites/edema, HE. 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 Previously evaluated for liver transplant but deffered due to pulmonary hypertension. He presented to OSH ED with generalized weakness, frequent falls, gait instability. Hit his lip, wound noted. Denies hitting his head or LOC. Denies any vomiting, diarrhea, blood in his stool, fever. W/u in ED: CTH neg for bleed/acute abnormality, CXR without infection, UA neg for infection. Hyponatremia to 125, albumin 1.7, LFTs elevated (expected), Cr at BL. OSH d/w Dr. Cloud, patient admitted to [facility] for worsening decompensation including overt hepatic encephalopathy (confusion), worsening hyponatremia, coagulopathy and jaundice. Dey placed for retention. Patient admitted under Dr. Walden, will transfer to IML service in AM.    Overview/Hospital Course:  Admitted with hepatic encephalopathy, started on lactulose and rifaximin.  Hyponatremic with a JASON, Nephrology consulted.  Continuing on albumin challenge.  Diuretics have been held.  Started on IV Rocephin given UTI.  Hepatology on board for decompensated cirrhosis.  Needs CD4 recheck. Will reportedly not be a transplant candidate if CD4 count is <100.  Would additionally provide insight regarding medication compliance. CD4 count returned at 56 which is decreased from before. Discussed w transplant ID, has poor immune recovery which is further lowered by cirrhosis.  Therefore, may not necessarily be medication noncompliance or failure, but rather a  lowered immune response 2/2 cirrhosis. Regardless, will not be a transplant candidate. Continue intermittent paracentesis.      3/25: new drop in Hb which preceeded paracentesis this afternoon. Obtain workup. 1 unit prbc transfusion. Hb stable. Palliative care involved, family meeting was had today. Will transition to hospice. Comfort care orders were placed by palliative team. Consult was placed to IR for palliative pleurX to be placed for ascites. Willing to perform however INR should be <3. Vitamin K was started yesterday, will repeat INR tmrw and contact IR    Interval History: Seen this AM at bedside.  No acute events reported overnight. No complaints other than abdominal distension. Pt without reports of bleeding    Caretaker updated at bedside, updated on pt condition an dplan    Review of Systems  Objective:     Vital Signs (Most Recent):  Temp: 98.2 °F (36.8 °C) (03/25/25 1330)  Pulse: 91 (03/25/25 1330)  Resp: 17 (03/25/25 1330)  BP: (!) 108/58 (03/25/25 1330)  SpO2: 98 % (03/25/25 1330) Vital Signs (24h Range):  Temp:  [97.9 °F (36.6 °C)-98.8 °F (37.1 °C)] 98.2 °F (36.8 °C)  Pulse:  [84-98] 91  Resp:  [17-95] 17  SpO2:  [88 %-98 %] 98 %  BP: ()/(54-60) 108/58     Weight: 71.7 kg (158 lb 1.1 oz)  Body mass index is 20.86 kg/m².    Intake/Output Summary (Last 24 hours) at 3/25/2025 1506  Last data filed at 3/25/2025 1211  Gross per 24 hour   Intake 680 ml   Output 3150 ml   Net -2470 ml         Physical Exam  Vitals and nursing note reviewed.   Constitutional:       General: He is awake. He is not in acute distress.     Appearance: He is not ill-appearing.   HENT:      Head: Normocephalic.      Mouth/Throat:      Mouth: Mucous membranes are moist.   Eyes:      General: Scleral icterus present.   Cardiovascular:      Rate and Rhythm: Normal rate and regular rhythm.      Pulses: Normal pulses.      Heart sounds: No murmur heard.  Pulmonary:      Effort: Pulmonary effort is normal. No respiratory  distress.      Breath sounds: No wheezing or rales.   Abdominal:      General: Bowel sounds are normal. There is distension.      Palpations: Abdomen is soft.      Tenderness: There is no abdominal tenderness. There is no guarding or rebound.   Musculoskeletal:         General: Normal range of motion.      Cervical back: Normal range of motion.      Right lower leg: Edema present.      Left lower leg: Edema present.   Skin:     General: Skin is warm and dry.   Neurological:      General: No focal deficit present.      Mental Status: He is alert and oriented to person, place, and time.      Motor: Weakness present.   Psychiatric:         Mood and Affect: Mood normal.         Behavior: Behavior normal.         Thought Content: Thought content normal.         Judgment: Judgment normal.               Significant Labs: All pertinent labs within the past 24 hours have been reviewed.    Significant Imaging: I have reviewed all pertinent imaging results/findings within the past 24 hours.      Assessment & Plan  Cirrhosis of liver with ascites  Patient with known Cirrhosis with Co-morbidities are present and inclusive of ascites, hepatic encephalopathy, anticoagulation, and jaundice .  MELD-Na score calculated; MELD 3.0: 34 at 3/25/2025 10:56 AM  MELD-Na: 34 at 3/25/2025 10:56 AM  Calculated from:  Serum Creatinine: 1.4 mg/dL at 3/25/2025 10:56 AM  Serum Sodium: 124 mmol/L (Using min of 125 mmol/L) at 3/25/2025 10:56 AM  Total Bilirubin: 8.1 mg/dL at 3/25/2025 10:56 AM  Serum Albumin: 2.6 g/dL at 3/25/2025 10:56 AM  INR(ratio): 3.1 at 3/25/2025 10:56 AM  Age at listing (hypothetical): 59 years  Sex: Male at 3/25/2025 10:56 AM      Continue chronic meds. Etiology likely Hepatitis. Will avoid any hepatotoxic meds, and monitor CBC/CMP/INR for synthetic function.     -para 3/18 neg for SBP  -Hepatology on board, not a candidate for transplant unless CD4 count is greater than 100.  Would also provide insight regarding medication  compliance   -came back at 56, likely due to lack of immune response 2/2 cirrhosis rather than medication noncompliance or medication failure  Plan:  -Palliative care consult given lack of transplant candidacy: transition to comfort care  -Cont IV rocephin  -last para done yesterday 3/26   -IR willing to place palliative pleurX to assist w ascites however INR needs to be < 3. Continue Vit K and repeat INR tmrw  -PT/OT: moderate intensity  Acute blood loss anemia  Anemia is likely due to chronic blood loss and chronic disease due to Chronic liver disease. Most recent hemoglobin and hematocrit are listed below.  Recent Labs     03/24/25  0249 03/25/25  1056 03/25/25  1950   HGB 7.4* 6.3* 7.8*   HCT 22.9* 19.2* 24.3*     Plan  - Monitor serial CBC: Daily  - Transfuse PRBC if patient becomes hemodynamically unstable, symptomatic or H/H drops below 7/21.  - Patient has received 1 units of PRBCs on at OSH  and 1 unit at [facility] (3/25  - Patient's anemia is currently stable    JASON (acute kidney injury)  Hyponatremia  JASON is likely due to  HR S . Baseline creatinine is  1.5 . Most recent creatinine and eGFR are listed below.  Recent Labs     03/24/25  0249 03/25/25  1056   CREATININE 1.5* 1.4   EGFRNORACEVR 54* 59*      Plan  - JASON is improving  - Avoid nephrotoxins and renally dose meds for GFR listed above  - Monitor urine output, serial BMP, and adjust therapy as needed  - nephrology on board  - continue albumin challenge increased to t.i.d.  - continue to hold Lasix  Human immunodeficiency virus (HIV) disease  -CD4 done, lower at 54  -Would benefit from palliative care consult given not only lack of candidacy for transplant, but also high propensity for opportunistic infections     UTI (urinary tract infection)  Continue IV Rocephin, added fluconazole due to candida 2 week course      Bilateral subdural hematomas    Recent Labs     03/24/25  0249 03/25/25  1056 03/25/25  1950   HGB 7.4* 6.3* 7.8*   HCT 22.9* 19.2* 24.3*    PLT 52* 41* 41*   INR  --  3.1*  --         HLD (hyperlipidemia)      Depression  Continue Prozac      Hepatic encephalopathy      Comfort measures only status        VTE Risk Mitigation (From admission, onward)           Ordered     Place RADHA mourae  Until discontinued         03/23/25 0041     IP VTE LOW RISK PATIENT  Once         03/23/25 0041                    Discharge Planning   COID: 3/31/2025     Code Status: DNR   Medical Readiness for Discharge Date:   Discharge Plan A: Home Health                Please place Justification for DME        Frank Pennington DO  Department of Hospital Medicine   [facility] - Transplant Step[facility]

## 2025-03-26 NOTE — CONSULTS
Palliative Medicine  Consult Note     Patient Name: Jam Card   MRN: [id number]     Admission Date: 3/22/2025   Hospital Length of Stay: 4     Attending Provider: Frank Pennington DO   Consulting Provider: Gunnar Quinones MD  Primary Care Physician: Ulysses Almaraz MD     Principal Problem: Cirrhosis of liver with ascites     Patient information was obtained from patient, parent, relative(s), caregiver / friend, past medical records, and primary team.    Assessment/Plan:   Jam Card is a 59 y.o. male on whom Palliative Care is consulted for assistance with clarification of goals of care and hospice referral or discussion as it pertains to their diagnoses of HCV related cirrhosis w/ ascites and hepatic encephalopathy in the setting of HIV and SDH s/p MMA embolization (08/2024). Palliative Care consulted to discuss end-of-life options including Hospice.     Impression:  Patient and family all in agreement with plan to pursue comfort-focused care given patient is not a candidate for liver transplant. His goal currently is to return closer to home in [place] to receive hospice care there so that he can be closer to family and friends. Has had some relief from prior paracenteses, recommending PleurX catheter evaluation by IR. Code Status changed to DNR/DNI following our conversation.     Billing:  In my care of this patient with acute- on chronic-severe illness which poses threat to life and/or bodily function, I am recommending goal-concordant care as outlined below. I spent a significant amount of time reviewing of prior external records and recommendations of other care providing teams including Hospital Medicine, Interventional Radiology, Nephrology, and Spiritual care , reviewed relevant test results / studies including CBC, CMP, and iron studies , and discussed care with other specialists / providers involved in this patient's care.    Also, I spent an additional 47 min of time on voluntary  advance care planning and/or goals of care discussion, providing emotional support, discussing prognosis, and exploring the burden vs benefit of various treatment options.     Recommendations:  #Comfort-Focused Care:  No further escalation of care  DISCONTINUE lab draws and additional disease-directed interventions & investigations not contributing to comfort  START Dilaudid 0.5mg IV q30min PRN for pain / dyspnea  START Ativan 1mg IV q30min PRN for anxiety / agitation  Consider IR evaluation for possible PleurX catheter placement    #Constipation:  Continue Bisacodyl 10mg ME qday PRN if not BM in 24-48 hrs  Continue Lactulose 30g PO BID     #GOC Discussions  Advance Care Planning   3/26/2025: Gunnar Quinones MD  Met with patient at bedside along with his mother, friend/caregiver (Ashley), and twin brother to discuss GoC. Patient aware of poor prognosis and that he is not a candidate for liver transplant. What is most important to him given likely short life-expectancy is being closer to home ([place], [place]) near to his family, including his nieces and their children who are very important to him. He wishes at this time to prioritize comfort-focused care over further disease-directed, life-prolonging treatments. He wishes to go to an IP Hospice facility if possible since there is no one to care for him at home; he is amenable to Hospice. Concluded with discussion about Code Status and decision made based on shared values and GoC to make him DNR/DNI.     Code status: DNR/DNI; changed today (03/26)     Recommendations communicated directly to primary team on 03/26/2025 via telephone and EPIC chat.    Subjective:   Brief HPI:   Jam Card is a 59 y.o. male with a PMH of HCV related cirrhosis c/b ascites and hepatic encephalopathy, HIV, and SDH s/p MMA embolization (08/2024) who presented to the hospital on 03/22 for weakness, falls, and gait instability thought 2/2 hepatic encephalopathy. During initial  "workup found to have JASON and UTI. Patient evaluated by hepatology and unfortunately because of CD4 <100. Palliative Care consulted to discuss end-of-life care options and hospice.     Initial Assessment:   Met with patient at bedside; mother, friend/caregiver, and brother present. Patient aware of poor prognosis from liver disease and that there are limited treatment options given that he is not a transplant candidate. He reports having lived "a good life" and values spending whatever time he has left with family including his nieces and their children. For this reason he wishes to be transferred to a facility closer to home in [place], [place]. He is amenable to receiving Hospice care there since he wishes to prioritize comfort-focused care at this time rather than pursuing further disease-directed, life-prolonging treatments. Engaged in GoC;  see ACP note above for details.    Patient reporting some abdominal discomfort from ascites; noted improvement s/p paracentesis. He is experiencing some back pain (chronic issue), but otherwise denies any pain.     [place] Symptom Assessment (ESAS):   Pain: Mild  Dyspnea: None  Anxiety: None  Nausea: None  Depression: None  Anorexia: Moderate  Fatigue: Moderate  Insomnia: None  Restlessness: None  Agitation: None    Biopsychosocial History:  Social History:  Housing: Lives with his elderly mother; no one able to care for him at home    Family:  Spouse/Partner: single  Children: none; very close to his nieces (all in their 20s)    Self-identified support system: family and friends    Functional status:   Fully independent and managing own ADLs until recently    Substance use:  Social History     Tobacco Use    Smoking status: Former     Types: Cigarettes    Smokeless tobacco: Never   Substance and Sexual Activity    Alcohol use: Not Currently    Drug use: Not Currently    Sexual activity: Not on file       Spirituality:  Ariella: Holiness, Congregational  Influence / importance: " "*shrugs*; amenable to visit from  for spiritual support  Case discussed with  Michael Maxi    Objective:     Past Medical History:   Diagnosis Date    CAD (coronary artery disease) 03/2010    Hx of MI, CABG,  and cardiac cath    Chronic hepatitis C with cirrhosis     GERD (gastroesophageal reflux disease)     HTN (hypertension)     Human immunodeficiency virus (HIV) disease     Hyperlipidemia     Organic brain syndrome      Past Surgical History:   Procedure Laterality Date    CARDIAC CATHETERIZATION      CORONARY ANGIOGRAPHY N/A 12/20/2024    Procedure: ANGIOGRAM, CORONARY ARTERY;  Surgeon: Yannick Gastelum III, MD;  Location: [facility] CATH LAB;  Service: Cardiology;  Laterality: N/A;    CORONARY ARTERY BYPASS GRAFT      CORONARY BYPASS GRAFT ANGIOGRAPHY  12/20/2024    Procedure: Bypass graft study;  Surgeon: Yannick Gastelum III, MD;  Location: [facility] CATH LAB;  Service: Cardiology;;    ESOPHAGOGASTRODUODENOSCOPY N/A 2/7/2025    Procedure: EGD (ESOPHAGOGASTRODUODENOSCOPY);  Surgeon: Jerald Vo MD;  Location: [facility] ([address] [place] [address]);  Service: Endoscopy;  Laterality: N/A;  2nd floor - recent brain bleed 10/24 ref [facility], portal, neurosur clear-st  per neuro E Chivo MITTAL-Patient is cleared for endoscopy-GT  12/12-tb-mess re-sent to [facility] to order plat  12/11 r/s, portal, card cl pend-st  12/26-r/s to earlier date-ca    RIGHT HEART CATHETERIZATION Right 12/20/2024    Procedure: INSERTION, CATHETER, RIGHT HEART;  Surgeon: Yannick Gastelum III, MD;  Location: [facility] CATH LAB;  Service: Cardiology;  Laterality: Right;    TONSILLECTOMY AND ADENOIDECTOMY      TRIGGER FINGER RELEASE       No family history on file.  Review of patient's allergies indicates:   Allergen Reactions    Hydrocodone-acetaminophen Other (See Comments)     "cannot sleep when taking" Does not want to take    Lisinopril Other (See Comments)     Other reaction(s): Cough    Sulfamethoxazole-trimethoprim Rash "       Medications:  Continuous Infusions: None    Scheduled Meds:    albumin human 25%  25 g Intravenous TID    atovaquone  1,500 mg Oral Daily    cyanocobalamin  1,000 mcg Oral Daily    elviteg-cob-emtri-tenof ALAFEN  1 tablet Oral Daily    fluconazole  200 mg Oral Daily    FLUoxetine  20 mg Oral Daily    lactulose  30 g Oral BID    levETIRAcetam  500 mg Oral BID    metoprolol succinate  25 mg Oral Nightly    mupirocin   Nasal BID    pantoprazole  40 mg Oral Daily    phytonadione vitamin k (AQUA-MEPHYTON) 10 mg in D5W 50 mL IVPB  10 mg Intravenous Daily    pyridoxine (vitamin B6)  100 mg Oral Daily    rifAXIMin  550 mg Oral BID       PRN Meds:  Current Facility-Administered Medications:     (Magic mouthwash) 1:1:1 diphenhydrAMINE(Benadryl) 12.5mg/5ml liq, aluminum & magnesium hydroxide-simethicone (Maalox), LIDOcaine viscous 2%, 5 mL, Swish & Spit, Q4H PRN    0.9%  NaCl infusion (for blood administration), , Intravenous, Q24H PRN    acetaminophen, 650 mg, Oral, Q4H PRN    albuterol-ipratropium, 3 mL, Nebulization, Q6H PRN    baclofen, 10 mg, Oral, TID PRN    bisacodyL, 10 mg, Rectal, Daily PRN    calcium carbonate, 1,000 mg, Oral, TID PRN    diphenhydrAMINE, 25 mg, Oral, Nightly PRN    HYDROmorphone (PF), 0.5 mg, Intravenous, Q30 Min PRN    lorazepam, 1 mg, Intravenous, Q30 Min PRN    melatonin, 6 mg, Oral, Nightly PRN    ondansetron, 8 mg, Oral, Q6H PRN    prochlorperazine, 5 mg, Intravenous, Q6H PRN    sodium chloride 0.9%, 10 mL, Intravenous, PRN     Vitals: Temp: 97.6 °F (36.4 °C) (03/26/25 1153)  Pulse: (!) 118 (03/26/25 1153)  Resp: 18 (03/26/25 1153)  BP: 110/69 (03/26/25 1153)  SpO2: (!) 94 % (03/26/25 1153)    Physical Exam:  Constitutional:       General: NAD. They appear comfortable. Poor eye contact.      Appearance: They are ill-appearing. They are not diaphoretic.   HENT:      Head: Normocephalic and atraumatic.   Pulmonary:      Effort: No respiratory distress. No increased work of breathing.      RR  "18  Abdominal:      General: There is no distension.   Skin:     General: Skin is warm and dry.      Coloration: Mildly jaundiced.      Findings: Bruising present on extremities.   Neurological:      General: Patient slightly lethargic, but alert to conversation. No focal neurological deficits. AAOx3.  Psychiatric:      Mood: "OK"     Affect: blunted; mood-congruent     Behaviour: Pleasant and appropriate.     Labs:   Creatinine   Date Value Ref Range Status   03/25/2025 1.4 0.5 - 1.4 mg/dL Final      Hemoglobin   Date Value Ref Range Status   03/22/2025 8.3 (L) 14.0 - 16.5 g/dL Final   03/12/2025 9.1 (L) 14.0 - 18.0 g/dL Final     HGB   Date Value Ref Range Status   03/25/2025 7.8 (L) 14.0 - 18.0 gm/dL Final      Albumin   Date Value Ref Range Status   03/25/2025 2.6 (L) 3.5 - 5.2 g/dL Final   03/24/2025 2.0 (L) 3.5 - 5.2 g/dL Final   03/23/2025 1.5 (L) 3.5 - 5.2 g/dL Final   03/12/2025 1.8 (L) 3.5 - 5.2 g/dL Final   03/07/2025 1.7 (L) 3.5 - 5.2 g/dL Final   02/06/2025 1.9 (L) 3.5 - 5.2 g/dL Final   02/06/2025 1.9 (L) 3.5 - 5.2 g/dL Final        Imaging & Investigations: reviewed on 03/26/2025    Thank you for your consult. I will follow-up with patient. Please contact us if you have any additional questions.    Signed,  Gunnar Quinones MD  Hospice & Palliative Medicine  [facility]    "

## 2025-03-26 NOTE — PLAN OF CARE
Patient is AAOx4, with delayed responses. Afebrile without c/o pain. Night meds given. Dey in place with demar urine. 100cc  UOP for this shift . CBC resulted with H:H; 7.8:24.3. No orders put forward. Pt received the 3rd albumin. Family at bedside. Palliative care consulted yesterday, waiting on mother and sister. Bed in the locked lowest position, side rails up x3, call light within reach, non-skid socks in place, pt verbalized understanding to call RN when needed. hand hygiene promoted,  Will continue to monitor

## 2025-03-27 NOTE — ASSESSMENT & PLAN NOTE
Recent Labs     03/25/25  1056 03/25/25  1950 03/27/25  0553   HGB 6.3* 7.8* 8.5*   HCT 19.2* 24.3* 25.5*   PLT 41* 41* 39*   INR 3.1*  --  3.2*

## 2025-03-27 NOTE — PLAN OF CARE
Aaox4 w/ delayed responses. VSS. NPO for Pleurx drain placement today. Dey draining demar urine--total in flowsheet. X1 BM overnight. Albumin TID continued. Caregiver at bedside. Bed locked and in lowest setting. Call bell in reach.

## 2025-03-27 NOTE — PROGRESS NOTES
Palliative Medicine  Progress Note     Patient Name: Jam Card   MRN: [id number]     Admission Date: 3/22/2025   Hospital Length of Stay: 5     Attending Provider: Ranulfo Wong MD   Consulting Provider: Gunnar Quinones MD  Primary Care Physician: Ulysses Almaraz MD     Principal Problem: Cirrhosis of liver with ascites     Patient information was obtained from patient, caregiver / friend, past medical records, and primary team.    Assessment/Plan:   Jam Card is a 59 y.o. male on whom Palliative Care is consulted for assistance with clarification of goals of care and hospice referral or discussion as it pertains to their diagnoses of HCV related cirrhosis w/ ascites and hepatic encephalopathy in the setting of HIV and SDH s/p MMA embolization (08/2024). Palliative Care consulted to discuss end-of-life options including Hospice.     Impression:  Patients GoC remain to transfer to [place], [place] so he can be closer to family, ideally in an IP Hospice situation. Plan was to have PleurX catheter placed today, however unable to go through with procedure 2/2 INR today. Per primary team, plan is to see if they can bring the INR down in the next day or so, so that he can undergo procedure. If this looks like it will take many days, plan is to transfer without PleurX.     Billing:  In my care of this patient with acute- on chronic-severe illness which poses threat to life and/or bodily function, I am recommending goal-concordant care as outlined below. I spent a significant amount of time reviewing of prior external records and recommendations of other care providing teams including Hospital Medicine, Interventional Radiology, and Spiritual Care , reviewed relevant test results / studies including CBC, CMP, and INR , and discussed care with other specialists / providers involved in this patient's care.     Also, I spent an additional 17 min of time on voluntary advance care planning and/or goals of care  discussion, providing emotional support, discussing prognosis, and exploring the burden vs benefit of various treatment options.     Recommendations:  ##Comfort-Focused Care:  No further escalation of care  DISCONTINUE lab draws and additional disease-directed interventions & investigations not contributing to comfort  Continue Dilaudid 0.5mg IV q30min PRN for pain / dyspnea  May consider decreasing dose to 0.2mg if patient continues to experience sedation from PRN administration  Continue Ativan 1mg IV q30min PRN for anxiety / agitation  Still unsure if IR will be able to place PleurX catheter 2/2 bleeding risk     #Constipation:  Continue Bisacodyl 10mg IA qday PRN if not BM in 24-48 hrs  Continue Lactulose 30g PO BID     #GOC Discussions  Advance Care Planning   3/27/2025: Gunnar Quinones MD  Met with patient and his caregiver/friend (Ashley) at bedside. Reviewed GoC which remain to pursue comfort-focused care and move closer to home if possible so that he can be near family. What is most important to him right now is being comfortable and being able to spend as much time with family as possible. He remains interested in PleurX catheter at this time. He is hopeful that he will be accepted at an IP Hospice facility, but otherwise wants to transfer to another care facility closer to home in the meantime while awaiting a bed.     3/26/2025: Gunnar Quinones MD  Met with patient at bedside along with his mother, friend/caregiver (Ashley), and twin brother to discuss GoC. Patient aware of poor prognosis and that he is not a candidate for liver transplant. What is most important to him given likely short life-expectancy is being closer to home ([place], [place]) near to his family, including his nieces and their children who are very important to him. He wishes at this time to prioritize comfort-focused care over further disease-directed, life-prolonging treatments. He wishes to go to an IP Hospice facility if  possible since there is no one to care for him at home; he is amenable to Hospice. Concluded with discussion about Code Status and decision made based on shared values and GoC to make him DNR/DNI.      Code status: DNR/DNI     Recommendations communicated directly to primary team on 03/27/2025 via in-person handoff.    Subjective:   Brief HPI:   Jam Card is a 59 y.o. male with a PMH of HCV related cirrhosis c/b ascites and hepatic encephalopathy, HIV, and SDH s/p MMA embolization (08/2024) who presented to the hospital on 03/22 for weakness, falls, and gait instability thought 2/2 hepatic encephalopathy. During initial workup found to have JASON and UTI. Patient evaluated by hepatology and unfortunately because of CD4 <100. Palliative Care consulted to discuss end-of-life care options and hospice.     Today's Update:   Met with patient at bedside who was with his caregiver/friend, Ashley; primary team attending also present for majority of conversation. Patient has questions about reason why he could not undergo PleurX catheter placement today; all questions answered. We discussed GoC;  see ACP note above for details. What is most important right now is being comfortable and spending time with family, which means transferring closer to home.     He continues to have back pain, but this responded well to the IV Dilaudid yesterday. Was able to get relief from this, but subsequently slept most of the day and then was awake all night. Discussed possibility of decreasing dose to minimize risk of sedation, however patient would like to hold off for one more day to see if issue persists.     Family very interested in talking to SW about dispo plan.     Pain Assessment: N/A    [place] Symptom Assessment (ESAS):   Pain: Mild; responds adequately to PRN Dilaudid  Dyspnea: None  Anxiety: None  Nausea: None  Depression: None  Anorexia: Mild  Fatigue: Moderate  Insomnia: None  Restlessness: None  Agitation:  "None    Objective:   Vitals: Temp: 98.2 °F (36.8 °C) (03/27/25 1134)  Pulse: 68 (03/27/25 1134)  Resp: 16 (03/27/25 1134)  BP: 114/64 (03/27/25 1134)  SpO2: 96 % (03/27/25 1134)    Medications:  Continuous Infusions: None    Scheduled Meds:    atovaquone  1,500 mg Oral Daily    elviteg-cob-emtri-tenof ALAFEN  1 tablet Oral Daily    FLUoxetine  20 mg Oral Daily    lactulose  30 g Oral BID    levETIRAcetam  500 mg Oral BID    rifAXIMin  550 mg Oral BID       PRN Meds:  Current Facility-Administered Medications:     (Magic mouthwash) 1:1:1 diphenhydrAMINE(Benadryl) 12.5mg/5ml liq, aluminum & magnesium hydroxide-simethicone (Maalox), LIDOcaine viscous 2%, 5 mL, Swish & Spit, Q4H PRN    0.9%  NaCl infusion (for blood administration), , Intravenous, Q24H PRN    acetaminophen, 650 mg, Oral, Q4H PRN    albuterol-ipratropium, 3 mL, Nebulization, Q6H PRN    baclofen, 10 mg, Oral, TID PRN    bisacodyL, 10 mg, Rectal, Daily PRN    calcium carbonate, 1,000 mg, Oral, TID PRN    diphenhydrAMINE, 25 mg, Oral, Nightly PRN    HYDROmorphone (PF), 0.5 mg, Intravenous, Q30 Min PRN    lorazepam, 1 mg, Intravenous, Q30 Min PRN    melatonin, 6 mg, Oral, Nightly PRN    ondansetron, 8 mg, Oral, Q6H PRN    prochlorperazine, 5 mg, Intravenous, Q6H PRN    sodium chloride 0.9%, 10 mL, Intravenous, PRN     Physical Exam:  Constitutional:       General: NAD. They appear comfortable. Good eye contact.      Appearance: They are ill-appearing. They are not diaphoretic.   HENT:      Head: Normocephalic and atraumatic.   Pulmonary:      Effort: No respiratory distress. No increased work of breathing.      RR 18  Abdominal:      General: There is no distension.   Skin:     General: Skin is warm and dry.      Coloration: Mildly jaundiced.      Findings: Bruising present on extremities.   Neurological:      General: Alert. No focal neurological deficits. AAOx3.  Psychiatric:      Mood: "I'm doing OK"     Affect: mildly constricted; mood-congruent     " Behaviour: Pleasant and appropriate.     Labs:   Creatinine   Date Value Ref Range Status   03/25/2025 1.4 0.5 - 1.4 mg/dL Final      Hemoglobin   Date Value Ref Range Status   03/22/2025 8.3 (L) 14.0 - 16.5 g/dL Final   03/12/2025 9.1 (L) 14.0 - 18.0 g/dL Final     HGB   Date Value Ref Range Status   03/27/2025 8.5 (L) 14.0 - 18.0 gm/dL Final      Albumin   Date Value Ref Range Status   03/25/2025 2.6 (L) 3.5 - 5.2 g/dL Final   03/24/2025 2.0 (L) 3.5 - 5.2 g/dL Final   03/23/2025 1.5 (L) 3.5 - 5.2 g/dL Final   03/12/2025 1.8 (L) 3.5 - 5.2 g/dL Final   03/07/2025 1.7 (L) 3.5 - 5.2 g/dL Final   02/06/2025 1.9 (L) 3.5 - 5.2 g/dL Final   02/06/2025 1.9 (L) 3.5 - 5.2 g/dL Final        Imaging & Investigations: reviewed on 03/27/2025    Thank you for your consult. I will follow-up with patient. Please contact us if you have any additional questions.    Signed,  Gunnar Quinones MD  Hospice & Palliative Medicine  [facility]

## 2025-03-27 NOTE — ASSESSMENT & PLAN NOTE
Patient with known Cirrhosis with Co-morbidities are present and inclusive of ascites, hepatic encephalopathy, anticoagulation, and jaundice.  MELD-Na score calculated; MELD 3.0: 35 at 3/27/2025  5:53 AM  MELD-Na: 35 at 3/27/2025  5:53 AM  Calculated from:  Serum Creatinine: 1.4 mg/dL at 3/25/2025 10:56 AM  Serum Sodium: 124 mmol/L (Using min of 125 mmol/L) at 3/25/2025 10:56 AM  Total Bilirubin: 8.1 mg/dL at 3/25/2025 10:56 AM  Serum Albumin: 2.6 g/dL at 3/25/2025 10:56 AM  INR(ratio): 3.2 at 3/27/2025  5:53 AM  Age at listing (hypothetical): 59 years  Sex: Male at 3/27/2025  5:53 AM      Continue chronic meds. Etiology likely Hepatitis. Will avoid any hepatotoxic meds, and monitor CBC/CMP/INR for synthetic function.     -para 3/18 neg for SBP  -Hepatology on board, not a candidate for transplant unless CD4 count is greater than 100.  Would also provide insight regarding medication compliance   -came back at 56, likely due to lack of immune response 2/2 cirrhosis rather than medication noncompliance or medication failure  Plan:  -Palliative care consult given lack of transplant candidacy: transition to comfort care  -Cont IV rocephin  -last para done yesterday 3/26   -IR willing to place palliative pleurX to assist w ascites however INR needs to be < 3. Continue Vit K and repeat INR tmrw  -PT/OT: moderate intensity

## 2025-03-27 NOTE — ASSESSMENT & PLAN NOTE
JASON is likely due to HR S. Baseline creatinine is 1.5. Most recent creatinine and eGFR are listed below.  Recent Labs     03/25/25  1056   CREATININE 1.4   EGFRNORACEVR 59*      Plan  - JASON is improving  - Avoid nephrotoxins and renally dose meds for GFR listed above  - Monitor urine output, serial BMP, and adjust therapy as needed  - nephrology on board  - continue albumin challenge increased to t.i.d.  - continue to hold Lasix

## 2025-03-27 NOTE — PLAN OF CARE
03/27/25 1549   Post-Acute Status   Post-Acute Authorization Hospice   Hospice Status Referrals Sent   Discharge Plan   Discharge Plan A Inpatient Hospice   Discharge Plan B Inpatient Hospice     Pt and wife have decided on [facility] (p) [phone] for the patient. Referral made to them. Faxed [phone] all clinical information to them  Awaiting acceptance.     Jamar Paris, [facility]    [facility]  [phone]

## 2025-03-27 NOTE — PROGRESS NOTES
[facility] - Transplant [facility] Medicine  Progress Note    Patient Name: Jam Card  MRN: [id number]  Patient Class: IP- Inpatient   Admission Date: 3/22/2025  Length of Stay: 6 days  Attending Physician: Ranulfo Wong MD  Primary Care Provider: Ulysses Almaraz MD        Subjective     Principal Problem:Cirrhosis of liver with ascites        HPI:  Mr. Card is a 59 y.o. male with PMH of HCV cirrhosis and HIV. Liver disease c/b ascites/edema, HE. 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 Previously evaluated for liver transplant but deffered due to pulmonary hypertension. He presented to OSH ED with generalized weakness, frequent falls, gait instability. Hit his lip, wound noted. Denies hitting his head or LOC. Denies any vomiting, diarrhea, blood in his stool, fever. W/u in ED: CTH neg for bleed/acute abnormality, CXR without infection, UA neg for infection. Hyponatremia to 125, albumin 1.7, LFTs elevated (expected), Cr at BL. OSH d/w Dr. Cloud, patient admitted to [facility] for worsening decompensation including overt hepatic encephalopathy (confusion), worsening hyponatremia, coagulopathy and jaundice. Dey placed for retention. Patient admitted under Dr. Walden, will transfer to IML service in AM.    Overview/Hospital Course:  Admitted with hepatic encephalopathy, started on lactulose and rifaximin.  Hyponatremic with a JASON, Nephrology consulted.  Continuing on albumin challenge.  Diuretics have been held.  Started on IV Rocephin given UTI.  Hepatology on board for decompensated cirrhosis.  Needs CD4 recheck. Will reportedly not be a transplant candidate if CD4 count is <100.  Would additionally provide insight regarding medication compliance. CD4 count returned at 56 which is decreased from before. Discussed w transplant ID, has poor immune recovery which is further lowered by cirrhosis.  Therefore, may not necessarily be medication noncompliance or failure, but rather a lowered  immune response 2/2 cirrhosis. Regardless, will not be a transplant candidate. Continue intermittent paracentesis.      3/25: new drop in Hb which preceeded paracentesis this afternoon. Obtain workup. 1 unit prbc transfusion. Hb stable. Palliative care involved, family meeting was had today. Will transition to hospice. Comfort care orders were placed by palliative team. Consult was placed to IR for palliative pleurX to be placed for ascites. Willing to perform however INR should be <3. Vitamin K was started yesterday, will repeat INR tmrw and contact IR    Interval History: Seen this AM at bedside with palliative care. PleurEx drain could not be placed today as INR still > 3. Will reassess tomorrow. Wife present at bedside.     Review of Systems  Objective:     Vital Signs (Most Recent):  Temp: 98.2 °F (36.8 °C) (03/25/25 1330)  Pulse: 91 (03/25/25 1330)  Resp: 17 (03/25/25 1330)  BP: (!) 108/58 (03/25/25 1330)  SpO2: 98 % (03/25/25 1330) Vital Signs (24h Range):  Temp:  [97.9 °F (36.6 °C)-98.8 °F (37.1 °C)] 98.2 °F (36.8 °C)  Pulse:  [84-98] 91  Resp:  [17-95] 17  SpO2:  [88 %-98 %] 98 %  BP: ()/(54-60) 108/58     Weight: 71.7 kg (158 lb 1.1 oz)  Body mass index is 20.86 kg/m².    Intake/Output Summary (Last 24 hours) at 3/25/2025 1506  Last data filed at 3/25/2025 1211  Gross per 24 hour   Intake 680 ml   Output 3150 ml   Net -2470 ml         Physical Exam  Vitals and nursing note reviewed.   Constitutional:       General: He is awake. He is not in acute distress.     Appearance: He is ill-appearing.   HENT:      Head: Normocephalic.      Mouth/Throat:      Mouth: Mucous membranes are moist.   Eyes:      General: Scleral icterus present.   Cardiovascular:      Rate and Rhythm: Normal rate and regular rhythm.      Pulses: Normal pulses.      Heart sounds: No murmur heard.  Pulmonary:      Effort: Pulmonary effort is normal. No respiratory distress.      Breath sounds: No wheezing.   Abdominal:      General:  Bowel sounds are normal. There is distension.      Palpations: Abdomen is soft.      Tenderness: There is no abdominal tenderness. There is no guarding.   Musculoskeletal:         General: Normal range of motion.      Cervical back: Normal range of motion.      Right lower leg: Edema present.      Left lower leg: Edema present.   Skin:     General: Skin is warm and dry.   Neurological:      General: No focal deficit present.      Mental Status: He is alert and oriented to person, place, and time.      Motor: Weakness present.   Psychiatric:         Mood and Affect: Mood normal.         Behavior: Behavior normal.               Significant Labs: All pertinent labs within the past 24 hours have been reviewed.    Significant Imaging: I have reviewed all pertinent imaging results/findings within the past 24 hours.      Assessment & Plan  Cirrhosis of liver with ascites  Patient with known Cirrhosis with Co-morbidities are present and inclusive of ascites, hepatic encephalopathy, anticoagulation, and jaundice .  MELD-Na score calculated; MELD 3.0: 35 at 3/27/2025  5:53 AM  MELD-Na: 35 at 3/27/2025  5:53 AM  Calculated from:  Serum Creatinine: 1.4 mg/dL at 3/25/2025 10:56 AM  Serum Sodium: 124 mmol/L (Using min of 125 mmol/L) at 3/25/2025 10:56 AM  Total Bilirubin: 8.1 mg/dL at 3/25/2025 10:56 AM  Serum Albumin: 2.6 g/dL at 3/25/2025 10:56 AM  INR(ratio): 3.2 at 3/27/2025  5:53 AM  Age at listing (hypothetical): 59 years  Sex: Male at 3/27/2025  5:53 AM      Continue chronic meds. Etiology likely Hepatitis. Will avoid any hepatotoxic meds, and monitor CBC/CMP/INR for synthetic function.     -para 3/18 neg for SBP  -Hepatology on board, not a candidate for transplant unless CD4 count is greater than 100.  Would also provide insight regarding medication compliance   -came back at 54, likely due to lack of immune response 2/2 cirrhosis rather than medication noncompliance or medication failure  Plan:  -Palliative care consult  given lack of transplant candidacy: transition to comfort care  -Cont IV rocephin  -last para done yesterday 3/26   -IR willing to place palliative pleurX to assist w ascites however INR needs to be < 3. Continue Vit K and repeat INR tmrw  -PT/OT: moderate intensity  Acute blood loss anemia  Anemia is likely due to chronic blood loss and chronic disease due to Chronic liver disease. Most recent hemoglobin and hematocrit are listed below.  Recent Labs     03/25/25  1056 03/25/25  1950 03/27/25  0553   HGB 6.3* 7.8* 8.5*   HCT 19.2* 24.3* 25.5*     Plan  - Monitor serial CBC: Daily  - Transfuse PRBC if patient becomes hemodynamically unstable, symptomatic or H/H drops below 7/21.  - Patient has received 1 units of PRBCs on at OSH  and 1 unit at [facility] (3/25  - Patient's anemia is currently stable    JASON (acute kidney injury)  Hyponatremia  JASON is likely due to  HR S . Baseline creatinine is  1.5 . Most recent creatinine and eGFR are listed below.  Recent Labs     03/25/25  1056   CREATININE 1.4   EGFRNORACEVR 59*      Plan  - JASON is improving  - Avoid nephrotoxins and renally dose meds for GFR listed above  - Monitor urine output, serial BMP, and adjust therapy as needed  - nephrology on board  - continue albumin challenge increased to t.i.d.  - continue to hold Lasix  Human immunodeficiency virus (HIV) disease  -CD4 done, lower at 54  -Would benefit from palliative care consult given not only lack of candidacy for transplant, but also high propensity for opportunistic infections     UTI (urinary tract infection)  Continue IV Rocephin, added fluconazole due to candida 2 week course      Bilateral subdural hematomas    Recent Labs     03/25/25  1056 03/25/25  1950 03/27/25  0553   HGB 6.3* 7.8* 8.5*   HCT 19.2* 24.3* 25.5*   PLT 41* 41* 39*   INR 3.1*  --  3.2*        HLD (hyperlipidemia)      Depression  Continue Prozac      Hepatic encephalopathy      Comfort measures only status        VTE Risk Mitigation (From  admission, onward)           Ordered     Place RADHA hose  Until discontinued         03/23/25 0041     IP VTE LOW RISK PATIENT  Once         03/23/25 0041                    Discharge Planning   CODI: 3/29/2025     Code Status: DNR   Medical Readiness for Discharge Date:   Discharge Plan A: Inpatient Hospice                        Ranulfo Wong MD  Department of Hospital Medicine   [facility] - Transplant Stepdown

## 2025-03-27 NOTE — ASSESSMENT & PLAN NOTE
Anemia is likely due to chronic blood loss and chronic disease due to Chronic liver disease. Most recent hemoglobin and hematocrit are listed below.  Recent Labs     03/25/25  1056 03/25/25  1950 03/27/25  0553   HGB 6.3* 7.8* 8.5*   HCT 19.2* 24.3* 25.5*     Plan  - Monitor serial CBC: Daily  - Transfuse PRBC if patient becomes hemodynamically unstable, symptomatic or H/H drops below 7/21.  - Patient has received 1 units of PRBCs on at OSH and 1 unit at [facility] (3/25  - Patient's anemia is currently stable

## 2025-03-28 NOTE — PLAN OF CARE
AAOx4 - delayed responses and slurred speech noted. Vitals done Qshift - VSS. Patient assisted w/ shifting weight/turning throughout shift w/ 1-2x assist. INR 3.2 (same as yesterday) on AM labs. Per IR - no plans for PleurX drain placement today (need INR <3.0). Regular/low Na diet restarted. PRN IVP dilaudid given x3 this shift for c/o pain. Dey to gravity w/ demar urine noted - see flowsheet for exact UOP amount. SW and CM working on hospice placement/acceptance - see note. Bed in low position. Call light within reach. Bed alarm on.

## 2025-03-28 NOTE — ASSESSMENT & PLAN NOTE
Anemia is likely due to chronic blood loss and chronic disease due to Chronic liver disease.   Plan    - Patient has received 1 units of PRBCs on at OSH and 1 unit at [facility] (3/25  -we will stop further blood draws given now that he is comfort measures.

## 2025-03-28 NOTE — ASSESSMENT & PLAN NOTE
Patient with known Cirrhosis with Co-morbidities are present and inclusive of ascites, hepatic encephalopathy, anticoagulation, and jaundice.  MELD-Na score calculated; MELD 3.0: 35 at 3/27/2025  5:53 AM  MELD-Na: 35 at 3/27/2025  5:53 AM  Calculated from:  Serum Creatinine: 1.4 mg/dL at 3/25/2025 10:56 AM  Serum Sodium: 124 mmol/L (Using min of 125 mmol/L) at 3/25/2025 10:56 AM  Total Bilirubin: 8.1 mg/dL at 3/25/2025 10:56 AM  Serum Albumin: 2.6 g/dL at 3/25/2025 10:56 AM  INR(ratio): 3.2 at 3/27/2025  5:53 AM  Age at listing (hypothetical): 59 years  Sex: Male at 3/27/2025  5:53 AM      Continue chronic meds. Etiology likely Hepatitis. Will avoid any hepatotoxic meds, and monitor CBC/CMP/INR for synthetic function.     -para 3/18 neg for SBP  -Hepatology on board, not a candidate for transplant unless CD4 count is greater than 100.  Would also provide insight regarding medication compliance   -came back at 54, likely due to lack of immune response 2/2 cirrhosis rather than medication noncompliance or medication failure  Plan:  -Palliative care consult given lack of transplant candidacy: transition to comfort care  -Cont IV rocephin  -last para done 3/26   -IR willing to place palliative pleurX to assist w ascites however INR needs to be < 3.  Discuss plans for giving FFP 30 minutes prior to procedure and 1 unit during procedures.

## 2025-03-28 NOTE — ASSESSMENT & PLAN NOTE
JASON is likely due to HR S. Baseline creatinine is 1.5. Most recent creatinine and eGFR are listed below.  We will stop further blood draws as he is now comfort measures.  Secondary to cirrhosis.  We will stop monitoring further labs as now he is comfort measures.

## 2025-03-28 NOTE — PROGRESS NOTES
[facility] - Transplant [facility] Medicine  Progress Note    Patient Name: Jam Card  MRN: [id number]  Patient Class: IP- Inpatient   Admission Date: 3/22/2025  Length of Stay: 6 days  Attending Physician: Ranulfo Wong MD  Primary Care Provider: Ulysses Almaraz MD        Subjective     Principal Problem:Cirrhosis of liver with ascites        HPI:  Mr. Card is a 59 y.o. male with PMH of HCV cirrhosis and HIV. Liver disease c/b ascites/edema, HE. 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 Previously evaluated for liver transplant but deffered due to pulmonary hypertension. He presented to OSH ED with generalized weakness, frequent falls, gait instability. Hit his lip, wound noted. Denies hitting his head or LOC. Denies any vomiting, diarrhea, blood in his stool, fever. W/u in ED: CTH neg for bleed/acute abnormality, CXR without infection, UA neg for infection. Hyponatremia to 125, albumin 1.7, LFTs elevated (expected), Cr at BL. OSH d/w Dr. Cloud, patient admitted to [facility] for worsening decompensation including overt hepatic encephalopathy (confusion), worsening hyponatremia, coagulopathy and jaundice. Dey placed for retention. Patient admitted under Dr. Walden, will transfer to IML service in AM.    Overview/Hospital Course:  Admitted with hepatic encephalopathy, started on lactulose and rifaximin.  Hyponatremic with a JASON, Nephrology consulted.  Continuing on albumin challenge.  Diuretics have been held.  Started on IV Rocephin given UTI.  Hepatology on board for decompensated cirrhosis.  Needs CD4 recheck. Will reportedly not be a transplant candidate if CD4 count is <100.  Would additionally provide insight regarding medication compliance. CD4 count returned at 56 which is decreased from before. Discussed w transplant ID, has poor immune recovery which is further lowered by cirrhosis.  Therefore, may not necessarily be medication noncompliance or failure, but rather a lowered  immune response 2/2 cirrhosis. Regardless, will not be a transplant candidate. Continue intermittent paracentesis.      3/25: new drop in Hb which preceeded paracentesis this afternoon. Obtain workup. 1 unit prbc transfusion. Hb stable. Palliative care involved, family meeting was had today. Will transition to hospice. Comfort care orders were placed by palliative team. Consult was placed to IR for palliative pleurX to be placed for ascites. Willing to perform however INR should be <3. Vitamin K was started yesterday, will repeat INR tmrw and contact IR    3/28- PleurX drain could not be placed as INR remains above 3.  Discussed with IR and recommendation to give FFP 30 minutes prior and during PleurX placement on Monday potentially.    Interval History:  Patient resting comfortably.  Just received his pain medication.  Patient would like to maintain his Dey catheter for comfort.    Review of Systems  Objective:     Vital Signs (Most Recent):  Temp: 97.4 °F (36.3 °C) (03/28/25 0729)  Pulse: 76 (03/28/25 0729)  Resp: 18 (03/28/25 1621)  BP: 123/70 (03/28/25 0729)  SpO2: (!) 94 % (03/28/25 0729) Vital Signs (24h Range):  Temp:  [97.4 °F (36.3 °C)-97.7 °F (36.5 °C)] 97.4 °F (36.3 °C)  Pulse:  [73-76] 76  Resp:  [16-18] 18  SpO2:  [92 %-94 %] 94 %  BP: (123-131)/(60-70) 123/70     Weight: 71.7 kg (158 lb 1.1 oz)  Body mass index is 20.86 kg/m².    Intake/Output Summary (Last 24 hours) at 3/28/2025 1800  Last data filed at 3/28/2025 1724  Gross per 24 hour   Intake 360 ml   Output 600 ml   Net -240 ml         Physical Exam  Vitals and nursing note reviewed.   Constitutional:       General: He is awake. He is not in acute distress.     Appearance: He is ill-appearing.   HENT:      Head: Normocephalic.      Mouth/Throat:      Mouth: Mucous membranes are moist.   Eyes:      General: Scleral icterus present.   Cardiovascular:      Rate and Rhythm: Normal rate and regular rhythm.      Pulses: Normal pulses.      Heart  sounds: No murmur heard.  Pulmonary:      Effort: Pulmonary effort is normal. No respiratory distress.      Breath sounds: No wheezing.   Abdominal:      General: Bowel sounds are normal. There is distension.      Palpations: Abdomen is soft.      Tenderness: There is no abdominal tenderness. There is no guarding.   Musculoskeletal:         General: Normal range of motion.      Cervical back: Normal range of motion.      Right lower leg: Edema present.      Left lower leg: Edema present.   Skin:     General: Skin is warm and dry.   Neurological:      General: No focal deficit present.      Mental Status: He is alert and oriented to person, place, and time.      Motor: Weakness present.   Psychiatric:         Mood and Affect: Mood normal.         Behavior: Behavior normal.               Significant Labs: All pertinent labs within the past 24 hours have been reviewed.    Significant Imaging: I have reviewed all pertinent imaging results/findings within the past 24 hours.      Assessment & Plan  Cirrhosis of liver with ascites  Patient with known Cirrhosis with Co-morbidities are present and inclusive of ascites, hepatic encephalopathy, anticoagulation, and jaundice .  MELD-Na score calculated; MELD 3.0: 35 at 3/27/2025  5:53 AM  MELD-Na: 35 at 3/27/2025  5:53 AM  Calculated from:  Serum Creatinine: 1.4 mg/dL at 3/25/2025 10:56 AM  Serum Sodium: 124 mmol/L (Using min of 125 mmol/L) at 3/25/2025 10:56 AM  Total Bilirubin: 8.1 mg/dL at 3/25/2025 10:56 AM  Serum Albumin: 2.6 g/dL at 3/25/2025 10:56 AM  INR(ratio): 3.2 at 3/27/2025  5:53 AM  Age at listing (hypothetical): 59 years  Sex: Male at 3/27/2025  5:53 AM      Continue chronic meds. Etiology likely Hepatitis. Will avoid any hepatotoxic meds, and monitor CBC/CMP/INR for synthetic function.     -para 3/18 neg for SBP  -Hepatology on board, not a candidate for transplant unless CD4 count is greater than 100.  Would also provide insight regarding medication  compliance   -came back at 54, likely due to lack of immune response 2/2 cirrhosis rather than medication noncompliance or medication failure  Plan:  -Palliative care consult given lack of transplant candidacy: transition to comfort care  -Cont IV rocephin  -last para done 3/26   -IR willing to place palliative pleurX to assist w ascites however INR needs to be < 3.  Discuss plans for giving FFP 30 minutes prior to procedure and 1 unit during procedures.  Acute blood loss anemia  Anemia is likely due to chronic blood loss and chronic disease due to Chronic liver disease.   Plan    - Patient has received 1 units of PRBCs on at OSH  and 1 unit at [facility] (3/25  -we will stop further blood draws given now that he is comfort measures.    JASON (acute kidney injury)  Hyponatremia  JASON is likely due to  HR S . Baseline creatinine is  1.5 . Most recent creatinine and eGFR are listed below.  We will stop further blood draws as he is now comfort measures.  Secondary to cirrhosis.  We will stop monitoring further labs as now he is comfort measures.  Human immunodeficiency virus (HIV) disease  -CD4 done, lower at 54  -Would benefit from palliative care consult given not only lack of candidacy for transplant, but also high propensity for opportunistic infections     UTI (urinary tract infection)  S/p Rocephin, and fluconazole  Stopped as an outpatient is now comfort measures.      Bilateral subdural hematomas  Stable.  No further monitoring as patient is now comfort measures.  HLD (hyperlipidemia)      Depression  Continue Prozac      Hepatic encephalopathy      Comfort measures only status      VTE Risk Mitigation (From admission, onward)           Ordered     Place RADHA hose  Until discontinued         03/23/25 0041     IP VTE LOW RISK PATIENT  Once         03/23/25 0041                    Discharge Planning   CODI: 4/1/2025     Code Status: DNR   Medical Readiness for Discharge Date:   Discharge Plan A: Inpatient Hospice                         Ranulfo Wong MD  Department of Hospital Medicine   [facility] - Transplant Stepdown

## 2025-03-28 NOTE — PLAN OF CARE
Patient oriented x4. Vitals Q shift. PRN dilaudid given for pain. No BM overnight. Dey draining demar urine--total in flowsheets. Caregiver at bedside. Bed locked and in lowest settings. Call bell in reach.

## 2025-03-28 NOTE — PLAN OF CARE
CM faxed MAR to [facility] [phone] ph, [phone] fx with [facility].    Neeta Oliveira RN     [phone]

## 2025-03-28 NOTE — SUBJECTIVE & OBJECTIVE
Interval History:  Patient resting comfortably.  Just received his pain medication.  Patient would like to maintain his Dey catheter for comfort.    Review of Systems  Objective:     Vital Signs (Most Recent):  Temp: 97.4 °F (36.3 °C) (03/28/25 0729)  Pulse: 76 (03/28/25 0729)  Resp: 18 (03/28/25 1621)  BP: 123/70 (03/28/25 0729)  SpO2: (!) 94 % (03/28/25 0729) Vital Signs (24h Range):  Temp:  [97.4 °F (36.3 °C)-97.7 °F (36.5 °C)] 97.4 °F (36.3 °C)  Pulse:  [73-76] 76  Resp:  [16-18] 18  SpO2:  [92 %-94 %] 94 %  BP: (123-131)/(60-70) 123/70     Weight: 71.7 kg (158 lb 1.1 oz)  Body mass index is 20.86 kg/m².    Intake/Output Summary (Last 24 hours) at 3/28/2025 1800  Last data filed at 3/28/2025 1724  Gross per 24 hour   Intake 360 ml   Output 600 ml   Net -240 ml         Physical Exam  Vitals and nursing note reviewed.   Constitutional:       General: He is awake. He is not in acute distress.     Appearance: He is ill-appearing.   HENT:      Head: Normocephalic.      Mouth/Throat:      Mouth: Mucous membranes are moist.   Eyes:      General: Scleral icterus present.   Cardiovascular:      Rate and Rhythm: Normal rate and regular rhythm.      Pulses: Normal pulses.      Heart sounds: No murmur heard.  Pulmonary:      Effort: Pulmonary effort is normal. No respiratory distress.      Breath sounds: No wheezing.   Abdominal:      General: Bowel sounds are normal. There is distension.      Palpations: Abdomen is soft.      Tenderness: There is no abdominal tenderness. There is no guarding.   Musculoskeletal:         General: Normal range of motion.      Cervical back: Normal range of motion.      Right lower leg: Edema present.      Left lower leg: Edema present.   Skin:     General: Skin is warm and dry.   Neurological:      General: No focal deficit present.      Mental Status: He is alert and oriented to person, place, and time.      Motor: Weakness present.   Psychiatric:         Mood and Affect: Mood normal.          Behavior: Behavior normal.               Significant Labs: All pertinent labs within the past 24 hours have been reviewed.    Significant Imaging: I have reviewed all pertinent imaging results/findings within the past 24 hours.

## 2025-03-29 NOTE — PLAN OF CARE
Pt admitted 3/22 with decompensated hepatic cirrhosis. Deferred for liver transplant due to pulm HTN. Pt transitioned to comfort care this admission, DNR orders in place. Pt is oriented x3 but has delayed responses. Speech is slurred and RN having trouble understanding pt throughout the day. Pt on RA. Afebrile. RLQ puncture site C/D/I. PRN dilaudid given to pt per request. Vitals q shift. Plan to discharge home on hospice this coming week. Pt's mother updated on clinical status today. PO intake is poor, assistance offered with each meal. Pt remained in bed this shift. 1-2 person assist with hygiene care. Dey to gravity with minimal UOP. No BM so far today. Lactulose given this morning. Bed alarm set. Side rails x3, bed locked/lowest position, call bell left within reach. Pt verbalized understanding to call for needs/assistance.

## 2025-03-29 NOTE — SUBJECTIVE & OBJECTIVE
Interval History:  Patient resting comfortably.  Just received his pain medication.  Patient would like to maintain his Dey catheter for comfort.    Review of Systems  Objective:     Vital Signs (Most Recent):  Temp: 97.7 °F (36.5 °C) (03/29/25 0823)  Pulse: 80 (03/29/25 0823)  Resp: 16 (03/29/25 0948)  BP: 110/64 (03/29/25 0823)  SpO2: (!) 92 % (03/29/25 0823) Vital Signs (24h Range):  Temp:  [97.7 °F (36.5 °C)] 97.7 °F (36.5 °C)  Pulse:  [75-80] 80  Resp:  [16-19] 16  SpO2:  [92 %-94 %] 92 %  BP: (110-143)/(64-71) 110/64     Weight: 71.7 kg (158 lb 1.1 oz)  Body mass index is 20.86 kg/m².    Intake/Output Summary (Last 24 hours) at 3/29/2025 1536  Last data filed at 3/29/2025 1456  Gross per 24 hour   Intake 880 ml   Output 250 ml   Net 630 ml         Physical Exam  Vitals and nursing note reviewed.   Constitutional:       General: He is awake. He is not in acute distress.     Appearance: He is ill-appearing.   HENT:      Head: Normocephalic.      Mouth/Throat:      Mouth: Mucous membranes are moist.   Eyes:      General: Scleral icterus present.   Cardiovascular:      Rate and Rhythm: Normal rate and regular rhythm.      Pulses: Normal pulses.      Heart sounds: No murmur heard.  Pulmonary:      Effort: Pulmonary effort is normal. No respiratory distress.      Breath sounds: No wheezing.   Abdominal:      General: Bowel sounds are normal. There is distension.      Palpations: Abdomen is soft.      Tenderness: There is no abdominal tenderness. There is no guarding.   Musculoskeletal:         General: Normal range of motion.      Cervical back: Normal range of motion.      Right lower leg: Edema present.      Left lower leg: Edema present.   Skin:     General: Skin is warm and dry.   Neurological:      General: No focal deficit present.      Mental Status: He is alert and oriented to person, place, and time.      Motor: Weakness present.   Psychiatric:         Mood and Affect: Mood normal.         Behavior:  Behavior normal.               Significant Labs: All pertinent labs within the past 24 hours have been reviewed.    Significant Imaging: I have reviewed all pertinent imaging results/findings within the past 24 hours.

## 2025-03-29 NOTE — PROGRESS NOTES
[facility]  Progress Note    Patient Name: Jam Card  MRN: [id number]  Patient Class: IP- Inpatient   Admission Date: 3/22/2025  Length of Stay: 7 days  Attending Physician: Ranulfo Wong MD  Primary Care Provider: Ulysses Almaraz MD        Subjective     Principal Problem:Cirrhosis of liver with ascites        HPI:  Mr. Card is a 59 y.o. male with PMH of HCV cirrhosis and HIV. Liver disease c/b ascites/edema, HE. 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 Previously evaluated for liver transplant but deffered due to pulmonary hypertension. He presented to OSH ED with generalized weakness, frequent falls, gait instability. Hit his lip, wound noted. Denies hitting his head or LOC. Denies any vomiting, diarrhea, blood in his stool, fever. W/u in ED: CTH neg for bleed/acute abnormality, CXR without infection, UA neg for infection. Hyponatremia to 125, albumin 1.7, LFTs elevated (expected), Cr at BL. OSH d/w Dr. Cloud, [place] [facility] [place] including overt hepatic encephalopathy (confusion), worsening hyponatremia, coagulopathy and jaundice. Dey placed for retention. Patient admitted under Dr. Walden, will transfer to IML service in AM.    Overview/Hospital Course:  Admitted with hepatic encephalopathy, started on lactulose and rifaximin.  Hyponatremic with a JASON, Nephrology consulted.  Continuing on albumin challenge.  Diuretics have been held.  Started on IV Rocephin given UTI.  Hepatology on board for decompensated cirrhosis.  Needs CD4 recheck. Will reportedly not be a transplant candidate if CD4 count is <100.  Would additionally provide insight regarding medication compliance. CD4 count returned at 56 which is decreased from before. Discussed w transplant ID, has poor immune recovery which is further lowered by cirrhosis.  Therefore, may not necessarily be medication noncompliance or failure, but rather a lowered  immune response 2/2 cirrhosis. Regardless, will not be a transplant candidate. Continue intermittent paracentesis.      3/25: new drop in Hb which preceeded paracentesis this afternoon. Obtain workup. 1 unit prbc transfusion. Hb stable. Palliative care involved, family meeting was had today. Will transition to hospice. Comfort care orders were placed by palliative team. Consult was placed to IR for palliative pleurX to be placed for ascites. Willing to perform however INR should be <3. Vitamin K was started yesterday, will repeat INR tmrw and contact IR    3/28- PleurX drain could not be placed as INR remains above 3.  Discussed with IR and recommendation to give FFP 30 minutes prior and during PleurX placement on Monday potentially.          Interval History:  Patient resting comfortably.  Just received his pain medication.  Patient would like to maintain his Dey catheter for comfort.    Review of Systems  Objective:     Vital Signs (Most Recent):  Temp: 97.7 °F (36.5 °C) (03/29/25 0823)  Pulse: 80 (03/29/25 0823)  Resp: 16 (03/29/25 0948)  BP: 110/64 (03/29/25 0823)  SpO2: (!) 92 % (03/29/25 0823) Vital Signs (24h Range):  Temp:  [97.7 °F (36.5 °C)] 97.7 °F (36.5 °C)  Pulse:  [75-80] 80  Resp:  [16-19] 16  SpO2:  [92 %-94 %] 92 %  BP: (110-143)/(64-71) 110/64     Weight: 71.7 kg (158 lb 1.1 oz)  Body mass index is 20.86 kg/m².    Intake/Output Summary (Last 24 hours) at 3/29/2025 1536  Last data filed at 3/29/2025 1456  Gross per 24 hour   Intake 880 ml   Output 250 ml   Net 630 ml         Physical Exam  Vitals and nursing note reviewed.   Constitutional:       General: He is awake. He is not in acute distress.     Appearance: He is ill-appearing.   HENT:      Head: Normocephalic.      Mouth/Throat:      Mouth: Mucous membranes are moist.   Eyes:      General: Scleral icterus present.   Cardiovascular:      Rate and Rhythm: Normal rate and regular rhythm.      Pulses: Normal pulses.      Heart sounds: No murmur  heard.  Pulmonary:      Effort: Pulmonary effort is normal. No respiratory distress.      Breath sounds: No wheezing.   Abdominal:      General: Bowel sounds are normal. There is distension.      Palpations: Abdomen is soft.      Tenderness: There is no abdominal tenderness. There is no guarding.   Musculoskeletal:         General: Normal range of motion.      Cervical back: Normal range of motion.      Right lower leg: Edema present.      Left lower leg: Edema present.   Skin:     General: Skin is warm and dry.   Neurological:      General: No focal deficit present.      Mental Status: He is alert and oriented to person, place, and time.      Motor: Weakness present.   Psychiatric:         Mood and Affect: Mood normal.         Behavior: Behavior normal.               Significant Labs: All pertinent labs within the past 24 hours have been reviewed.    Significant Imaging: I have reviewed all pertinent imaging results/findings within the past 24 hours.      Assessment & Plan  Cirrhosis of liver with ascites  Patient with known Cirrhosis with Co-morbidities are present and inclusive of ascites, hepatic encephalopathy, anticoagulation, and jaundice .  MELD-Na score calculated; MELD 3.0: 35 at 3/27/2025  5:53 AM  MELD-Na: 35 at 3/27/2025  5:53 AM  Calculated from:  Serum Creatinine: 1.4 mg/dL at 3/25/2025 10:56 AM  Serum Sodium: 124 mmol/L (Using min of 125 mmol/L) at 3/25/2025 10:56 AM  Total Bilirubin: 8.1 mg/dL at 3/25/2025 10:56 AM  Serum Albumin: 2.6 g/dL at 3/25/2025 10:56 AM  INR(ratio): 3.2 at 3/27/2025  5:53 AM  Age at listing (hypothetical): 59 years  Sex: Male at 3/27/2025  5:53 AM      Continue chronic meds. Etiology likely Hepatitis. Will avoid any hepatotoxic meds, and monitor CBC/CMP/INR for synthetic function.     -para 3/18 neg for SBP  -Hepatology on board, not a candidate for transplant unless CD4 count is greater than 100.  Would also provide insight regarding medication compliance   -came back at  56, likely due to lack of immune response 2/2 cirrhosis rather than medication noncompliance or medication failure  Plan:  -Palliative care consult given lack of transplant candidacy: transition to comfort care  -Cont IV rocephin  -last para done 3/26   -IR willing to place palliative pleurX to assist w ascites however INR needs to be < 3.  Discuss plans for giving FFP 30 minutes prior to procedure and 1 unit during procedures.  Acute blood loss anemia  Anemia is likely due to chronic blood loss and chronic disease due to Chronic liver disease.   Plan    - Patient has received 1 units of PRBCs on at OSH  and [place] [facility] [place]  -we will stop further blood draws given now that he is comfort measures.    JASON (acute kidney injury)  Hyponatremia  JASON is likely due to  HR S . Baseline creatinine is  1.5 . Most recent creatinine and eGFR are listed below.  We will stop further blood draws as he is now comfort measures.  Secondary to cirrhosis.  We will stop monitoring further labs as now he is comfort measures.  Human immunodeficiency virus (HIV) disease  -CD4 done, lower at 54  -Would benefit from palliative care consult given not only lack of candidacy for transplant, but also high propensity for opportunistic infections     UTI (urinary tract infection)  S/p Rocephin, and fluconazole  Stopped as an outpatient is now comfort measures.      Bilateral subdural hematomas  Stable.  No further monitoring as patient is now comfort measures.  HLD (hyperlipidemia)      Depression  Continue Prozac      Hepatic encephalopathy      Comfort measures only status      VTE Risk Mitigation (From admission, onward)           Ordered     Place RADHA hose  Until discontinued         03/23/25 0041     IP VTE LOW RISK PATIENT  Once         03/23/25 0041                    Discharge Planning   CODI: 4/1/2025     Code Status: DNR   Medical Readiness for Discharge Date:   Discharge Plan A: Inpatient Hospice                        Adil Marvin,  MD  Department of Hospital Medicine   [facility]

## 2025-03-30 NOTE — PROGRESS NOTES
Lactulose enema administered this am by Charge DRE Garza and Mera ERICKSON. Successful enema, pt tolerated 100%. Large liquid BM soon after. Pt becoming more alert, still not back to yesterday's baseline, but improving since this morning. Able to take meds without issues at this time.

## 2025-03-30 NOTE — SUBJECTIVE & OBJECTIVE
Interval History:  Patient resting comfortably.  Alerted by patient's nurse that patient is more lethargic this morning, not answering questions as well. Patient very drowsy on my exam but responsive.  He denies pain including abdominal pain. I personally noted large BM which was bright red blood. Patient's 3rd such BM today.   Discussed with patient's friend Neeta at bedside. She states patient has told her multiple times that he hopes he passes peacefully in his sleep and would not want to live in his current condition.     Review of Systems  Unable to complete thorough review of systems due to patient's decreased responsiveness     Objective:     Vital Signs (Most Recent):  Temp: 97.6 °F (36.4 °C) (03/30/25 0742)  Pulse: 79 (03/30/25 0742)  Resp: 16 (03/30/25 0742)  BP: (!) 104/56 (03/30/25 0742)  SpO2: (!) 92 % (03/30/25 0742) Vital Signs (24h Range):  Temp:  [97.5 °F (36.4 °C)-97.6 °F (36.4 °C)] 97.6 °F (36.4 °C)  Pulse:  [72-79] 79  Resp:  [16] 16  SpO2:  [92 %] 92 %  BP: (100-104)/(55-56) 104/56     Weight: 107.6 kg (237 lb 3.4 oz)  Body mass index is 31.3 kg/m².    Intake/Output Summary (Last 24 hours) at 3/30/2025 1639  Last data filed at 3/30/2025 1549  Gross per 24 hour   Intake 510 ml   Output 135 ml   Net 375 ml         Physical Exam  Vitals (Dey in place) and nursing note reviewed.   Constitutional:       General: He is awake. He is not in acute distress.     Appearance: He is ill-appearing.   HENT:      Head: Normocephalic.      Mouth/Throat:      Mouth: Mucous membranes are moist.   Eyes:      General: Scleral icterus present.   Cardiovascular:      Rate and Rhythm: Normal rate and regular rhythm.      Pulses: Normal pulses.      Heart sounds: No murmur heard.  Pulmonary:      Effort: Pulmonary effort is normal. No respiratory distress.      Breath sounds: No wheezing.   Abdominal:      General: Bowel sounds are normal. There is distension.      Palpations: Abdomen is soft.      Tenderness: There  is no abdominal tenderness. There is no guarding.   Musculoskeletal:         General: Normal range of motion.      Cervical back: Normal range of motion.      Right lower leg: Edema present.      Left lower leg: Edema present.   Skin:     General: Skin is warm and dry.      Comments: Severe jaundice   Neurological:      Mental Status: He is alert.      Motor: Weakness (Generalized) present.      Comments: Lethargic but arousable to palpation and speech. Not following most commands. Difficult to understand speech. Oriented to person. Agrees he is in the hospital. No answer for year.               Significant Labs: All pertinent labs within the past 24 hours have been reviewed.    Significant Imaging: I have reviewed all pertinent imaging results/findings within the past 24 hours.

## 2025-03-30 NOTE — PROGRESS NOTES
Dr. Glasgow notified of RN holding PO meds due to decreased alertness/inability to follow commands. Orders received for lactulose enema.

## 2025-03-30 NOTE — CHAPLAIN
Patient: Jam Card  MRN: [id number]  : 1965  Age: 59 y.o.  Legal sex: male   Hospital Length of Stay: 8 days  Code Status: DNR   Attending Provider: Elisha Glasgow MD  Principal Problem: Cirrhosis of liver with ascites  Patient's Restoration: Faith  Length of my visit: 5 min  Purpose of visit:   PallMed      attempted to visit - patient asleep, no family at bedside.         Rev. Michael German, [id number]  board certified , polylingual (Divehi+4)     support is available and on-site . Please call the on-call  for any emergent spiritual care needs, [id number].

## 2025-03-30 NOTE — PLAN OF CARE
Pt lethargic at start of shift but has started perking up moreso this afternoon after receiving lactulose enema earlier in shift. Pt's speech still remains slurred and difficult to understand. Unable to assess orientation today. Dey remains in place, pt is oliguric. BM x2 so far today. Remains on RA. SLP consult placed per MD due to pt observed to cough frequently after PO intake. Vitals q shift. Pt denies pain. No signs of agitation so far today. Pt remained in bed this shift. Bed alarm set. Turning assistance provided. Bed locked/lowest position, call bell left within reach. Hourly rounding completed on pt today. PO intake remains low.

## 2025-03-30 NOTE — ASSESSMENT & PLAN NOTE
Patient with known Cirrhosis with Co-morbidities are present and inclusive of ascites, hepatic encephalopathy, anticoagulation, and jaundice.  MELD-Na score calculated; MELD 3.0: 35 at 3/27/2025  5:53 AM  MELD-Na: 35 at 3/27/2025  5:53 AM  Calculated from:  Serum Creatinine: 1.4 mg/dL at 3/25/2025 10:56 AM  Serum Sodium: 124 mmol/L (Using min of 125 mmol/L) at 3/25/2025 10:56 AM  Total Bilirubin: 8.1 mg/dL at 3/25/2025 10:56 AM  Serum Albumin: 2.6 g/dL at 3/25/2025 10:56 AM  INR(ratio): 3.2 at 3/27/2025  5:53 AM  Age at listing (hypothetical): 59 years  Sex: Male at 3/27/2025  5:53 AM      Continue chronic meds. Etiology likely Hepatitis. Will avoid any hepatotoxic meds, and monitor CBC/CMP/INR for synthetic function.     -para 3/18 neg for SBP  -s/p IV Rocephin  -Hepatology on board, not a candidate for transplant unless CD4 count is greater than 100.  Would also provide insight regarding medication compliance   -came back at 56, likely due to lack of immune response 2/2 cirrhosis rather than medication noncompliance or medication failure  Plan:  -Palliative care consult given lack of transplant candidacy: transition to comfort care  -last para done 3/26   -IR willing to place palliative pleurX to assist w ascites however INR needs to be < 3.  Discuss plans for giving FFP 30 minutes prior to procedure and 1 unit during procedures.

## 2025-03-30 NOTE — PROGRESS NOTES
[facility] - Transplant [facility]  Progress Note    Patient Name: Jam Card  MRN: [id number]  Patient Class: IP- Inpatient   Admission Date: 3/22/2025  Length of Stay: 8 days  Attending Physician: Elisha Glasgow MD  Primary Care Provider: Ulysses Almaraz MD        Subjective     Principal Problem:Cirrhosis of liver with ascites        HPI:  Mr. Card is a 59 y.o. male with PMH of HCV cirrhosis and HIV. Liver disease c/b ascites/edema, HE. 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 Previously evaluated for liver transplant but deffered due to pulmonary hypertension. He presented to OSH ED with generalized weakness, frequent falls, gait instability. Hit his lip, wound noted. Denies hitting his head or LOC. Denies any vomiting, diarrhea, blood in his stool, fever. W/u in ED: CTH neg for bleed/acute abnormality, CXR without infection, UA neg for infection. Hyponatremia to 125, albumin 1.7, LFTs elevated (expected), Cr at BL. OSH d/w Dr. Cloud, patient admitted to [facility] [place] for worsening decompensation including overt hepatic encephalopathy (confusion), worsening hyponatremia, coagulopathy and jaundice. Dey placed for retention. Patient admitted under Dr. Walden, will transfer to IML service in AM.    Overview/Hospital Course:  Admitted with hepatic encephalopathy, started on lactulose and rifaximin.  Hyponatremic with a JASON, Nephrology consulted.  Continuing on albumin challenge.  Diuretics have been held.  Started on IV Rocephin given UTI.  Hepatology on board for decompensated cirrhosis.  Needs CD4 recheck. Will reportedly not be a transplant candidate if CD4 count is <100.  Would additionally provide insight regarding medication compliance. CD4 count returned at 56 which is decreased from before. Discussed w transplant ID, has poor immune recovery which is further lowered by cirrhosis.  Therefore, may not necessarily be medication noncompliance or failure, but rather a  lowered immune response 2/2 cirrhosis. Regardless, will not be a transplant candidate. Continue intermittent paracentesis.      3/25: new drop in Hb which preceeded paracentesis this afternoon. Obtain workup. 1 unit prbc transfusion. Hb stable. Palliative care involved, family meeting was had today. Will transition to hospice. Comfort care orders were placed by palliative team. Consult was placed to IR for palliative pleurX to be placed for ascites. Willing to perform however INR should be <3. Vitamin K was started yesterday, will repeat INR tmrw and contact IR    3/28- PleurX drain could not be placed as INR remains above 3.  Discussed with IR and recommendation to give FFP 30 minutes prior and during PleurX placement on Monday potentially.    3/30- Nurse reports patient is more lethargic, not responding to questions as well as yesterday. Patient treated with Lactulose enema. He had 2 large BMs soon after this. Nurse reports he is improving alertness.           Interval History:  Patient resting comfortably.  Alerted by patient's nurse that patient is more lethargic this morning, not answering questions as well. Not safe for po breakfast. He was treated with a lactulose enema.  On my exam patient is drowsy but alert.  Difficult to understand speech. Soft voice- denies acute pain complaints. Agreed yes that he is in the hospital.    Review of Systems  Unable to complete thorough review of systems due to patient's decreased responsiveness     Objective:     Vital Signs (Most Recent):  Temp: 97.6 °F (36.4 °C) (03/30/25 0742)  Pulse: 79 (03/30/25 0742)  Resp: 16 (03/30/25 0742)  BP: (!) 104/56 (03/30/25 0742)  SpO2: (!) 92 % (03/30/25 0742) Vital Signs (24h Range):  Temp:  [97.5 °F (36.4 °C)-97.6 °F (36.4 °C)] 97.6 °F (36.4 °C)  Pulse:  [72-79] 79  Resp:  [16] 16  SpO2:  [92 %] 92 %  BP: (100-104)/(55-56) 104/56     Weight: 107.6 kg (237 lb 3.4 oz)  Body mass index is 31.3 kg/m².    Intake/Output Summary (Last 24  hours) at 3/30/2025 1639  Last data filed at 3/30/2025 1549  Gross per 24 hour   Intake 510 ml   Output 135 ml   Net 375 ml         Physical Exam  Vitals (Dey in place) and nursing note reviewed.   Constitutional:       General: He is awake. He is not in acute distress.     Appearance: He is ill-appearing.   HENT:      Head: Normocephalic.      Mouth/Throat:      Mouth: Mucous membranes are moist.   Eyes:      General: Scleral icterus present.   Cardiovascular:      Rate and Rhythm: Normal rate and regular rhythm.      Pulses: Normal pulses.      Heart sounds: No murmur heard.  Pulmonary:      Effort: Pulmonary effort is normal. No respiratory distress.      Breath sounds: No wheezing.   Abdominal:      General: Bowel sounds are normal. There is distension.      Palpations: Abdomen is soft.      Tenderness: There is no abdominal tenderness. There is no guarding.   Musculoskeletal:         General: Normal range of motion.      Cervical back: Normal range of motion.      Right lower leg: Edema present.      Left lower leg: Edema present.   Skin:     General: Skin is warm and dry.   Neurological:      Mental Status: He is alert.      Motor: Weakness (Generalized) present.      Comments: Difficult to understand speech. Oriented to person, place. Does not appear oriented to time though difficult to say               Significant Labs: All pertinent labs within the past 24 hours have been reviewed.    Significant Imaging: I have reviewed all pertinent imaging results/findings within the past 24 hours.      Assessment & Plan  Cirrhosis of liver with ascites  Hepatic encephalopathy  Patient with known Cirrhosis with Co-morbidities are present and inclusive of ascites, hepatic encephalopathy, anticoagulation, and jaundice .  MELD-Na score calculated; MELD 3.0: 35 at 3/27/2025  5:53 AM  MELD-Na: 35 at 3/27/2025  5:53 AM  Calculated from:  Serum Creatinine: 1.4 mg/dL at 3/25/2025 10:56 AM  Serum Sodium: 124 mmol/L (Using min  of 125 mmol/L) at 3/25/2025 10:56 AM  Total Bilirubin: 8.1 mg/dL at 3/25/2025 10:56 AM  Serum Albumin: 2.6 g/dL at 3/25/2025 10:56 AM  INR(ratio): 3.2 at 3/27/2025  5:53 AM  Age at listing (hypothetical): 59 years  Sex: Male at 3/27/2025  5:53 AM      Continue chronic meds. Etiology likely Hepatitis. Will avoid any hepatotoxic meds, and monitor CBC/CMP/INR for synthetic function.     -para 3/18 neg for SBP  -s/p IV Rocephin  -Hepatology on board, not a candidate for transplant unless CD4 count is greater than 100.  Would also provide insight regarding medication compliance   -came back at 54, likely due to lack of immune response 2/2 cirrhosis rather than medication noncompliance or medication failure  Plan:  -Palliative care consult given lack of transplant candidacy: transition to comfort care  -last para done 3/26   -IR willing to place palliative pleurX to assist w ascites however INR needs to be < 3.  Discuss plans for giving FFP 30 minutes prior to procedure and 1 unit during procedures.      Acute blood loss anemia  Anemia is likely due to chronic blood loss and chronic disease due to Chronic liver disease.   Plan    - Patient has received 1 units of PRBCs on at OSH  and 1 unit at [facility] (3/25  -we will stop further blood draws given now that he is comfort measures.    JASON (acute kidney injury)  Hyponatremia  JASON is likely due to  HR S . Baseline creatinine is  1.5 . Most recent creatinine and eGFR are listed below.  We will stop further blood draws as he is now comfort measures.  Secondary to cirrhosis.  We will stop monitoring further labs as now he is comfort measures.  Human immunodeficiency virus (HIV) disease  -CD4 done, lower at 54  -Palliative following; comfort measures.     UTI (urinary tract infection)  S/p Rocephin, and fluconazole  Stopped as an outpatient is now comfort measures.  Bilateral subdural hematomas  Stable.  No further monitoring as patient is now comfort measures.  HLD  (hyperlipidemia)    Depression  Continue Prozac        VTE Risk Mitigation (From admission, onward)           Ordered     Place RADHA hose  Until discontinued         03/23/25 0041     IP VTE LOW RISK PATIENT  Once         03/23/25 0041                Holding AC- comfort measures    Discharge Planning   CODI: 4/1/2025     Code Status: DNR   Medical Readiness for Discharge Date:   Discharge Plan A: Inpatient Hospice                Elisha Glasgow MD  Department of Hospital Medicine   [facility] - Transplant Stepdown

## 2025-03-30 NOTE — ACP (ADVANCE CARE PLANNING)
Advance Care Planning     Date: 03/30/2025    Hospice  I called patient's mother Chelita Card.  She states she is very sad about her son circumstances, as is appropriate.  I will ask Palliative Care to provide her with some resources for grieving. She states she is in favor of treating patient with Lactulose enemas if they have benefit of making him more alert.  Also, she thinks we should leave his Dey in for comfort.  She was previously living with Mr. Polk.  They were looking into inpatient hospice in their area.  However, she was told his insurance will not cover inpatient hospice.  She is interested in home hospice but will need the help of a sitter.  I did explain the role for hospice care at this stage of the patient's illness, including its ability to help the patient live with the best quality of life possible.  We willbe making a hospice referral.       16 minutes spent in direct consultation on end of life care.     Elisha Glasgow MD  Department of Hospital Medicine  [facility]  03/30/2025

## 2025-03-30 NOTE — PROGRESS NOTES
RN entered pt's room to perform vitals. Pt noted to be more out of it compared to yesterday. Pt not responding to RN questions compared to yesterday. BG spot checked - 122. Pt also with dried blood in mouth. Oral care provided by Charge DRE Garza and Koby ERICKSON. They reported pt appeared to have bit his lip.     Dr. Glasgow notified of the above.

## 2025-03-31 NOTE — PROGRESS NOTES
Palliative Medicine  Progress Note     Patient Name: Jam Card   MRN: [id number]     Admission Date: 3/22/2025   Hospital Length of Stay: 9     Attending Provider: Karime Maddox MD   Consulting Provider: Gunnar Quinones MD  Primary Care Physician: Ulysses Almaraz MD     Principal Problem: Cirrhosis of liver with ascites     Patient information was obtained from patient, caregiver / friend, past medical records, and primary team.    Assessment/Plan:   Jam Card is a 59 y.o. male on whom Palliative Care is consulted for assistance with clarification of goals of care and hospice referral or discussion as it pertains to their diagnoses of HCV related cirrhosis w/ ascites and hepatic encephalopathy in the setting of HIV and SDH s/p MMA embolization (08/2024). Palliative Care consulted to discuss end-of-life options including Hospice.     Impression:  Patient is becoming increasingly lethargic and encephalopathic suggesting that he may be transitioning towards end-of-life. Discussed GoC at length with patient, life-long friend, and mother/surrogate-decision maker;  see ACP note below for details.    Billing:  In my care of this patient with acute- on chronic-severe illness which poses threat to life and/or bodily function, I am recommending goal-concordant care as outlined below. I spent a significant amount of time reviewing of prior external records and recommendations of other care providing teams including Hospital Medicine and Spiritual Care , reviewed relevant test results / studies including CBC, CMP, and INR , and discussed care with other specialists / providers involved in this patient's care.     Also, I spent an additional 52 min of time on voluntary advance care planning and/or goals of care discussion, providing emotional support, discussing prognosis, and exploring the burden vs benefit of various treatment options. This involved an initial GoC discussion with patient (who did not have  capacity) and life-long friend at bedside, a phone conversation with mother to discuss transfer plan to HPU and change in orders / plan, and also revisiting with patient at bedside once he arrived on HPU.     Recommendations:  ##Comfort-Focused Care:  No further escalation of care  Continue Dilaudid 0.5mg IV q30min PRN for pain / dyspnea  Continue Ativan 1mg IV q30min PRN for anxiety / agitation  Transfer patient to HPU for end-of-life care     #Constipation:  Continue Bisacodyl 10mg LA qday PRN if not BM in 24-48 hrs  Continue Lactulose 30g PO BID     #GOC Discussions  Advance Care Planning   3/31/2025: Gunnar Quinones MD  Spoke to patient's mother/surrogate-decision maker Chelita via telephone to discuss GoC. She would like to take patient home because that is his wish, but worries about her ability to care for him. We discussed moving patient to HPU for comfort-focused care given that his symptom burden is worsening and he appears to be transitioning to end-of-life phase. She is amenable with this plan if we can keep discussing possible dispo options that would allow him to move closer to home. Also, I shared with her that continuing to pursue PleurX catheter would not be my recommendation which she was also in agreement with.     3/27/2025: Gunnar Quinones MD   Met with patient and his caregiver/friend (Ashley) at bedside. Reviewed GoC which remain to pursue comfort-focused care and move closer to home if possible so that he can be near family. What is most important to him right now is being comfortable and being able to spend as much time with family as possible. He remains interested in PleurX catheter at this time. He is hopeful that he will be accepted at an IP Hospice facility, but otherwise wants to transfer to another care facility closer to home in the meantime while awaiting a bed.     3/26/2025: Gunnar Quinones MD  Met with patient at bedside along with his mother, friend/caregiver (Ashley), and  twin brother to discuss GoC. Patient aware of poor prognosis and that he is not a candidate for liver transplant. What is most important to him given likely short life-expectancy is being closer to home ([place], [place]) near to his family, including his nieces and their children who are very important to him. He wishes at this time to prioritize comfort-focused care over further disease-directed, life-prolonging treatments. He wishes to go to an IP Hospice facility if possible since there is no one to care for him at home; he is amenable to Hospice. Concluded with discussion about Code Status and decision made based on shared values and GoC to make him DNR/DNI.      Code status: DNR/DNI     Recommendations communicated directly to primary team on 03/31/2025 via telephone.    Subjective:   Brief HPI:   Jam Card is a 59 y.o. male with a PMH of HCV related cirrhosis c/b ascites and hepatic encephalopathy, HIV, and SDH s/p MMA embolization (08/2024) who presented to the hospital on 03/22 for weakness, falls, and gait instability thought 2/2 hepatic encephalopathy. During initial workup found to have JASON and UTI. Patient evaluated by hepatology and unfortunately because of CD4 <100. Palliative Care consulted to discuss end-of-life care options and hospice.     Today's Update:   Patient becoming increasingly lethargic and encephalopathic. Able to answer basic questions, but with waxing-and-waning attention. Reports mild, non-specific pain.     Discussed GoC with life-long friend at bedside who voices concerns about patient going home with hospice, given that patient's 80+ year-old, wheelchair-bound, supplemental oxygen dependent mother will be unable to care for him. Discussed with her transition to HPU for comfort-focused care in light of him starting to transition to end-of-life phase, where we can better address his needs while also working on alternative dispo options if going home remains important to  family. Friend verbalized understanding and appreciation, but deferred all final decision making to patient's mother/surrogate-decision maker.     Contacted patient's mother to also engage in GoC discussion;  see ACP note above for details.    Pain Assessment: N/A    Bard Symptom Assessment (ESAS):   Pain: Mild  Dyspnea: None  Anxiety: None  Nausea: None  Depression: None  Anorexia: Mild  Fatigue: Moderate  Insomnia: None  Restlessness: Mild  Agitation: None    Objective:   Vitals: Temp: 96 °F (35.6 °C) (03/31/25 1100)  Pulse: 72 (03/31/25 1341)  Resp: 19 (03/31/25 1341)  BP: (!) 86/43 (03/31/25 1341)  SpO2: 95 % (03/31/25 1341)    Medications:  Continuous Infusions: None    Scheduled Meds:    FLUoxetine  20 mg Oral Daily    lactulose  30 g Oral BID    levETIRAcetam  500 mg Oral BID    rifAXIMin  550 mg Oral BID       PRN Meds:  Current Facility-Administered Medications:     (Magic mouthwash) 1:1:1 diphenhydrAMINE(Benadryl) 12.5mg/5ml liq, aluminum & magnesium hydroxide-simethicone (Maalox), LIDOcaine viscous 2%, 5 mL, Swish & Spit, Q4H PRN    albuterol-ipratropium, 3 mL, Nebulization, Q6H PRN    baclofen, 10 mg, Oral, TID PRN    bisacodyL, 10 mg, Rectal, Daily PRN    calcium carbonate, 1,000 mg, Oral, TID PRN    diphenhydrAMINE, 25 mg, Oral, Nightly PRN    HYDROmorphone (PF), 0.5 mg, Intravenous, Q30 Min PRN    lorazepam, 1 mg, Intravenous, Q30 Min PRN    ondansetron, 8 mg, Oral, Q6H PRN    prochlorperazine, 5 mg, Intravenous, Q6H PRN    sodium chloride 0.9%, 10 mL, Intravenous, PRN     Physical Exam:  Constitutional:       General: NAD. They appear comfortable. Poor eye contact.      Appearance: They are ill-appearing. They are not diaphoretic.   HENT:      Head: Normocephalic and atraumatic.      Eyes: scleral icterus  Pulmonary:      Effort: No respiratory distress. No increased work of breathing.      RR 18  Abdominal:      General: There is no distension.   Skin:     General: Skin is warm and dry.       Coloration: Jaundiced.      Findings: Bruising present on extremities.   Neurological:      General: Waxing-and-waning alertness. Lethargic. Difficult to engage meaningfully.  Psychiatric:      Mood: *Shrugs*     Affect: constricted; mood-congruent     Behaviour: confused but pleasant.     Labs:   Creatinine   Date Value Ref Range Status   03/25/2025 1.4 0.5 - 1.4 mg/dL Final      Hemoglobin   Date Value Ref Range Status   03/22/2025 8.3 (L) 14.0 - 16.5 g/dL Final   03/12/2025 9.1 (L) 14.0 - 18.0 g/dL Final     HGB   Date Value Ref Range Status   03/31/2025 10.2 (L) 14.0 - 18.0 gm/dL Final      Albumin   Date Value Ref Range Status   03/25/2025 2.6 (L) 3.5 - 5.2 g/dL Final   03/24/2025 2.0 (L) 3.5 - 5.2 g/dL Final   03/23/2025 1.5 (L) 3.5 - 5.2 g/dL Final   03/12/2025 1.8 (L) 3.5 - 5.2 g/dL Final   03/07/2025 1.7 (L) 3.5 - 5.2 g/dL Final   02/06/2025 1.9 (L) 3.5 - 5.2 g/dL Final   02/06/2025 1.9 (L) 3.5 - 5.2 g/dL Final        Imaging & Investigations: reviewed on 03/31/2025    Thank you for your consult. Patient's care will be taken over by Dr Maddox on HPU.     Signed,  Gunnar Quinones MD  Hospice & Palliative Medicine  [facility]

## 2025-03-31 NOTE — NURSING
Arrives on unit via hospital bed accompanied by 2 nurses and a PCT.  Oriented familt to unit.  Transferred patient to ICU bed.  Patient with zero indication of discomfort.

## 2025-03-31 NOTE — ASSESSMENT & PLAN NOTE
Anemia is likely due to chronic blood loss and chronic disease due to Chronic liver disease.   Plan    - Patient has received 1 units of PRBCs on at OSH and 1 unit at [facility] (3/25  - we will stop further blood draws given now that he is comfort measures.

## 2025-03-31 NOTE — HPI
Jam Card is a 59-year-old man with HCV cirrhosis with associated ascites, edema, hepatic encephalopathy, HIV, SDH s/p MMA embolization (8/2024) on levetiracetam, pulmonary hypertension who was admitted for decompensated cirrhosis, JASON, acute blood loss anemia who Palliative and Supportive Care was consulted to explore goals of care. In light of no curable treatment options and worsening health trajectory, transitioned to comfort focused care with initial plans to try to return closer to home ([place]) however due to lack of funds and support, unable to utilize financially-burdensome disposition options. He is transferred to the inpatient hospice and palliative care unit on 3/31/2025 for aggressive symptom management at the end of life.

## 2025-03-31 NOTE — PLAN OF CARE
CM received call from admissions at [facility]. Medicare will pay for hospice care, but the family would have to pay $285 a day for the room and board. The family advised they cannot afford that. ANAMIKA spoke with Erin Card sister in law, and she inquired if it would be possible to transfer patient back to [facility]. Patient has stepped up to SICU and CM will communicate this information to CM/SW covering patient now. ANAMIKA also advised Erin that [facility] inpatient hospice facility is located in [facility]. That is about an hour away from patient's mom Chelita Card. That is a little closer to family. That maybe the only option. Chelita Card is not able to care for patient due to her age and the medical condition patient is in right now. ANAMIKA will follow up with CM/SW in SICU.      Neeta Oliveira RN     [phone]

## 2025-03-31 NOTE — ASSESSMENT & PLAN NOTE
Patient with known Cirrhosis with Co-morbidities are present and inclusive of ascites, hepatic encephalopathy, anticoagulation, and jaundice.  MELD-Na score calculated; MELD 3.0: 35 at 3/27/2025  5:53 AM  MELD-Na: 35 at 3/27/2025  5:53 AM  Calculated from:  Serum Creatinine: 1.4 mg/dL at 3/25/2025 10:56 AM  Serum Sodium: 124 mmol/L (Using min of 125 mmol/L) at 3/25/2025 10:56 AM  Total Bilirubin: 8.1 mg/dL at 3/25/2025 10:56 AM  Serum Albumin: 2.6 g/dL at 3/25/2025 10:56 AM  INR(ratio): 3.2 at 3/27/2025  5:53 AM  Age at listing (hypothetical): 59 years  Sex: Male at 3/27/2025  5:53 AM     Etiology likely Hepatitis. Will avoid any hepatotoxic meds, and monitor CBC/CMP/INR for synthetic function.     -para 3/18 neg for SBP  -s/p IV Rocephin  -Hepatology on board, not a candidate for transplant unless CD4 count is greater than 100.  Would also provide insight regarding medication compliance  -came back at 56, likely due to lack of immune response 2/2 cirrhosis rather than medication noncompliance or medication failure  Plan:  -Palliative care consult given lack of transplant candidacy: transition to comfort care  -last para done 3/26   -IR willing to place palliative pleurX to assist w ascites however INR needs to be < 3. INR worsening, would need to get FFP and recheck. Will defer this to later as pt is clinically declining.   -Transition to inpatient hospice

## 2025-03-31 NOTE — PT/OT/SLP PROGRESS
Speech Language Pathology      Jam Card  MRN: [id number]    Patient not seen today secondary to  (orders discontinued before eval initiated 2/2 tansition to hospice/pallaitive).

## 2025-03-31 NOTE — ASSESSMENT & PLAN NOTE
JASON is likely due to HR S. Baseline creatinine is 1.5. Most recent creatinine and eGFR are listed below.  We will stop further blood draws as he is now comfort measures.  Secondary to cirrhosis.  Stopped monitoring further labs as now he is comfort measures.

## 2025-03-31 NOTE — PLAN OF CARE
Problem: Adult Inpatient Plan of Care  Goal: Plan of Care Review  Outcome: Progressing  Goal: Patient-Specific Goal (Individualized)  Outcome: Progressing  Goal: Absence of Hospital-Acquired Illness or Injury  Outcome: Progressing  Goal: Optimal Comfort and Wellbeing  Outcome: Progressing  Goal: Readiness for Transition of Care  Outcome: Progressing     Problem: Wound  Goal: Optimal Coping  Outcome: Progressing  Goal: Optimal Functional Ability  Outcome: Progressing  Goal: Absence of Infection Signs and Symptoms  Outcome: Progressing  Goal: Optimal Pain Control and Function  Outcome: Progressing  Goal: Skin Health and Integrity  Outcome: Progressing  Goal: Optimal Wound Healing  Outcome: Progressing     Problem: Fall Injury Risk  Goal: Absence of Fall and Fall-Related Injury  Outcome: Progressing     Problem: Skin Injury Risk Increased  Goal: Skin Health and Integrity  Outcome: Progressing     Problem: Neutropenia  Goal: Absence of Infection  Outcome: Progressing     Problem: Infection  Goal: Absence of Infection Signs and Symptoms  Outcome: Progressing     Problem: Coping Ineffective  Goal: Effective Coping  Outcome: Progressing     Problem: Pain Acute  Goal: Optimal Pain Control and Function  Outcome: Progressing

## 2025-03-31 NOTE — PROGRESS NOTES
[facility] Medicine  Progress Note    Patient Name: Jam Card  MRN: [id number]  Patient Class: IP- Inpatient   Admission Date: 3/22/2025  Length of Stay: 9 days  Attending Physician: Karime Maddox MD  Primary Care Provider: Ulysses Almaraz MD        Subjective     Principal Problem:Cirrhosis of liver with ascites        HPI:  Mr. Card is a 59 y.o. male with PMH of HCV cirrhosis and HIV. Liver disease c/b ascites/edema, HE. 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 Previously evaluated for liver transplant but deffered due to pulmonary hypertension. He presented to OSH ED with generalized weakness, frequent falls, gait instability. Hit his lip, wound noted. Denies hitting his head or LOC. Denies any vomiting, diarrhea, blood in his stool, fever. W/u in ED: CTH neg for bleed/acute abnormality, CXR without infection, UA neg for infection. Hyponatremia to 125, albumin 1.7, LFTs elevated (expected), Cr at BL. OSH d/w Dr. Cloud, patient admitted to [facility] for worsening decompensation including overt hepatic encephalopathy (confusion), worsening hyponatremia, coagulopathy and jaundice. Dey placed for retention. Patient admitted under Dr. Walden, will transfer to IML service in AM.    Overview/Hospital Course:  Admitted with hepatic encephalopathy, started on lactulose and rifaximin.  Hyponatremic with a JASON, Nephrology consulted.  Continuing on albumin challenge.  Diuretics have been held.  Started on IV Rocephin given UTI.  Hepatology on board for decompensated cirrhosis.  Needs CD4 recheck. Will reportedly not be a transplant candidate if CD4 count is <100.  Would additionally provide insight regarding medication compliance. CD4 count returned at 56 which is decreased from before. Discussed w transplant ID, has poor immune recovery which is further lowered by cirrhosis.  Therefore, may not necessarily be medication noncompliance or failure, but rather a lowered  immune response 2/2 cirrhosis. Regardless, will not be a transplant candidate. Continue intermittent paracentesis.      3/25: new drop in Hb which preceeded paracentesis this afternoon. Obtain workup. 1 unit prbc transfusion. Hb stable. Palliative care involved, family meeting was had today. Will transition to hospice. Comfort care orders were placed by palliative team. Consult was placed to IR for palliative pleurX to be placed for ascites. Willing to perform however INR should be <3. Vitamin K was started yesterday, will repeat INR tmrw and contact IR    3/28- PleurX drain could not be placed as INR remains above 3.  Discussed with IR and recommendation to give FFP 30 minutes prior and during PleurX placement on Monday potentially.    3/30- Nurse reports patient is more lethargic, not responding to questions as well as yesterday. Patient treated with Lactulose enema. He had 2 large BMs soon after this. Nurse reports he is improving alertness.     3/31- Patient's INR elevated to 4.2. Discussed with IR.  They recommend transfusion FFP and recheck INR. Issue with hypotension this morning BP 90/47.  Patient with increased drowsiness. Nurse thinks likely not safe to eat. Ordered another Lactulose enema. Stool this morning is bright red blood pres rectum. 3 BMs before noon.  Discussed with palliative care team. Patient is relevantly clinically declining. Life expectancy is now likely on the order of days.  Think his needs would be best met in inpatient hospice unit. Patient will transfer to [facility] inpatient [facility]      Interval History:  Patient resting comfortably.  Alerted by patient's nurse that patient is more lethargic this morning, not answering questions as well. Patient very drowsy on my exam but responsive.  He denies pain including abdominal pain. I personally noted large BM which was bright red blood. Patient's 3rd such BM today.   Discussed with patient's friend Neeta at bedside. She states patient has  told her multiple times that he hopes he passes peacefully in his sleep and would not want to live in his current condition.     Review of Systems  Unable to complete thorough review of systems due to patient's decreased responsiveness     Objective:     Vital Signs (Most Recent):  Temp: 97.6 °F (36.4 °C) (03/30/25 0742)  Pulse: 79 (03/30/25 0742)  Resp: 16 (03/30/25 0742)  BP: (!) 104/56 (03/30/25 0742)  SpO2: (!) 92 % (03/30/25 0742) Vital Signs (24h Range):  Temp:  [97.5 °F (36.4 °C)-97.6 °F (36.4 °C)] 97.6 °F (36.4 °C)  Pulse:  [72-79] 79  Resp:  [16] 16  SpO2:  [92 %] 92 %  BP: (100-104)/(55-56) 104/56     Weight: 107.6 kg (237 lb 3.4 oz)  Body mass index is 31.3 kg/m².    Intake/Output Summary (Last 24 hours) at 3/30/2025 1639  Last data filed at 3/30/2025 1549  Gross per 24 hour   Intake 510 ml   Output 135 ml   Net 375 ml         Physical Exam  Vitals (Dey in place) and nursing note reviewed.   Constitutional:       General: He is awake. He is not in acute distress.     Appearance: He is ill-appearing.   HENT:      Head: Normocephalic.      Mouth/Throat:      Mouth: Mucous membranes are moist.   Eyes:      General: Scleral icterus present.   Cardiovascular:      Rate and Rhythm: Normal rate and regular rhythm.      Pulses: Normal pulses.      Heart sounds: No murmur heard.  Pulmonary:      Effort: Pulmonary effort is normal. No respiratory distress.      Breath sounds: No wheezing.   Abdominal:      General: Bowel sounds are normal. There is distension.      Palpations: Abdomen is soft.      Tenderness: There is no abdominal tenderness. There is no guarding.   Musculoskeletal:         General: Normal range of motion.      Cervical back: Normal range of motion.      Right lower leg: Edema present.      Left lower leg: Edema present.   Skin:     General: Skin is warm and dry.      Comments: Severe jaundice   Neurological:      Mental Status: He is alert.      Motor: Weakness (Generalized) present.       Comments: Lethargic but arousable to palpation and speech. Not following most commands. Difficult to understand speech. Oriented to person. Agrees he is in the hospital. No answer for year.               Significant Labs: All pertinent labs within the past 24 hours have been reviewed.    Significant Imaging: I have reviewed all pertinent imaging results/findings within the past 24 hours.      Assessment & Plan  Cirrhosis of liver with ascites  Hepatic encephalopathy  Patient with known Cirrhosis with Co-morbidities are present and inclusive of ascites, hepatic encephalopathy, anticoagulation, and jaundice .  MELD-Na score calculated; MELD 3.0: 35 at 3/27/2025  5:53 AM  MELD-Na: 35 at 3/27/2025  5:53 AM  Calculated from:  Serum Creatinine: 1.4 mg/dL at 3/25/2025 10:56 AM  Serum Sodium: 124 mmol/L (Using min of 125 mmol/L) at 3/25/2025 10:56 AM  Total Bilirubin: 8.1 mg/dL at 3/25/2025 10:56 AM  Serum Albumin: 2.6 g/dL at 3/25/2025 10:56 AM  INR(ratio): 3.2 at 3/27/2025  5:53 AM  Age at listing (hypothetical): 59 years  Sex: Male at 3/27/2025  5:53 AM     Etiology likely Hepatitis. Will avoid any hepatotoxic meds, and monitor CBC/CMP/INR for synthetic function.     -para 3/18 neg for SBP  -s/p IV Rocephin  -Hepatology on board, not a candidate for transplant unless CD4 count is greater than 100.  Would also provide insight regarding medication compliance  -came back at 54, likely due to lack of immune response 2/2 cirrhosis rather than medication noncompliance or medication failure  Plan:  -Palliative care consult given lack of transplant candidacy: transition to comfort care  -last para done 3/26   -IR willing to place palliative pleurX to assist w ascites however INR needs to be < 3. INR worsening, would need to get FFP and recheck. Will defer this to later as pt is clinically declining.   -Transition to inpatient hospice    Acute blood loss anemia  Anemia is likely due to chronic blood loss and chronic disease due  to Chronic liver disease.   Plan    - Patient has received 1 units of PRBCs on at OSH  and 1 unit at [facility] (3/25  - we will stop further blood draws given now that he is comfort measures.    JASON (acute kidney injury)  Hyponatremia  JASON is likely due to  HR S . Baseline creatinine is  1.5 . Most recent creatinine and eGFR are listed below.  We will stop further blood draws as he is now comfort measures.  Secondary to cirrhosis.  Stopped monitoring further labs as now he is comfort measures.  Human immunodeficiency virus (HIV) disease  -CD4 done, lower at 54  -Palliative following; comfort measures.     UTI (urinary tract infection)  S/p Rocephin, and fluconazole. Stopped as an outpatient is now comfort measures.  Bilateral subdural hematomas  Stable.  No further monitoring as patient is now comfort measures.  Depression  Continue Prozac      Comfort measures only status        VTE Risk Mitigation (From admission, onward)           Ordered     Place RADHA hose  Until discontinued         03/23/25 0041     IP VTE LOW RISK PATIENT  Once         03/23/25 0041                Holding AC- comfort measures    Discharge Planning   CODI: 4/1/2025     Code Status: DNR   Medical Readiness for Discharge Date:   Discharge Plan A: Inpatient Hospice                Elisha Glasgow MD  Department of Hospital Medicine   [facility] - Transplant Step[facility]

## 2025-04-01 NOTE — ASSESSMENT & PLAN NOTE
Jam Card is a 59-year-old man with PMH of HCV cirrhosis and HIV. Liver disease c/b ascites/edema, HE. 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 Previously evaluated for liver transplant but deffered due to pulmonary hypertension. He presented to OSH ED with generalized weakness, frequent falls, gait instability. Hit his lip, wound noted. Denies hitting his head or LOC. Denies any vomiting, diarrhea, blood in his stool, fever. W/u in ED: CTH neg for bleed/acute abnormality, CXR without infection, UA neg for infection. Hyponatremia to 125, albumin 1.7, LFTs elevated (expected), Cr at BL. OSH d/w Dr. Cloud, [place] [facility] [place]ensation including overt hepatic encephalopathy (confusion), worsening hyponatremia, coagulopathy and jaundice. Dey placed for retention.      Overview/Hospital Course:  Admitted with hepatic encephalopathy, started on lactulose and rifaximin.  Hyponatremic with a JASON, Nephrology consulted.  Continuing on albumin challenge.  Diuretics have been held.  Started on IV Rocephin given UTI.  Hepatology on board for decompensated cirrhosis.  Needs CD4 recheck. Will reportedly not be a transplant candidate if CD4 count is <100.  Would additionally provide insight regarding medication compliance. CD4 count returned at 56 which is decreased from before. Discussed w transplant ID, has poor immune recovery which is further lowered by cirrhosis.  Therefore, may not necessarily be medication noncompliance or failure, but rather a lowered immune response 2/2 cirrhosis. Regardless, will not be a transplant candidate. Continue intermittent paracentesis.       3/25: new drop in Hb which preceeded paracentesis this afternoon. Obtain workup. 1 unit prbc transfusion. Hb stable. Palliative care involved, family meeting was had today. Will transition to hospice. Comfort care orders were placed by palliative team. Consult was placed to IR for palliative pleurX to be  placed for ascites. Willing to perform however INR should be <3. Vitamin K was started yesterday, will repeat INR tmrw and contact IR     3/28- PleurX drain could not be placed as INR remains above 3.  Discussed with IR and recommendation to give FFP 30 minutes prior and during PleurX placement on Monday potentially.     3/30- Nurse reports patient is more lethargic, not responding to questions as well as yesterday. Patient treated with Lactulose enema. He had 2 large BMs soon after this. Nurse reports he is improving alertness.      3/31- Patient's INR elevated to 4.2. Discussed with IR.  They recommend transfusion FFP and recheck INR. Issue with hypotension this morning BP 90/47.  Patient with increased drowsiness. Nurse thinks likely not safe to eat. Ordered another Lactulose enema. Stool this morning is bright red blood pres rectum. 3 BMs before noon.  Discussed with palliative care team. Patient is relevantly clinically declining. Life expectancy is now likely on the order of days.  Think his needs would be best met in inpatient hospice unit. Patient will transfer to [facility] inpatient [facility]    Following admission to the hospice unit on 3/31/2025, patient's symptoms were managed with proportionate opioids and benzodiazepines to ensure comfort during end of life care. Family and patient were supported by our interdisciplinary team of doctors, nurses, social workers, and other team members. Patient peacefully passed away on April 1, 2025 at 09:10 AM.

## 2025-04-01 NOTE — PLAN OF CARE
Problem: Adult Inpatient Plan of Care  Goal: Plan of Care Review  Outcome: Met  Goal: Patient-Specific Goal (Individualized)  Outcome: Met  Goal: Absence of Hospital-Acquired Illness or Injury  Outcome: Met  Goal: Optimal Comfort and Wellbeing  Outcome: Met  Goal: Readiness for Transition of Care  Outcome: Met     Problem: Acute Kidney Injury/Impairment  Goal: Fluid and Electrolyte Balance  Outcome: Met  Goal: Improved Oral Intake  Outcome: Met  Goal: Effective Renal Function  Outcome: Met     Problem: Wound  Goal: Optimal Coping  Outcome: Met  Goal: Optimal Functional Ability  Outcome: Met  Goal: Absence of Infection Signs and Symptoms  Outcome: Met  Goal: Improved Oral Intake  Outcome: Met  Goal: Optimal Pain Control and Function  Outcome: Met  Goal: Skin Health and Integrity  Outcome: Met  Goal: Optimal Wound Healing  Outcome: Met     Problem: Fall Injury Risk  Goal: Absence of Fall and Fall-Related Injury  Outcome: Met     Problem: Skin Injury Risk Increased  Goal: Skin Health and Integrity  Outcome: Met     Problem: Neutropenia  Goal: Absence of Infection  Outcome: Met     Problem: Occupational Therapy  Goal: Occupational Therapy Goal  Description: Goals to be met by: 3/30/25    Patient will increase functional independence with ADLs by performing:    UE Dressing with Set-up Assistance.  LE Dressing with Minimal Assistance.  Grooming while standing at sink with Stand-by Assistance.  Toileting from toilet with Minimal Assistance for hygiene and clothing management.   Supine to sit with Supervision.  Step transfer with Stand-by Assistance  Toilet transfer to toilet with Stand-by Assistance.  Upper extremity exercise program per handout, with independence.    Outcome: Met     Problem: Physical Therapy  Goal: Physical Therapy Goal  Description: Goals to be met by: 2025     Patient will increase functional independence with mobility by performin. Supine to sit with Modified [place]  2. Sit to  stand transfer with Supervision using LRAD  3. Bed to chair transfer with Supervision using LRAD  4. Gait  x 50 feet with Stand-by Assistance using LRAD.     Outcome: Met     Problem: Infection  Goal: Absence of Infection Signs and Symptoms  Outcome: Met     Problem: Coping Ineffective  Goal: Effective Coping  Outcome: Met     Problem: Pain Acute  Goal: Optimal Pain Control and Function  Outcome: Met      Pt .

## 2025-04-01 NOTE — NURSING
09:10 pt's friend at bedside reports pt not breathing.  Nurse assessed, no respirations and no heart tones to auscultation.  Notified Dr. Maddox.  MD to pronounce, see MD notes.   10:00 family at bedside.

## 2025-04-01 NOTE — PROGRESS NOTES
Please prioritize patient's comfort and preferences over strict nutritional goals at this time. Offer small, frequent meals, texture modifications, and respect the patient's desire to eat or not eat. Honor food preferences.    RD available if nutrition intervention is desired and in line with goals of care.  RD signing off (consult if warranted)

## 2025-04-01 NOTE — DISCHARGE SUMMARY
[facility] - [facility] and Palliative Medicine  Palliative Medicine  Discharge Summary      Patient Name: Jam Card  MRN: [id number]  Admission Date: 3/22/2025  Hospital Length of Stay: 10 days  Discharge Date and Time: 04/01/2025 9:18 AM  Attending Physician: Karime Maddox MD   Discharging Provider: Karime Maddox MD  Primary Care Provider: Ulysses Almaraz MD    HPI:   Jam Card is a 59-year-old man with HCV cirrhosis with associated ascites, edema, hepatic encephalopathy, HIV, SDH s/p MMA embolization (8/2024) on levetiracetam, pulmonary hypertension who was admitted for decompensated cirrhosis, JASON, acute blood loss anemia who Palliative and Supportive Care was consulted to explore goals of care. In light of no curable treatment options and worsening health trajectory, transitioned to comfort focused care with initial plans to try to return closer to home ([place]) however due to lack of funds and support, unable to utilize financially-burdensome disposition options. He is transferred to the inpatient hospice and palliative care unit on 3/31/2025 for aggressive symptom management at the end of life.    Following admission to the hospice unit on 3/31/2025, patient's symptoms were managed with proportionate opioids and benzodiazepines to ensure comfort during end of life care. Family and patient were supported by our interdisciplinary team of doctors, nurses, social workers, and other team members. Patient peacefully passed away on April 1, 2025 at 09:10 AM.     Cause of death: Decompensated cirrhosis  Estimated onset to death: 3/22/25 - 4/1/25  Tobacco use: Former    Goals of Care Treatment Preferences:  Code Status: DNR      Consults:   Consults (From admission, onward)          Status Ordering Provider     Inpatient consult to Interventional Radiology  Once        Provider:  (Not yet assigned)    Completed MARIAM VACA     Inpatient consult to Social Work  Once        Provider:  (Not yet  assigned)    Completed MICHAEL OLIVERA     Inpatient consult to Palliative Care  Once        Provider:  (Not yet assigned)    Completed MARIAM VACA     Inpatient consult to Hepatology  Once        Provider:  (Not yet assigned)    Completed MIKEY WU     Inpatient consult to Nephrology  Once        Provider:  (Not yet assigned)    Completed MIKEY WU            GI  * Cirrhosis of liver with ascites  Jam Card is a 59-year-old man with PMH of HCV cirrhosis and HIV. Liver disease c/b ascites/edema, HE. 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 Previously evaluated for liver transplant but deffered due to pulmonary hypertension. He presented to OSH ED with generalized weakness, frequent falls, gait instability. Hit his lip, wound noted. Denies hitting his head or LOC. Denies any vomiting, diarrhea, blood in his stool, fever. W/u in ED: CTH neg for bleed/acute abnormality, CXR without infection, UA neg for infection. Hyponatremia to 125, albumin 1.7, LFTs elevated (expected), Cr at BL. OSH d/w Dr. Cloud, patient admitted to [facility] for worsening decompensation including overt hepatic encephalopathy (confusion), worsening hyponatremia, coagulopathy and jaundice. Dey placed for retention.      Overview/Hospital Course:  Admitted with hepatic encephalopathy, started on lactulose and rifaximin.  Hyponatremic with a JASON, Nephrology consulted.  Continuing on albumin challenge.  Diuretics have been held.  Started on IV Rocephin given UTI.  Hepatology on board for decompensated cirrhosis.  Needs CD4 recheck. Will reportedly not be a transplant candidate if CD4 count is <100.  Would additionally provide insight regarding medication compliance. CD4 count returned at 56 which is decreased from before. Discussed w transplant ID, has poor immune recovery which is further lowered by cirrhosis.  Therefore, may not necessarily be medication noncompliance or failure, but rather a  lowered immune response 2/2 cirrhosis. Regardless, will not be a transplant candidate. Continue intermittent paracentesis.       3/25: new drop in Hb which preceeded paracentesis this afternoon. Obtain workup. 1 unit prbc transfusion. Hb stable. Palliative care involved, family meeting was had today. Will transition to hospice. Comfort care orders were placed by palliative team. Consult was placed to IR for palliative pleurX to be placed for ascites. Willing to perform however INR should be <3. Vitamin K was started yesterday, will repeat INR tmrw and contact IR     3/28- PleurX drain could not be placed as INR remains above 3.  Discussed with IR and recommendation to give FFP 30 minutes prior and during PleurX placement on Monday potentially.     3/30- Nurse reports patient is more lethargic, not responding to questions as well as yesterday. Patient treated with Lactulose enema. He had 2 large BMs soon after this. Nurse reports he is improving alertness.      3/31- Patient's INR elevated to 4.2. Discussed with IR.  They recommend transfusion FFP and recheck INR. Issue with hypotension this morning BP 90/47.  Patient with increased drowsiness. Nurse thinks likely not safe to eat. Ordered another Lactulose enema. Stool this morning is bright red blood pres rectum. 3 BMs before noon.  Discussed with palliative care team. Patient is relevantly clinically declining. Life expectancy is now likely on the order of days.  Think his needs would be best met in inpatient hospice unit. Patient will transfer to [facility]    Following admission to the hospice unit on 3/31/2025, patient's symptoms were managed with proportionate opioids and benzodiazepines to ensure comfort during end of life care. Family and patient were supported by our interdisciplinary team of doctors, nurses, social workers, and other team members. Patient peacefully passed away on April 1, 2025 at 09:10 AM.        Final Active Diagnoses:     Diagnosis Date Noted POA    PRINCIPAL PROBLEM:  Cirrhosis of liver with ascites [K74.60, R18.8] 08/10/2024 Yes    Comfort measures only status [Z51.5] 2025 Not Applicable    UTI (urinary tract infection) [N39.0] 2025 Yes    Hyponatremia [E87.1] 2024 Yes    Acute blood loss anemia [D62] 2024 Yes    JASON (acute kidney injury) [N17.9] 2024 Yes    Depression [F32.A] 2024 Yes    Hepatic encephalopathy [K76.82] 2024 Yes    HLD (hyperlipidemia) [E78.5] 2024 Yes    Bilateral subdural hematomas [S06.5XAA] 08/10/2024 Yes    Human immunodeficiency virus (HIV) disease [B20]  Yes      Problems Resolved During this Admission:       Discharged Condition:     Disposition:     Follow Up:    Patient Instructions:   No discharge procedures on file.    Significant Diagnostic Studies: N/A    Pending Diagnostic Studies:       None           Medications:  None    Indwelling Lines/Drains at time of discharge:   Lines/Drains/Airways       None                   Time spent on the discharge of patient: 60 minutes  Patient was seen and examined on the date of discharge and determined to be suitable for discharge.    Karime Maddox MD  Department of Palliative Medicine  [facility] - Hospice and Palliative Medicine

## 2025-04-01 NOTE — SIGNIFICANT EVENT
Death Note    Informed of the patient's expected death on the inpatient palliative and hospice unit. The patient was seen and examined. After one minute auscultation, no spontaneous heartbeat or respiration noted. No withdrawal to stimulation. Patient was pronounced dead at April 1, 2025 at 09:10 AM.    Loved ones at bedside. Condolences expressed.     Cause of death: Decompensated cirrhosis  Estimated onset to death: 3/22/25 - 4/1/25  Tobacco use: Former    Please send LEERS to: [email or web address]

## 2025-04-01 NOTE — CHAPLAIN
Palliative Care        Patient: Jam Card  MRN: [id number]  : 1965  Age: 59 y.o.  Hospital Length of Stay: 10  Code Status: Code Status Discussion Note  Attending Provider: Karime Maddox MD  Principal Problem: Cirrhosis of liver with ascites     What prompted this initial visit?:   [facility]     Who was present during my visit:   Pt's 85 yo mother, a good friend, twin brother and niece present     Non-clinical observations of patient/family or room:  Family appropriately upset, especially the 85 yo mother who has lived with patients since .      Feelings (Emotions/Fears/Experiences/Coping):   Visited along with pall [facility] provider, informing the mother about the patient's death; providing compassionate presence, I accompanied them to the room for the family to see the body. Gave them time alone to grieve, then provided water and bowl of snacks; more treasured time together; later gave them the grief booklet and explained next steps when they were ready.    Ariella (Beliefs/Meaning/Philosophy/Distress):   Pt and family are Taoism Gnosticism and mother of pt welcomed me to pray over the body commending his spirit to The Lord. Family appreciative of the prayer.     Future (Spiritual Care Plan of Action):   Family requested that the pt's twin brother be the point person when [facility] calls him and the  home coordination. They did not want the 85 yo mom called for these logistics.     I let [facility] Dept philipp Tello know this. The twin is Humberto [phone]. The FH is Segundo Samayoa  in [place], [place]. Le[place], in your [facility].        To [facility] Staff:   Educated the patient/family regarding the services offered by the Spiritual Care team in my absence as the specialized Palliative Care  ([phone])  My hours are 7:30a-4:00p M-F, but Spiritual Care Chaplains are available /.  I'm usually the first point of contact for palliative care  patients, but any  is able to help.  Evening, overnight and weekends, please dial [phone] which is carried by an in-house Spiritual Care  at all hours.         Rev Jeremy. Irina Esparza MDiv, [facility]  Board Certified Palliative Care   Palliative Medicine Department, 9th Floor Clinic [facility] [place]     My SpectraLink: [email or web address]   Office: [phone]  ** If you call and I don't answer, please leave a voicemail because vmail is forwarded to me  Email: [email or web address]  Work hours: [phone]     The care I provide is shaped by the Code of Ethics of the [employer] Professional Chaplains

## 2025-04-06 NOTE — PROGRESS NOTES
Subjective   Patient ID:  Jam Card is a 59 y.o. male who presents for evaluation of Pulmonary Hypertension and Consult      HPI  Mr. Card is a 59 y.o. male with PMH of HCV cirrhosis and HIV. Liver disease c/b ascites/edema, HE. 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 Previously evaluated for liver transplant but deffered due to pulmonary hypertension that was seen on TTE. Not had significant evaluation of this as of yet. Here for his first visit. Seems quite ill, has had several admits for encephalopathy. No recent echo since last year. Pasp 44 mm Hg on DSE 2024. Does not seem very active, not walking.     DSE 07/2024:    Left Ventricle: The left ventricle is mildly dilated. Normal wall thickness. There is normal systolic function with a visually estimated ejection fraction of 55 - 60%. There is normal diastolic function.    Right Ventricle: Mild right ventricular enlargement. Systolic function is normal.    Left Atrium: Left atrium is severely dilated.    Aortic Valve: There is mild aortic valve sclerosis. Mildly restricted motion. There is mild stenosis. Aortic valve area by VTI is 2.12 cm². Aortic valve peak velocity is 2.34 m/s. Mean gradient is 10 mmHg. The dimensionless index is 0.58.    Tricuspid Valve: There is mild to moderate regurgitation.    Pulmonary Artery: The estimated pulmonary artery systolic pressure is 44 mmHg.    IVC/SVC: Normal venous pressure at 3 mmHg.    Stress Protocol: The patient was infused intravenously with dobutamine. The patient received a graduated infusion of the stress agent beginning at 10.0 mcg/kg/min to a peak dose of 40.0 mcg/kg/min. The peak heart rate was 144 bpm, which is 88% of age predicted maximum heart rate. The patient was also given atropine for a total dose of .5 mg. The patient reported no symptoms during the stress test. The test was stopped because the end of the protocol was reached.    Baseline ECG: The Baseline ECG reveals sinus rhythm.  The axis is normal. The ST segments are normal.    Stress ECG: There are no ST segment deviation identified during the protocol. There is normal blood pressure response with stress.    ECG Conclusion: The ECG portion of the study is negative for ischemia.    Post-stress Impression: The study is normal and negative with no echocardiographic evidence of stress induced ischemia.       Review of Systems   Constitutional: Positive for malaise/fatigue and weight gain.   Eyes:  Positive for blurred vision.   Respiratory:  Positive for sleep disturbances due to breathing.    Hematologic/Lymphatic: Bruises/bleeds easily.   Musculoskeletal:  Positive for falls, joint pain, joint swelling and muscle weakness.   Gastrointestinal:  Positive for bloating.   Neurological:  Positive for weakness.          Objective     Physical Exam  Vitals and nursing note reviewed. Exam conducted with a chaperone present.   Constitutional:       General: He is not in acute distress.     Appearance: He is well-developed. He is ill-appearing. He is not diaphoretic.   HENT:      Head: Normocephalic and atraumatic.   Eyes:      Conjunctiva/sclera: Conjunctivae normal.      Pupils: Pupils are equal, round, and reactive to light.   Neck:      Thyroid: No thyromegaly.      Vascular: No JVD.   Cardiovascular:      Rate and Rhythm: Normal rate and regular rhythm.      Heart sounds: No murmur heard.     No friction rub. No gallop.   Pulmonary:      Effort: Pulmonary effort is normal. No respiratory distress.      Breath sounds: Normal breath sounds. No wheezing or rales.   Abdominal:      General: Bowel sounds are normal. There is no distension.      Palpations: Abdomen is soft.   Musculoskeletal:         General: No tenderness.      Cervical back: Normal range of motion and neck supple.      Right lower leg: Edema present.      Left lower leg: Edema present.   Lymphadenopathy:      Cervical: No cervical adenopathy.   Skin:     General: Skin is warm.       Coloration: Skin is not pale.      Findings: No erythema or rash.   Neurological:      Mental Status: He is alert and oriented to person, place, and time.   Psychiatric:         Behavior: Behavior normal.            Assessment and Plan     1. Coronary artery disease with history of myocardial infarction without history of CABG    2. Pulmonary hypertension    3. Cirrhosis of liver with ascites, unspecified hepatic cirrhosis type    4. Pre-transplant evaluation for liver transplant        Plan:  Do not see more recent echo, will obtain echo and see where PA pressures are again. Do wonder about his significant medical history, if he is not a candidate for other reasons, including s/p CABG and debility, not sure there is utility in further evaluation such as RHC for his PA pressures. Reached out to hepatology will see repeat echo and go from there. Additionally, likely a mix of WHO group 2 +/- 1 based on his medical history and review of records we have.

## (undated) DEVICE — INTRODUCER CATH 4F 11CM

## (undated) DEVICE — SNAP CAP 18 DOME COVERS

## (undated) DEVICE — KIT CUSTOM MANIFOLD

## (undated) DEVICE — STOPCOCK 3-WAY

## (undated) DEVICE — OMNIPAQUE 350MG 150ML VIAL

## (undated) DEVICE — TRANSDUCER ADULT DISP

## (undated) DEVICE — COVER PROBE US 5.5X58L NON LTX

## (undated) DEVICE — PAD DEFIB CADENCE ADULT R2

## (undated) DEVICE — SHEATH INTRODUCER 7FR 11CM

## (undated) DEVICE — TRAY CATH LAB OMC

## (undated) DEVICE — CATH INFINITI 4F 3DRC 100CM

## (undated) DEVICE — SPIKE SHORT LG BORE 1-WAY 2IN

## (undated) DEVICE — CATH INFINITI JUDKINS JR4

## (undated) DEVICE — KIT MICROINTRO 4F .018X40X7CM

## (undated) DEVICE — GUIDEWIRE EMERALD 150CM PTFE

## (undated) DEVICE — CATH SWAN GANZ STND 7FR

## (undated) DEVICE — CATH JL5 4FR INFINITY

## (undated) DEVICE — CATH INFINITI 4F JL4 .042X100

## (undated) DEVICE — CATH IMA INFINITI 4FRX100CM